# Patient Record
Sex: FEMALE | Race: WHITE | Employment: OTHER | URBAN - METROPOLITAN AREA
[De-identification: names, ages, dates, MRNs, and addresses within clinical notes are randomized per-mention and may not be internally consistent; named-entity substitution may affect disease eponyms.]

---

## 2017-01-05 ENCOUNTER — TELEPHONE (OUTPATIENT)
Dept: ORTHOPEDIC SURGERY | Age: 67
End: 2017-01-05

## 2017-01-06 ENCOUNTER — OFFICE VISIT (OUTPATIENT)
Dept: ORTHOPEDIC SURGERY | Age: 67
End: 2017-01-06

## 2017-01-06 VITALS — RESPIRATION RATE: 16 BRPM | WEIGHT: 171 LBS | HEIGHT: 62 IN | BODY MASS INDEX: 31.47 KG/M2

## 2017-01-06 DIAGNOSIS — M65.341 TRIGGER RING FINGER OF RIGHT HAND: ICD-10-CM

## 2017-01-06 DIAGNOSIS — M79.642 PAIN OF LEFT HAND: Primary | ICD-10-CM

## 2017-01-06 DIAGNOSIS — G56.03 BILATERAL CARPAL TUNNEL SYNDROME: ICD-10-CM

## 2017-01-06 PROCEDURE — 73130 X-RAY EXAM OF HAND: CPT | Performed by: ORTHOPAEDIC SURGERY

## 2017-01-06 PROCEDURE — 20550 NJX 1 TENDON SHEATH/LIGAMENT: CPT | Performed by: ORTHOPAEDIC SURGERY

## 2017-01-06 PROCEDURE — 99213 OFFICE O/P EST LOW 20 MIN: CPT | Performed by: ORTHOPAEDIC SURGERY

## 2017-01-13 ENCOUNTER — OFFICE VISIT (OUTPATIENT)
Dept: ORTHOPEDIC SURGERY | Age: 67
End: 2017-01-13

## 2017-01-13 VITALS — RESPIRATION RATE: 16 BRPM | WEIGHT: 171 LBS | HEIGHT: 62 IN | BODY MASS INDEX: 31.47 KG/M2

## 2017-01-13 DIAGNOSIS — G56.03 BILATERAL CARPAL TUNNEL SYNDROME: Primary | ICD-10-CM

## 2017-01-13 PROCEDURE — 99024 POSTOP FOLLOW-UP VISIT: CPT | Performed by: ORTHOPAEDIC SURGERY

## 2017-02-01 ENCOUNTER — TELEPHONE (OUTPATIENT)
Dept: ORTHOPEDIC SURGERY | Age: 67
End: 2017-02-01

## 2017-02-20 RX ORDER — ROSUVASTATIN CALCIUM 40 MG/1
TABLET, COATED ORAL
Qty: 30 TABLET | Refills: 0 | Status: SHIPPED | OUTPATIENT
Start: 2017-02-20 | End: 2017-08-07

## 2017-03-01 RX ORDER — METOPROLOL SUCCINATE 50 MG/1
50 TABLET, EXTENDED RELEASE ORAL DAILY
Qty: 180 TABLET | Refills: 0 | Status: SHIPPED | OUTPATIENT
Start: 2017-03-01 | End: 2017-06-30 | Stop reason: SDUPTHER

## 2017-03-27 ENCOUNTER — OFFICE VISIT (OUTPATIENT)
Dept: INTERNAL MEDICINE | Age: 67
End: 2017-03-27

## 2017-03-27 VITALS
WEIGHT: 174 LBS | HEART RATE: 68 BPM | HEIGHT: 63 IN | RESPIRATION RATE: 16 BRPM | BODY MASS INDEX: 30.83 KG/M2 | SYSTOLIC BLOOD PRESSURE: 108 MMHG | TEMPERATURE: 97.7 F | DIASTOLIC BLOOD PRESSURE: 72 MMHG

## 2017-03-27 DIAGNOSIS — G56.03 BILATERAL CARPAL TUNNEL SYNDROME: ICD-10-CM

## 2017-03-27 DIAGNOSIS — J44.1 CHRONIC OBSTRUCTIVE PULMONARY DISEASE WITH ACUTE EXACERBATION (HCC): ICD-10-CM

## 2017-03-27 DIAGNOSIS — H53.9 VISUAL CHANGES: ICD-10-CM

## 2017-03-27 DIAGNOSIS — E03.9 HYPOTHYROIDISM, UNSPECIFIED TYPE: ICD-10-CM

## 2017-03-27 DIAGNOSIS — I25.10 CORONARY ARTERY DISEASE INVOLVING NATIVE CORONARY ARTERY OF NATIVE HEART WITHOUT ANGINA PECTORIS: ICD-10-CM

## 2017-03-27 DIAGNOSIS — Z01.818 PRE-OP EXAM: Primary | ICD-10-CM

## 2017-03-27 DIAGNOSIS — E78.00 PURE HYPERCHOLESTEROLEMIA: ICD-10-CM

## 2017-03-27 DIAGNOSIS — I10 ESSENTIAL HYPERTENSION: ICD-10-CM

## 2017-03-27 LAB
A/G RATIO: 1.6 (ref 1.1–2.2)
ALBUMIN SERPL-MCNC: 4.1 G/DL (ref 3.4–5)
ALP BLD-CCNC: 90 U/L (ref 40–129)
ALT SERPL-CCNC: 23 U/L (ref 10–40)
ANION GAP SERPL CALCULATED.3IONS-SCNC: 18 MMOL/L (ref 3–16)
AST SERPL-CCNC: 20 U/L (ref 15–37)
BILIRUB SERPL-MCNC: <0.2 MG/DL (ref 0–1)
BUN BLDV-MCNC: 21 MG/DL (ref 7–20)
CALCIUM SERPL-MCNC: 9.1 MG/DL (ref 8.3–10.6)
CHLORIDE BLD-SCNC: 107 MMOL/L (ref 99–110)
CO2: 23 MMOL/L (ref 21–32)
CREAT SERPL-MCNC: 0.5 MG/DL (ref 0.6–1.2)
GFR AFRICAN AMERICAN: >60
GFR NON-AFRICAN AMERICAN: >60
GLOBULIN: 2.5 G/DL
GLUCOSE BLD-MCNC: 130 MG/DL (ref 70–99)
HCT VFR BLD CALC: 38.2 % (ref 36–48)
HEMOGLOBIN: 12.5 G/DL (ref 12–16)
MCH RBC QN AUTO: 29.9 PG (ref 26–34)
MCHC RBC AUTO-ENTMCNC: 32.6 G/DL (ref 31–36)
MCV RBC AUTO: 91.8 FL (ref 80–100)
PDW BLD-RTO: 14.9 % (ref 12.4–15.4)
PLATELET # BLD: 239 K/UL (ref 135–450)
PMV BLD AUTO: 8.4 FL (ref 5–10.5)
POTASSIUM SERPL-SCNC: 3.6 MMOL/L (ref 3.5–5.1)
RBC # BLD: 4.17 M/UL (ref 4–5.2)
SEDIMENTATION RATE, ERYTHROCYTE: 14 MM/HR (ref 0–30)
SODIUM BLD-SCNC: 148 MMOL/L (ref 136–145)
TOTAL PROTEIN: 6.6 G/DL (ref 6.4–8.2)
WBC # BLD: 6.6 K/UL (ref 4–11)

## 2017-03-27 PROCEDURE — G8484 FLU IMMUNIZE NO ADMIN: HCPCS | Performed by: INTERNAL MEDICINE

## 2017-03-27 PROCEDURE — 99215 OFFICE O/P EST HI 40 MIN: CPT | Performed by: INTERNAL MEDICINE

## 2017-03-27 PROCEDURE — G8926 SPIRO NO PERF OR DOC: HCPCS | Performed by: INTERNAL MEDICINE

## 2017-03-27 PROCEDURE — G8417 CALC BMI ABV UP PARAM F/U: HCPCS | Performed by: INTERNAL MEDICINE

## 2017-03-27 PROCEDURE — 81002 URINALYSIS NONAUTO W/O SCOPE: CPT | Performed by: INTERNAL MEDICINE

## 2017-03-27 PROCEDURE — G8427 DOCREV CUR MEDS BY ELIG CLIN: HCPCS | Performed by: INTERNAL MEDICINE

## 2017-03-27 PROCEDURE — 3017F COLORECTAL CA SCREEN DOC REV: CPT | Performed by: INTERNAL MEDICINE

## 2017-03-27 PROCEDURE — G8399 PT W/DXA RESULTS DOCUMENT: HCPCS | Performed by: INTERNAL MEDICINE

## 2017-03-27 PROCEDURE — 1090F PRES/ABSN URINE INCON ASSESS: CPT | Performed by: INTERNAL MEDICINE

## 2017-03-27 PROCEDURE — G8598 ASA/ANTIPLAT THER USED: HCPCS | Performed by: INTERNAL MEDICINE

## 2017-03-27 PROCEDURE — 36415 COLL VENOUS BLD VENIPUNCTURE: CPT | Performed by: INTERNAL MEDICINE

## 2017-03-27 PROCEDURE — 3023F SPIROM DOC REV: CPT | Performed by: INTERNAL MEDICINE

## 2017-03-27 PROCEDURE — 4040F PNEUMOC VAC/ADMIN/RCVD: CPT | Performed by: INTERNAL MEDICINE

## 2017-03-27 PROCEDURE — 1036F TOBACCO NON-USER: CPT | Performed by: INTERNAL MEDICINE

## 2017-03-27 PROCEDURE — 3014F SCREEN MAMMO DOC REV: CPT | Performed by: INTERNAL MEDICINE

## 2017-03-27 PROCEDURE — 1123F ACP DISCUSS/DSCN MKR DOCD: CPT | Performed by: INTERNAL MEDICINE

## 2017-03-27 ASSESSMENT — ENCOUNTER SYMPTOMS: GASTROINTESTINAL NEGATIVE: 1

## 2017-03-28 LAB
ANA INTERPRETATION: NORMAL
ANTI-NUCLEAR ANTIBODY (ANA): NEGATIVE

## 2017-04-03 ASSESSMENT — ENCOUNTER SYMPTOMS
ALLERGIC/IMMUNOLOGIC NEGATIVE: 1
RESPIRATORY NEGATIVE: 1

## 2017-04-04 ENCOUNTER — SURG/PROC ORDERS (OUTPATIENT)
Dept: ANESTHESIOLOGY | Age: 67
End: 2017-04-04

## 2017-04-04 RX ORDER — SODIUM CHLORIDE 0.9 % (FLUSH) 0.9 %
10 SYRINGE (ML) INJECTION PRN
Status: CANCELLED | OUTPATIENT
Start: 2017-04-04

## 2017-04-04 RX ORDER — SODIUM CHLORIDE 9 MG/ML
INJECTION, SOLUTION INTRAVENOUS CONTINUOUS
Status: CANCELLED | OUTPATIENT
Start: 2017-04-04

## 2017-04-04 RX ORDER — SODIUM CHLORIDE 0.9 % (FLUSH) 0.9 %
10 SYRINGE (ML) INJECTION EVERY 12 HOURS SCHEDULED
Status: CANCELLED | OUTPATIENT
Start: 2017-04-04

## 2017-04-06 ENCOUNTER — HOSPITAL ENCOUNTER (OUTPATIENT)
Dept: SURGERY | Age: 67
Discharge: HOME | End: 2017-04-06
Attending: ORTHOPAEDIC SURGERY | Admitting: ORTHOPAEDIC SURGERY

## 2017-04-06 VITALS
HEART RATE: 61 BPM | WEIGHT: 172.29 LBS | HEIGHT: 62 IN | SYSTOLIC BLOOD PRESSURE: 143 MMHG | BODY MASS INDEX: 31.71 KG/M2 | TEMPERATURE: 97.8 F | DIASTOLIC BLOOD PRESSURE: 63 MMHG | OXYGEN SATURATION: 98 % | RESPIRATION RATE: 12 BRPM

## 2017-04-06 PROCEDURE — 93010 ELECTROCARDIOGRAM REPORT: CPT | Performed by: INTERNAL MEDICINE

## 2017-04-06 RX ORDER — ONDANSETRON 2 MG/ML
4 INJECTION INTRAMUSCULAR; INTRAVENOUS
Status: ACTIVE | OUTPATIENT
Start: 2017-04-06 | End: 2017-04-06

## 2017-04-06 RX ORDER — MORPHINE SULFATE 2 MG/ML
1 INJECTION, SOLUTION INTRAMUSCULAR; INTRAVENOUS EVERY 5 MIN PRN
Status: DISCONTINUED | OUTPATIENT
Start: 2017-04-06 | End: 2017-04-07 | Stop reason: HOSPADM

## 2017-04-06 RX ORDER — MEPERIDINE HYDROCHLORIDE 25 MG/ML
12.5 INJECTION INTRAMUSCULAR; INTRAVENOUS; SUBCUTANEOUS EVERY 5 MIN PRN
Status: DISCONTINUED | OUTPATIENT
Start: 2017-04-06 | End: 2017-04-07 | Stop reason: HOSPADM

## 2017-04-06 RX ORDER — SODIUM CHLORIDE 0.9 % (FLUSH) 0.9 %
10 SYRINGE (ML) INJECTION PRN
Status: DISCONTINUED | OUTPATIENT
Start: 2017-04-06 | End: 2017-04-07 | Stop reason: HOSPADM

## 2017-04-06 RX ORDER — OXYCODONE HYDROCHLORIDE AND ACETAMINOPHEN 5; 325 MG/1; MG/1
1 TABLET ORAL PRN
Status: ACTIVE | OUTPATIENT
Start: 2017-04-06 | End: 2017-04-06

## 2017-04-06 RX ORDER — FENTANYL CITRATE 50 UG/ML
50 INJECTION, SOLUTION INTRAMUSCULAR; INTRAVENOUS EVERY 5 MIN PRN
Status: DISCONTINUED | OUTPATIENT
Start: 2017-04-06 | End: 2017-04-07 | Stop reason: HOSPADM

## 2017-04-06 RX ORDER — SODIUM CHLORIDE 0.9 % (FLUSH) 0.9 %
10 SYRINGE (ML) INJECTION EVERY 12 HOURS SCHEDULED
Status: DISCONTINUED | OUTPATIENT
Start: 2017-04-06 | End: 2017-04-07 | Stop reason: HOSPADM

## 2017-04-06 RX ORDER — MORPHINE SULFATE 2 MG/ML
2 INJECTION, SOLUTION INTRAMUSCULAR; INTRAVENOUS EVERY 5 MIN PRN
Status: DISCONTINUED | OUTPATIENT
Start: 2017-04-06 | End: 2017-04-07 | Stop reason: HOSPADM

## 2017-04-06 RX ORDER — SODIUM CHLORIDE 9 MG/ML
INJECTION, SOLUTION INTRAVENOUS CONTINUOUS
Status: DISCONTINUED | OUTPATIENT
Start: 2017-04-06 | End: 2017-04-07 | Stop reason: HOSPADM

## 2017-04-06 RX ORDER — FENTANYL CITRATE 50 UG/ML
25 INJECTION, SOLUTION INTRAMUSCULAR; INTRAVENOUS EVERY 5 MIN PRN
Status: DISCONTINUED | OUTPATIENT
Start: 2017-04-06 | End: 2017-04-07 | Stop reason: HOSPADM

## 2017-04-06 RX ORDER — OXYCODONE HYDROCHLORIDE AND ACETAMINOPHEN 5; 325 MG/1; MG/1
2 TABLET ORAL PRN
Status: ACTIVE | OUTPATIENT
Start: 2017-04-06 | End: 2017-04-06

## 2017-04-06 RX ADMIN — SODIUM CHLORIDE: 9 INJECTION, SOLUTION INTRAVENOUS at 06:52

## 2017-04-06 ASSESSMENT — ENCOUNTER SYMPTOMS: SHORTNESS OF BREATH: 1

## 2017-04-06 ASSESSMENT — PAIN SCALES - GENERAL: PAINLEVEL_OUTOF10: 3

## 2017-04-06 ASSESSMENT — PAIN - FUNCTIONAL ASSESSMENT: PAIN_FUNCTIONAL_ASSESSMENT: 0-10

## 2017-04-07 LAB
EKG ATRIAL RATE: 61 BPM
EKG DIAGNOSIS: NORMAL
EKG P AXIS: 28 DEGREES
EKG P-R INTERVAL: 238 MS
EKG Q-T INTERVAL: 412 MS
EKG QRS DURATION: 88 MS
EKG QTC CALCULATION (BAZETT): 414 MS
EKG R AXIS: -20 DEGREES
EKG T AXIS: -4 DEGREES
EKG VENTRICULAR RATE: 61 BPM

## 2017-04-11 RX ORDER — LEVOTHYROXINE SODIUM 137 UG/1
TABLET ORAL
Qty: 90 TABLET | Refills: 3 | Status: SHIPPED | OUTPATIENT
Start: 2017-04-11 | End: 2017-09-08 | Stop reason: SDUPTHER

## 2017-04-12 ENCOUNTER — OFFICE VISIT (OUTPATIENT)
Dept: ORTHOPEDIC SURGERY | Age: 67
End: 2017-04-12

## 2017-04-12 VITALS — BODY MASS INDEX: 30.48 KG/M2 | WEIGHT: 172 LBS | RESPIRATION RATE: 16 BRPM | HEIGHT: 63 IN

## 2017-04-12 DIAGNOSIS — G56.03 BILATERAL CARPAL TUNNEL SYNDROME: Primary | ICD-10-CM

## 2017-04-12 PROCEDURE — 99024 POSTOP FOLLOW-UP VISIT: CPT | Performed by: PHYSICIAN ASSISTANT

## 2017-04-19 ENCOUNTER — OFFICE VISIT (OUTPATIENT)
Dept: ORTHOPEDIC SURGERY | Age: 67
End: 2017-04-19

## 2017-04-19 VITALS — BODY MASS INDEX: 30.12 KG/M2 | HEIGHT: 63 IN | WEIGHT: 170 LBS | RESPIRATION RATE: 16 BRPM

## 2017-04-19 DIAGNOSIS — G56.03 BILATERAL CARPAL TUNNEL SYNDROME: Primary | ICD-10-CM

## 2017-04-19 PROCEDURE — 99024 POSTOP FOLLOW-UP VISIT: CPT | Performed by: ORTHOPAEDIC SURGERY

## 2017-05-16 ENCOUNTER — TELEPHONE (OUTPATIENT)
Dept: ORTHOPEDIC SURGERY | Age: 67
End: 2017-05-16

## 2017-05-19 ENCOUNTER — OFFICE VISIT (OUTPATIENT)
Dept: ORTHOPEDIC SURGERY | Age: 67
End: 2017-05-19

## 2017-05-19 VITALS — RESPIRATION RATE: 16 BRPM | HEIGHT: 63 IN | BODY MASS INDEX: 30.12 KG/M2 | WEIGHT: 170 LBS

## 2017-05-19 DIAGNOSIS — G56.03 BILATERAL CARPAL TUNNEL SYNDROME: Primary | ICD-10-CM

## 2017-05-19 PROCEDURE — 99024 POSTOP FOLLOW-UP VISIT: CPT | Performed by: ORTHOPAEDIC SURGERY

## 2017-06-08 ENCOUNTER — OFFICE VISIT (OUTPATIENT)
Dept: INTERNAL MEDICINE | Age: 67
End: 2017-06-08

## 2017-06-08 VITALS
WEIGHT: 177 LBS | RESPIRATION RATE: 16 BRPM | SYSTOLIC BLOOD PRESSURE: 118 MMHG | DIASTOLIC BLOOD PRESSURE: 78 MMHG | BODY MASS INDEX: 31.86 KG/M2 | HEART RATE: 64 BPM

## 2017-06-08 DIAGNOSIS — J44.1 CHRONIC OBSTRUCTIVE PULMONARY DISEASE WITH ACUTE EXACERBATION (HCC): ICD-10-CM

## 2017-06-08 DIAGNOSIS — Z23 NEED FOR TDAP VACCINATION: ICD-10-CM

## 2017-06-08 DIAGNOSIS — E78.00 PURE HYPERCHOLESTEROLEMIA: ICD-10-CM

## 2017-06-08 DIAGNOSIS — E03.9 HYPOTHYROIDISM, UNSPECIFIED TYPE: ICD-10-CM

## 2017-06-08 DIAGNOSIS — I10 ESSENTIAL HYPERTENSION: Primary | ICD-10-CM

## 2017-06-08 PROCEDURE — G8926 SPIRO NO PERF OR DOC: HCPCS | Performed by: INTERNAL MEDICINE

## 2017-06-08 PROCEDURE — 1123F ACP DISCUSS/DSCN MKR DOCD: CPT | Performed by: INTERNAL MEDICINE

## 2017-06-08 PROCEDURE — G8598 ASA/ANTIPLAT THER USED: HCPCS | Performed by: INTERNAL MEDICINE

## 2017-06-08 PROCEDURE — 90715 TDAP VACCINE 7 YRS/> IM: CPT | Performed by: INTERNAL MEDICINE

## 2017-06-08 PROCEDURE — 3014F SCREEN MAMMO DOC REV: CPT | Performed by: INTERNAL MEDICINE

## 2017-06-08 PROCEDURE — 4040F PNEUMOC VAC/ADMIN/RCVD: CPT | Performed by: INTERNAL MEDICINE

## 2017-06-08 PROCEDURE — 90471 IMMUNIZATION ADMIN: CPT | Performed by: INTERNAL MEDICINE

## 2017-06-08 PROCEDURE — 3017F COLORECTAL CA SCREEN DOC REV: CPT | Performed by: INTERNAL MEDICINE

## 2017-06-08 PROCEDURE — G8417 CALC BMI ABV UP PARAM F/U: HCPCS | Performed by: INTERNAL MEDICINE

## 2017-06-08 PROCEDURE — G8399 PT W/DXA RESULTS DOCUMENT: HCPCS | Performed by: INTERNAL MEDICINE

## 2017-06-08 PROCEDURE — 1090F PRES/ABSN URINE INCON ASSESS: CPT | Performed by: INTERNAL MEDICINE

## 2017-06-08 PROCEDURE — 99214 OFFICE O/P EST MOD 30 MIN: CPT | Performed by: INTERNAL MEDICINE

## 2017-06-08 PROCEDURE — 1036F TOBACCO NON-USER: CPT | Performed by: INTERNAL MEDICINE

## 2017-06-08 PROCEDURE — G8427 DOCREV CUR MEDS BY ELIG CLIN: HCPCS | Performed by: INTERNAL MEDICINE

## 2017-06-08 PROCEDURE — 3023F SPIROM DOC REV: CPT | Performed by: INTERNAL MEDICINE

## 2017-06-08 RX ORDER — POTASSIUM CHLORIDE 750 MG/1
10 CAPSULE, EXTENDED RELEASE ORAL 2 TIMES DAILY
COMMUNITY
End: 2017-07-11 | Stop reason: SDUPTHER

## 2017-06-08 RX ORDER — IBUPROFEN 600 MG/1
600 TABLET ORAL EVERY 6 HOURS PRN
Status: ON HOLD | COMMUNITY
End: 2018-08-10 | Stop reason: HOSPADM

## 2017-06-08 RX ORDER — BENAZEPRIL/HYDROCHLOROTHIAZIDE 20 MG-25MG
1 TABLET ORAL DAILY
COMMUNITY
End: 2017-09-08 | Stop reason: SDUPTHER

## 2017-06-08 RX ORDER — ESOMEPRAZOLE MAGNESIUM 40 MG/1
40 FOR SUSPENSION ORAL DAILY
COMMUNITY

## 2017-06-08 ASSESSMENT — ENCOUNTER SYMPTOMS
ALLERGIC/IMMUNOLOGIC NEGATIVE: 1
GASTROINTESTINAL NEGATIVE: 1

## 2017-06-22 LAB
A/G RATIO: 1.5 (ref 1.1–2.2)
ALBUMIN SERPL-MCNC: 4.3 G/DL (ref 3.4–5)
ALP BLD-CCNC: 80 U/L (ref 40–129)
ALT SERPL-CCNC: 19 U/L (ref 10–40)
ANION GAP SERPL CALCULATED.3IONS-SCNC: 13 MMOL/L (ref 3–16)
AST SERPL-CCNC: 17 U/L (ref 15–37)
BILIRUB SERPL-MCNC: 0.3 MG/DL (ref 0–1)
BUN BLDV-MCNC: 27 MG/DL (ref 7–20)
CALCIUM SERPL-MCNC: 9.9 MG/DL (ref 8.3–10.6)
CHLORIDE BLD-SCNC: 101 MMOL/L (ref 99–110)
CHOLESTEROL, TOTAL: 146 MG/DL (ref 0–199)
CO2: 27 MMOL/L (ref 21–32)
CREAT SERPL-MCNC: 0.7 MG/DL (ref 0.6–1.2)
GFR AFRICAN AMERICAN: >60
GFR NON-AFRICAN AMERICAN: >60
GLOBULIN: 2.9 G/DL
GLUCOSE BLD-MCNC: 110 MG/DL (ref 70–99)
HDLC SERPL-MCNC: 42 MG/DL (ref 40–60)
LDL CHOLESTEROL CALCULATED: 64 MG/DL
POTASSIUM SERPL-SCNC: 4.2 MMOL/L (ref 3.5–5.1)
SODIUM BLD-SCNC: 141 MMOL/L (ref 136–145)
TOTAL PROTEIN: 7.2 G/DL (ref 6.4–8.2)
TRIGL SERPL-MCNC: 200 MG/DL (ref 0–150)
TSH SERPL DL<=0.05 MIU/L-ACNC: 0.53 UIU/ML (ref 0.27–4.2)
VLDLC SERPL CALC-MCNC: 40 MG/DL

## 2017-06-30 ENCOUNTER — OFFICE VISIT (OUTPATIENT)
Dept: INTERNAL MEDICINE | Age: 67
End: 2017-06-30

## 2017-06-30 VITALS
BODY MASS INDEX: 31.5 KG/M2 | SYSTOLIC BLOOD PRESSURE: 112 MMHG | HEART RATE: 68 BPM | DIASTOLIC BLOOD PRESSURE: 62 MMHG | RESPIRATION RATE: 16 BRPM | WEIGHT: 175 LBS

## 2017-06-30 DIAGNOSIS — R60.0 LOCALIZED EDEMA: Primary | ICD-10-CM

## 2017-06-30 PROCEDURE — 4040F PNEUMOC VAC/ADMIN/RCVD: CPT | Performed by: INTERNAL MEDICINE

## 2017-06-30 PROCEDURE — 3017F COLORECTAL CA SCREEN DOC REV: CPT | Performed by: INTERNAL MEDICINE

## 2017-06-30 PROCEDURE — 1123F ACP DISCUSS/DSCN MKR DOCD: CPT | Performed by: INTERNAL MEDICINE

## 2017-06-30 PROCEDURE — 1036F TOBACCO NON-USER: CPT | Performed by: INTERNAL MEDICINE

## 2017-06-30 PROCEDURE — G8399 PT W/DXA RESULTS DOCUMENT: HCPCS | Performed by: INTERNAL MEDICINE

## 2017-06-30 PROCEDURE — G8598 ASA/ANTIPLAT THER USED: HCPCS | Performed by: INTERNAL MEDICINE

## 2017-06-30 PROCEDURE — G8417 CALC BMI ABV UP PARAM F/U: HCPCS | Performed by: INTERNAL MEDICINE

## 2017-06-30 PROCEDURE — 1090F PRES/ABSN URINE INCON ASSESS: CPT | Performed by: INTERNAL MEDICINE

## 2017-06-30 PROCEDURE — 99213 OFFICE O/P EST LOW 20 MIN: CPT | Performed by: INTERNAL MEDICINE

## 2017-06-30 PROCEDURE — G8427 DOCREV CUR MEDS BY ELIG CLIN: HCPCS | Performed by: INTERNAL MEDICINE

## 2017-06-30 PROCEDURE — 3014F SCREEN MAMMO DOC REV: CPT | Performed by: INTERNAL MEDICINE

## 2017-06-30 RX ORDER — ALBUTEROL SULFATE 90 UG/1
2 AEROSOL, METERED RESPIRATORY (INHALATION) EVERY 6 HOURS PRN
COMMUNITY
End: 2017-09-08 | Stop reason: SDUPTHER

## 2017-06-30 RX ORDER — METOPROLOL SUCCINATE 50 MG/1
50 TABLET, EXTENDED RELEASE ORAL DAILY
COMMUNITY
End: 2017-11-09

## 2017-06-30 ASSESSMENT — ENCOUNTER SYMPTOMS
GASTROINTESTINAL NEGATIVE: 1
ALLERGIC/IMMUNOLOGIC NEGATIVE: 1

## 2017-07-11 RX ORDER — POTASSIUM CHLORIDE 750 MG/1
10 CAPSULE, EXTENDED RELEASE ORAL 2 TIMES DAILY
Qty: 60 CAPSULE | Refills: 3 | Status: SHIPPED | OUTPATIENT
Start: 2017-07-11 | End: 2017-09-08 | Stop reason: SDUPTHER

## 2017-08-07 RX ORDER — ROSUVASTATIN CALCIUM 40 MG/1
TABLET, COATED ORAL
Qty: 30 TABLET | Refills: 0 | Status: SHIPPED | OUTPATIENT
Start: 2017-08-07 | End: 2017-09-08 | Stop reason: SDUPTHER

## 2017-08-30 ENCOUNTER — OFFICE VISIT (OUTPATIENT)
Dept: ORTHOPEDIC SURGERY | Age: 67
End: 2017-08-30

## 2017-08-30 VITALS — HEIGHT: 63 IN | WEIGHT: 170 LBS | RESPIRATION RATE: 18 BRPM | BODY MASS INDEX: 30.12 KG/M2

## 2017-08-30 DIAGNOSIS — M65.331 TRIGGER MIDDLE FINGER OF RIGHT HAND: Primary | ICD-10-CM

## 2017-08-30 PROCEDURE — 4040F PNEUMOC VAC/ADMIN/RCVD: CPT | Performed by: ORTHOPAEDIC SURGERY

## 2017-08-30 PROCEDURE — 3014F SCREEN MAMMO DOC REV: CPT | Performed by: ORTHOPAEDIC SURGERY

## 2017-08-30 PROCEDURE — G8427 DOCREV CUR MEDS BY ELIG CLIN: HCPCS | Performed by: ORTHOPAEDIC SURGERY

## 2017-08-30 PROCEDURE — 20550 NJX 1 TENDON SHEATH/LIGAMENT: CPT | Performed by: ORTHOPAEDIC SURGERY

## 2017-08-30 PROCEDURE — 1123F ACP DISCUSS/DSCN MKR DOCD: CPT | Performed by: ORTHOPAEDIC SURGERY

## 2017-08-30 PROCEDURE — 1036F TOBACCO NON-USER: CPT | Performed by: ORTHOPAEDIC SURGERY

## 2017-08-30 PROCEDURE — 3017F COLORECTAL CA SCREEN DOC REV: CPT | Performed by: ORTHOPAEDIC SURGERY

## 2017-08-30 PROCEDURE — G8598 ASA/ANTIPLAT THER USED: HCPCS | Performed by: ORTHOPAEDIC SURGERY

## 2017-08-30 PROCEDURE — 99214 OFFICE O/P EST MOD 30 MIN: CPT | Performed by: ORTHOPAEDIC SURGERY

## 2017-08-30 PROCEDURE — G8399 PT W/DXA RESULTS DOCUMENT: HCPCS | Performed by: ORTHOPAEDIC SURGERY

## 2017-08-30 PROCEDURE — G8417 CALC BMI ABV UP PARAM F/U: HCPCS | Performed by: ORTHOPAEDIC SURGERY

## 2017-08-30 PROCEDURE — 1090F PRES/ABSN URINE INCON ASSESS: CPT | Performed by: ORTHOPAEDIC SURGERY

## 2017-11-09 ENCOUNTER — OFFICE VISIT (OUTPATIENT)
Dept: INTERNAL MEDICINE | Age: 67
End: 2017-11-09

## 2017-11-09 VITALS
SYSTOLIC BLOOD PRESSURE: 114 MMHG | HEART RATE: 72 BPM | DIASTOLIC BLOOD PRESSURE: 74 MMHG | OXYGEN SATURATION: 93 % | WEIGHT: 171 LBS | BODY MASS INDEX: 30.78 KG/M2

## 2017-11-09 DIAGNOSIS — E78.2 MIXED HYPERLIPIDEMIA: ICD-10-CM

## 2017-11-09 DIAGNOSIS — I10 ESSENTIAL HYPERTENSION: ICD-10-CM

## 2017-11-09 DIAGNOSIS — E03.9 HYPOTHYROIDISM, UNSPECIFIED TYPE: ICD-10-CM

## 2017-11-09 DIAGNOSIS — Z00.00 ROUTINE GENERAL MEDICAL EXAMINATION AT A HEALTH CARE FACILITY: Primary | ICD-10-CM

## 2017-11-09 DIAGNOSIS — Z78.0 ASYMPTOMATIC MENOPAUSAL STATE: ICD-10-CM

## 2017-11-09 DIAGNOSIS — Z12.11 SCREENING FOR COLORECTAL CANCER: ICD-10-CM

## 2017-11-09 DIAGNOSIS — Z13.6 SCREENING FOR CARDIOVASCULAR CONDITION: ICD-10-CM

## 2017-11-09 DIAGNOSIS — Z72.89 OTHER PROBLEMS RELATED TO LIFESTYLE: ICD-10-CM

## 2017-11-09 DIAGNOSIS — Z13.1 SCREENING FOR DIABETES MELLITUS (DM): ICD-10-CM

## 2017-11-09 DIAGNOSIS — I25.10 CORONARY ARTERY DISEASE INVOLVING NATIVE CORONARY ARTERY OF NATIVE HEART WITHOUT ANGINA PECTORIS: ICD-10-CM

## 2017-11-09 DIAGNOSIS — M48.061 SPINAL STENOSIS OF LUMBAR REGION, UNSPECIFIED WHETHER NEUROGENIC CLAUDICATION PRESENT: ICD-10-CM

## 2017-11-09 DIAGNOSIS — C34.10 MALIGNANT NEOPLASM OF UPPER LOBE OF LUNG, UNSPECIFIED LATERALITY (HCC): ICD-10-CM

## 2017-11-09 DIAGNOSIS — Z12.12 SCREENING FOR COLORECTAL CANCER: ICD-10-CM

## 2017-11-09 DIAGNOSIS — Z23 NEED FOR 23-POLYVALENT PNEUMOCOCCAL POLYSACCHARIDE VACCINE: ICD-10-CM

## 2017-11-09 DIAGNOSIS — I71.21 ASCENDING AORTIC ANEURYSM: ICD-10-CM

## 2017-11-09 DIAGNOSIS — J44.1 CHRONIC OBSTRUCTIVE PULMONARY DISEASE WITH ACUTE EXACERBATION (HCC): ICD-10-CM

## 2017-11-09 PROCEDURE — G0009 ADMIN PNEUMOCOCCAL VACCINE: HCPCS | Performed by: INTERNAL MEDICINE

## 2017-11-09 PROCEDURE — 93000 ELECTROCARDIOGRAM COMPLETE: CPT | Performed by: INTERNAL MEDICINE

## 2017-11-09 PROCEDURE — 90732 PPSV23 VACC 2 YRS+ SUBQ/IM: CPT | Performed by: INTERNAL MEDICINE

## 2017-11-09 PROCEDURE — G0439 PPPS, SUBSEQ VISIT: HCPCS | Performed by: INTERNAL MEDICINE

## 2017-11-09 PROCEDURE — G8598 ASA/ANTIPLAT THER USED: HCPCS | Performed by: INTERNAL MEDICINE

## 2017-11-09 RX ORDER — ROSUVASTATIN CALCIUM 40 MG/1
TABLET, COATED ORAL
Qty: 90 TABLET | Refills: 0 | Status: SHIPPED | OUTPATIENT
Start: 2017-11-09 | End: 2018-03-13 | Stop reason: SDUPTHER

## 2017-11-09 RX ORDER — METOPROLOL SUCCINATE 50 MG/1
TABLET, EXTENDED RELEASE ORAL
Qty: 90 TABLET | Refills: 0 | Status: SHIPPED | OUTPATIENT
Start: 2017-11-09 | End: 2018-03-13 | Stop reason: SDUPTHER

## 2017-11-09 RX ORDER — BENAZEPRIL/HYDROCHLOROTHIAZIDE 20 MG-25MG
1 TABLET ORAL DAILY
Qty: 90 TABLET | Refills: 0 | Status: SHIPPED | OUTPATIENT
Start: 2017-11-09 | End: 2018-06-14 | Stop reason: SDUPTHER

## 2017-11-09 RX ORDER — LEVOTHYROXINE SODIUM 137 UG/1
TABLET ORAL
Qty: 90 TABLET | Refills: 0 | Status: SHIPPED | OUTPATIENT
Start: 2017-11-09 | End: 2018-03-13 | Stop reason: SDUPTHER

## 2017-11-09 ASSESSMENT — ENCOUNTER SYMPTOMS
RHINORRHEA: 0
STRIDOR: 0
EYE DISCHARGE: 0
FACIAL SWELLING: 0
EYE ITCHING: 0
SHORTNESS OF BREATH: 0
ANAL BLEEDING: 0
EYE REDNESS: 0
CONSTIPATION: 0
ABDOMINAL PAIN: 0
ABDOMINAL DISTENTION: 0
VOICE CHANGE: 0
WHEEZING: 0
NAUSEA: 0
BACK PAIN: 0
TROUBLE SWALLOWING: 0
COLOR CHANGE: 0
APNEA: 0
RECTAL PAIN: 0
PHOTOPHOBIA: 0
COUGH: 0
SORE THROAT: 0
SINUS PRESSURE: 0
CHOKING: 0
DIARRHEA: 0
BLOOD IN STOOL: 0
VOMITING: 0
EYE PAIN: 0
CHEST TIGHTNESS: 0

## 2017-11-09 ASSESSMENT — PATIENT HEALTH QUESTIONNAIRE - PHQ9
2. FEELING DOWN, DEPRESSED OR HOPELESS: 0
SUM OF ALL RESPONSES TO PHQ QUESTIONS 1-9: 0
1. LITTLE INTEREST OR PLEASURE IN DOING THINGS: 0
SUM OF ALL RESPONSES TO PHQ9 QUESTIONS 1 & 2: 0

## 2017-11-09 NOTE — PATIENT INSTRUCTIONS
good cholesterol, this type of cholesterol actually carries cholesterol away from your arteries and may, therefore, help lower your risk of having a heart attack. You want this level to be high (ideally greater than 60). It is a risk to have a level less than 40. You can raise this good cholesterol by eating olive oil, canola oil, avocados, or nuts. Exercise raises this level, too. Fat    Fat is calorie dense and packs a lot of calories into a small amount of food. Even though fats should be limited due to their high calorie content, not all fats are bad. In fact, some fats are quite healthful. Fat can be broken down into four main types. The good-for-you fats are:   Monounsaturated fat  found in oils such as olive and canola, avocados, and nuts and natural nut butters; can decrease cholesterol levels, while keeping levels of HDL cholesterol high   Polyunsaturated fat  found in oils such as safflower, sunflower, soybean, corn, and sesame; can decrease total cholesterol and LDL cholesterol   Omega-3 fatty acids  particularly those found in fatty fish (such as salmon, trout, tuna, mackerel, herring, and sardines); can decrease risk of arrhythmias, decrease triglyceride levels, and slightly lower blood pressure   The fats that you want to limit are:   Saturated fat  found in animal products, many fast foods, and a few vegetables; increases total blood cholesterol, including LDL levels   Animal fats that are saturated include: butter, lard, whole-milk dairy products, meat fat, and poultry skin   Vegetable fats that are saturated include: hydrogenated shortening, palm oil, coconut oil, cocoa butter   Hydrogenated or trans fat  found in margarine and vegetable shortening, most shelf stable snack foods, and fried foods; increases LDL and decreases HDL     It is generally recommended that you limit your total fat for the day to less than 30% of your total calories.  If you follow an 1800-calorie heart healthy diet, for week) Tofu Nuts or seeds (unsalted, dry-roasted), low-sodium peanut butter Dried peas, beans, and lentils   Any smoked, cured, salted, or canned meat, fish, or poultry (including castellano, chipped beef, cold cuts, hot dogs, sausages, sardines, and anchovies) Poultry skins Breaded and/or fried fish or meats Canned peas, beans, and lentils Salted nuts   Fats and Oils   Olive oil and canola oil Low-sodium, low-fat salad dressings and mayonnaise   Butter, margarine, coconut and palm oils, castellano fat   Snacks, Sweets, and Condiments   Low-sodium or unsalted versions of broths, soups, soy sauce, and condiments Pepper, herbs, and spices; vinegar, lemon, or lime juice Low-fat frozen desserts (yogurt, sherbet, fruit bars) Sugar, cocoa powder, honey, syrup, jam, and preserves Low-fat, trans-fat free cookies, cakes, and pies Sharan and animal crackers, fig bars, hermelinda snaps   High-fat desserts Broth, soups, gravies, and sauces, made from instant mixes or other high-sodium ingredients Salted snack foods Canned olives Meat tenderizers, seasoning salt, and most flavored vinegars   Beverages   Low-sodium carbonated beverages Tea and coffee in moderation Soy milk   Commercially softened water   Suggestions   Make whole grains, fruits, and vegetables the base of your diet. Choose heart-healthy fats such as canola, olive, and flaxseed oil, and foods high in heart-healthy fats, such as nuts, seeds, soybeans, tofu, and fish. Eat fish at least twice per week; the fish highest in omega-3 fatty acids and lowest in mercury include salmon, herring, mackerel, sardines, and canned chunk light tuna. If you eat fish less than twice per week or have high triglycerides, talk to your doctor about taking fish oil supplements. Read food labels.    For products low in fat and cholesterol, look for fat free, low-fat, cholesterol free, saturated fat free, and trans fat freeAlso scan the Nutrition Facts Label, which lists saturated fat, trans fat, and cholesterol amounts. For products low in sodium, look for sodium free, very low sodium, low sodium, no added salt, and unsalted   Skip the salt when cooking or at the table; if food needs more flavor, get creative and try out different herbs and spices. Garlic and onion also add substantial flavor to foods. Trim any visible fat off meat and poultry before cooking, and drain the fat off after brown. Use cooking methods that require little or no added fat, such as grilling, boiling, baking, poaching, broiling, roasting, steaming, stir-frying, and sauting. Avoid fast food and convenience food. They tend to be high in saturated and trans fat and have a lot of added salt. Talk to a registered dietitian for individualized diet advice.       Last Reviewed: March 2011 Vimal Orellana MS, MPH, RD   Updated: 3/29/2011   ·

## 2017-11-09 NOTE — PROGRESS NOTES
Subjective:      Patient ID: Maegan Bal is a 74 y.o. female. HPI  Patient is here for AWV. Review of Systems   Constitutional: Negative for activity change, appetite change, chills, diaphoresis, fatigue, fever and unexpected weight change. HENT: Negative for congestion, dental problem, drooling, ear discharge, ear pain, facial swelling, hearing loss, mouth sores, nosebleeds, postnasal drip, rhinorrhea, sinus pressure, sneezing, sore throat, tinnitus, trouble swallowing and voice change. Eyes: Negative for photophobia, pain, discharge, redness, itching and visual disturbance. Respiratory: Negative for apnea, cough, choking, chest tightness, shortness of breath, wheezing and stridor. Cardiovascular: Negative for chest pain, palpitations and leg swelling. Gastrointestinal: Negative for abdominal distention, abdominal pain, anal bleeding, blood in stool, constipation, diarrhea, nausea, rectal pain and vomiting. Genitourinary: Negative for decreased urine volume, difficulty urinating, dyspareunia, dysuria, enuresis, flank pain, frequency, genital sores, hematuria, menstrual problem, pelvic pain, urgency, vaginal bleeding, vaginal discharge and vaginal pain. Musculoskeletal: Negative for arthralgias, back pain, gait problem, joint swelling, myalgias, neck pain and neck stiffness. Skin: Negative for color change, pallor, rash and wound. Neurological: Negative for dizziness, tremors, seizures, syncope, facial asymmetry, speech difficulty, weakness, light-headedness, numbness and headaches. Hematological: Negative for adenopathy. Does not bruise/bleed easily. Objective:   Physical Exam   Constitutional: She is oriented to person, place, and time. She appears well-developed and well-nourished. HENT:   Head: Normocephalic and atraumatic.    Right Ear: Tympanic membrane and ear canal normal.   Left Ear: Tympanic membrane and ear canal normal.   Nose: Nose normal.   Mouth/Throat: [place] [employer] Due: see orders.   Recommended screening schedule for the next 5-10 years is provided to the patient in written form: see Patient Instructions/AVS.

## 2017-11-29 DIAGNOSIS — Z00.00 ROUTINE GENERAL MEDICAL EXAMINATION AT A HEALTH CARE FACILITY: ICD-10-CM

## 2017-11-29 DIAGNOSIS — Z72.89 OTHER PROBLEMS RELATED TO LIFESTYLE: ICD-10-CM

## 2017-11-29 DIAGNOSIS — Z13.6 SCREENING FOR CARDIOVASCULAR CONDITION: ICD-10-CM

## 2017-11-29 LAB
A/G RATIO: 1.5 (ref 1.1–2.2)
ALBUMIN SERPL-MCNC: 4.4 G/DL (ref 3.4–5)
ALP BLD-CCNC: 89 U/L (ref 40–129)
ALT SERPL-CCNC: 25 U/L (ref 10–40)
ANION GAP SERPL CALCULATED.3IONS-SCNC: 18 MMOL/L (ref 3–16)
AST SERPL-CCNC: 19 U/L (ref 15–37)
BACTERIA: ABNORMAL /HPF
BASOPHILS ABSOLUTE: 0 K/UL (ref 0–0.2)
BASOPHILS RELATIVE PERCENT: 0.4 %
BILIRUB SERPL-MCNC: 0.3 MG/DL (ref 0–1)
BILIRUBIN URINE: NEGATIVE
BLOOD, URINE: NEGATIVE
BUN BLDV-MCNC: 17 MG/DL (ref 7–20)
CALCIUM SERPL-MCNC: 10 MG/DL (ref 8.3–10.6)
CHLORIDE BLD-SCNC: 102 MMOL/L (ref 99–110)
CHOLESTEROL, TOTAL: 157 MG/DL (ref 0–199)
CLARITY: CLEAR
CO2: 27 MMOL/L (ref 21–32)
COLOR: YELLOW
CREAT SERPL-MCNC: 0.6 MG/DL (ref 0.6–1.2)
CREATININE URINE: 159.4 MG/DL (ref 28–259)
EOSINOPHILS ABSOLUTE: 0.3 K/UL (ref 0–0.6)
EOSINOPHILS RELATIVE PERCENT: 3.3 %
EPITHELIAL CELLS, UA: 5 /HPF (ref 0–5)
GFR AFRICAN AMERICAN: >60
GFR NON-AFRICAN AMERICAN: >60
GLOBULIN: 2.9 G/DL
GLUCOSE BLD-MCNC: 100 MG/DL (ref 70–99)
GLUCOSE URINE: NEGATIVE MG/DL
HCT VFR BLD CALC: 41.5 % (ref 36–48)
HDLC SERPL-MCNC: 52 MG/DL (ref 40–60)
HEMOGLOBIN: 13.7 G/DL (ref 12–16)
HEPATITIS C ANTIBODY INTERPRETATION: NORMAL
HYALINE CASTS: 1 /LPF (ref 0–8)
KETONES, URINE: NEGATIVE MG/DL
LDL CHOLESTEROL CALCULATED: 69 MG/DL
LEUKOCYTE ESTERASE, URINE: ABNORMAL
LYMPHOCYTES ABSOLUTE: 2 K/UL (ref 1–5.1)
LYMPHOCYTES RELATIVE PERCENT: 24 %
MCH RBC QN AUTO: 30.8 PG (ref 26–34)
MCHC RBC AUTO-ENTMCNC: 33 G/DL (ref 31–36)
MCV RBC AUTO: 93.5 FL (ref 80–100)
MICROALBUMIN UR-MCNC: <1.2 MG/DL
MICROALBUMIN/CREAT UR-RTO: NORMAL MG/G (ref 0–30)
MICROSCOPIC EXAMINATION: YES
MONOCYTES ABSOLUTE: 0.7 K/UL (ref 0–1.3)
MONOCYTES RELATIVE PERCENT: 8.9 %
NEUTROPHILS ABSOLUTE: 5.3 K/UL (ref 1.7–7.7)
NEUTROPHILS RELATIVE PERCENT: 63.4 %
NITRITE, URINE: NEGATIVE
PDW BLD-RTO: 14.5 % (ref 12.4–15.4)
PH UA: 5.5
PLATELET # BLD: 271 K/UL (ref 135–450)
PMV BLD AUTO: 8.3 FL (ref 5–10.5)
POTASSIUM SERPL-SCNC: 4 MMOL/L (ref 3.5–5.1)
PROTEIN UA: NEGATIVE MG/DL
RBC # BLD: 4.43 M/UL (ref 4–5.2)
RBC UA: 1 /HPF (ref 0–4)
SODIUM BLD-SCNC: 147 MMOL/L (ref 136–145)
SPECIFIC GRAVITY UA: 1.02
TOTAL PROTEIN: 7.3 G/DL (ref 6.4–8.2)
TRIGL SERPL-MCNC: 182 MG/DL (ref 0–150)
TSH SERPL DL<=0.05 MIU/L-ACNC: 0.87 UIU/ML (ref 0.27–4.2)
UROBILINOGEN, URINE: 0.2 E.U./DL
VLDLC SERPL CALC-MCNC: 36 MG/DL
WBC # BLD: 8.4 K/UL (ref 4–11)
WBC UA: 4 /HPF (ref 0–5)

## 2017-11-30 LAB
ESTIMATED AVERAGE GLUCOSE: 137 MG/DL
HBA1C MFR BLD: 6.4 %

## 2018-03-12 ENCOUNTER — OFFICE VISIT (OUTPATIENT)
Dept: ORTHOPEDIC SURGERY | Age: 68
End: 2018-03-12

## 2018-03-12 VITALS — HEART RATE: 61 BPM | DIASTOLIC BLOOD PRESSURE: 82 MMHG | RESPIRATION RATE: 16 BRPM | SYSTOLIC BLOOD PRESSURE: 131 MMHG

## 2018-03-12 DIAGNOSIS — M65.332 TRIGGER MIDDLE FINGER OF LEFT HAND: ICD-10-CM

## 2018-03-12 PROCEDURE — G8598 ASA/ANTIPLAT THER USED: HCPCS | Performed by: ORTHOPAEDIC SURGERY

## 2018-03-12 PROCEDURE — G8482 FLU IMMUNIZE ORDER/ADMIN: HCPCS | Performed by: ORTHOPAEDIC SURGERY

## 2018-03-12 PROCEDURE — 4040F PNEUMOC VAC/ADMIN/RCVD: CPT | Performed by: ORTHOPAEDIC SURGERY

## 2018-03-12 PROCEDURE — 1036F TOBACCO NON-USER: CPT | Performed by: ORTHOPAEDIC SURGERY

## 2018-03-12 PROCEDURE — G8427 DOCREV CUR MEDS BY ELIG CLIN: HCPCS | Performed by: ORTHOPAEDIC SURGERY

## 2018-03-12 PROCEDURE — 99214 OFFICE O/P EST MOD 30 MIN: CPT | Performed by: ORTHOPAEDIC SURGERY

## 2018-03-12 PROCEDURE — G8399 PT W/DXA RESULTS DOCUMENT: HCPCS | Performed by: ORTHOPAEDIC SURGERY

## 2018-03-12 PROCEDURE — 3014F SCREEN MAMMO DOC REV: CPT | Performed by: ORTHOPAEDIC SURGERY

## 2018-03-12 PROCEDURE — G8417 CALC BMI ABV UP PARAM F/U: HCPCS | Performed by: ORTHOPAEDIC SURGERY

## 2018-03-12 PROCEDURE — 1090F PRES/ABSN URINE INCON ASSESS: CPT | Performed by: ORTHOPAEDIC SURGERY

## 2018-03-12 PROCEDURE — 3017F COLORECTAL CA SCREEN DOC REV: CPT | Performed by: ORTHOPAEDIC SURGERY

## 2018-03-12 PROCEDURE — 1123F ACP DISCUSS/DSCN MKR DOCD: CPT | Performed by: ORTHOPAEDIC SURGERY

## 2018-03-12 PROCEDURE — 20550 NJX 1 TENDON SHEATH/LIGAMENT: CPT | Performed by: ORTHOPAEDIC SURGERY

## 2018-03-12 NOTE — PROGRESS NOTES
Ms. Verla Severe returns today in follow-up of her previously treated  right Middle Finger and Ring Finger Trigger Finger. She was last seen in August, 2017 at which time she was treated with Steroid Injection. She experienced moderate relief of her initial symptoms. She  has not noticed symptom recurrence over the last several weeks. She returns today with newsymptoms of left Middle Finger Stenosing Tenosynovitis, requesting further treatment. The patient's , past medical history, medications, allergies,  family history, social history, and review of systems have been reviewed and are recorded in the chart. Physical Exam:  Vitals  BP: 131/82  Pulse: 61  Resp: 16  Ms. Verla Severe appears well, she is in no apparent distress, she demonstrates appropriate mood & affect. Skin: Skin color, texture, turgor normal. No rashes or lesions bilaterally  Digital range of motion is limited by pain on the Left, greater than Right. There is mild triggering at the A1 pulley of the symptomatic left Middle Finger. Wrist range of motion is full and equal bilateral bilaterally  There is no evidence of gross joint instability bilaterally. Sensation is subjectively normal bilaterally  Vascular examination reveals normal, good capillary refill and good color bilaterally  Swelling is moderate in the symptomatic digit(s) on the Left, normal on the Right  Muscular strength is clinically appropriate bilaterally. Examination for Stenosing Tenosynovitis demonstrates moderate tenderness, thickening & nodularity at the A-1 pulley(s) of the Left Middle Finger. There is a palpable Nota's Node. There is triggering on active flexion with pain. No other digits demonstrate evidence of Stenosing Tenosynovitis. Impression:  Ms. Verla Severe is showing evidence of initial Stenosing Tenosynovitis (Trigger Finger) after previous treatment. She requests additional treatment at this time.     Plan:    After discussion

## 2018-03-13 RX ORDER — METOPROLOL SUCCINATE 50 MG/1
TABLET, EXTENDED RELEASE ORAL
Qty: 90 TABLET | Refills: 0 | Status: SHIPPED | OUTPATIENT
Start: 2018-03-13 | End: 2018-06-14 | Stop reason: SDUPTHER

## 2018-03-13 RX ORDER — ROSUVASTATIN CALCIUM 40 MG/1
TABLET, COATED ORAL
Qty: 90 TABLET | Refills: 0 | Status: SHIPPED | OUTPATIENT
Start: 2018-03-13 | End: 2018-07-06 | Stop reason: SDUPTHER

## 2018-03-13 RX ORDER — LEVOTHYROXINE SODIUM 137 MCG
TABLET ORAL
Qty: 90 TABLET | Refills: 0 | Status: SHIPPED | OUTPATIENT
Start: 2018-03-13 | End: 2018-06-13 | Stop reason: SDUPTHER

## 2018-05-09 ENCOUNTER — OFFICE VISIT (OUTPATIENT)
Dept: INTERNAL MEDICINE | Age: 68
End: 2018-05-09

## 2018-05-09 VITALS
HEART RATE: 62 BPM | SYSTOLIC BLOOD PRESSURE: 112 MMHG | OXYGEN SATURATION: 95 % | HEIGHT: 62 IN | DIASTOLIC BLOOD PRESSURE: 62 MMHG | WEIGHT: 168 LBS | TEMPERATURE: 98.6 F | BODY MASS INDEX: 30.91 KG/M2

## 2018-05-09 DIAGNOSIS — I25.10 CORONARY ARTERY DISEASE INVOLVING NATIVE CORONARY ARTERY OF NATIVE HEART WITHOUT ANGINA PECTORIS: ICD-10-CM

## 2018-05-09 DIAGNOSIS — I10 ESSENTIAL HYPERTENSION: Primary | ICD-10-CM

## 2018-05-09 DIAGNOSIS — J44.1 CHRONIC OBSTRUCTIVE PULMONARY DISEASE WITH ACUTE EXACERBATION (HCC): ICD-10-CM

## 2018-05-09 DIAGNOSIS — E03.9 HYPOTHYROIDISM, UNSPECIFIED TYPE: ICD-10-CM

## 2018-05-09 DIAGNOSIS — E78.2 MIXED HYPERLIPIDEMIA: ICD-10-CM

## 2018-05-09 DIAGNOSIS — I71.21 ASCENDING AORTIC ANEURYSM: ICD-10-CM

## 2018-05-09 DIAGNOSIS — C34.10 MALIGNANT NEOPLASM OF UPPER LOBE OF LUNG, UNSPECIFIED LATERALITY (HCC): ICD-10-CM

## 2018-05-09 PROCEDURE — 1090F PRES/ABSN URINE INCON ASSESS: CPT | Performed by: NURSE PRACTITIONER

## 2018-05-09 PROCEDURE — 3017F COLORECTAL CA SCREEN DOC REV: CPT | Performed by: NURSE PRACTITIONER

## 2018-05-09 PROCEDURE — 1036F TOBACCO NON-USER: CPT | Performed by: NURSE PRACTITIONER

## 2018-05-09 PROCEDURE — 1123F ACP DISCUSS/DSCN MKR DOCD: CPT | Performed by: NURSE PRACTITIONER

## 2018-05-09 PROCEDURE — 99214 OFFICE O/P EST MOD 30 MIN: CPT | Performed by: NURSE PRACTITIONER

## 2018-05-09 PROCEDURE — G8926 SPIRO NO PERF OR DOC: HCPCS | Performed by: NURSE PRACTITIONER

## 2018-05-09 PROCEDURE — G8399 PT W/DXA RESULTS DOCUMENT: HCPCS | Performed by: NURSE PRACTITIONER

## 2018-05-09 PROCEDURE — G8427 DOCREV CUR MEDS BY ELIG CLIN: HCPCS | Performed by: NURSE PRACTITIONER

## 2018-05-09 PROCEDURE — G8598 ASA/ANTIPLAT THER USED: HCPCS | Performed by: NURSE PRACTITIONER

## 2018-05-09 PROCEDURE — G8417 CALC BMI ABV UP PARAM F/U: HCPCS | Performed by: NURSE PRACTITIONER

## 2018-05-09 PROCEDURE — 4040F PNEUMOC VAC/ADMIN/RCVD: CPT | Performed by: NURSE PRACTITIONER

## 2018-05-09 PROCEDURE — 3023F SPIROM DOC REV: CPT | Performed by: NURSE PRACTITIONER

## 2018-05-09 ASSESSMENT — ENCOUNTER SYMPTOMS
CONSTIPATION: 0
VOMITING: 0
ABDOMINAL PAIN: 0
NAUSEA: 0
WHEEZING: 0
COLOR CHANGE: 0
RHINORRHEA: 0
SORE THROAT: 0
EYE REDNESS: 0
SINUS PRESSURE: 0
CHEST TIGHTNESS: 0
BLOOD IN STOOL: 0
BACK PAIN: 0
COUGH: 0
DIARRHEA: 0
EYE ITCHING: 0
SHORTNESS OF BREATH: 0

## 2018-06-13 RX ORDER — LEVOTHYROXINE SODIUM 137 MCG
TABLET ORAL
Qty: 90 TABLET | Refills: 0 | Status: ON HOLD | OUTPATIENT
Start: 2018-06-13 | End: 2018-08-10 | Stop reason: HOSPADM

## 2018-06-14 ENCOUNTER — TELEPHONE (OUTPATIENT)
Dept: INTERNAL MEDICINE | Age: 68
End: 2018-06-14

## 2018-06-14 RX ORDER — METOPROLOL SUCCINATE 50 MG/1
TABLET, EXTENDED RELEASE ORAL
Qty: 90 TABLET | Refills: 0 | Status: ON HOLD | OUTPATIENT
Start: 2018-06-14 | End: 2018-08-01 | Stop reason: HOSPADM

## 2018-06-14 RX ORDER — BENAZEPRIL/HYDROCHLOROTHIAZIDE 20 MG-25MG
1 TABLET ORAL DAILY
Qty: 90 TABLET | Refills: 0 | Status: ON HOLD | OUTPATIENT
Start: 2018-06-14 | End: 2018-08-01 | Stop reason: HOSPADM

## 2018-06-22 PROBLEM — R04.2 HEMOPTYSIS: Status: ACTIVE | Noted: 2017-12-19

## 2018-06-22 PROBLEM — N64.4 PAINFUL LUMPY RIGHT BREAST: Status: ACTIVE | Noted: 2018-01-11

## 2018-06-22 PROBLEM — N63.10 PAINFUL LUMPY RIGHT BREAST: Status: ACTIVE | Noted: 2018-01-11

## 2018-07-06 RX ORDER — ROSUVASTATIN CALCIUM 40 MG/1
TABLET, COATED ORAL
Qty: 90 TABLET | Refills: 0 | Status: SHIPPED | OUTPATIENT
Start: 2018-07-06 | End: 2018-07-06 | Stop reason: SDUPTHER

## 2018-07-09 RX ORDER — ROSUVASTATIN CALCIUM 40 MG/1
TABLET, COATED ORAL
Qty: 90 TABLET | Refills: 0 | Status: ON HOLD | OUTPATIENT
Start: 2018-07-09 | End: 2018-08-10 | Stop reason: HOSPADM

## 2018-07-24 ENCOUNTER — APPOINTMENT (OUTPATIENT)
Dept: GENERAL RADIOLOGY | Age: 68
DRG: 231 | End: 2018-07-24
Payer: MEDICARE

## 2018-07-24 ENCOUNTER — APPOINTMENT (OUTPATIENT)
Dept: CT IMAGING | Age: 68
DRG: 231 | End: 2018-07-24
Payer: MEDICARE

## 2018-07-24 ENCOUNTER — HOSPITAL ENCOUNTER (INPATIENT)
Age: 68
LOS: 8 days | Discharge: NURSING, REHAB OR LONG-TERM CARE | DRG: 231 | End: 2018-08-01
Attending: EMERGENCY MEDICINE | Admitting: INTERNAL MEDICINE
Payer: MEDICARE

## 2018-07-24 ENCOUNTER — OFFICE VISIT (OUTPATIENT)
Dept: INTERNAL MEDICINE | Age: 68
End: 2018-07-24

## 2018-07-24 VITALS
BODY MASS INDEX: 30.73 KG/M2 | WEIGHT: 167 LBS | HEART RATE: 74 BPM | HEIGHT: 62 IN | SYSTOLIC BLOOD PRESSURE: 122 MMHG | DIASTOLIC BLOOD PRESSURE: 76 MMHG | OXYGEN SATURATION: 97 %

## 2018-07-24 DIAGNOSIS — R10.13 DYSPEPSIA: ICD-10-CM

## 2018-07-24 DIAGNOSIS — F41.9 ANXIETY: ICD-10-CM

## 2018-07-24 DIAGNOSIS — I25.10 CORONARY ARTERY DISEASE INVOLVING NATIVE CORONARY ARTERY OF NATIVE HEART WITHOUT ANGINA PECTORIS: ICD-10-CM

## 2018-07-24 DIAGNOSIS — R07.9 CHEST PAIN, UNSPECIFIED TYPE: Primary | ICD-10-CM

## 2018-07-24 DIAGNOSIS — I71.21 ASCENDING AORTIC ANEURYSM: ICD-10-CM

## 2018-07-24 DIAGNOSIS — I10 ESSENTIAL HYPERTENSION: Primary | ICD-10-CM

## 2018-07-24 LAB
A/G RATIO: 1.3 (ref 1.1–2.2)
ABO/RH: NORMAL
ALBUMIN SERPL-MCNC: 3.7 G/DL (ref 3.4–5)
ALP BLD-CCNC: 67 U/L (ref 40–129)
ALT SERPL-CCNC: 20 U/L (ref 10–40)
ANION GAP SERPL CALCULATED.3IONS-SCNC: 11 MMOL/L (ref 3–16)
ANION GAP SERPL CALCULATED.3IONS-SCNC: 14 MMOL/L (ref 3–16)
ANTIBODY SCREEN: NORMAL
APTT: 30.9 SEC (ref 26–36)
AST SERPL-CCNC: 23 U/L (ref 15–37)
BASOPHILS ABSOLUTE: 0.1 K/UL (ref 0–0.2)
BASOPHILS RELATIVE PERCENT: 1 %
BILIRUB SERPL-MCNC: 0.3 MG/DL (ref 0–1)
BILIRUBIN DIRECT: <0.2 MG/DL (ref 0–0.3)
BILIRUBIN URINE: NEGATIVE
BILIRUBIN, INDIRECT: NORMAL MG/DL (ref 0–1)
BLOOD, URINE: NEGATIVE
BUN BLDV-MCNC: 15 MG/DL (ref 7–20)
BUN BLDV-MCNC: 23 MG/DL (ref 7–20)
CALCIUM SERPL-MCNC: 10.1 MG/DL (ref 8.3–10.6)
CALCIUM SERPL-MCNC: 9.1 MG/DL (ref 8.3–10.6)
CHLORIDE BLD-SCNC: 102 MMOL/L (ref 99–110)
CHLORIDE BLD-SCNC: 104 MMOL/L (ref 99–110)
CLARITY: CLEAR
CO2: 24 MMOL/L (ref 21–32)
CO2: 27 MMOL/L (ref 21–32)
COLOR: YELLOW
CREAT SERPL-MCNC: 0.5 MG/DL (ref 0.6–1.2)
CREAT SERPL-MCNC: <0.5 MG/DL (ref 0.6–1.2)
EKG ATRIAL RATE: 59 BPM
EKG DIAGNOSIS: NORMAL
EKG P AXIS: 32 DEGREES
EKG P-R INTERVAL: 196 MS
EKG Q-T INTERVAL: 432 MS
EKG QRS DURATION: 90 MS
EKG QTC CALCULATION (BAZETT): 427 MS
EKG R AXIS: -12 DEGREES
EKG T AXIS: -53 DEGREES
EKG VENTRICULAR RATE: 59 BPM
EOSINOPHILS ABSOLUTE: 0.4 K/UL (ref 0–0.6)
EOSINOPHILS RELATIVE PERCENT: 5.5 %
EPITHELIAL CELLS, UA: NORMAL /HPF
FIBRINOGEN: 273 MG/DL (ref 200–397)
GFR AFRICAN AMERICAN: >60
GFR AFRICAN AMERICAN: >60
GFR NON-AFRICAN AMERICAN: >60
GFR NON-AFRICAN AMERICAN: >60
GLOBULIN: 2.8 G/DL
GLUCOSE BLD-MCNC: 107 MG/DL (ref 70–99)
GLUCOSE BLD-MCNC: 113 MG/DL (ref 70–99)
GLUCOSE URINE: NEGATIVE MG/DL
HCT VFR BLD CALC: 41.1 % (ref 36–48)
HCT VFR BLD CALC: 41.1 % (ref 36–48)
HEMOGLOBIN: 13.5 G/DL (ref 12–16)
HEMOGLOBIN: 13.7 G/DL (ref 12–16)
INR BLD: 0.91 (ref 0.86–1.14)
INR BLD: 1.4 (ref 0.8–1.4)
KETONES, URINE: NEGATIVE MG/DL
LEFT VENTRICULAR EJECTION FRACTION MODE: NORMAL
LEUKOCYTE ESTERASE, URINE: ABNORMAL
LV EF: 35 %
LYMPHOCYTES ABSOLUTE: 1.9 K/UL (ref 1–5.1)
LYMPHOCYTES RELATIVE PERCENT: 29.7 %
MAGNESIUM: 1.8 MG/DL (ref 1.8–2.4)
MCH RBC QN AUTO: 30.8 PG (ref 26–34)
MCH RBC QN AUTO: 31 PG (ref 26–34)
MCHC RBC AUTO-ENTMCNC: 33 G/DL (ref 31–36)
MCHC RBC AUTO-ENTMCNC: 33.2 G/DL (ref 31–36)
MCV RBC AUTO: 93.2 FL (ref 80–100)
MCV RBC AUTO: 93.3 FL (ref 80–100)
MICROSCOPIC EXAMINATION: YES
MONOCYTES ABSOLUTE: 0.6 K/UL (ref 0–1.3)
MONOCYTES RELATIVE PERCENT: 8.9 %
NEUTROPHILS ABSOLUTE: 3.5 K/UL (ref 1.7–7.7)
NEUTROPHILS RELATIVE PERCENT: 54.9 %
NITRITE, URINE: NEGATIVE
PDW BLD-RTO: 14.2 % (ref 12.4–15.4)
PDW BLD-RTO: 14.2 % (ref 12.4–15.4)
PH UA: 6.5
PLATELET # BLD: 236 K/UL (ref 135–450)
PLATELET # BLD: 243 K/UL (ref 135–450)
PMV BLD AUTO: 7.3 FL (ref 5–10.5)
PMV BLD AUTO: 8.1 FL (ref 5–10.5)
POC ACT LR: 126 SEC (ref 113–149)
POC ACT LR: 230 SEC (ref 113–149)
POC ACT LR: 271 SEC (ref 113–149)
POC ACT LR: 298 SEC (ref 113–149)
POC ACT LR: 300 SEC (ref 113–149)
POTASSIUM REFLEX MAGNESIUM: 3.5 MMOL/L (ref 3.5–5.1)
POTASSIUM REFLEX MAGNESIUM: 3.8 MMOL/L (ref 3.5–5.1)
PRO-BNP: 854 PG/ML (ref 0–124)
PROTEIN UA: NEGATIVE MG/DL
PROTHROMBIN TIME: 10.4 SEC (ref 9.8–13)
RBC # BLD: 4.4 M/UL (ref 4–5.2)
RBC # BLD: 4.41 M/UL (ref 4–5.2)
RBC UA: NORMAL /HPF (ref 0–2)
SODIUM BLD-SCNC: 140 MMOL/L (ref 136–145)
SODIUM BLD-SCNC: 142 MMOL/L (ref 136–145)
SPECIFIC GRAVITY UA: 1.01
TOTAL PROTEIN: 6.5 G/DL (ref 6.4–8.2)
TROPONIN: 0.16 NG/ML
TROPONIN: 0.17 NG/ML
URINE TYPE: ABNORMAL
UROBILINOGEN, URINE: 0.2 E.U./DL
WBC # BLD: 6.4 K/UL (ref 4–11)
WBC # BLD: 6.5 K/UL (ref 4–11)
WBC UA: NORMAL /HPF (ref 0–5)

## 2018-07-24 PROCEDURE — 86900 BLOOD TYPING SEROLOGIC ABO: CPT

## 2018-07-24 PROCEDURE — 36415 COLL VENOUS BLD VENIPUNCTURE: CPT

## 2018-07-24 PROCEDURE — 3017F COLORECTAL CA SCREEN DOC REV: CPT | Performed by: INTERNAL MEDICINE

## 2018-07-24 PROCEDURE — 99153 MOD SED SAME PHYS/QHP EA: CPT

## 2018-07-24 PROCEDURE — 93000 ELECTROCARDIOGRAM COMPLETE: CPT | Performed by: INTERNAL MEDICINE

## 2018-07-24 PROCEDURE — 92920 PRQ TRLUML C ANGIOP 1ART&/BR: CPT

## 2018-07-24 PROCEDURE — 6360000004 HC RX CONTRAST MEDICATION: Performed by: INTERNAL MEDICINE

## 2018-07-24 PROCEDURE — B2151ZZ FLUOROSCOPY OF LEFT HEART USING LOW OSMOLAR CONTRAST: ICD-10-PCS | Performed by: INTERNAL MEDICINE

## 2018-07-24 PROCEDURE — 85520 HEPARIN ASSAY: CPT

## 2018-07-24 PROCEDURE — 85384 FIBRINOGEN ACTIVITY: CPT

## 2018-07-24 PROCEDURE — G8427 DOCREV CUR MEDS BY ELIG CLIN: HCPCS | Performed by: INTERNAL MEDICINE

## 2018-07-24 PROCEDURE — 2500000003 HC RX 250 WO HCPCS

## 2018-07-24 PROCEDURE — C1894 INTRO/SHEATH, NON-LASER: HCPCS

## 2018-07-24 PROCEDURE — C1760 CLOSURE DEV, VASC: HCPCS

## 2018-07-24 PROCEDURE — 1036F TOBACCO NON-USER: CPT | Performed by: INTERNAL MEDICINE

## 2018-07-24 PROCEDURE — C1725 CATH, TRANSLUMIN NON-LASER: HCPCS

## 2018-07-24 PROCEDURE — 6370000000 HC RX 637 (ALT 250 FOR IP): Performed by: STUDENT IN AN ORGANIZED HEALTH CARE EDUCATION/TRAINING PROGRAM

## 2018-07-24 PROCEDURE — C1887 CATHETER, GUIDING: HCPCS

## 2018-07-24 PROCEDURE — 86850 RBC ANTIBODY SCREEN: CPT

## 2018-07-24 PROCEDURE — 1090F PRES/ABSN URINE INCON ASSESS: CPT | Performed by: INTERNAL MEDICINE

## 2018-07-24 PROCEDURE — 02703ZZ DILATION OF CORONARY ARTERY, ONE ARTERY, PERCUTANEOUS APPROACH: ICD-10-PCS | Performed by: INTERNAL MEDICINE

## 2018-07-24 PROCEDURE — 6360000002 HC RX W HCPCS: Performed by: EMERGENCY MEDICINE

## 2018-07-24 PROCEDURE — 71275 CT ANGIOGRAPHY CHEST: CPT

## 2018-07-24 PROCEDURE — 96374 THER/PROPH/DIAG INJ IV PUSH: CPT

## 2018-07-24 PROCEDURE — 2700000000 HC OXYGEN THERAPY PER DAY

## 2018-07-24 PROCEDURE — B2111ZZ FLUOROSCOPY OF MULTIPLE CORONARY ARTERIES USING LOW OSMOLAR CONTRAST: ICD-10-PCS | Performed by: INTERNAL MEDICINE

## 2018-07-24 PROCEDURE — 2500000003 HC RX 250 WO HCPCS: Performed by: INTERNAL MEDICINE

## 2018-07-24 PROCEDURE — 99285 EMERGENCY DEPT VISIT HI MDM: CPT

## 2018-07-24 PROCEDURE — 6370000000 HC RX 637 (ALT 250 FOR IP)

## 2018-07-24 PROCEDURE — 94761 N-INVAS EAR/PLS OXIMETRY MLT: CPT

## 2018-07-24 PROCEDURE — 71046 X-RAY EXAM CHEST 2 VIEWS: CPT

## 2018-07-24 PROCEDURE — 6360000002 HC RX W HCPCS

## 2018-07-24 PROCEDURE — 2000000000 HC ICU R&B

## 2018-07-24 PROCEDURE — 93458 L HRT ARTERY/VENTRICLE ANGIO: CPT | Performed by: INTERNAL MEDICINE

## 2018-07-24 PROCEDURE — 2709999900 HC NON-CHARGEABLE SUPPLY

## 2018-07-24 PROCEDURE — 6360000002 HC RX W HCPCS: Performed by: INTERNAL MEDICINE

## 2018-07-24 PROCEDURE — 80053 COMPREHEN METABOLIC PANEL: CPT

## 2018-07-24 PROCEDURE — 2580000003 HC RX 258: Performed by: INTERNAL MEDICINE

## 2018-07-24 PROCEDURE — 85610 PROTHROMBIN TIME: CPT

## 2018-07-24 PROCEDURE — 86901 BLOOD TYPING SEROLOGIC RH(D): CPT

## 2018-07-24 PROCEDURE — 83735 ASSAY OF MAGNESIUM: CPT

## 2018-07-24 PROCEDURE — 6370000000 HC RX 637 (ALT 250 FOR IP): Performed by: INTERNAL MEDICINE

## 2018-07-24 PROCEDURE — 1101F PT FALLS ASSESS-DOCD LE1/YR: CPT | Performed by: INTERNAL MEDICINE

## 2018-07-24 PROCEDURE — 99214 OFFICE O/P EST MOD 30 MIN: CPT | Performed by: INTERNAL MEDICINE

## 2018-07-24 PROCEDURE — 85730 THROMBOPLASTIN TIME PARTIAL: CPT

## 2018-07-24 PROCEDURE — G8399 PT W/DXA RESULTS DOCUMENT: HCPCS | Performed by: INTERNAL MEDICINE

## 2018-07-24 PROCEDURE — G8510 SCR DEP NEG, NO PLAN REQD: HCPCS | Performed by: INTERNAL MEDICINE

## 2018-07-24 PROCEDURE — 83036 HEMOGLOBIN GLYCOSYLATED A1C: CPT

## 2018-07-24 PROCEDURE — 85347 COAGULATION TIME ACTIVATED: CPT

## 2018-07-24 PROCEDURE — 99223 1ST HOSP IP/OBS HIGH 75: CPT | Performed by: INTERNAL MEDICINE

## 2018-07-24 PROCEDURE — 99152 MOD SED SAME PHYS/QHP 5/>YRS: CPT

## 2018-07-24 PROCEDURE — 1123F ACP DISCUSS/DSCN MKR DOCD: CPT | Performed by: INTERNAL MEDICINE

## 2018-07-24 PROCEDURE — 93005 ELECTROCARDIOGRAM TRACING: CPT | Performed by: EMERGENCY MEDICINE

## 2018-07-24 PROCEDURE — C1769 GUIDE WIRE: HCPCS

## 2018-07-24 PROCEDURE — 92920 PRQ TRLUML C ANGIOP 1ART&/BR: CPT | Performed by: INTERNAL MEDICINE

## 2018-07-24 PROCEDURE — 3288F FALL RISK ASSESSMENT DOCD: CPT | Performed by: INTERNAL MEDICINE

## 2018-07-24 PROCEDURE — 85025 COMPLETE CBC W/AUTO DIFF WBC: CPT

## 2018-07-24 PROCEDURE — 87086 URINE CULTURE/COLONY COUNT: CPT

## 2018-07-24 PROCEDURE — 87081 CULTURE SCREEN ONLY: CPT

## 2018-07-24 PROCEDURE — 94664 DEMO&/EVAL PT USE INHALER: CPT

## 2018-07-24 PROCEDURE — 84484 ASSAY OF TROPONIN QUANT: CPT

## 2018-07-24 PROCEDURE — 81001 URINALYSIS AUTO W/SCOPE: CPT

## 2018-07-24 PROCEDURE — 80061 LIPID PANEL: CPT

## 2018-07-24 PROCEDURE — 94010 BREATHING CAPACITY TEST: CPT

## 2018-07-24 PROCEDURE — 6360000004 HC RX CONTRAST MEDICATION: Performed by: EMERGENCY MEDICINE

## 2018-07-24 PROCEDURE — G8598 ASA/ANTIPLAT THER USED: HCPCS | Performed by: INTERNAL MEDICINE

## 2018-07-24 PROCEDURE — 85027 COMPLETE CBC AUTOMATED: CPT

## 2018-07-24 PROCEDURE — G8417 CALC BMI ABV UP PARAM F/U: HCPCS | Performed by: INTERNAL MEDICINE

## 2018-07-24 PROCEDURE — 4A023N7 MEASUREMENT OF CARDIAC SAMPLING AND PRESSURE, LEFT HEART, PERCUTANEOUS APPROACH: ICD-10-PCS | Performed by: INTERNAL MEDICINE

## 2018-07-24 PROCEDURE — 83880 ASSAY OF NATRIURETIC PEPTIDE: CPT

## 2018-07-24 PROCEDURE — 93458 L HRT ARTERY/VENTRICLE ANGIO: CPT

## 2018-07-24 PROCEDURE — 75635 CT ANGIO ABDOMINAL ARTERIES: CPT

## 2018-07-24 PROCEDURE — C1874 STENT, COATED/COV W/DEL SYS: HCPCS

## 2018-07-24 PROCEDURE — 4040F PNEUMOC VAC/ADMIN/RCVD: CPT | Performed by: INTERNAL MEDICINE

## 2018-07-24 RX ORDER — ASPIRIN 81 MG/1
81 TABLET, CHEWABLE ORAL DAILY
Status: DISCONTINUED | OUTPATIENT
Start: 2018-07-24 | End: 2018-07-26

## 2018-07-24 RX ORDER — CHLORHEXIDINE GLUCONATE 0.12 MG/ML
15 RINSE ORAL 2 TIMES DAILY
Status: DISCONTINUED | OUTPATIENT
Start: 2018-07-24 | End: 2018-07-26

## 2018-07-24 RX ORDER — SODIUM CHLORIDE 0.9 % (FLUSH) 0.9 %
10 SYRINGE (ML) INJECTION PRN
Status: DISCONTINUED | OUTPATIENT
Start: 2018-07-24 | End: 2018-07-24 | Stop reason: SDUPTHER

## 2018-07-24 RX ORDER — HEPARIN SODIUM 10000 [USP'U]/100ML
12 INJECTION, SOLUTION INTRAVENOUS CONTINUOUS
Status: DISCONTINUED | OUTPATIENT
Start: 2018-07-24 | End: 2018-07-24

## 2018-07-24 RX ORDER — ATROPINE SULFATE 0.4 MG/ML
0.5 AMPUL (ML) INJECTION
Status: ACTIVE | OUTPATIENT
Start: 2018-07-24 | End: 2018-07-24

## 2018-07-24 RX ORDER — ONDANSETRON 2 MG/ML
4 INJECTION INTRAMUSCULAR; INTRAVENOUS EVERY 6 HOURS PRN
Status: DISCONTINUED | OUTPATIENT
Start: 2018-07-24 | End: 2018-07-24 | Stop reason: SDUPTHER

## 2018-07-24 RX ORDER — HEPARIN SODIUM 5000 [USP'U]/ML
4000 INJECTION, SOLUTION INTRAVENOUS; SUBCUTANEOUS PRN
Status: DISCONTINUED | OUTPATIENT
Start: 2018-07-24 | End: 2018-07-26

## 2018-07-24 RX ORDER — ATORVASTATIN CALCIUM 40 MG/1
40 TABLET, FILM COATED ORAL NIGHTLY
Status: DISCONTINUED | OUTPATIENT
Start: 2018-07-24 | End: 2018-07-24 | Stop reason: SDUPTHER

## 2018-07-24 RX ORDER — ATORVASTATIN CALCIUM 80 MG/1
80 TABLET, FILM COATED ORAL NIGHTLY
Status: DISCONTINUED | OUTPATIENT
Start: 2018-07-24 | End: 2018-07-26

## 2018-07-24 RX ORDER — HEPARIN SODIUM 5000 [USP'U]/ML
2000 INJECTION, SOLUTION INTRAVENOUS; SUBCUTANEOUS PRN
Status: DISCONTINUED | OUTPATIENT
Start: 2018-07-24 | End: 2018-07-26

## 2018-07-24 RX ORDER — SODIUM CHLORIDE 0.9 % (FLUSH) 0.9 %
10 SYRINGE (ML) INJECTION EVERY 12 HOURS SCHEDULED
Status: DISCONTINUED | OUTPATIENT
Start: 2018-07-24 | End: 2018-07-26

## 2018-07-24 RX ORDER — BENAZEPRIL/HYDROCHLOROTHIAZIDE 20 MG-25MG
1 TABLET ORAL DAILY
Status: DISCONTINUED | OUTPATIENT
Start: 2018-07-24 | End: 2018-07-24 | Stop reason: RX

## 2018-07-24 RX ORDER — FAMOTIDINE 20 MG/1
20 TABLET, FILM COATED ORAL 2 TIMES DAILY
Status: DISCONTINUED | OUTPATIENT
Start: 2018-07-24 | End: 2018-07-26

## 2018-07-24 RX ORDER — ONDANSETRON 2 MG/ML
4 INJECTION INTRAMUSCULAR; INTRAVENOUS EVERY 6 HOURS PRN
Status: DISCONTINUED | OUTPATIENT
Start: 2018-07-24 | End: 2018-07-25 | Stop reason: SDUPTHER

## 2018-07-24 RX ORDER — MORPHINE SULFATE 2 MG/ML
2 INJECTION, SOLUTION INTRAMUSCULAR; INTRAVENOUS
Status: ACTIVE | OUTPATIENT
Start: 2018-07-24 | End: 2018-07-24

## 2018-07-24 RX ORDER — HYDRALAZINE HYDROCHLORIDE 20 MG/ML
10 INJECTION INTRAMUSCULAR; INTRAVENOUS EVERY 4 HOURS PRN
Status: DISCONTINUED | OUTPATIENT
Start: 2018-07-24 | End: 2018-07-26

## 2018-07-24 RX ORDER — LISINOPRIL AND HYDROCHLOROTHIAZIDE 25; 20 MG/1; MG/1
1 TABLET ORAL DAILY
Status: DISCONTINUED | OUTPATIENT
Start: 2018-07-24 | End: 2018-07-26

## 2018-07-24 RX ORDER — CALCIUM CARBONATE 500(1250)
1 TABLET ORAL 2 TIMES DAILY
Status: DISCONTINUED | OUTPATIENT
Start: 2018-07-24 | End: 2018-07-26

## 2018-07-24 RX ORDER — NITROGLYCERIN 20 MG/100ML
40 INJECTION INTRAVENOUS CONTINUOUS
Status: DISCONTINUED | OUTPATIENT
Start: 2018-07-24 | End: 2018-07-26

## 2018-07-24 RX ORDER — LEVOTHYROXINE SODIUM 137 UG/1
137 TABLET ORAL DAILY
Status: DISCONTINUED | OUTPATIENT
Start: 2018-07-25 | End: 2018-07-26

## 2018-07-24 RX ORDER — SODIUM CHLORIDE 0.9 % (FLUSH) 0.9 %
10 SYRINGE (ML) INJECTION PRN
Status: DISCONTINUED | OUTPATIENT
Start: 2018-07-24 | End: 2018-07-26

## 2018-07-24 RX ORDER — ALBUTEROL SULFATE 90 UG/1
2 AEROSOL, METERED RESPIRATORY (INHALATION) EVERY 6 HOURS PRN
Status: DISCONTINUED | OUTPATIENT
Start: 2018-07-24 | End: 2018-07-26

## 2018-07-24 RX ORDER — ACETAMINOPHEN 325 MG/1
650 TABLET ORAL EVERY 4 HOURS PRN
Status: DISCONTINUED | OUTPATIENT
Start: 2018-07-24 | End: 2018-07-26

## 2018-07-24 RX ORDER — PANTOPRAZOLE SODIUM 40 MG/1
40 GRANULE, DELAYED RELEASE ORAL DAILY
Status: DISCONTINUED | OUTPATIENT
Start: 2018-07-24 | End: 2018-07-26

## 2018-07-24 RX ORDER — SODIUM CHLORIDE 0.9 % (FLUSH) 0.9 %
10 SYRINGE (ML) INJECTION EVERY 12 HOURS SCHEDULED
Status: DISCONTINUED | OUTPATIENT
Start: 2018-07-24 | End: 2018-07-24 | Stop reason: SDUPTHER

## 2018-07-24 RX ORDER — SODIUM CHLORIDE 9 MG/ML
INJECTION, SOLUTION INTRAVENOUS CONTINUOUS
Status: DISPENSED | OUTPATIENT
Start: 2018-07-24 | End: 2018-07-25

## 2018-07-24 RX ORDER — EPTIFIBATIDE 0.75 MG/ML
2 INJECTION, SOLUTION INTRAVENOUS CONTINUOUS
Status: DISPENSED | OUTPATIENT
Start: 2018-07-24 | End: 2018-07-25

## 2018-07-24 RX ORDER — HEPARIN SODIUM 10000 [USP'U]/100ML
10 INJECTION, SOLUTION INTRAVENOUS CONTINUOUS
Status: DISCONTINUED | OUTPATIENT
Start: 2018-07-24 | End: 2018-07-26

## 2018-07-24 RX ORDER — METOPROLOL SUCCINATE 50 MG/1
50 TABLET, EXTENDED RELEASE ORAL DAILY
Status: DISCONTINUED | OUTPATIENT
Start: 2018-07-24 | End: 2018-07-26

## 2018-07-24 RX ORDER — HEPARIN SODIUM 5000 [USP'U]/ML
4000 INJECTION, SOLUTION INTRAVENOUS; SUBCUTANEOUS ONCE
Status: COMPLETED | OUTPATIENT
Start: 2018-07-24 | End: 2018-07-24

## 2018-07-24 RX ADMIN — NITROGLYCERIN 40 MCG/MIN: 200 INJECTION, SOLUTION INTRAVENOUS at 18:00

## 2018-07-24 RX ADMIN — ATORVASTATIN CALCIUM 80 MG: 80 TABLET, FILM COATED ORAL at 21:50

## 2018-07-24 RX ADMIN — FAMOTIDINE 20 MG: 20 TABLET ORAL at 21:53

## 2018-07-24 RX ADMIN — Medication 2 PUFF: at 22:06

## 2018-07-24 RX ADMIN — Medication 15 ML: at 23:29

## 2018-07-24 RX ADMIN — CALCIUM 500 MG: 500 TABLET ORAL at 21:50

## 2018-07-24 RX ADMIN — IOPAMIDOL 250 ML: 755 INJECTION, SOLUTION INTRAVENOUS at 18:13

## 2018-07-24 RX ADMIN — SODIUM CHLORIDE: 0.9 INJECTION, SOLUTION INTRAVENOUS at 23:52

## 2018-07-24 RX ADMIN — MUPIROCIN: 20 OINTMENT TOPICAL at 23:29

## 2018-07-24 RX ADMIN — IOPAMIDOL 100 ML: 755 INJECTION, SOLUTION INTRAVENOUS at 11:44

## 2018-07-24 RX ADMIN — HEPARIN SODIUM 4000 UNITS: 5000 INJECTION, SOLUTION INTRAVENOUS; SUBCUTANEOUS at 14:20

## 2018-07-24 RX ADMIN — HEPARIN SODIUM 1000 UNITS/HR: 10000 INJECTION, SOLUTION INTRAVENOUS at 17:58

## 2018-07-24 RX ADMIN — PANTOPRAZOLE SODIUM 40 MG: 40 GRANULE, DELAYED RELEASE ORAL at 21:53

## 2018-07-24 RX ADMIN — ACETAMINOPHEN 650 MG: 325 TABLET, FILM COATED ORAL at 19:04

## 2018-07-24 RX ADMIN — EPTIFIBATIDE 2 MCG/KG/MIN: 0.75 INJECTION, SOLUTION INTRAVENOUS at 20:15

## 2018-07-24 RX ADMIN — SODIUM CHLORIDE: 0.9 INJECTION, SOLUTION INTRAVENOUS at 20:15

## 2018-07-24 RX ADMIN — IOPAMIDOL 50 ML: 612 INJECTION, SOLUTION INTRATHECAL at 18:13

## 2018-07-24 RX ADMIN — ACETAMINOPHEN 650 MG: 325 TABLET, FILM COATED ORAL at 23:13

## 2018-07-24 RX ADMIN — ASPIRIN 325 MG: 325 TABLET, DELAYED RELEASE ORAL at 15:17

## 2018-07-24 RX ADMIN — CHOLECALCIFEROL CAP 125 MCG (5000 UNIT) 5000 UNITS: 125 CAP at 21:50

## 2018-07-24 RX ADMIN — EPTIFIBATIDE 2 MCG/KG/MIN: 0.75 INJECTION, SOLUTION INTRAVENOUS at 23:26

## 2018-07-24 ASSESSMENT — PAIN DESCRIPTION - PROGRESSION
CLINICAL_PROGRESSION: GRADUALLY WORSENING
CLINICAL_PROGRESSION: GRADUALLY WORSENING

## 2018-07-24 ASSESSMENT — PAIN DESCRIPTION - PAIN TYPE
TYPE: ACUTE PAIN

## 2018-07-24 ASSESSMENT — PAIN DESCRIPTION - DESCRIPTORS
DESCRIPTORS: ACHING;HEADACHE
DESCRIPTORS: BURNING
DESCRIPTORS: ACHING;DULL

## 2018-07-24 ASSESSMENT — ENCOUNTER SYMPTOMS
COUGH: 0
DIARRHEA: 0
NAUSEA: 0
BLOOD IN STOOL: 0
CHEST TIGHTNESS: 0
CONSTIPATION: 0
EYE DISCHARGE: 0
ABDOMINAL PAIN: 0
RHINORRHEA: 0
EYE REDNESS: 0
SORE THROAT: 0
SINUS PRESSURE: 0
SHORTNESS OF BREATH: 0
VOMITING: 0
WHEEZING: 0

## 2018-07-24 ASSESSMENT — PAIN DESCRIPTION - ONSET
ONSET: ON-GOING
ONSET: ON-GOING

## 2018-07-24 ASSESSMENT — PAIN DESCRIPTION - LOCATION
LOCATION: HEAD
LOCATION: HEAD
LOCATION: CHEST

## 2018-07-24 ASSESSMENT — PAIN SCALES - GENERAL
PAINLEVEL_OUTOF10: 3
PAINLEVEL_OUTOF10: 8
PAINLEVEL_OUTOF10: 0
PAINLEVEL_OUTOF10: 7

## 2018-07-24 ASSESSMENT — PATIENT HEALTH QUESTIONNAIRE - PHQ9
SUM OF ALL RESPONSES TO PHQ QUESTIONS 1-9: 0
SUM OF ALL RESPONSES TO PHQ9 QUESTIONS 1 & 2: 0
2. FEELING DOWN, DEPRESSED OR HOPELESS: 0
1. LITTLE INTEREST OR PLEASURE IN DOING THINGS: 0

## 2018-07-24 ASSESSMENT — PAIN DESCRIPTION - ORIENTATION
ORIENTATION: RIGHT
ORIENTATION: MID

## 2018-07-24 ASSESSMENT — PAIN DESCRIPTION - FREQUENCY
FREQUENCY: INTERMITTENT
FREQUENCY: CONTINUOUS
FREQUENCY: INTERMITTENT

## 2018-07-24 ASSESSMENT — HEART SCORE: ECG: 1

## 2018-07-24 NOTE — ED TRIAGE NOTES
Patient states she has been having chest burning in the middle of her chest since June. She states she was at her PCP Dr. Ma Signs office today and was sent to the ER for EKG changes.

## 2018-07-24 NOTE — ED PROVIDER NOTES
ENDOSCOPY  6/2010    URETHRAL STRICTURE DILATATION      VASCULAR SURGERY Right 2003    leg     Her family history includes Dementia in her mother; Heart Failure in her father and mother; High Blood Pressure in her sister; High Cholesterol in her brother and sister; Other in her sister; Stroke in her mother. She reports that she quit smoking about 18 years ago. She has a 30.00 pack-year smoking history. She has never used smokeless tobacco. She reports that she drinks about 4.2 oz of alcohol per week . She reports that she does not use drugs. Medications     Previous Medications    ALBUTEROL SULFATE  (90 BASE) MCG/ACT INHALER    Inhale 2 puffs into the lungs every 6 hours as needed for Wheezing    ASPIRIN 81 MG TABLET    Take 81 mg by mouth daily    BENAZEPRIL-HYDROCHLORTHIAZIDE (LOTENSIN HCT) 20-25 MG PER TABLET    Take 1 tablet by mouth daily    CALCIUM CARBONATE (CALCIUM 600) 600 MG TABS TABLET    Take 1 tablet by mouth 2 times daily. Calcium for bones. CHOLECALCIFEROL (Speek VIT D 5000 HIGH-POTENCY) 5000 UNITS CAPS CAPSULE    Take 1 capsule by mouth daily. Vitamin D for bones. CYANOCOBALAMIN ([employer] VITAMIN B12) 1000 MCG TABLET    Everyone should take OTC B12 1000 mcg twice a day for neurological health. High B12 levels help prevent dementia. ESOMEPRAZOLE MAGNESIUM (NEXIUM) 40 MG PACK    Take 40 mg by mouth daily    FLUTICASONE-SALMETEROL (ADVAIR) 250-50 MCG/DOSE AEPB    Inhale 1 puff into the lungs every 12 hours    FUROSEMIDE (LASIX) 20 MG TABLET    Take 1 tablet by mouth daily As needed swelling.     IBUPROFEN (ADVIL;MOTRIN) 600 MG TABLET    Take 600 mg by mouth every 6 hours as needed for Pain    METOPROLOL SUCCINATE (TOPROL XL) 50 MG EXTENDED RELEASE TABLET    TAKE 1 TABLET BY MOUTH DAILY    POTASSIUM CHLORIDE (MICRO-K) 10 MEQ EXTENDED RELEASE CAPSULE    Take 1 capsule by mouth 2 times daily    ROSUVASTATIN (CRESTOR) 40 MG TABLET    TAKE 1 TABLET BY MOUTH EVERY DAY IN THE EVENING    SYNTHROID 137 MCG TABLET    TAKE 1 TABLET BY MOUTH DAILY       Allergies     She is allergic to codeine; demerol; erythromycin; mobic [meloxicam]; morphine and related; and sulfa antibiotics. Physical Exam     INITIAL VITALS: BP (!) 152/82   Pulse 72   Temp 98 °F (36.7 °C) (Oral)   Resp 13   Ht 5' 2\" (1.575 m)   Wt 170 lb (77.1 kg)   SpO2 97%   Breastfeeding? No   BMI 31.09 kg/m²    Physical Exam   Constitutional: She is oriented to person, place, and time. She appears well-developed and well-nourished. No distress. HENT:   Head: Normocephalic and atraumatic. Eyes: Conjunctivae are normal. No scleral icterus. Cardiovascular: Normal rate, regular rhythm, normal heart sounds and intact distal pulses. Exam reveals no gallop and no friction rub. No murmur heard. Pulmonary/Chest: Effort normal and breath sounds normal. No respiratory distress. She has no wheezes. She has no rales. Abdominal: Soft. Bowel sounds are normal. She exhibits no distension and no mass. There is no tenderness. There is no rigidity, no rebound, no guarding and no CVA tenderness. Musculoskeletal: She exhibits no edema or tenderness. Neurological: She is alert and oriented to person, place, and time. Skin: Skin is warm. No rash noted. Diagnostic Results     EKG   Interpreted in conjunction with emergency department physician Nikko Client*  Rhythm: normal sinus   Rate: 50-60  Axis: normal  Ectopy: none  Conduction: normal  ST Segments: normal  T Waves: New T-wave inversions in lead 2 and aVF as well as V4 V5 and V6 as compared to EKG dated 11/9/17  Q Waves: none  Clinical Impression: non-specific EKG  Comparison:  11/9/17    RADIOLOGY:  CTA ABDOMINAL AORTA W BILAT RUNOFF W CONTRAST   Final Result      Stable 4.1 cm ascending thoracic aortic aneurysm. No acute aortic pathology.       Relatively small complex loculated right basilar pleural fluid collection noting changes of prior surgery in the right lower [id number]   Phone [phone]   TROPONIN - Abnormal; Notable for the following:     Troponin 0.17 (*)     All other components within normal limits    Narrative:     Performed at: [facility]  [address] [facility] [address] [facility], [address]   Phone [phone]   CBC WITH AUTO DIFFERENTIAL    Narrative:     Performed at: [facility]  [address] [facility],  [place], [address]   Phone [phone]   PROTIME-INR    Narrative:     Performed at: [facility]  [address],  [place], [address]   Phone [phone]   MICROSCOPIC URINALYSIS    Narrative:     Performed at: [facility]  [address] [facility], [address]   Phone [phone]       ED BEDSIDE ULTRASOUND:    This procedure was performed in the presence of the Emergency Medicine Attending. RECENT VITALS:  BP: (!) 152/82, Temp: 98 °F (36.7 °C), Pulse: 72, Resp: 13     Procedures         ED Course     The patient was given the following medications:  Orders Placed This Encounter   Medications    iopamidol (ISOVUE-370) 76 % injection 75 mL            CONSULTS:  None    MEDICAL DECISION MAKING     Brooks Muse is a 69 y.o. female with history of hypertension, COPD, hypothyroidism, CAD, ascending aortic aneurysm last measuring 4.5 cm, lung cancer currently in remission who presents today with 1 week of intermittent chest pain. On arrival to the emergency department patient is a symptomatic with no chest pain at this time. History of aortic aneurysm as well as sharp chest pain radiating to her back neck and shoulders is concerning for possible aortic pathology therefore CT of the aorta was obtained and demonstrated 4.1 cm thoracic aortic aneurysm.   Patient was found to have new T-wave inversions as compared to prior EKG dated 11/9/17 however she states she has had a full cardiac

## 2018-07-24 NOTE — PROGRESS NOTES
Admitted from cath lab in stable condition. PIV x 2 intact. NS infusing at 150 ml/hr, Integrillin at 12 ml/hr, Heparin at 1000 units/hr, NTG at 40 mcg/min. Right groin site intact with slight ooze. Lower ext pulses palpable. Denies CP, SOB. Family at bedside.

## 2018-07-24 NOTE — H&P
Hospital Medicine History & Physical      PCP: Karin Allen MD    Date of Admission: 7/24/2018    Date of Service: Pt seen/examined on 7/24/2018 10:20 AM and   Admitted to Inpatient with expected LOS greater than two midnights due to medical therapy. Chief Complaint:  Chest pain    History Of Present Illness:    Rufino Alvarado is a 69 y.o. patient with PMHx: HTN, AAA (4.5cm), Cerebral aneurysm, COPD, HLP, hypothyroidism, lung cancer s/p pneumonectomy of RML and RLL (2004, 2010) who presented to the hospital after PCP visit for abnormal EKG. Patient has been having chest burning sensations with radiation across her chest, to her ears, to her back and down her arms. Patient reports it usually starts when she lays flat and does not improve with position. She stated the pain lasts for 45 min to 1 hour. She has had multiple episodes in the last 2 days  No particular relieving factors  The discomfort is occasionally associated with nausea and vomiting. Currently pain-free    Past Medical History:          Diagnosis Date    Brain aneurysm     on right very small    CAD (coronary artery disease)     [facility]chronic obstructive pulmonary disease) ([facility] [address]     Hyperlipidemia     Hypertension     Hypothyroidism     Lumbar spondylosis     per MRI done 5/23/2013 - Dr. Noelle Sin [facility]) 7/28/2013    Resected.  Renal cyst 10/27/2015    Spinal stenosis of lumbar region     per MRI done 5/23/2013    Thoracic aortic aneurysm (H[facility])     4 cm stable       Past Surgical History:          Procedure Laterality Date    BACK SURGERY  1990    L5    BACK SURGERY  11/2013    5th vert sciatic nerve release    BREAST LUMPECTOMY Bilateral 2000    CARPAL TUNNEL RELEASE Left 12/22/2016    and left index and ring trigger finger release    CARPAL TUNNEL RELEASE Right 04/06/2017    w/R ring finger trigger finger release    COLONOSCOPY  2010    COLPOPEXY  11/19/2014    Dr. Maureen Guillory. Gagan Doyle RALES/WHEEZES/RHONCHI. Cardiovascular:  Regular rate and rhythm with normal S1/S2 without MURMUR, rubs or gallops. Abdomen: Soft, non-tender, non-distended with normal bowel sounds. Musculoskeletal:  No clubbing, cyanosis or EDEMA bilaterally. Full range of motion without deformity. Skin: Skin color, texture, turgor normal.  No rashes or lesions. Neurologic:  Neurovascularly intact without any focal sensory/motor deficits. Cranial nerves: II-XII intact, grossly non-focal.  Psychiatric:  Alert and oriented, thought content appropriate, normal insight  Capillary Refill: Brisk,< 3 seconds   Peripheral Pulses: +2 palpable, equal bilaterally       Labs:     Recent Labs      07/24/18   1041  07/24/18   1419   WBC  6.4  6.5   HGB  13.7  13.5   HCT  41.1  41.1   PLT  243  236     Recent Labs      07/24/18   1041   NA  142   K  3.8   CL  104   CO2  27   BUN  23*   CREATININE  0.5*   CALCIUM  10.1     No results for input(s): AST, ALT, BILIDIR, BILITOT, ALKPHOS in the last 72 hours. Recent Labs      07/24/18   1041   INR  0.91     Recent Labs      07/24/18   1041  07/24/18   1220   TROPONINI  0.16*  0.17*       Urinalysis:      Lab Results   Component Value Date    NITRU Negative 07/24/2018    WBCUA 0-2 07/24/2018    BACTERIA 1+ 11/29/2017    RBCUA None seen 07/24/2018    BLOODU Negative 07/24/2018    SPECGRAV 1.010 07/24/2018    GLUCOSEU Negative 07/24/2018       Radiology:     CXR: I have reviewed the CXR with the following interpretation: nad  EKG:  I have reviewed the EKG with the following interpretation: T inversion in the inferior and lateral leads    CTA ABDOMINAL AORTA W BILAT RUNOFF W CONTRAST   Final Result      Stable 4.1 cm ascending thoracic aortic aneurysm. No acute aortic pathology. Relatively small complex loculated right basilar pleural fluid collection noting changes of prior surgery in the right lower lung for history of lung cancer.  Correlation with any interval outside studies is recommended, as the effusion is new since            CTA CHEST W CONTRAST   Final Result      Stable 4.1 cm ascending thoracic aortic aneurysm. No acute aortic pathology. Relatively small complex loculated right basilar pleural fluid collection noting changes of prior surgery in the right lower lung for history of lung cancer. Correlation with any interval outside studies is recommended, as the effusion is new since            XR CHEST STANDARD (2 VW)   Final Result      Elevated right hemidiaphragm with pleural scarring similar to previous. Left lung is clear. Borderline cardiomegaly. Tortuous descending thoracic aorta. ASSESSMENT/PLAN:    Active Hospital Problems    Diagnosis Date Noted    Chest pain [R07.9] 07/24/2018       Acute Medical Issues Being Addressed:    58-year-old woman admitted with chest burning    Chest pain suspected non-ST elevation MI  Elevation of troponin with some T inversion in inferior lateral leads  Currently pain-free  Aspirin, atorvastatin, beta blocker  Start IV heparin  Cardiology consult  Plan for cardiac catheterization this afternoon  CT chest no evidence of PE stable ascending aortic aneurysm of 4.1 cm    Hypothyroidism  Continue levothyroxin    COPD  Continue inhalers  Currently no evidence of exacerbation    DVT Prophylaxis: On IV heparin  Diet: Diet NPO Effective Now  Code Status: No Order    PT/OT Eval Status: will have eval if appropriate given patient's condition    Dispo - once acute medical process is resolved       Gerardo Lr MD    Thank you Saurav Peacock MD for the opportunity to be involved in this patient's care.  If you have any questions or concerns please feel free to contact me at [phone] (pager)

## 2018-07-24 NOTE — CONSULTS
12/22/2016); Carpal tunnel release (Right, 04/06/2017); and Urethra dilation. Social History:   reports that she quit smoking about 18 years ago. She has a 30.00 pack-year smoking history. She has never used smokeless tobacco. She reports that she drinks about 4.2 oz of alcohol per week . She reports that she does not use drugs. Family History:  No evidence for sudden cardiac death or premature CAD    Home Medications:  Were reviewed and are listed in nursing record. and/or listed below  Prior to Admission medications    Medication Sig Start Date End Date Taking? Authorizing Provider   rosuvastatin (CRESTOR) 40 MG tablet TAKE 1 TABLET BY MOUTH EVERY DAY IN THE EVENING 7/9/18  Yes Brendan Pardo MD   benazepril-hydrochlorthiazide (LOTENSIN HCT) 20-25 MG per tablet Take 1 tablet by mouth daily 6/14/18  Yes Brendan Pardo MD   metoprolol succinate (TOPROL XL) 50 MG extended release tablet TAKE 1 TABLET BY MOUTH DAILY 6/14/18  Yes Brendan Pardo MD   SYNTHROID 137 MCG tablet TAKE 1 TABLET BY MOUTH DAILY 6/13/18  Yes Brendan Pardo MD   potassium chloride (MICRO-K) 10 MEQ extended release capsule Take 1 capsule by mouth 2 times daily 9/8/17  Yes Severo Stack, MD   albuterol sulfate  (90 Base) MCG/ACT inhaler Inhale 2 puffs into the lungs every 6 hours as needed for Wheezing 9/8/17  Yes Severo Stack, MD   fluticasone-salmeterol (ADVAIR) 250-50 MCG/DOSE AEPB Inhale 1 puff into the lungs every 12 hours 9/8/17  Yes Severo Stack, MD   furosemide (LASIX) 20 MG tablet Take 1 tablet by mouth daily As needed swelling. 9/8/17  Yes Severo Stack, MD   aspirin 81 MG tablet Take 81 mg by mouth daily   Yes Historical Provider, MD   esomeprazole Magnesium (NEXIUM) 40 MG PACK Take 40 mg by mouth daily   Yes Historical Provider, MD   cyanocobalamin ([employer] VITAMIN B12) 1000 MCG tablet Everyone should take OTC B12 1000 mcg twice a day for neurological health. High B12 levels help prevent dementia. elevated troponin, and EKG changes comprising of T-wave inversions in the inferior leads, and ASCVD risk score of 11.6%,  patient would benefit from further cardiac evaluation via angiogram to assess for coronary artery disease. The 10-year ASCVD risk score (Hermes Burrows, et al., 2013) is: 11.6%    Values used to calculate the score:      Age: 69 years      Sex: Female      Is Non- : No      Diabetic: No      Tobacco smoker: No      Systolic Blood Pressure: 199 mmHg      Is BP treated: Yes      HDL Cholesterol: 52 mg/dL      Total Cholesterol: 157 mg/dL      Recommendations:    NSTEMI   Plan for angiogram today with possible stent placement. All possible options, including possibility of CABG were discussed with patient and daughter who is present in the room. Patient understands the risks and benefits of completing angiogram.     Discussed with attending physician Dr. Cisco Olivares MD  Internal Medicine, PGY-2  Pager: [phone]       Staff Note      Patient seen and evaluated with the medical resident. I was physically present during the critical portions of the service when performed by the resident including the assessment and management of the patient. NSTEMI. Inferior and anterolateral TWI. Will proceed with coronary angio to assess for CAD and may need further intervention. Will likely need admission to ICU. I agree with the findings and plans as described.

## 2018-07-24 NOTE — PROGRESS NOTES
ICU HISTORY AND PHYSICAL       Hospital Day:   ICU Day:                                                          Code:Full Code  Admit Date: 7/24/2018                                             PCP: Chalino Rmoero MD                                  CC: burning chest sensation     HISTORY OF PRESENT ILLNESS:   Interim history:  Patient admitted form cath lab to ICU floor, in stable condition. Will medically manage. Keep patient NPO for CT surgery either tonight or tomorrow AM.     PAST HISTORY:     Past Medical History:   Diagnosis Date    Brain aneurysm     on right very small    CAD (coronary artery disease)     COPD (chronic obstructive pulmonary disease) ([address]     Hyperlipidemia     Hypertension     Hypothyroidism     Lumbar spondylosis     per MRI done 5/23/2013 - Dr. Ryan [facility]) 7/28/2013    Resected.  Renal cyst 10/27/2015    Spinal stenosis of lumbar region     per MRI done 5/23/2013    Thoracic aortic aneurysm (HCC)     4 cm stable       Past Surgical History:   Procedure Laterality Date    BACK SURGERY  1990    L5    BACK SURGERY  11/2013 5th vert sciatic nerve release    BREAST LUMPECTOMY Bilateral 2000    CARPAL TUNNEL RELEASE Left 12/22/2016    and left index and ring trigger finger release    CARPAL TUNNEL RELEASE Right 04/06/2017    w/R ring finger trigger finger release    COLONOSCOPY  2010    COLPOPEXY  11/19/2014    Dr. Vazquez Mack[facility] COLPORRHAPHY  11/19/2014    A&P- Dr. Vazquez [facility] HIP SURGERY Right 05/2014    Dr. Anderson Spine, VAGINAL  11/19/2014    Dr. Vazquez Mack[facility] INGUINAL HERNIA REPAIR Bilateral 1980s    LUNG REMOVAL, PARTIAL Right 4/2010    mid in 4/2010, & lower in 5/2004    SALPINGO-OOPHORECTOMY Bilateral 11/19/2014    Dr. [facility]quez [facility] UPPER GASTROINTESTINAL ENDOSCOPY  6/2010    URETHRAL STRICTURE DILATATION      VASCULAR SURGERY Right 2003    leg       Social History:   The patient lives at    Alcohol: Illicit drugs: no use  Tobacco:      Family History:  Family History   Problem Relation Age of Onset    Heart Failure Father     Heart Failure Mother     Dementia Mother     Stroke Mother     Other Sister         brain aneurysm    High Cholesterol Brother     High Blood Pressure Sister     High Cholesterol Sister        MEDICATIONS:     No current facility-administered medications on file prior to encounter. Current Outpatient Prescriptions on File Prior to Encounter   Medication Sig Dispense Refill    rosuvastatin (CRESTOR) 40 MG tablet TAKE 1 TABLET BY MOUTH EVERY DAY IN THE EVENING 90 tablet 0    benazepril-hydrochlorthiazide (LOTENSIN HCT) 20-25 MG per tablet Take 1 tablet by mouth daily 90 tablet 0    metoprolol succinate (TOPROL XL) 50 MG extended release tablet TAKE 1 TABLET BY MOUTH DAILY 90 tablet 0    SYNTHROID 137 MCG tablet TAKE 1 TABLET BY MOUTH DAILY 90 tablet 0    potassium chloride (MICRO-K) 10 MEQ extended release capsule Take 1 capsule by mouth 2 times daily 180 capsule 0    albuterol sulfate  (90 Base) MCG/ACT inhaler Inhale 2 puffs into the lungs every 6 hours as needed for Wheezing 3 Inhaler 0    fluticasone-salmeterol (ADVAIR) 250-50 MCG/DOSE AEPB Inhale 1 puff into the lungs every 12 hours 1 Inhaler 0    furosemide (LASIX) 20 MG tablet Take 1 tablet by mouth daily As needed swelling. 90 tablet 0    aspirin 81 MG tablet Take 81 mg by mouth daily      esomeprazole Magnesium (NEXIUM) 40 MG PACK Take 40 mg by mouth daily      cyanocobalamin ([employer] VITAMIN B12) 1000 MCG tablet Everyone should take OTC B12 1000 mcg twice a day for neurological health. High B12 levels help prevent dementia. 180 tablet 3    Cholecalciferol ([employer]S VIT D 5000 HIGH-POTENCY) 5000 UNITS CAPS capsule Take 1 capsule by mouth daily. Vitamin D for bones. 90 capsule 12    calcium carbonate (CALCIUM 600) 600 MG TABS tablet Take 1 tablet by mouth 2 times daily. Calcium for bones.  180 tablet 3    Normal cephalic, atraumatic without obvious deformity. Pupils equal, round, and reactive to light. Extra ocular muscles intact. Conjunctivae/corneas clear. Neck: Supple, with full range of motion. No jugular venous distention. Trachea midline. Respiratory:  Normal respiratory effort. Clear to auscultation, bilaterally without RALES/WHEEZES/RHONCHI. Cardiovascular:  Regular rate and rhythm with normal S1/S2 without MURMUR, rubs or gallops. Abdomen: Soft, non-tender, non-distended with normal bowel sounds. Musculoskeletal:  No clubbing, cyanosis or EDEMA bilaterally. Full range of motion without deformity. Skin: Skin color, texture, turgor normal.  No rashes or lesions. Neurologic:  Neurovascularly intact without any focal sensory/motor deficits. Cranial nerves: II-XII intact, grossly non-focal.  Psychiatric:  Alert and oriented, thought content appropriate, normal insight  Capillary Refill: Brisk,< 3 seconds   Peripheral Pulses: +2 palpable, equal bilaterally      DATA:       Labs:  CBC:   Recent Labs      07/24/18   1041  07/24/18   1419   WBC  6.4  6.5   HGB  13.7  13.5   HCT  41.1  41.1   PLT  243  236       BMP:   Recent Labs      07/24/18   1041  07/24/18   1746   NA  142  140   K  3.8  3.5   CL  104  102   CO2  27  24   BUN  23*  15   CREATININE  0.5*  <0.5*   GLUCOSE  113*  107*     LFT's:   Recent Labs      07/24/18   1746   AST  23   ALT  20   BILITOT  0.3   ALKPHOS  67     Troponin:   Recent Labs      07/24/18   1041  07/24/18   1220   TROPONINI  0.16*  0.17*     BNP: No results for input(s): BNP in the last 72 hours. ABGs: No results for input(s): PHART, ARW8LFO, PO2ART in the last 72 hours.   INR:   Recent Labs      07/24/18   1041  07/24/18   1621   INR  0.91  1.4       U/A:  Recent Labs      07/24/18   1101   COLORU  Yellow   PHUR  6.5   WBCUA  0-2   RBCUA  None seen   CLARITYU  Clear   SPECGRAV  1.010   LEUKOCYTESUR  TRACE*   UROBILINOGEN  0.2   BILIRUBINUR  Negative   BLOODU  Negative GLUCOSEU  Negative       CTA ABDOMINAL AORTA W BILAT RUNOFF W CONTRAST   Final Result      Stable 4.1 cm ascending thoracic aortic aneurysm. No acute aortic pathology. Relatively small complex loculated right basilar pleural fluid collection noting changes of prior surgery in the right lower lung for history of lung cancer. Correlation with any interval outside studies is recommended, as the effusion is new since            CTA CHEST W CONTRAST   Final Result      Stable 4.1 cm ascending thoracic aortic aneurysm. No acute aortic pathology. Relatively small complex loculated right basilar pleural fluid collection noting changes of prior surgery in the right lower lung for history of lung cancer. Correlation with any interval outside studies is recommended, as the effusion is new since            XR CHEST STANDARD (2 VW)   Final Result      Elevated right hemidiaphragm with pleural scarring similar to previous. Left lung is clear. Borderline cardiomegaly. Tortuous descending thoracic aorta. ASSESSMENT AND PLAN:   Moni Maier is a 69 y.o. female, patient with PMHx: HTN, AAA (4.5cm), Cerebral aneurysm, COPD, HLP, hypothyroidism, lung cancer s/p pneumonectomy of RML and RLL (2004, 2010) admitted to the ICU for observation post angioplasty with some increase in blood flow in RCA. Unable to place stent, CT surgery consulted for CABG as patient has multi vessel disease.       #NSTEMI- Plan for CT surgery tonight or tomorrow AM  - EKG changes comprising of T-wave inversions in the inferior leads  - Trop up trending, q6H  -Start ASA 81mg  -Atorvastatin 80 mg  -Calcium carbonate 600 mg BID   - Toprol XL 50mg daily  -Nitroglycerin 50mg   - eptifibatide 0.75 drip, disc after 15 h.  -Hydralazine 10mg q4H PRN for BP>160  -LFT  -Lipid panel    #Hyperthyroidism  -Levothyroxine 137 mg daily    #COPD  - Dulera         Code Status:Full Code  FEN: NPO  PPX:  IV Hep, Pepsid,

## 2018-07-24 NOTE — ED PROVIDER NOTES
ED Attending Attestation Note     Date of evaluation: 7/24/2018    This patient was seen by the resident. I have seen and examined the patient, agree with the workup, evaluation, management and diagnosis. The care plan has been discussed. I have reviewed the ECG and concur with the resident's interpretation. My assessment reveals A nontender chest and abdomen.      Vreonica Quezada MD  07/24/18 1931

## 2018-07-24 NOTE — ASSESSMENT & PLAN NOTE
Patient complains of two weeks severe neck/chest pain radiating through to the back, up her neck and down her arms  It lasts minutes to hours and repeats itself    Last CT reviewed    Referral to ER for CTA  Report called

## 2018-07-25 ENCOUNTER — APPOINTMENT (OUTPATIENT)
Dept: CT IMAGING | Age: 68
DRG: 231 | End: 2018-07-25
Payer: MEDICARE

## 2018-07-25 ENCOUNTER — APPOINTMENT (OUTPATIENT)
Dept: ULTRASOUND IMAGING | Age: 68
DRG: 231 | End: 2018-07-25
Payer: MEDICARE

## 2018-07-25 LAB
A/G RATIO: 1.3 (ref 1.1–2.2)
ALBUMIN SERPL-MCNC: 3.3 G/DL (ref 3.4–5)
ALBUMIN SERPL-MCNC: 3.4 G/DL (ref 3.4–5)
ALP BLD-CCNC: 61 U/L (ref 40–129)
ALP BLD-CCNC: 66 U/L (ref 40–129)
ALT SERPL-CCNC: 16 U/L (ref 10–40)
ALT SERPL-CCNC: 18 U/L (ref 10–40)
ANION GAP SERPL CALCULATED.3IONS-SCNC: 9 MMOL/L (ref 3–16)
ANTI-XA UNFRAC HEPARIN: 0.42 IU/ML (ref 0.3–0.7)
ANTI-XA UNFRAC HEPARIN: 0.49 IU/ML (ref 0.3–0.7)
ANTI-XA UNFRAC HEPARIN: 0.94 IU/ML (ref 0.3–0.7)
AST SERPL-CCNC: 17 U/L (ref 15–37)
AST SERPL-CCNC: 22 U/L (ref 15–37)
BASOPHILS ABSOLUTE: 0 K/UL (ref 0–0.2)
BASOPHILS RELATIVE PERCENT: 0.7 %
BILIRUB SERPL-MCNC: <0.2 MG/DL (ref 0–1)
BILIRUB SERPL-MCNC: <0.2 MG/DL (ref 0–1)
BILIRUBIN DIRECT: <0.2 MG/DL (ref 0–0.3)
BILIRUBIN DIRECT: <0.2 MG/DL (ref 0–0.3)
BILIRUBIN, INDIRECT: ABNORMAL MG/DL (ref 0–1)
BILIRUBIN, INDIRECT: NORMAL MG/DL (ref 0–1)
BUN BLDV-MCNC: 19 MG/DL (ref 7–20)
CALCIUM SERPL-MCNC: 8.9 MG/DL (ref 8.3–10.6)
CHLORIDE BLD-SCNC: 110 MMOL/L (ref 99–110)
CHOLESTEROL, TOTAL: 129 MG/DL (ref 0–199)
CO2: 23 MMOL/L (ref 21–32)
CREAT SERPL-MCNC: <0.5 MG/DL (ref 0.6–1.2)
EOSINOPHILS ABSOLUTE: 0.3 K/UL (ref 0–0.6)
EOSINOPHILS RELATIVE PERCENT: 4.2 %
ESTIMATED AVERAGE GLUCOSE: 128.4 MG/DL
FIBRINOGEN: 269 MG/DL (ref 200–397)
GFR AFRICAN AMERICAN: >60
GFR NON-AFRICAN AMERICAN: >60
GLOBULIN: 2.5 G/DL
GLUCOSE BLD-MCNC: 154 MG/DL (ref 70–99)
HBA1C MFR BLD: 6.1 %
HCT VFR BLD CALC: 30.6 % (ref 36–48)
HCT VFR BLD CALC: 31.7 % (ref 36–48)
HCT VFR BLD CALC: 32.9 % (ref 36–48)
HDLC SERPL-MCNC: 44 MG/DL (ref 40–60)
HEMOGLOBIN: 10.4 G/DL (ref 12–16)
HEMOGLOBIN: 10.5 G/DL (ref 12–16)
HEMOGLOBIN: 10.9 G/DL (ref 12–16)
INR BLD: 0.99 (ref 0.86–1.14)
LDL CHOLESTEROL CALCULATED: 67 MG/DL
LV EF: 50 %
LVEF MODALITY: NORMAL
LYMPHOCYTES ABSOLUTE: 2.4 K/UL (ref 1–5.1)
LYMPHOCYTES RELATIVE PERCENT: 34 %
MAGNESIUM: 1.9 MG/DL (ref 1.8–2.4)
MAGNESIUM: 2 MG/DL (ref 1.8–2.4)
MCH RBC QN AUTO: 31.1 PG (ref 26–34)
MCHC RBC AUTO-ENTMCNC: 33.2 G/DL (ref 31–36)
MCV RBC AUTO: 93.6 FL (ref 80–100)
MONOCYTES ABSOLUTE: 0.6 K/UL (ref 0–1.3)
MONOCYTES RELATIVE PERCENT: 8.2 %
NEUTROPHILS ABSOLUTE: 3.7 K/UL (ref 1.7–7.7)
NEUTROPHILS RELATIVE PERCENT: 52.9 %
PDW BLD-RTO: 14.3 % (ref 12.4–15.4)
PLATELET # BLD: 203 K/UL (ref 135–450)
PMV BLD AUTO: 7.9 FL (ref 5–10.5)
POTASSIUM SERPL-SCNC: 3.9 MMOL/L (ref 3.5–5.1)
PROTHROMBIN TIME: 11.3 SEC (ref 9.8–13)
RBC # BLD: 3.52 M/UL (ref 4–5.2)
SODIUM BLD-SCNC: 142 MMOL/L (ref 136–145)
TOTAL PROTEIN: 5.8 G/DL (ref 6.4–8.2)
TOTAL PROTEIN: 6 G/DL (ref 6.4–8.2)
TRIGL SERPL-MCNC: 89 MG/DL (ref 0–150)
TROPONIN: 0.26 NG/ML
TROPONIN: 0.28 NG/ML
TROPONIN: 0.31 NG/ML
VLDLC SERPL CALC-MCNC: 18 MG/DL
WBC # BLD: 7 K/UL (ref 4–11)

## 2018-07-25 PROCEDURE — 36415 COLL VENOUS BLD VENIPUNCTURE: CPT

## 2018-07-25 PROCEDURE — 85018 HEMOGLOBIN: CPT

## 2018-07-25 PROCEDURE — 6370000000 HC RX 637 (ALT 250 FOR IP): Performed by: INTERNAL MEDICINE

## 2018-07-25 PROCEDURE — 94761 N-INVAS EAR/PLS OXIMETRY MLT: CPT

## 2018-07-25 PROCEDURE — 85014 HEMATOCRIT: CPT

## 2018-07-25 PROCEDURE — 74176 CT ABD & PELVIS W/O CONTRAST: CPT

## 2018-07-25 PROCEDURE — 6360000002 HC RX W HCPCS: Performed by: INTERNAL MEDICINE

## 2018-07-25 PROCEDURE — 6360000002 HC RX W HCPCS: Performed by: STUDENT IN AN ORGANIZED HEALTH CARE EDUCATION/TRAINING PROGRAM

## 2018-07-25 PROCEDURE — 93306 TTE W/DOPPLER COMPLETE: CPT

## 2018-07-25 PROCEDURE — 83036 HEMOGLOBIN GLYCOSYLATED A1C: CPT

## 2018-07-25 PROCEDURE — 6370000000 HC RX 637 (ALT 250 FOR IP): Performed by: STUDENT IN AN ORGANIZED HEALTH CARE EDUCATION/TRAINING PROGRAM

## 2018-07-25 PROCEDURE — 76705 ECHO EXAM OF ABDOMEN: CPT

## 2018-07-25 PROCEDURE — 2000000000 HC ICU R&B

## 2018-07-25 PROCEDURE — 80053 COMPREHEN METABOLIC PANEL: CPT

## 2018-07-25 PROCEDURE — 85384 FIBRINOGEN ACTIVITY: CPT

## 2018-07-25 PROCEDURE — 83735 ASSAY OF MAGNESIUM: CPT

## 2018-07-25 PROCEDURE — 80061 LIPID PANEL: CPT

## 2018-07-25 PROCEDURE — 82248 BILIRUBIN DIRECT: CPT

## 2018-07-25 PROCEDURE — 93005 ELECTROCARDIOGRAM TRACING: CPT | Performed by: INTERNAL MEDICINE

## 2018-07-25 PROCEDURE — 99233 SBSQ HOSP IP/OBS HIGH 50: CPT | Performed by: INTERNAL MEDICINE

## 2018-07-25 PROCEDURE — 84484 ASSAY OF TROPONIN QUANT: CPT

## 2018-07-25 PROCEDURE — 85610 PROTHROMBIN TIME: CPT

## 2018-07-25 PROCEDURE — 2700000000 HC OXYGEN THERAPY PER DAY

## 2018-07-25 PROCEDURE — 85025 COMPLETE CBC W/AUTO DIFF WBC: CPT

## 2018-07-25 PROCEDURE — 6360000002 HC RX W HCPCS

## 2018-07-25 PROCEDURE — 99291 CRITICAL CARE FIRST HOUR: CPT | Performed by: INTERNAL MEDICINE

## 2018-07-25 PROCEDURE — 2580000003 HC RX 258: Performed by: INTERNAL MEDICINE

## 2018-07-25 PROCEDURE — 85520 HEPARIN ASSAY: CPT

## 2018-07-25 RX ORDER — 0.9 % SODIUM CHLORIDE 0.9 %
250 INTRAVENOUS SOLUTION INTRAVENOUS ONCE
Status: COMPLETED | OUTPATIENT
Start: 2018-07-25 | End: 2018-07-25

## 2018-07-25 RX ORDER — ATROPINE SULFATE 1 MG/ML
0.5 INJECTION, SOLUTION INTRAMUSCULAR; INTRAVENOUS; SUBCUTANEOUS ONCE
Status: COMPLETED | OUTPATIENT
Start: 2018-07-25 | End: 2018-07-25

## 2018-07-25 RX ORDER — ONDANSETRON 2 MG/ML
4 INJECTION INTRAMUSCULAR; INTRAVENOUS EVERY 6 HOURS PRN
Status: DISCONTINUED | OUTPATIENT
Start: 2018-07-25 | End: 2018-07-26

## 2018-07-25 RX ORDER — DIPHENHYDRAMINE HYDROCHLORIDE 50 MG/ML
5 INJECTION INTRAMUSCULAR; INTRAVENOUS ONCE
Status: COMPLETED | OUTPATIENT
Start: 2018-07-25 | End: 2018-07-25

## 2018-07-25 RX ORDER — TRAMADOL HYDROCHLORIDE 50 MG/1
50 TABLET ORAL EVERY 6 HOURS PRN
Status: COMPLETED | OUTPATIENT
Start: 2018-07-25 | End: 2018-07-25

## 2018-07-25 RX ADMIN — TRAMADOL HYDROCHLORIDE 50 MG: 50 TABLET, FILM COATED ORAL at 23:46

## 2018-07-25 RX ADMIN — ATROPINE SULFATE 0.5 MG: 1 INJECTION, SOLUTION INTRAMUSCULAR; INTRAVENOUS; SUBCUTANEOUS at 09:30

## 2018-07-25 RX ADMIN — Medication 0.25 MG: at 19:06

## 2018-07-25 RX ADMIN — SODIUM CHLORIDE 250 ML: 9 INJECTION, SOLUTION INTRAVENOUS at 10:49

## 2018-07-25 RX ADMIN — FAMOTIDINE 20 MG: 20 TABLET ORAL at 10:38

## 2018-07-25 RX ADMIN — MUPIROCIN: 20 OINTMENT TOPICAL at 23:09

## 2018-07-25 RX ADMIN — ACETAMINOPHEN 650 MG: 325 TABLET, FILM COATED ORAL at 15:51

## 2018-07-25 RX ADMIN — PROCHLORPERAZINE EDISYLATE 10 MG: 5 INJECTION INTRAMUSCULAR; INTRAVENOUS at 23:05

## 2018-07-25 RX ADMIN — Medication 15 ML: at 10:39

## 2018-07-25 RX ADMIN — Medication 0.25 MG: at 09:40

## 2018-07-25 RX ADMIN — Medication 10 ML: at 23:10

## 2018-07-25 RX ADMIN — DIPHENHYDRAMINE HYDROCHLORIDE 5 MG: 50 INJECTION, SOLUTION INTRAMUSCULAR; INTRAVENOUS at 23:05

## 2018-07-25 RX ADMIN — HEPARIN SODIUM 10 UNITS/KG/HR: 10000 INJECTION, SOLUTION INTRAVENOUS at 23:50

## 2018-07-25 RX ADMIN — ACETAMINOPHEN 650 MG: 325 TABLET, FILM COATED ORAL at 04:33

## 2018-07-25 RX ADMIN — CHOLECALCIFEROL CAP 125 MCG (5000 UNIT) 5000 UNITS: 125 CAP at 10:32

## 2018-07-25 RX ADMIN — HYDROMORPHONE HYDROCHLORIDE 0.25 MG: 1 INJECTION, SOLUTION INTRAMUSCULAR; INTRAVENOUS; SUBCUTANEOUS at 09:40

## 2018-07-25 RX ADMIN — CALCIUM 500 MG: 500 TABLET ORAL at 10:38

## 2018-07-25 RX ADMIN — Medication 10 ML: at 10:39

## 2018-07-25 RX ADMIN — MUPIROCIN: 20 OINTMENT TOPICAL at 10:45

## 2018-07-25 RX ADMIN — ONDANSETRON 4 MG: 2 INJECTION INTRAMUSCULAR; INTRAVENOUS at 09:35

## 2018-07-25 RX ADMIN — ONDANSETRON 4 MG: 2 INJECTION INTRAMUSCULAR; INTRAVENOUS at 19:04

## 2018-07-25 RX ADMIN — EPTIFIBATIDE 2 MCG/KG/MIN: 0.75 INJECTION, SOLUTION INTRAVENOUS at 07:34

## 2018-07-25 RX ADMIN — PANTOPRAZOLE SODIUM 40 MG: 40 GRANULE, DELAYED RELEASE ORAL at 10:38

## 2018-07-25 RX ADMIN — ACETAMINOPHEN 650 MG: 325 TABLET, FILM COATED ORAL at 11:34

## 2018-07-25 RX ADMIN — HYDROMORPHONE HYDROCHLORIDE 0.25 MG: 1 INJECTION, SOLUTION INTRAMUSCULAR; INTRAVENOUS; SUBCUTANEOUS at 19:06

## 2018-07-25 RX ADMIN — LEVOTHYROXINE SODIUM 137 MCG: 137 TABLET ORAL at 10:38

## 2018-07-25 ASSESSMENT — PAIN DESCRIPTION - ONSET
ONSET: ON-GOING
ONSET_2: GRADUAL
ONSET: ON-GOING
ONSET: ON-GOING

## 2018-07-25 ASSESSMENT — PAIN SCALES - GENERAL
PAINLEVEL_OUTOF10: 10
PAINLEVEL_OUTOF10: 4
PAINLEVEL_OUTOF10: 7
PAINLEVEL_OUTOF10: 9
PAINLEVEL_OUTOF10: 8

## 2018-07-25 ASSESSMENT — PAIN DESCRIPTION - PAIN TYPE
TYPE: ACUTE PAIN
TYPE: ACUTE PAIN
TYPE_2: ACUTE PAIN
TYPE: ACUTE PAIN
TYPE: ACUTE PAIN

## 2018-07-25 ASSESSMENT — PAIN DESCRIPTION - PROGRESSION
CLINICAL_PROGRESSION: GRADUALLY WORSENING
CLINICAL_PROGRESSION_2: GRADUALLY WORSENING

## 2018-07-25 ASSESSMENT — PAIN DESCRIPTION - DESCRIPTORS
DESCRIPTORS: ACHING;HEADACHE
DESCRIPTORS_2: ACHING
DESCRIPTORS: HEADACHE;ACHING
DESCRIPTORS: ACHING;HEADACHE

## 2018-07-25 ASSESSMENT — PAIN DESCRIPTION - LOCATION
LOCATION: HEAD
LOCATION: OTHER (COMMENT)
LOCATION: HEAD
LOCATION: HEAD
LOCATION_2: BACK

## 2018-07-25 ASSESSMENT — PAIN DESCRIPTION - FREQUENCY
FREQUENCY: CONTINUOUS
FREQUENCY: CONTINUOUS
FREQUENCY: INTERMITTENT

## 2018-07-25 ASSESSMENT — PAIN DESCRIPTION - ORIENTATION
ORIENTATION: RIGHT;LEFT;ANTERIOR;POSTERIOR
ORIENTATION: OTHER (COMMENT)
ORIENTATION: OTHER (COMMENT)
ORIENTATION: RIGHT;LEFT
ORIENTATION_2: LOWER;MID

## 2018-07-25 ASSESSMENT — PAIN DESCRIPTION - INTENSITY: RATING_2: 8

## 2018-07-25 ASSESSMENT — PAIN DESCRIPTION - DURATION: DURATION_2: INTERMITTENT

## 2018-07-25 NOTE — PROCEDURES
Beta blockers will be initiated and Integrilin  will continue for 15 hours pending the evaluation from CT surgery. She is  critically ill and will be maintained in the ICU with hydration to  continue. Opal Santillan MD    D: 07/24/2018 18:53:52       T: 07/24/2018 18:58:22     [email or web address]  Job#: [id number]     Doc#: [id number]    CC:  Edel Mensah.  Tram Sampson MD

## 2018-07-25 NOTE — PROGRESS NOTES
- - 65 15 95 % - -   07/25/18 0630 90/62 - - 63 15 96 % 5' 2\" (1.575 m) 177 lb 0.5 oz (80.3 kg)   07/25/18 0600 102/62 - - 62 15 96 % - -       Intake/Output Summary (Last 24 hours) at 07/25/18 1339  Last data filed at 07/25/18 1038   Gross per 24 hour   Intake          2705.96 ml   Output              875 ml   Net          1830.96 ml       Physical Exam   Constitutional: She appears distressed. HENT:   Head: Normocephalic and atraumatic. Eyes: Conjunctivae and EOM are normal.   Neck: Normal range of motion. Cardiovascular: Normal rate, regular rhythm and normal heart sounds. Pulmonary/Chest: Effort normal and breath sounds normal. No respiratory distress. She has no wheezes. Abdominal: Soft. Bowel sounds are normal. She exhibits distension. There is no tenderness. There is no guarding. LABS:    CBC: Recent Labs      07/24/18   1041  07/24/18   1419  07/25/18   0505  07/25/18   0953   WBC  6.4  6.5  7.0   --    HGB  13.7  13.5  10.9*  10.5*   HCT  41.1  41.1  32.9*  31.7*   PLT  243  236  203   --    MCV  93.2  93.3  93.6   --      Renal:  Recent Labs      07/24/18   1041  07/24/18   1746  07/25/18   0505  07/25/18   0953   NA  142  140   --   142   K  3.8  3.5   --   3.9   CL  104  102   --   110   CO2  27  24   --   23   BUN  23*  15   --   19   CREATININE  0.5*  <0.5*   --   <0.5*   GLUCOSE  113*  107*   --   154*   CALCIUM  10.1  9.1   --   8.9   MG   --   1.80  1.90  2.00   ANIONGAP  11  14   --   9     Hepatic: Recent Labs      07/24/18   1746  07/25/18   0505  07/25/18   0953   AST  23  22  17   ALT  20  18  16   BILITOT  0.3  <0.2  <0.2   BILIDIR  <0.2  <0.2  <0.2   PROT  6.5  6.0*  5.8*   LABALBU  3.7  3.4  3.3*   ALKPHOS  67  66  61     Troponin:   Recent Labs      07/24/18   1220  07/25/18   0505  07/25/18   0953   TROPONINI  0.17*  0.31*  0.28*     BNP: No results for input(s): BNP in the last 72 hours.   Lipids: Recent Labs      07/24/18   1746   CHOL  129   HDL  44     ABGs:  No angioplasty with some increase in blood flow in RCA. Unable to place stent, CT surgery consulted for CABG as patient has multi vessel disease.       #NSTEMI- Plan for CT surgery-tomorrow AM  - EKG changes comprising of T-wave inversions in the inferior leads  - Trop up trending, q6H  - Start ASA 81mg  - Atorvastatin 80 mg  - Calcium carbonate 600 mg BID   - Toprol XL 50mg daily  - Nitroglycerin 50mg   - eptifibatide 0.75 drip, disc after 15h- has been discontinued   - Hydralazine 10mg q4H PRN for BP>160     #Hyperthyroidism  -Levothyroxine 137 mg daily     #COPD  - Dulera      Code Status:Full Code  FEN: NPO  PPX:  IV Hep, Pepsid, Ondansetron   DISPO: ICU    [facility] June, PGY-1  07/25/18  1:39 PM    This patient will be staffed and discussed with Kate Calix MD.       Staff Note      Patient seen and evaluated with the medical resident. I was physically present during the critical portions of the service when performed by the resident including the assessment and management of the patient. Continue heparin for NÉSTOR 2 to NÉSTOR 3 flow after marginally effective PTCA on calcified Mid-RCA. Hematoma on CT is noted but patient is HD stable presently. CT surgery will be alerted as to this development. Integrelin was stopped. Vascular surgery was consulted. I agree with the findings and plans as described.

## 2018-07-25 NOTE — PROGRESS NOTES
Post procedure site check completed. Surgical site is within normal limits and appears to be free of complications. All concerns were reviewed and questions answered. Patient verbalized understanding.

## 2018-07-25 NOTE — CONSULTS
ago. She has a 30.00 pack-year smoking history. She has never used smokeless tobacco.  ETOH:   reports that she drinks about 4.2 oz of alcohol per week . DRUGS:   reports that she does not use drugs. ROS: A 10 point review of systems was conducted, significant findings as noted in HPI. All other systems negative. Physical exam:   Vitals:    07/25/18 0941 07/25/18 1028 07/25/18 1129 07/25/18 1230   BP:  98/75 98/75 126/78   Pulse:  78 78 75   Resp: 14   26   Temp:    98.1 °F (36.7 °C)   TempSrc:    Oral   SpO2: 94%   99%   Weight:       Height:           General appearance: alert, no acute distress, grooming appropriate  Eyes: PERRLA, no scleral icterus  Neck: trachea midline, no JVD, no lymphadenopathy  Chest/Lungs: CTAB, no crackles/rales, wheezes/rhonchi, normal effort  Cardiovascular: RRR, no murmurs/gallops/rubs  Abdomen: soft, non-tender, non-distended, +BS, no guarding/rigidity  Skin: warm and dry, no rashes  Groin: R groin with tenderness to palpation, no palpable mass. Some ecchymosis around the puncture site, dressed with gauze and tegaderm.    Extremities: no edema, no cyanosis, PT and DT pulses palpable  Neuro: A&Ox3, no focal deficits, sensation intact    Pulses    PT DP   R + +   L + +    +, -/+ ,-/-       Labs:    CBC: Recent Labs      07/24/18   1041  07/24/18   1419  07/25/18   0505  07/25/18   0953  07/25/18   1542   WBC  6.4  6.5  7.0   --    --    HGB  13.7  13.5  10.9*  10.5*  10.4*   HCT  41.1  41.1  32.9*  31.7*  30.6*   MCV  93.2  93.3  93.6   --    --    PLT  243  236  203   --    --      BMP: Recent Labs      07/24/18   1041  07/24/18   1746  07/25/18   0953   NA  142  140  142   K  3.8  3.5  3.9   CL  104  102  110   CO2  27  24  23   BUN  23*  15  19   CREATININE  0.5*  <0.5*  <0.5*     PT/INR:   Recent Labs      07/24/18   1041  07/24/18   1621  07/25/18   0505   PROTIME  10.4   --   11.3   INR  0.91  1.4  0.99     APTT:   Recent Labs      07/24/18   1419   APTT  30.9     Liver

## 2018-07-25 NOTE — CONSULTS
TUNNEL RELEASE Left 2016    and left index and ring trigger finger release    CARPAL TUNNEL RELEASE Right 2017    w/R ring finger trigger finger release    COLONOSCOPY  2010    COLPOPEXY  2014    Dr. Archana Cruz. [facility] COLPORRHAPHY  2014    A&P- Dr. Archana Cruz. [facility] HIP SURGERY Right 2014    Dr. Socorro Ocasio, VAGINAL  2014    Dr. Archana Cruz. [facility] INGUINAL HERNIA REPAIR Bilateral 1980s    LUNG REMOVAL, PARTIAL Right 2010    RML lobectomy in 2010, & RLL lobectomy in 2004    SALPINGO-OOPHORECTOMY Bilateral 2014    Dr. Archana Cruz. [facility] UPPER GASTROINTESTINAL ENDOSCOPY  2010    URETHRAL STRICTURE DILATATION      VASCULAR SURGERY Right     leg       Social History:    TOBACCO:   reports that she quit smoking about 18 years ago. She has a 30.00 pack-year smoking history. She has never used smokeless tobacco.  ETOH:   reports that she drinks about 4.2 oz of alcohol per week . Family History:   Family History   Problem Relation Age of Onset    Heart Failure Father     Heart Failure Mother     Dementia Mother     Stroke Mother     Other Sister         brain aneurysm    High Cholesterol Brother     High Blood Pressure Sister     High Cholesterol Sister        REVIEW OF SYSTEMS:    Per HPI    Objective:   PHYSICAL EXAM:      VITALS:  /78   Pulse 75   Temp 98.1 °F (36.7 °C) (Oral)   Resp 26   Ht 5' 2\" (1.575 m)   Wt 177 lb 0.5 oz (80.3 kg)   SpO2 99%   Breastfeeding? No   BMI 32.38 kg/m²       24HR INTAKE/OUTPUT:    Intake/Output Summary (Last 24 hours) at 18 1651  Last data filed at 18 1038   Gross per 24 hour   Intake          2705.96 ml   Output              875 ml   Net          1830.96 ml     CURRENT PULSE OXIMETRY:  SpO2: 99 %  24HR PULSE OXIMETRY RANGE:  SpO2  Av.7 %  Min: 86 %  Max: 100 %    No results for input(s): PHART, SBU4ZWF, PO2ART in the last 72 hours.     CONSTITUTIONAL: alert, no distress 07/25/2018    EOSABS 0.3 07/25/2018    BASOSABS 0.0 07/25/2018     BMP:  Lab Results   Component Value Date     07/25/2018    K 3.9 07/25/2018    K 3.5 07/24/2018     07/25/2018    CO2 23 07/25/2018    BUN 19 07/25/2018    CREATININE <0.5 07/25/2018    CALCIUM 8.9 07/25/2018    GFRAA >60 07/25/2018    GFRAA 100 02/07/2012    LABGLOM >60 07/25/2018    GLUCOSE 154 07/25/2018    GLUCOSE 97 02/07/2012     Hepatic Function Panel:  Lab Results   Component Value Date    ALKPHOS 61 07/25/2018    ALT 16 07/25/2018    AST 17 07/25/2018    PROT 5.8 07/25/2018    PROT 6.7 10/30/2012    BILITOT <0.2 07/25/2018    BILIDIR <0.2 07/25/2018    IBILI see below 07/25/2018     ABG:  No results found for: VTL6LZT, BEART, U1YHKSVO, PHART, THGBART, MLP4IKF, PO2ART, XWZ3RSK    Cultures:   Blood Culture:    Sputum Culture:        Radiology Review:  All pertinent images / reports were reviewed as a part of this visit. imaging reveals the following:   CT ABDOMEN PELVIS WO CONTRAST Additional Contrast? None   Final Result      1. Large hematoma emanating from the right groin femoral arterial puncture site throughout the adjacent extraperitoneal soft tissues, right pelvic sidewall and inferior right retroperitoneum. 2.  Slight increase in small volume of right pleural fluid. US ABDOMEN LIMITED   Final Result   1. Small amount of ascites noted right lower quadrant near area of patient's pain uncertain significance and etiology repeat abdominal pelvic CT could be performed if clinically indicated                  CTA ABDOMINAL AORTA W BILAT RUNOFF W CONTRAST   Final Result      Stable 4.1 cm ascending thoracic aortic aneurysm. No acute aortic pathology. Relatively small complex loculated right basilar pleural fluid collection noting changes of prior surgery in the right lower lung for history of lung cancer.  Correlation with any interval outside studies is recommended, as the effusion is new since            CTA

## 2018-07-25 NOTE — PROGRESS NOTES
5814: Upon entering room patient complaining of 10/10 R side groin pain at sheath insertion site - small/soft hematoma is now palpable, patient states pain did not start until daughter raised HOB to 30 degrees. Patient became pale/diaphoretic, HR talia on monitor to 30-40s. Staff assist called. 1/2 amp atropine, Zofran 4 mg, and Dilaudid 0.25 administered per Dr. Trenton Smith order. Patient recovered well. VSS - see doc flowsheet. Will continue to monitor.

## 2018-07-25 NOTE — PROGRESS NOTES
lisinopril-hydrochlorothiazide  1 tablet Oral Daily    mometasone-formoterol        mupirocin   Nasal BID    chlorhexidine  15 mL Mouth/Throat BID      heparin (porcine) 10 Units/kg/hr (07/25/18 0610)    nitroGLYCERIN Stopped (07/25/18 0147)    eptifibatide 2 mcg/kg/min (07/25/18 0734)     ondansetron, heparin (porcine), heparin (porcine), albuterol sulfate HFA, sodium chloride flush, acetaminophen, magnesium hydroxide, hydrALAZINE    Lab Data:  CBC: Recent Labs      07/24/18   1041  07/24/18   1419  07/25/18   0505   WBC  6.4  6.5  7.0   HGB  13.7  13.5  10.9*   HCT  41.1  41.1  32.9*   MCV  93.2  93.3  93.6   PLT  243  236  203     BMP: Recent Labs      07/24/18   1041  07/24/18   1746   NA  142  140   K  3.8  3.5   CL  104  102   CO2  27  24   BUN  23*  15   CREATININE  0.5*  <0.5*     LIVER PROFILE:   Recent Labs      07/24/18   1746  07/25/18   0505   AST  23  22   ALT  20  18   BILIDIR  <0.2  <0.2   BILITOT  0.3  <0.2   ALKPHOS  67  66     PT/INR:   Recent Labs      07/24/18   1041  07/24/18   1621  07/25/18   0505   PROTIME  10.4   --   11.3   INR  0.91  1.4  0.99     APTT:   Recent Labs      07/24/18   1419   APTT  30.9       Assessment:  Patient is chest pain free. She underwent angiogram which revealed multi-vessel disease. Patient had low blood (NÉSTOR flow 2) which improved after angioplasty to NÉSTOR 3. No stents were deployed. Case was discussed with Cardiothoracic Surgeons to have CABG scheduled.      Plan:    Atypical Chest pain 2/2 NSTEMI, multi-vessel disease   - no need to continue trending troponin, as it will be elevated from angiogram with attempt to place stents   - Consulted CT Surgery   - follow up on Echo  - continue integrillin and heparin, until CABG is scheduled by Cardiothoracic Surgeons   - continue aspirin 325mg   - continue atorvastatin 80mg qhs   - continue metoprolol succinate 50mg qd      Discussed with attending physician Dr. Lonzell Siemens, MD 7/25/2018 9:45 AM  Internal Medicine, PGY-2  Pager: [phone]       Staff Note      Patient seen and evaluated with the medical resident. I was physically present during the critical portions of the service when performed by the resident including the assessment and management of the patient. Will alert CT surgery about intraperitoneal hematoma. Vascular surgery consulted and stopped integrelin. Patient is HD stable now. I agree with the findings and plans as described.

## 2018-07-25 NOTE — PROGRESS NOTES
Hospitalist Progress Note      PCP: Melvi Abad MD    Date of Admission: 7/24/2018    Chief Complaint: Chest pain    Hospital Course:  Feels well  No further chest pain  No nausea vomiting  No headache       Medications:  Reviewed      Exam:    BP 98/75   Pulse 78   Temp 97.9 °F (36.6 °C) (Oral)   Resp 14   Ht 5' 2\" (1.575 m)   Wt 177 lb 0.5 oz (80.3 kg)   SpO2 94%   Breastfeeding? No   BMI 32.38 kg/m²     General appearance: No apparent distress, appears stated age and cooperative. HEENT: Pupils equal, round, and reactive to light. Conjunctivae/corneas clear. Neck: Supple, with full range of motion. No jugular venous distention. Trachea midline. Respiratory:  Normal respiratory effort. Clear to auscultation, bilaterally without RALES/WHEEZES/Rhonchi. Cardiovascular: Regular rate and rhythm with normal S1/S2 without MURMURS, rubs or gallops. Abdomen: Soft, non-tender, non-distended with normal bowel sounds. Musculoskeletal: No clubbing, cyanosis or EDEMA bilaterally. Full range of motion without deformity. Skin: Skin color, texture, turgor normal.  No rashes or lesions. Neurologic:  Neurovascularly intact without any focal sensory/motor deficits.  Cranial nerves: II-XII intact, grossly non-focal.  Psychiatric: Alert and oriented, thought content appropriate, normal insight  Capillary Refill: Brisk,< 3 seconds   Peripheral Pulses: +2 palpable, equal bilaterally       Labs:   Recent Labs      07/24/18   1041  07/24/18   1419  07/25/18   0505  07/25/18   0953   WBC  6.4  6.5  7.0   --    HGB  13.7  13.5  10.9*  10.5*   HCT  41.1  41.1  32.9*  31.7*   PLT  243  236  203   --      Recent Labs      07/24/18   1041  07/24/18   1746  07/25/18   0953   NA  142  140  142   K  3.8  3.5  3.9   CL  104  102  110   CO2  27  24  23   BUN  23*  15  19   CREATININE  0.5*  <0.5*  <0.5*   CALCIUM  10.1  9.1  8.9     Recent Labs      07/24/18   1746  07/25/18   0505  07/25/18   0953   AST  23  22  17   ALT

## 2018-07-25 NOTE — PROGRESS NOTES
Bilateral 2000    CARPAL TUNNEL RELEASE Left 12/22/2016    and left index and ring trigger finger release    CARPAL TUNNEL RELEASE Right 04/06/2017    w/R ring finger trigger finger release    COLONOSCOPY  2010    COLPOPEXY  11/19/2014    Dr. Archana Cruz. [facility] COLPORRHAPHY  11/19/2014    A&P- Dr. Archana Cruz. [facility] HIP SURGERY Right 05/2014    Dr. Scoorro Ocasio, VAGINAL  11/19/2014    Dr. Archana Cruz. [facility] INGUINAL HERNIA REPAIR Bilateral 1980s    LUNG REMOVAL, PARTIAL Right 4/2010    mid in 4/2010, & lower in 5/2004    SALPINGO-OOPHORECTOMY Bilateral 11/19/2014    Dr. Archana Cruz. [facility] UPPER GASTROINTESTINAL ENDOSCOPY  6/2010    URETHRAL STRICTURE DILATATION      VASCULAR SURGERY Right 2003    leg       Level of Consciousness: Alert, Oriented, and Cooperative = 0    Level of Activity: Walking unassisted = 0    Respiratory Pattern: Regular Pattern; RR 8-20 = 0    Breath Sounds: Clear = 0    Sputum   ,  ,    Cough: Strong, spontaneous, non-productive = 0    Vital Signs   BP 91/72   Pulse 75   Temp 97.8 °F (36.6 °C) (Axillary)   Resp 15   Ht 5' 2\" (1.575 m)   Wt 170 lb (77.1 kg)   SpO2 95%   Breastfeeding? No   BMI 31.09 kg/m²   SPO2 (COPD values may differ): 90-91% on room air or greater than 92% on FiO2 24- 28% = 1    Peak Flow (asthma only): not applicable = 0    RSI: 0-4 = See once and convert to home regimen or discontinue        Plan       Goals: medication delivery and improve oxygenation    Patient/caregiver was educated on the proper method of use for Respiratory Care Devices:  Yes      Level of patient/caregiver understanding able to:   [] Verbalize understanding   [] Demonstrate understanding       [] Teach back        [] Needs reinforcement       []  No available caregiver               []  Other:     Response to education:  Good     Is patient being placed on Home Treatment Regimen? Yes     Does the patient have everything they need prior to discharge?   No     Comments: pt admitted with chest pain unspecified    Plan of Care: home regimen    Electronically signed by Mouna Devries RCP on 7/24/2018 at 10:12 PM    Respiratory Protocol Guidelines     1. Assessment and treatment by Respiratory Therapy will be initiated for medication and therapeutic interventions upon initiation of aerosolized medication. 2. Physician will be contacted for respiratory rate (RR) greater than 35 breaths per minute. Therapy will be held for heart rate (HR) greater than 140 beats per minute, pending direction from physician. 3. Bronchodilators will be administered via Metered Dose Inhaler (MDI) with spacer when the following criteria are met:  a. Alert and cooperative     b. HR < 140 bpm  c. RR < 30 bpm                d. Can demonstrate a 23 second inspiratory hold  4. Bronchodilators will be administered via Hand Held Nebulizer [facility]BASTIAN [facility]) to patients when ANY of the following criteria are met  a. Incognizant or uncooperative          b. Patients treated with HHN at Home        c. Unable to demonstrate proper use of MDI with spacer     d. RR > 30 bpm   5. Bronchodilators will be delivered via Metered Dose Inhaler (MDI), HHN, Aerogen to intubated patients on mechanical ventilation. 6. Inhalation medication orders will be delivered and/or substituted as outlined below. Aerosolized Medications Ordering and Administration Guidelines:    1. All Medications will be ordered by a physician, and their frequency and/or modality will be adjusted as defined by the patients Respiratory Severity Index (RSI) score. 2. If the patient does not have documented COPD, consider discontinuing anticholinergics when RSI is less than 9.  3. If the bronchospasm worsens (increased RSI), then the bronchodilator frequency can be increased to a maximum of every 4 hours. If greater than every 4 hours is required, the physician will be contacted.   4. If the bronchospasm improves, the frequency of the bronchodilator can be

## 2018-07-25 NOTE — CONSULTS
Consultation H&P    Date of Admission:  7/24/2018 10:20 AM  Date of Consultation:  7/25/2018    PCP:  Bertha Kaye MD    Cardiologist: Kenisha Teague    Chief Complaint: CP    History of Present Illness: We are asked to see this patient in consultation by Dr. Kenisha Teague  Regarding cardiac revascularization  Hansel Rayo is a 69 y.o. female who presented with NSTEMI and CP. Taken to cath lab where an intervention was performed on the RCA, felt to be the culprit vessel. Unfortunately, after the balloon inflation, the stent would not pass. A focal dissection was present but she had NÉSTOR 3 flow. She has been CP free since that intervention. Symptom Y/N Episodes Duration Severity Modifying Assoc Sx Other   CP                SOB           Dizzy           Syncope                Palpitations                Other                         Past Medical History:  Past Medical History:   Diagnosis Date    Brain aneurysm     on right very small    CAD (coronary artery disease)     COPD (chronic obstructive pulmonary disease) (HCC)     Hyperlipidemia     Hypertension     Hypothyroidism     Lumbar spondylosis     per MRI done 5/23/2013 - Dr. Jose Burgess [facility]) 7/28/2013    Resected.  Renal cyst 10/27/2015    Spinal stenosis of lumbar region     per MRI done 5/23/2013    Thoracic aortic aneurysm (HCC)     4 cm stable       Past Surgical History:  Past Surgical History:   Procedure Laterality Date    BACK SURGERY  1990    L5    BACK SURGERY  11/2013    5th vert sciatic nerve release    BREAST LUMPECTOMY Bilateral 2000    CARPAL TUNNEL RELEASE Left 12/22/2016    and left index and ring trigger finger release    CARPAL TUNNEL RELEASE Right 04/06/2017    w/R ring finger trigger finger release    COLONOSCOPY  2010    COLPOPEXY  11/19/2014    Dr. Gail Calhoun. [facility] COLPORRHAPHY  11/19/2014    A&P- Dr. Gail Calhoun. [facility] HIP SURGERY Right 05/2014    Dr. Aracelis Gutierrez,

## 2018-07-26 ENCOUNTER — ANESTHESIA (OUTPATIENT)
Dept: OPERATING ROOM | Age: 68
DRG: 231 | End: 2018-07-26
Payer: MEDICARE

## 2018-07-26 ENCOUNTER — APPOINTMENT (OUTPATIENT)
Dept: GENERAL RADIOLOGY | Age: 68
DRG: 231 | End: 2018-07-26
Payer: MEDICARE

## 2018-07-26 ENCOUNTER — ANESTHESIA EVENT (OUTPATIENT)
Dept: OPERATING ROOM | Age: 68
DRG: 231 | End: 2018-07-26
Payer: MEDICARE

## 2018-07-26 VITALS
RESPIRATION RATE: 12 BRPM | DIASTOLIC BLOOD PRESSURE: 50 MMHG | TEMPERATURE: 98.6 F | OXYGEN SATURATION: 99 % | SYSTOLIC BLOOD PRESSURE: 91 MMHG

## 2018-07-26 LAB
ACTIVATED CLOTTING TIME: 107 SEC (ref 81–125)
ACTIVATED CLOTTING TIME: 116 SEC (ref 81–125)
ACTIVATED CLOTTING TIME: 422 SEC (ref 81–125)
ACTIVATED CLOTTING TIME: 432 SEC (ref 81–125)
ACTIVATED CLOTTING TIME: 456 SEC (ref 81–125)
ACTIVATED CLOTTING TIME: 490 SEC (ref 81–125)
ACTIVATED CLOTTING TIME: 514 SEC (ref 81–125)
ACTIVATED CLOTTING TIME: 87 SEC (ref 81–125)
ALBUMIN SERPL-MCNC: 3.7 G/DL (ref 3.4–5)
ANION GAP SERPL CALCULATED.3IONS-SCNC: 8 MMOL/L (ref 3–16)
ANION GAP SERPL CALCULATED.3IONS-SCNC: 9 MMOL/L (ref 3–16)
ANTI-XA UNFRAC HEPARIN: 0.3 IU/ML (ref 0.3–0.7)
APTT: 31 SEC (ref 26–36)
BASE EXCESS ARTERIAL: -1 (ref -3–3)
BASE EXCESS ARTERIAL: -2 (ref -3–3)
BASE EXCESS ARTERIAL: -3 (ref -3–3)
BASE EXCESS ARTERIAL: 0 (ref -3–3)
BASE EXCESS ARTERIAL: 1 (ref -3–3)
BASE EXCESS ARTERIAL: 2 (ref -3–3)
BLOOD BANK DISPENSE STATUS: NORMAL
BLOOD BANK PRODUCT CODE: NORMAL
BPU ID: NORMAL
BUN BLDV-MCNC: 13 MG/DL (ref 7–20)
BUN BLDV-MCNC: 18 MG/DL (ref 7–20)
CALCIUM IONIZED: 1 MMOL/L (ref 1.12–1.32)
CALCIUM IONIZED: 1.03 MMOL/L (ref 1.12–1.32)
CALCIUM IONIZED: 1.04 MMOL/L (ref 1.12–1.32)
CALCIUM IONIZED: 1.22 MMOL/L (ref 1.12–1.32)
CALCIUM IONIZED: 1.3 MMOL/L (ref 1.12–1.32)
CALCIUM IONIZED: 1.31 MMOL/L (ref 1.12–1.32)
CALCIUM IONIZED: 1.32 MMOL/L (ref 1.12–1.32)
CALCIUM IONIZED: 1.33 MMOL/L (ref 1.12–1.32)
CALCIUM IONIZED: 1.34 MMOL/L (ref 1.12–1.32)
CALCIUM IONIZED: 1.42 MMOL/L (ref 1.12–1.32)
CALCIUM IONIZED: 1.49 MMOL/L (ref 1.12–1.32)
CALCIUM SERPL-MCNC: 9.3 MG/DL (ref 8.3–10.6)
CALCIUM SERPL-MCNC: 9.7 MG/DL (ref 8.3–10.6)
CHLORIDE BLD-SCNC: 108 MMOL/L (ref 99–110)
CHLORIDE BLD-SCNC: 113 MMOL/L (ref 99–110)
CHOLESTEROL, TOTAL: 106 MG/DL (ref 0–199)
CO2: 26 MMOL/L (ref 21–32)
CO2: 27 MMOL/L (ref 21–32)
CREAT SERPL-MCNC: <0.5 MG/DL (ref 0.6–1.2)
CREAT SERPL-MCNC: <0.5 MG/DL (ref 0.6–1.2)
DESCRIPTION BLOOD BANK: NORMAL
ESTIMATED AVERAGE GLUCOSE: 137 MG/DL
GFR AFRICAN AMERICAN: >60
GFR AFRICAN AMERICAN: >60
GFR NON-AFRICAN AMERICAN: >60
GFR NON-AFRICAN AMERICAN: >60
GLUCOSE BLD-MCNC: 114 MG/DL (ref 70–99)
GLUCOSE BLD-MCNC: 115 MG/DL (ref 70–99)
GLUCOSE BLD-MCNC: 116 MG/DL (ref 70–99)
GLUCOSE BLD-MCNC: 120 MG/DL (ref 70–99)
GLUCOSE BLD-MCNC: 122 MG/DL (ref 70–99)
GLUCOSE BLD-MCNC: 124 MG/DL (ref 70–99)
GLUCOSE BLD-MCNC: 129 MG/DL (ref 70–99)
GLUCOSE BLD-MCNC: 131 MG/DL (ref 70–99)
GLUCOSE BLD-MCNC: 134 MG/DL (ref 70–99)
GLUCOSE BLD-MCNC: 151 MG/DL (ref 70–99)
GLUCOSE BLD-MCNC: 155 MG/DL (ref 70–99)
GLUCOSE BLD-MCNC: 165 MG/DL (ref 70–99)
GLUCOSE BLD-MCNC: 174 MG/DL (ref 70–99)
GLUCOSE BLD-MCNC: 179 MG/DL (ref 70–99)
GLUCOSE BLD-MCNC: 183 MG/DL (ref 70–99)
GLUCOSE BLD-MCNC: 202 MG/DL (ref 70–99)
GLUCOSE BLD-MCNC: 214 MG/DL (ref 70–99)
GLUCOSE BLD-MCNC: 93 MG/DL (ref 70–99)
GLUCOSE BLD-MCNC: 93 MG/DL (ref 70–99)
HBA1C MFR BLD: 6.4 %
HCO3 ARTERIAL: 22.2 MMOL/L (ref 21–29)
HCO3 ARTERIAL: 22.3 MMOL/L (ref 21–29)
HCO3 ARTERIAL: 22.6 MMOL/L (ref 21–29)
HCO3 ARTERIAL: 23.3 MMOL/L (ref 21–29)
HCO3 ARTERIAL: 24 MMOL/L (ref 21–29)
HCO3 ARTERIAL: 25 MMOL/L (ref 21–29)
HCO3 ARTERIAL: 25.1 MMOL/L (ref 21–29)
HCO3 ARTERIAL: 25.1 MMOL/L (ref 21–29)
HCO3 ARTERIAL: 25.4 MMOL/L (ref 21–29)
HCO3 ARTERIAL: 25.4 MMOL/L (ref 21–29)
HCO3 ARTERIAL: 25.7 MMOL/L (ref 21–29)
HCO3 ARTERIAL: 25.8 MMOL/L (ref 21–29)
HCO3 ARTERIAL: 25.8 MMOL/L (ref 21–29)
HCO3 ARTERIAL: 26 MMOL/L (ref 21–29)
HCO3 ARTERIAL: 27.2 MMOL/L (ref 21–29)
HCO3 ARTERIAL: 28.1 MMOL/L (ref 21–29)
HCO3 ARTERIAL: 28.2 MMOL/L (ref 21–29)
HCT VFR BLD CALC: 26.3 % (ref 36–48)
HCT VFR BLD CALC: 27.2 % (ref 36–48)
HCT VFR BLD CALC: 28.8 % (ref 36–48)
HCT VFR BLD CALC: 32.5 % (ref 36–48)
HDLC SERPL-MCNC: 35 MG/DL (ref 40–60)
HEMOGLOBIN: 11 G/DL (ref 12–16)
HEMOGLOBIN: 9 G/DL (ref 12–16)
HEMOGLOBIN: 9.1 G/DL (ref 12–16)
HEMOGLOBIN: 9.6 G/DL (ref 12–16)
INR BLD: 1.05 (ref 0.86–1.14)
INR BLD: 1.32 (ref 0.86–1.14)
LACTATE: 1.65 MMOL/L (ref 0.4–2)
LACTATE: 1.68 MMOL/L (ref 0.4–2)
LACTATE: 1.8 MMOL/L (ref 0.4–2)
LACTATE: 1.85 MMOL/L (ref 0.4–2)
LACTATE: 2.05 MMOL/L (ref 0.4–2)
LACTATE: 2.16 MMOL/L (ref 0.4–2)
LACTATE: 2.44 MMOL/L (ref 0.4–2)
LDL CHOLESTEROL CALCULATED: 39 MG/DL
MAGNESIUM: 1.9 MG/DL (ref 1.8–2.4)
MAGNESIUM: 2.3 MG/DL (ref 1.8–2.4)
MCH RBC QN AUTO: 30.5 PG (ref 26–34)
MCH RBC QN AUTO: 30.9 PG (ref 26–34)
MCH RBC QN AUTO: 31.3 PG (ref 26–34)
MCHC RBC AUTO-ENTMCNC: 33.3 G/DL (ref 31–36)
MCHC RBC AUTO-ENTMCNC: 33.8 G/DL (ref 31–36)
MCHC RBC AUTO-ENTMCNC: 34.4 G/DL (ref 31–36)
MCV RBC AUTO: 90 FL (ref 80–100)
MCV RBC AUTO: 90.3 FL (ref 80–100)
MCV RBC AUTO: 93.9 FL (ref 80–100)
O2 SAT, ARTERIAL: 100 % (ref 93–100)
O2 SAT, ARTERIAL: 93 % (ref 93–100)
O2 SAT, ARTERIAL: 95 % (ref 93–100)
O2 SAT, ARTERIAL: 96 % (ref 93–100)
O2 SAT, ARTERIAL: 97 % (ref 93–100)
O2 SAT, ARTERIAL: 98 % (ref 93–100)
PCO2 ARTERIAL: 31.6 MM HG (ref 35–45)
PCO2 ARTERIAL: 35.2 MM HG (ref 35–45)
PCO2 ARTERIAL: 35.4 MM HG (ref 35–45)
PCO2 ARTERIAL: 35.7 MM HG (ref 35–45)
PCO2 ARTERIAL: 38 MM HG (ref 35–45)
PCO2 ARTERIAL: 38 MM HG (ref 35–45)
PCO2 ARTERIAL: 38.4 MM HG (ref 35–45)
PCO2 ARTERIAL: 38.9 MM HG (ref 35–45)
PCO2 ARTERIAL: 39 MM HG (ref 35–45)
PCO2 ARTERIAL: 40.3 MM HG (ref 35–45)
PCO2 ARTERIAL: 42.1 MM HG (ref 35–45)
PCO2 ARTERIAL: 42.8 MM HG (ref 35–45)
PCO2 ARTERIAL: 44.1 MM HG (ref 35–45)
PCO2 ARTERIAL: 45.1 MM HG (ref 35–45)
PCO2 ARTERIAL: 47.6 MM HG (ref 35–45)
PCO2 ARTERIAL: 63.9 MM HG (ref 35–45)
PCO2 ARTERIAL: 65.3 MM HG (ref 35–45)
PDW BLD-RTO: 14.5 % (ref 12.4–15.4)
PDW BLD-RTO: 14.6 % (ref 12.4–15.4)
PDW BLD-RTO: 14.6 % (ref 12.4–15.4)
PERFORMED ON: ABNORMAL
PH ARTERIAL: 7.24 (ref 7.35–7.45)
PH ARTERIAL: 7.25 (ref 7.35–7.45)
PH ARTERIAL: 7.36 (ref 7.35–7.45)
PH ARTERIAL: 7.37 (ref 7.35–7.45)
PH ARTERIAL: 7.38 (ref 7.35–7.45)
PH ARTERIAL: 7.38 (ref 7.35–7.45)
PH ARTERIAL: 7.39 (ref 7.35–7.45)
PH ARTERIAL: 7.4 (ref 7.35–7.45)
PH ARTERIAL: 7.41 (ref 7.35–7.45)
PH ARTERIAL: 7.43 (ref 7.35–7.45)
PH ARTERIAL: 7.43 (ref 7.35–7.45)
PH ARTERIAL: 7.44 (ref 7.35–7.45)
PH ARTERIAL: 7.46 (ref 7.35–7.45)
PHOSPHORUS: 4.1 MG/DL (ref 2.5–4.9)
PLATELET # BLD: 141 K/UL (ref 135–450)
PLATELET # BLD: 165 K/UL (ref 135–450)
PLATELET # BLD: 198 K/UL (ref 135–450)
PMV BLD AUTO: 7.4 FL (ref 5–10.5)
PMV BLD AUTO: 7.4 FL (ref 5–10.5)
PMV BLD AUTO: 7.8 FL (ref 5–10.5)
PO2 ARTERIAL: 106.6 MM HG (ref 75–108)
PO2 ARTERIAL: 107.9 MM HG (ref 75–108)
PO2 ARTERIAL: 168.7 MM HG (ref 75–108)
PO2 ARTERIAL: 217.5 MM HG (ref 75–108)
PO2 ARTERIAL: 413 MM HG (ref 75–108)
PO2 ARTERIAL: 452.1 MM HG (ref 75–108)
PO2 ARTERIAL: 479 MM HG (ref 75–108)
PO2 ARTERIAL: 508.3 MM HG (ref 75–108)
PO2 ARTERIAL: 75.6 MM HG (ref 75–108)
PO2 ARTERIAL: 76.1 MM HG (ref 75–108)
PO2 ARTERIAL: 77.9 MM HG (ref 75–108)
PO2 ARTERIAL: 78.1 MM HG (ref 75–108)
PO2 ARTERIAL: 79.7 MM HG (ref 75–108)
PO2 ARTERIAL: 80.4 MM HG (ref 75–108)
PO2 ARTERIAL: 80.9 MM HG (ref 75–108)
PO2 ARTERIAL: 86.8 MM HG (ref 75–108)
PO2 ARTERIAL: 91.5 MM HG (ref 75–108)
POC POTASSIUM: 3.4 MMOL/L (ref 3.5–5.1)
POC POTASSIUM: 3.8 MMOL/L (ref 3.5–5.1)
POC POTASSIUM: 3.9 MMOL/L (ref 3.5–5.1)
POC POTASSIUM: 3.9 MMOL/L (ref 3.5–5.1)
POC POTASSIUM: 4 MMOL/L (ref 3.5–5.1)
POC POTASSIUM: 4.1 MMOL/L (ref 3.5–5.1)
POC POTASSIUM: 4.2 MMOL/L (ref 3.5–5.1)
POC POTASSIUM: 4.3 MMOL/L (ref 3.5–5.1)
POC POTASSIUM: 4.4 MMOL/L (ref 3.5–5.1)
POC POTASSIUM: 4.6 MMOL/L (ref 3.5–5.1)
POC POTASSIUM: 4.8 MMOL/L (ref 3.5–5.1)
POC SAMPLE TYPE: ABNORMAL
POC SODIUM: 142 MMOL/L (ref 136–145)
POC SODIUM: 143 MMOL/L (ref 136–145)
POC SODIUM: 143 MMOL/L (ref 136–145)
POC SODIUM: 144 MMOL/L (ref 136–145)
POC SODIUM: 145 MMOL/L (ref 136–145)
POC SODIUM: 146 MMOL/L (ref 136–145)
POC SODIUM: 147 MMOL/L (ref 136–145)
POTASSIUM SERPL-SCNC: 3.5 MMOL/L (ref 3.5–5.1)
POTASSIUM SERPL-SCNC: 3.7 MMOL/L (ref 3.5–5.1)
PROTHROMBIN TIME: 12 SEC (ref 9.8–13)
PROTHROMBIN TIME: 15 SEC (ref 9.8–13)
RBC # BLD: 2.9 M/UL (ref 4–5.2)
RBC # BLD: 2.92 M/UL (ref 4–5.2)
RBC # BLD: 3.6 M/UL (ref 4–5.2)
SODIUM BLD-SCNC: 143 MMOL/L (ref 136–145)
SODIUM BLD-SCNC: 148 MMOL/L (ref 136–145)
TCO2 ARTERIAL: 23 MMOL/L
TCO2 ARTERIAL: 23 MMOL/L
TCO2 ARTERIAL: 24 MMOL/L
TCO2 ARTERIAL: 25 MMOL/L
TCO2 ARTERIAL: 25 MMOL/L
TCO2 ARTERIAL: 26 MMOL/L
TCO2 ARTERIAL: 26 MMOL/L
TCO2 ARTERIAL: 27 MMOL/L
TCO2 ARTERIAL: 29 MMOL/L
TCO2 ARTERIAL: 30 MMOL/L
TCO2 ARTERIAL: 30 MMOL/L
TRIGL SERPL-MCNC: 159 MG/DL (ref 0–150)
VLDLC SERPL CALC-MCNC: 32 MG/DL
WBC # BLD: 11.3 K/UL (ref 4–11)
WBC # BLD: 8.7 K/UL (ref 4–11)
WBC # BLD: 9.6 K/UL (ref 4–11)

## 2018-07-26 PROCEDURE — 94750 HC PULMONARY COMPLIANCE STUDY: CPT

## 2018-07-26 PROCEDURE — 5A1221Z PERFORMANCE OF CARDIAC OUTPUT, CONTINUOUS: ICD-10-PCS | Performed by: THORACIC SURGERY (CARDIOTHORACIC VASCULAR SURGERY)

## 2018-07-26 PROCEDURE — 5A1945Z RESPIRATORY VENTILATION, 24-96 CONSECUTIVE HOURS: ICD-10-PCS | Performed by: INTERNAL MEDICINE

## 2018-07-26 PROCEDURE — 6360000002 HC RX W HCPCS: Performed by: THORACIC SURGERY (CARDIOTHORACIC VASCULAR SURGERY)

## 2018-07-26 PROCEDURE — 2580000003 HC RX 258: Performed by: THORACIC SURGERY (CARDIOTHORACIC VASCULAR SURGERY)

## 2018-07-26 PROCEDURE — S0028 INJECTION, FAMOTIDINE, 20 MG: HCPCS | Performed by: THORACIC SURGERY (CARDIOTHORACIC VASCULAR SURGERY)

## 2018-07-26 PROCEDURE — 06BP4ZZ EXCISION OF RIGHT SAPHENOUS VEIN, PERCUTANEOUS ENDOSCOPIC APPROACH: ICD-10-PCS | Performed by: THORACIC SURGERY (CARDIOTHORACIC VASCULAR SURGERY)

## 2018-07-26 PROCEDURE — 3600000018 HC SURGERY OHS ADDTL 15MIN: Performed by: THORACIC SURGERY (CARDIOTHORACIC VASCULAR SURGERY)

## 2018-07-26 PROCEDURE — 94770 HC ETCO2 MONITOR DAILY: CPT

## 2018-07-26 PROCEDURE — 7100000011 HC PHASE II RECOVERY - ADDTL 15 MIN

## 2018-07-26 PROCEDURE — 6370000000 HC RX 637 (ALT 250 FOR IP): Performed by: STUDENT IN AN ORGANIZED HEALTH CARE EDUCATION/TRAINING PROGRAM

## 2018-07-26 PROCEDURE — 85520 HEPARIN ASSAY: CPT

## 2018-07-26 PROCEDURE — 2720000010 HC SURG SUPPLY STERILE: Performed by: THORACIC SURGERY (CARDIOTHORACIC VASCULAR SURGERY)

## 2018-07-26 PROCEDURE — B24BZZ4 ULTRASONOGRAPHY OF HEART WITH AORTA, TRANSESOPHAGEAL: ICD-10-PCS | Performed by: THORACIC SURGERY (CARDIOTHORACIC VASCULAR SURGERY)

## 2018-07-26 PROCEDURE — P9016 RBC LEUKOCYTES REDUCED: HCPCS

## 2018-07-26 PROCEDURE — 36415 COLL VENOUS BLD VENIPUNCTURE: CPT

## 2018-07-26 PROCEDURE — 84295 ASSAY OF SERUM SODIUM: CPT

## 2018-07-26 PROCEDURE — 2709999900 HC NON-CHARGEABLE SUPPLY: Performed by: THORACIC SURGERY (CARDIOTHORACIC VASCULAR SURGERY)

## 2018-07-26 PROCEDURE — 85730 THROMBOPLASTIN TIME PARTIAL: CPT

## 2018-07-26 PROCEDURE — C1751 CATH, INF, PER/CENT/MIDLINE: HCPCS | Performed by: THORACIC SURGERY (CARDIOTHORACIC VASCULAR SURGERY)

## 2018-07-26 PROCEDURE — 6370000000 HC RX 637 (ALT 250 FOR IP): Performed by: THORACIC SURGERY (CARDIOTHORACIC VASCULAR SURGERY)

## 2018-07-26 PROCEDURE — 33533 CABG ARTERIAL SINGLE: CPT | Performed by: THORACIC SURGERY (CARDIOTHORACIC VASCULAR SURGERY)

## 2018-07-26 PROCEDURE — 2500000003 HC RX 250 WO HCPCS: Performed by: THORACIC SURGERY (CARDIOTHORACIC VASCULAR SURGERY)

## 2018-07-26 PROCEDURE — 84132 ASSAY OF SERUM POTASSIUM: CPT

## 2018-07-26 PROCEDURE — 80069 RENAL FUNCTION PANEL: CPT

## 2018-07-26 PROCEDURE — 85610 PROTHROMBIN TIME: CPT

## 2018-07-26 PROCEDURE — 85027 COMPLETE CBC AUTOMATED: CPT

## 2018-07-26 PROCEDURE — 3600000008 HC SURGERY OHS BASE: Performed by: THORACIC SURGERY (CARDIOTHORACIC VASCULAR SURGERY)

## 2018-07-26 PROCEDURE — 02100Z9 BYPASS CORONARY ARTERY, ONE ARTERY FROM LEFT INTERNAL MAMMARY, OPEN APPROACH: ICD-10-PCS | Performed by: THORACIC SURGERY (CARDIOTHORACIC VASCULAR SURGERY)

## 2018-07-26 PROCEDURE — 33508 ENDOSCOPIC VEIN HARVEST: CPT | Performed by: THORACIC SURGERY (CARDIOTHORACIC VASCULAR SURGERY)

## 2018-07-26 PROCEDURE — 82330 ASSAY OF CALCIUM: CPT

## 2018-07-26 PROCEDURE — C1729 CATH, DRAINAGE: HCPCS | Performed by: THORACIC SURGERY (CARDIOTHORACIC VASCULAR SURGERY)

## 2018-07-26 PROCEDURE — 3700000000 HC ANESTHESIA ATTENDED CARE: Performed by: THORACIC SURGERY (CARDIOTHORACIC VASCULAR SURGERY)

## 2018-07-26 PROCEDURE — 3700000001 HC ADD 15 MINUTES (ANESTHESIA): Performed by: THORACIC SURGERY (CARDIOTHORACIC VASCULAR SURGERY)

## 2018-07-26 PROCEDURE — 94002 VENT MGMT INPAT INIT DAY: CPT

## 2018-07-26 PROCEDURE — 83605 ASSAY OF LACTIC ACID: CPT

## 2018-07-26 PROCEDURE — 33518 CABG ARTERY-VEIN TWO: CPT | Performed by: THORACIC SURGERY (CARDIOTHORACIC VASCULAR SURGERY)

## 2018-07-26 PROCEDURE — 021109W BYPASS CORONARY ARTERY, TWO ARTERIES FROM AORTA WITH AUTOLOGOUS VENOUS TISSUE, OPEN APPROACH: ICD-10-PCS | Performed by: THORACIC SURGERY (CARDIOTHORACIC VASCULAR SURGERY)

## 2018-07-26 PROCEDURE — 82803 BLOOD GASES ANY COMBINATION: CPT

## 2018-07-26 PROCEDURE — 2500000003 HC RX 250 WO HCPCS: Performed by: ANESTHESIOLOGY

## 2018-07-26 PROCEDURE — 82947 ASSAY GLUCOSE BLOOD QUANT: CPT

## 2018-07-26 PROCEDURE — P9017 PLASMA 1 DONOR FRZ W/IN 8 HR: HCPCS

## 2018-07-26 PROCEDURE — 6370000000 HC RX 637 (ALT 250 FOR IP): Performed by: ANESTHESIOLOGY

## 2018-07-26 PROCEDURE — 85018 HEMOGLOBIN: CPT

## 2018-07-26 PROCEDURE — P9035 PLATELET PHERES LEUKOREDUCED: HCPCS

## 2018-07-26 PROCEDURE — 71045 X-RAY EXAM CHEST 1 VIEW: CPT

## 2018-07-26 PROCEDURE — 2000000000 HC ICU R&B

## 2018-07-26 PROCEDURE — 83735 ASSAY OF MAGNESIUM: CPT

## 2018-07-26 PROCEDURE — 85347 COAGULATION TIME ACTIVATED: CPT

## 2018-07-26 PROCEDURE — 2700000000 HC OXYGEN THERAPY PER DAY

## 2018-07-26 PROCEDURE — 94664 DEMO&/EVAL PT USE INHALER: CPT

## 2018-07-26 PROCEDURE — 6360000002 HC RX W HCPCS: Performed by: ANESTHESIOLOGY

## 2018-07-26 PROCEDURE — 86923 COMPATIBILITY TEST ELECTRIC: CPT

## 2018-07-26 PROCEDURE — 6360000002 HC RX W HCPCS

## 2018-07-26 PROCEDURE — 2580000003 HC RX 258: Performed by: ANESTHESIOLOGY

## 2018-07-26 RX ORDER — DOCUSATE SODIUM 100 MG/1
100 CAPSULE, LIQUID FILLED ORAL 2 TIMES DAILY
Status: DISCONTINUED | OUTPATIENT
Start: 2018-07-26 | End: 2018-08-01 | Stop reason: HOSPADM

## 2018-07-26 RX ORDER — METOCLOPRAMIDE HYDROCHLORIDE 5 MG/ML
10 INJECTION INTRAMUSCULAR; INTRAVENOUS EVERY 6 HOURS PRN
Status: DISCONTINUED | OUTPATIENT
Start: 2018-07-26 | End: 2018-08-01 | Stop reason: HOSPADM

## 2018-07-26 RX ORDER — CALCIUM CHLORIDE 100 MG/ML
INJECTION INTRAVENOUS; INTRAVENTRICULAR PRN
Status: DISCONTINUED | OUTPATIENT
Start: 2018-07-26 | End: 2018-07-26 | Stop reason: SDUPTHER

## 2018-07-26 RX ORDER — HYDRALAZINE HYDROCHLORIDE 20 MG/ML
5 INJECTION INTRAMUSCULAR; INTRAVENOUS
Status: DISCONTINUED | OUTPATIENT
Start: 2018-07-26 | End: 2018-07-30

## 2018-07-26 RX ORDER — CEFAZOLIN SODIUM 1 G/3ML
INJECTION, POWDER, FOR SOLUTION INTRAMUSCULAR; INTRAVENOUS PRN
Status: DISCONTINUED | OUTPATIENT
Start: 2018-07-26 | End: 2018-07-26 | Stop reason: SDUPTHER

## 2018-07-26 RX ORDER — NITROGLYCERIN 20 MG/100ML
INJECTION INTRAVENOUS CONTINUOUS PRN
Status: DISCONTINUED | OUTPATIENT
Start: 2018-07-26 | End: 2018-07-26 | Stop reason: SDUPTHER

## 2018-07-26 RX ORDER — LACTULOSE 10 G/15ML
10 SOLUTION ORAL 2 TIMES DAILY PRN
Status: DISCONTINUED | OUTPATIENT
Start: 2018-07-26 | End: 2018-08-01 | Stop reason: HOSPADM

## 2018-07-26 RX ORDER — DEXTROSE MONOHYDRATE 25 G/50ML
12.5 INJECTION, SOLUTION INTRAVENOUS PRN
Status: DISCONTINUED | OUTPATIENT
Start: 2018-07-26 | End: 2018-07-30

## 2018-07-26 RX ORDER — FENTANYL CITRATE 50 UG/ML
25 INJECTION, SOLUTION INTRAMUSCULAR; INTRAVENOUS
Status: DISCONTINUED | OUTPATIENT
Start: 2018-07-26 | End: 2018-08-01

## 2018-07-26 RX ORDER — ROCURONIUM BROMIDE 10 MG/ML
INJECTION, SOLUTION INTRAVENOUS PRN
Status: DISCONTINUED | OUTPATIENT
Start: 2018-07-26 | End: 2018-07-26 | Stop reason: SDUPTHER

## 2018-07-26 RX ORDER — SODIUM CHLORIDE 9 MG/ML
INJECTION, SOLUTION INTRAVENOUS CONTINUOUS
Status: DISCONTINUED | OUTPATIENT
Start: 2018-07-26 | End: 2018-07-30

## 2018-07-26 RX ORDER — OXYCODONE HYDROCHLORIDE 5 MG/1
5 TABLET ORAL EVERY 4 HOURS PRN
Status: DISCONTINUED | OUTPATIENT
Start: 2018-07-26 | End: 2018-07-30

## 2018-07-26 RX ORDER — DEXTROSE MONOHYDRATE 50 MG/ML
100 INJECTION, SOLUTION INTRAVENOUS PRN
Status: DISCONTINUED | OUTPATIENT
Start: 2018-07-26 | End: 2018-07-30

## 2018-07-26 RX ORDER — MIDAZOLAM HYDROCHLORIDE 1 MG/ML
1 INJECTION INTRAMUSCULAR; INTRAVENOUS
Status: ACTIVE | OUTPATIENT
Start: 2018-07-26 | End: 2018-07-27

## 2018-07-26 RX ORDER — NITROGLYCERIN 40 MG/1
1 PATCH TRANSDERMAL DAILY
Status: DISCONTINUED | OUTPATIENT
Start: 2018-07-27 | End: 2018-07-30

## 2018-07-26 RX ORDER — ATORVASTATIN CALCIUM 40 MG/1
40 TABLET, FILM COATED ORAL NIGHTLY
Status: DISCONTINUED | OUTPATIENT
Start: 2018-07-27 | End: 2018-08-01 | Stop reason: HOSPADM

## 2018-07-26 RX ORDER — FAMOTIDINE 20 MG/1
20 TABLET, FILM COATED ORAL 2 TIMES DAILY
Status: DISCONTINUED | OUTPATIENT
Start: 2018-07-27 | End: 2018-08-01 | Stop reason: HOSPADM

## 2018-07-26 RX ORDER — 0.9 % SODIUM CHLORIDE 0.9 %
500 INTRAVENOUS SOLUTION INTRAVENOUS CONTINUOUS PRN
Status: DISCONTINUED | OUTPATIENT
Start: 2018-07-26 | End: 2018-07-30

## 2018-07-26 RX ORDER — MIDAZOLAM HYDROCHLORIDE 1 MG/ML
INJECTION INTRAMUSCULAR; INTRAVENOUS PRN
Status: DISCONTINUED | OUTPATIENT
Start: 2018-07-26 | End: 2018-07-26 | Stop reason: SDUPTHER

## 2018-07-26 RX ORDER — SODIUM CHLORIDE 0.9 % (FLUSH) 0.9 %
10 SYRINGE (ML) INJECTION EVERY 12 HOURS SCHEDULED
Status: DISCONTINUED | OUTPATIENT
Start: 2018-07-26 | End: 2018-08-01 | Stop reason: HOSPADM

## 2018-07-26 RX ORDER — MAGNESIUM SULFATE IN WATER 40 MG/ML
2 INJECTION, SOLUTION INTRAVENOUS PRN
Status: DISCONTINUED | OUTPATIENT
Start: 2018-07-26 | End: 2018-08-01 | Stop reason: HOSPADM

## 2018-07-26 RX ORDER — MEPERIDINE HYDROCHLORIDE 25 MG/ML
25 INJECTION INTRAMUSCULAR; INTRAVENOUS; SUBCUTANEOUS
Status: ACTIVE | OUTPATIENT
Start: 2018-07-26 | End: 2018-07-26

## 2018-07-26 RX ORDER — PROTAMINE SULFATE 10 MG/ML
INJECTION, SOLUTION INTRAVENOUS PRN
Status: DISCONTINUED | OUTPATIENT
Start: 2018-07-26 | End: 2018-07-26 | Stop reason: SDUPTHER

## 2018-07-26 RX ORDER — ALBUMIN, HUMAN INJ 5% 5 %
25 SOLUTION INTRAVENOUS PRN
Status: DISCONTINUED | OUTPATIENT
Start: 2018-07-26 | End: 2018-07-30

## 2018-07-26 RX ORDER — ACETAMINOPHEN 325 MG/1
650 TABLET ORAL EVERY 4 HOURS PRN
Status: DISCONTINUED | OUTPATIENT
Start: 2018-07-26 | End: 2018-08-01 | Stop reason: HOSPADM

## 2018-07-26 RX ORDER — PROTAMINE SULFATE 10 MG/ML
50 INJECTION, SOLUTION INTRAVENOUS
Status: ACTIVE | OUTPATIENT
Start: 2018-07-26 | End: 2018-07-26

## 2018-07-26 RX ORDER — LISINOPRIL 2.5 MG/1
2.5 TABLET ORAL
Status: DISCONTINUED | OUTPATIENT
Start: 2018-07-27 | End: 2018-07-30

## 2018-07-26 RX ORDER — SODIUM CHLORIDE 9 MG/ML
INJECTION, SOLUTION INTRAVENOUS CONTINUOUS PRN
Status: DISCONTINUED | OUTPATIENT
Start: 2018-07-26 | End: 2018-07-26 | Stop reason: SDUPTHER

## 2018-07-26 RX ORDER — DIPHENHYDRAMINE HCL 25 MG
25 TABLET ORAL NIGHTLY PRN
Status: DISCONTINUED | OUTPATIENT
Start: 2018-07-27 | End: 2018-07-31

## 2018-07-26 RX ORDER — OXYCODONE HYDROCHLORIDE 5 MG/1
10 TABLET ORAL EVERY 4 HOURS PRN
Status: DISCONTINUED | OUTPATIENT
Start: 2018-07-26 | End: 2018-07-30

## 2018-07-26 RX ORDER — CHLORHEXIDINE GLUCONATE 0.12 MG/ML
15 RINSE ORAL 2 TIMES DAILY
Status: DISCONTINUED | OUTPATIENT
Start: 2018-07-26 | End: 2018-08-01 | Stop reason: HOSPADM

## 2018-07-26 RX ORDER — NITROGLYCERIN 20 MG/100ML
10 INJECTION INTRAVENOUS CONTINUOUS PRN
Status: DISCONTINUED | OUTPATIENT
Start: 2018-07-26 | End: 2018-07-30

## 2018-07-26 RX ORDER — AMINOCAPROIC ACID 250 MG/ML
INJECTION, SOLUTION INTRAVENOUS PRN
Status: DISCONTINUED | OUTPATIENT
Start: 2018-07-26 | End: 2018-07-26 | Stop reason: SDUPTHER

## 2018-07-26 RX ORDER — FUROSEMIDE 10 MG/ML
40 INJECTION INTRAMUSCULAR; INTRAVENOUS 2 TIMES DAILY
Status: DISCONTINUED | OUTPATIENT
Start: 2018-07-27 | End: 2018-07-30

## 2018-07-26 RX ORDER — ETOMIDATE 2 MG/ML
INJECTION INTRAVENOUS PRN
Status: DISCONTINUED | OUTPATIENT
Start: 2018-07-26 | End: 2018-07-26 | Stop reason: SDUPTHER

## 2018-07-26 RX ORDER — NICOTINE POLACRILEX 4 MG
15 LOZENGE BUCCAL PRN
Status: DISCONTINUED | OUTPATIENT
Start: 2018-07-26 | End: 2018-07-30

## 2018-07-26 RX ORDER — SUCCINYLCHOLINE/SOD CL,ISO/PF 100 MG/5ML
SYRINGE (ML) INTRAVENOUS PRN
Status: DISCONTINUED | OUTPATIENT
Start: 2018-07-26 | End: 2018-07-26 | Stop reason: SDUPTHER

## 2018-07-26 RX ORDER — ONDANSETRON 2 MG/ML
4 INJECTION INTRAMUSCULAR; INTRAVENOUS EVERY 8 HOURS PRN
Status: DISCONTINUED | OUTPATIENT
Start: 2018-07-26 | End: 2018-08-01 | Stop reason: HOSPADM

## 2018-07-26 RX ORDER — OXYCODONE HYDROCHLORIDE AND ACETAMINOPHEN 5; 325 MG/1; MG/1
1 TABLET ORAL EVERY 4 HOURS PRN
Status: DISCONTINUED | OUTPATIENT
Start: 2018-07-26 | End: 2018-07-26

## 2018-07-26 RX ORDER — FENTANYL CITRATE 0.05 MG/ML
INJECTION, SOLUTION INTRAMUSCULAR; INTRAVENOUS PRN
Status: DISCONTINUED | OUTPATIENT
Start: 2018-07-26 | End: 2018-07-26 | Stop reason: SDUPTHER

## 2018-07-26 RX ORDER — DOPAMINE HYDROCHLORIDE 160 MG/100ML
2 INJECTION, SOLUTION INTRAVENOUS CONTINUOUS PRN
Status: DISCONTINUED | OUTPATIENT
Start: 2018-07-26 | End: 2018-07-30

## 2018-07-26 RX ORDER — METOPROLOL TARTRATE 5 MG/5ML
INJECTION INTRAVENOUS PRN
Status: DISCONTINUED | OUTPATIENT
Start: 2018-07-26 | End: 2018-07-26 | Stop reason: SDUPTHER

## 2018-07-26 RX ORDER — POTASSIUM CHLORIDE 750 MG/1
10 TABLET, EXTENDED RELEASE ORAL
Status: DISCONTINUED | OUTPATIENT
Start: 2018-07-28 | End: 2018-07-30

## 2018-07-26 RX ORDER — FONDAPARINUX SODIUM 2.5 MG/.5ML
2.5 INJECTION SUBCUTANEOUS DAILY
Status: DISCONTINUED | OUTPATIENT
Start: 2018-07-27 | End: 2018-08-01 | Stop reason: HOSPADM

## 2018-07-26 RX ORDER — SODIUM CHLORIDE 0.9 % (FLUSH) 0.9 %
10 SYRINGE (ML) INJECTION PRN
Status: DISCONTINUED | OUTPATIENT
Start: 2018-07-26 | End: 2018-08-01 | Stop reason: HOSPADM

## 2018-07-26 RX ORDER — HEPARIN SODIUM 10000 [USP'U]/ML
INJECTION, SOLUTION INTRAVENOUS; SUBCUTANEOUS PRN
Status: DISCONTINUED | OUTPATIENT
Start: 2018-07-26 | End: 2018-07-26 | Stop reason: SDUPTHER

## 2018-07-26 RX ORDER — FUROSEMIDE 10 MG/ML
40 INJECTION INTRAMUSCULAR; INTRAVENOUS PRN
Status: DISCONTINUED | OUTPATIENT
Start: 2018-07-26 | End: 2018-07-30

## 2018-07-26 RX ORDER — POTASSIUM CHLORIDE 29.8 MG/ML
20 INJECTION INTRAVENOUS PRN
Status: DISCONTINUED | OUTPATIENT
Start: 2018-07-26 | End: 2018-07-30

## 2018-07-26 RX ADMIN — SODIUM CHLORIDE: 9 INJECTION, SOLUTION INTRAVENOUS at 13:16

## 2018-07-26 RX ADMIN — FENTANYL CITRATE 250 MCG: 50 INJECTION, SOLUTION INTRAMUSCULAR; INTRAVENOUS at 07:29

## 2018-07-26 RX ADMIN — POTASSIUM CHLORIDE 20 MEQ: 400 INJECTION, SOLUTION INTRAVENOUS at 21:48

## 2018-07-26 RX ADMIN — ROCURONIUM BROMIDE 50 MG: 10 INJECTION, SOLUTION INTRAVENOUS at 09:08

## 2018-07-26 RX ADMIN — FENTANYL CITRATE 25 MCG: 50 INJECTION INTRAMUSCULAR; INTRAVENOUS at 15:30

## 2018-07-26 RX ADMIN — ROCURONIUM BROMIDE 50 MG: 10 INJECTION, SOLUTION INTRAVENOUS at 07:50

## 2018-07-26 RX ADMIN — Medication 10 ML: at 21:49

## 2018-07-26 RX ADMIN — FENTANYL CITRATE 25 MCG: 50 INJECTION INTRAMUSCULAR; INTRAVENOUS at 20:33

## 2018-07-26 RX ADMIN — Medication 15 ML: at 21:48

## 2018-07-26 RX ADMIN — METOPROLOL TARTRATE 2.5 MG: 5 INJECTION, SOLUTION INTRAVENOUS at 06:45

## 2018-07-26 RX ADMIN — MIDAZOLAM HYDROCHLORIDE 2.5 MG: 1 INJECTION, SOLUTION INTRAMUSCULAR; INTRAVENOUS at 09:05

## 2018-07-26 RX ADMIN — METOCLOPRAMIDE 10 MG: 5 INJECTION, SOLUTION INTRAMUSCULAR; INTRAVENOUS at 21:21

## 2018-07-26 RX ADMIN — FENTANYL CITRATE 250 MCG: 50 INJECTION, SOLUTION INTRAMUSCULAR; INTRAVENOUS at 10:17

## 2018-07-26 RX ADMIN — Medication 15 ML: at 05:26

## 2018-07-26 RX ADMIN — MIDAZOLAM HYDROCHLORIDE 2.5 MG: 1 INJECTION, SOLUTION INTRAMUSCULAR; INTRAVENOUS at 10:15

## 2018-07-26 RX ADMIN — AMINOCAPROIC ACID 5000 MG: 250 INJECTION, SOLUTION INTRAVENOUS at 07:38

## 2018-07-26 RX ADMIN — Medication 100 MG: at 06:47

## 2018-07-26 RX ADMIN — FENTANYL CITRATE 250 MCG: 50 INJECTION, SOLUTION INTRAMUSCULAR; INTRAVENOUS at 09:08

## 2018-07-26 RX ADMIN — POTASSIUM CHLORIDE 20 MEQ: 400 INJECTION, SOLUTION INTRAVENOUS at 13:16

## 2018-07-26 RX ADMIN — MUPIROCIN: 20 OINTMENT TOPICAL at 05:26

## 2018-07-26 RX ADMIN — POTASSIUM CHLORIDE 20 MEQ: 400 INJECTION, SOLUTION INTRAVENOUS at 14:09

## 2018-07-26 RX ADMIN — SODIUM CHLORIDE: 900 INJECTION, SOLUTION INTRAVENOUS at 06:40

## 2018-07-26 RX ADMIN — SODIUM CHLORIDE 3 UNITS/HR: 900 INJECTION, SOLUTION INTRAVENOUS at 09:31

## 2018-07-26 RX ADMIN — VANCOMYCIN HYDROCHLORIDE 1000 MG: 10 INJECTION, POWDER, LYOPHILIZED, FOR SOLUTION INTRAVENOUS at 19:15

## 2018-07-26 RX ADMIN — ETOMIDATE 10 MG: 2 INJECTION, SOLUTION INTRAVENOUS at 06:45

## 2018-07-26 RX ADMIN — ONDANSETRON 4 MG: 2 INJECTION INTRAMUSCULAR; INTRAVENOUS at 18:50

## 2018-07-26 RX ADMIN — POTASSIUM CHLORIDE 20 MEQ: 400 INJECTION, SOLUTION INTRAVENOUS at 15:16

## 2018-07-26 RX ADMIN — NICARDIPINE HYDROCHLORIDE 5 MG/HR: 0.1 INJECTION, SOLUTION INTRAVENOUS at 13:14

## 2018-07-26 RX ADMIN — PROTAMINE SULFATE 350 MG: 10 INJECTION, SOLUTION INTRAVENOUS at 11:18

## 2018-07-26 RX ADMIN — MUPIROCIN: 20 OINTMENT TOPICAL at 13:36

## 2018-07-26 RX ADMIN — FAMOTIDINE 20 MG: 10 INJECTION, SOLUTION INTRAVENOUS at 21:21

## 2018-07-26 RX ADMIN — ROCURONIUM BROMIDE 50 MG: 10 INJECTION, SOLUTION INTRAVENOUS at 06:50

## 2018-07-26 RX ADMIN — DOPAMINE HYDROCHLORIDE IN DEXTROSE 2 MCG/KG/MIN: 1.6 INJECTION, SOLUTION INTRAVENOUS at 16:16

## 2018-07-26 RX ADMIN — SODIUM CHLORIDE 1.82 UNITS/HR: 900 INJECTION, SOLUTION INTRAVENOUS at 13:20

## 2018-07-26 RX ADMIN — ROCURONIUM BROMIDE 50 MG: 10 INJECTION, SOLUTION INTRAVENOUS at 10:15

## 2018-07-26 RX ADMIN — VANCOMYCIN HYDROCHLORIDE 1000 MG: 1 INJECTION, POWDER, LYOPHILIZED, FOR SOLUTION INTRAVENOUS at 07:00

## 2018-07-26 RX ADMIN — PROTAMINE SULFATE 30 MG: 10 INJECTION, SOLUTION INTRAVENOUS at 11:39

## 2018-07-26 RX ADMIN — CEFAZOLIN SODIUM 1000 MG: 1 POWDER, FOR SOLUTION INTRAMUSCULAR; INTRAVENOUS at 11:37

## 2018-07-26 RX ADMIN — POTASSIUM CHLORIDE 20 MEQ: 400 INJECTION, SOLUTION INTRAVENOUS at 18:03

## 2018-07-26 RX ADMIN — CEFAZOLIN SODIUM 2000 MG: 1 POWDER, FOR SOLUTION INTRAMUSCULAR; INTRAVENOUS at 07:30

## 2018-07-26 RX ADMIN — FENTANYL CITRATE 25 MCG: 50 INJECTION INTRAMUSCULAR; INTRAVENOUS at 21:57

## 2018-07-26 RX ADMIN — FENTANYL CITRATE 250 MCG: 50 INJECTION, SOLUTION INTRAMUSCULAR; INTRAVENOUS at 06:45

## 2018-07-26 RX ADMIN — AMINOCAPROIC ACID 1 G/HR: 250 INJECTION, SOLUTION INTRAVENOUS at 07:40

## 2018-07-26 RX ADMIN — CALCIUM CHLORIDE 1 G: 100 INJECTION, SOLUTION INTRAVENOUS at 11:39

## 2018-07-26 RX ADMIN — Medication 15 ML: at 13:36

## 2018-07-26 RX ADMIN — CEFAZOLIN SODIUM 2 G: 10 INJECTION, POWDER, FOR SOLUTION INTRAVENOUS at 17:18

## 2018-07-26 RX ADMIN — FENTANYL CITRATE 25 MCG: 50 INJECTION INTRAMUSCULAR; INTRAVENOUS at 17:33

## 2018-07-26 RX ADMIN — HEPARIN SODIUM 32000 UNITS: 10000 INJECTION, SOLUTION INTRAVENOUS; SUBCUTANEOUS at 08:22

## 2018-07-26 RX ADMIN — NITROGLYCERIN 100 MCG/MIN: 200 INJECTION, SOLUTION INTRAVENOUS at 11:15

## 2018-07-26 RX ADMIN — NITROGLYCERIN 300 MCG/MIN: 20 INJECTION INTRAVENOUS at 13:15

## 2018-07-26 RX ADMIN — FENTANYL CITRATE 25 MCG: 50 INJECTION INTRAMUSCULAR; INTRAVENOUS at 16:30

## 2018-07-26 RX ADMIN — CALCIUM CHLORIDE 1 G: 100 INJECTION, SOLUTION INTRAVENOUS at 11:18

## 2018-07-26 RX ADMIN — FAMOTIDINE 20 MG: 10 INJECTION, SOLUTION INTRAVENOUS at 13:34

## 2018-07-26 RX ADMIN — MUPIROCIN: 20 OINTMENT TOPICAL at 21:49

## 2018-07-26 ASSESSMENT — PULMONARY FUNCTION TESTS
PIF_VALUE: 17
PIF_VALUE: 1
PIF_VALUE: 19
PIF_VALUE: 17
PIF_VALUE: 1
PIF_VALUE: 1
PIF_VALUE: 22
PIF_VALUE: 1
PIF_VALUE: 1
PIF_VALUE: 18
PIF_VALUE: 19
PIF_VALUE: 1
PIF_VALUE: 19
PIF_VALUE: 18
PIF_VALUE: 19
PIF_VALUE: 3
PIF_VALUE: 19
PIF_VALUE: 23
PIF_VALUE: 18
PIF_VALUE: 19
PIF_VALUE: 1
PIF_VALUE: 16
PIF_VALUE: 3
PIF_VALUE: 1
PIF_VALUE: 18
PIF_VALUE: 22
PIF_VALUE: 17
PIF_VALUE: 1
PIF_VALUE: 19
PIF_VALUE: 19
PIF_VALUE: 17
PIF_VALUE: 17
PIF_VALUE: 22
PIF_VALUE: 16
PIF_VALUE: 17
PIF_VALUE: 1
PIF_VALUE: 18
PIF_VALUE: 22
PIF_VALUE: 1
PIF_VALUE: 18
PIF_VALUE: 18
PIF_VALUE: 17
PIF_VALUE: 18
PIF_VALUE: 1
PIF_VALUE: 1
PIF_VALUE: 0
PIF_VALUE: 18
PIF_VALUE: 17
PIF_VALUE: 1
PIF_VALUE: 18
PIF_VALUE: 1
PIF_VALUE: 18
PIF_VALUE: 1
PIF_VALUE: 18
PIF_VALUE: 19
PIF_VALUE: 17
PIF_VALUE: 18
PIF_VALUE: 18
PIF_VALUE: 17
PIF_VALUE: 19
PIF_VALUE: 1
PIF_VALUE: 18
PIF_VALUE: 17
PIF_VALUE: 19
PIF_VALUE: 1
PIF_VALUE: 1
PIF_VALUE: 23
PIF_VALUE: 18
PIF_VALUE: 19
PIF_VALUE: 34
PIF_VALUE: 18
PIF_VALUE: 1
PIF_VALUE: 1
PIF_VALUE: 17
PIF_VALUE: 19
PIF_VALUE: 1
PIF_VALUE: 18
PIF_VALUE: 20
PIF_VALUE: 22
PIF_VALUE: 18
PIF_VALUE: 22
PIF_VALUE: 18
PIF_VALUE: 22
PIF_VALUE: 1
PIF_VALUE: 18
PIF_VALUE: 1
PIF_VALUE: 1
PIF_VALUE: 18
PIF_VALUE: 20
PIF_VALUE: 18
PIF_VALUE: 1
PIF_VALUE: 19
PIF_VALUE: 1
PIF_VALUE: 22
PIF_VALUE: 1
PIF_VALUE: 17
PIF_VALUE: 17
PIF_VALUE: 1
PIF_VALUE: 1
PIF_VALUE: 17
PIF_VALUE: 17
PIF_VALUE: 0
PIF_VALUE: 18
PIF_VALUE: 19
PIF_VALUE: 20
PIF_VALUE: 18
PIF_VALUE: 18
PIF_VALUE: 19
PIF_VALUE: 1
PIF_VALUE: 1
PIF_VALUE: 19
PIF_VALUE: 1
PIF_VALUE: 17
PIF_VALUE: 1
PIF_VALUE: 1
PIF_VALUE: 17
PIF_VALUE: 17
PIF_VALUE: 1
PIF_VALUE: 18
PIF_VALUE: 1
PIF_VALUE: 1
PIF_VALUE: 0
PIF_VALUE: 2
PIF_VALUE: 1
PIF_VALUE: 17
PIF_VALUE: 19
PIF_VALUE: 17
PIF_VALUE: 1
PIF_VALUE: 18
PIF_VALUE: 18
PIF_VALUE: 1
PIF_VALUE: 1
PIF_VALUE: 21
PIF_VALUE: 17
PIF_VALUE: 19
PIF_VALUE: 1
PIF_VALUE: 1
PIF_VALUE: 23
PIF_VALUE: 30
PIF_VALUE: 23
PIF_VALUE: 1
PIF_VALUE: 22
PIF_VALUE: 18
PIF_VALUE: 1
PIF_VALUE: 18
PIF_VALUE: 19
PIF_VALUE: 20
PIF_VALUE: 19
PIF_VALUE: 1
PIF_VALUE: 1
PIF_VALUE: 23
PIF_VALUE: 23
PIF_VALUE: 22
PIF_VALUE: 18
PIF_VALUE: 1
PIF_VALUE: 22
PIF_VALUE: 20
PIF_VALUE: 17
PIF_VALUE: 23
PIF_VALUE: 19
PIF_VALUE: 22
PIF_VALUE: 22
PIF_VALUE: 1
PIF_VALUE: 22
PIF_VALUE: 1
PIF_VALUE: 17
PIF_VALUE: 1
PIF_VALUE: 1
PIF_VALUE: 17
PIF_VALUE: 16
PIF_VALUE: 0
PIF_VALUE: 21
PIF_VALUE: 18
PIF_VALUE: 1
PIF_VALUE: 16
PIF_VALUE: 22
PIF_VALUE: 18
PIF_VALUE: 18
PIF_VALUE: 19
PIF_VALUE: 17
PIF_VALUE: 1
PIF_VALUE: 18
PIF_VALUE: 17
PIF_VALUE: 18
PIF_VALUE: 1
PIF_VALUE: 19
PIF_VALUE: 17
PIF_VALUE: 19
PIF_VALUE: 19
PIF_VALUE: 22
PIF_VALUE: 18
PIF_VALUE: 1
PIF_VALUE: 19
PIF_VALUE: 17
PIF_VALUE: 28
PIF_VALUE: 1
PIF_VALUE: 21
PIF_VALUE: 18
PIF_VALUE: 1
PIF_VALUE: 18
PIF_VALUE: 19
PIF_VALUE: 18
PIF_VALUE: 1
PIF_VALUE: 18
PIF_VALUE: 1
PIF_VALUE: 19
PIF_VALUE: 1
PIF_VALUE: 17
PIF_VALUE: 1
PIF_VALUE: 15
PIF_VALUE: 1
PIF_VALUE: 18
PIF_VALUE: 1
PIF_VALUE: 17
PIF_VALUE: 19
PIF_VALUE: 17
PIF_VALUE: 1
PIF_VALUE: 18
PIF_VALUE: 1
PIF_VALUE: 1
PIF_VALUE: 19
PIF_VALUE: 19
PIF_VALUE: 1
PIF_VALUE: 1
PIF_VALUE: 19
PIF_VALUE: 16
PIF_VALUE: 18
PIF_VALUE: 1
PIF_VALUE: 16
PIF_VALUE: 17
PIF_VALUE: 21
PIF_VALUE: 1
PIF_VALUE: 20
PIF_VALUE: 1
PIF_VALUE: 17
PIF_VALUE: 18
PIF_VALUE: 17
PIF_VALUE: 18
PIF_VALUE: 1
PIF_VALUE: 18
PIF_VALUE: 18
PIF_VALUE: 19
PIF_VALUE: 19
PIF_VALUE: 1
PIF_VALUE: 4
PIF_VALUE: 1
PIF_VALUE: 18
PIF_VALUE: 18
PIF_VALUE: 19
PIF_VALUE: 22
PIF_VALUE: 18
PIF_VALUE: 19
PIF_VALUE: 1
PIF_VALUE: 1
PIF_VALUE: 20
PIF_VALUE: 19
PIF_VALUE: 18
PIF_VALUE: 1
PIF_VALUE: 1
PIF_VALUE: 19
PIF_VALUE: 1
PIF_VALUE: 17
PIF_VALUE: 19
PIF_VALUE: 1
PIF_VALUE: 19
PIF_VALUE: 0
PIF_VALUE: 1
PIF_VALUE: 22
PIF_VALUE: 1
PIF_VALUE: 19
PIF_VALUE: 20
PIF_VALUE: 17
PIF_VALUE: 19
PIF_VALUE: 19
PIF_VALUE: 18
PIF_VALUE: 17
PIF_VALUE: 19
PIF_VALUE: 18
PIF_VALUE: 17
PIF_VALUE: 19
PIF_VALUE: 1
PIF_VALUE: 18
PIF_VALUE: 18
PIF_VALUE: 1
PIF_VALUE: 19
PIF_VALUE: 17
PIF_VALUE: 17
PIF_VALUE: 22
PIF_VALUE: 1
PIF_VALUE: 19
PIF_VALUE: 1
PIF_VALUE: 1
PIF_VALUE: 18
PIF_VALUE: 1
PIF_VALUE: 1
PIF_VALUE: 18
PIF_VALUE: 18
PIF_VALUE: 1
PIF_VALUE: 17
PIF_VALUE: 1
PIF_VALUE: 16
PIF_VALUE: 1
PIF_VALUE: 0
PIF_VALUE: 1
PIF_VALUE: 1
PIF_VALUE: 19
PIF_VALUE: 17
PIF_VALUE: 18
PIF_VALUE: 17
PIF_VALUE: 16
PIF_VALUE: 18
PIF_VALUE: 1
PIF_VALUE: 1
PIF_VALUE: 0
PIF_VALUE: 22
PIF_VALUE: 19
PIF_VALUE: 17
PIF_VALUE: 18
PIF_VALUE: 19

## 2018-07-26 ASSESSMENT — PAIN DESCRIPTION - DESCRIPTORS
DESCRIPTORS: ACHING
DESCRIPTORS_2: ACHING
DESCRIPTORS: ACHING
DESCRIPTORS: ACHING;HEADACHE
DESCRIPTORS: ACHING

## 2018-07-26 ASSESSMENT — PAIN DESCRIPTION - PAIN TYPE
TYPE_2: ACUTE PAIN
TYPE: ACUTE PAIN
TYPE: ACUTE PAIN;SURGICAL PAIN

## 2018-07-26 ASSESSMENT — PAIN DESCRIPTION - ORIENTATION
ORIENTATION: RIGHT;LEFT;ANTERIOR;POSTERIOR
ORIENTATION: MID
ORIENTATION_2: LOWER;MID
ORIENTATION: MID

## 2018-07-26 ASSESSMENT — PAIN SCALES - GENERAL
PAINLEVEL_OUTOF10: 8
PAINLEVEL_OUTOF10: 2
PAINLEVEL_OUTOF10: 6
PAINLEVEL_OUTOF10: 0
PAINLEVEL_OUTOF10: 8
PAINLEVEL_OUTOF10: 4
PAINLEVEL_OUTOF10: 8
PAINLEVEL_OUTOF10: 0
PAINLEVEL_OUTOF10: 2
PAINLEVEL_OUTOF10: 2
PAINLEVEL_OUTOF10: 8
PAINLEVEL_OUTOF10: 2

## 2018-07-26 ASSESSMENT — PAIN DESCRIPTION - FREQUENCY
FREQUENCY: CONTINUOUS

## 2018-07-26 ASSESSMENT — PAIN DESCRIPTION - LOCATION
LOCATION: CHEST
LOCATION_2: BACK
LOCATION: CHEST
LOCATION: HEAD

## 2018-07-26 ASSESSMENT — PAIN DESCRIPTION - PROGRESSION
CLINICAL_PROGRESSION_2: NOT CHANGED
CLINICAL_PROGRESSION: NOT CHANGED

## 2018-07-26 ASSESSMENT — PAIN DESCRIPTION - DURATION: DURATION_2: CONTINUOUS

## 2018-07-26 ASSESSMENT — PAIN DESCRIPTION - ONSET
ONSET: ON-GOING
ONSET_2: ON-GOING

## 2018-07-26 ASSESSMENT — PAIN DESCRIPTION - INTENSITY: RATING_2: 8

## 2018-07-26 NOTE — BRIEF OP NOTE
Date: 7/26/2018  Patient:  Rhina Barahona    Brief Op Note    Pre-op Diagnosis:  cad    Post-op Diagnosis:  same    Procedure:    1. ELVIRA  2. EVH  3. cabgx3 (LIMA-LAD, SVG-OM, SVG-RCA)    Surgeon: Clint Garcia    Assistant(s):  Michele Banegas    Anesthesia:  General    EBL: n/a    XC:  96       CPB:  119    Specimens:  none    Findings:  Well preserved LV function    Drains:  Left pleural, mediastinal    Drips:  ntg    Complications:  none    Disposition: To CVU in stable condition    Post-Op Note:  Dictated.      Marifer Davis MD  7/26/2018  12:05 PM

## 2018-07-26 NOTE — PROGRESS NOTES
1800- skin     Patient turned and repositioned , skin inspected and heels elevated off bed, sacral heart in place

## 2018-07-26 NOTE — PROGRESS NOTES
1235- admission to ICU        Patient admitted to icu following cabg x 3 , report received from anesthesia,  Patient admitted on nitro at 200 mcg/min , and insulin infusion , nicardipine quickly turned on to keep b/p within goal range , family back to see patient all question answered, fall precautions in place

## 2018-07-26 NOTE — ADDENDUM NOTE
Addendum  created 07/26/18 1312 by Karlie Dickey MD    Anesthesia Intra Blocks edited, Sign clinical note

## 2018-07-26 NOTE — H&P
CAD -  Previous H+P on chart 7/25/18  Retroperitoneal hematoma noted. Serial hgb results noted. Vascular surgery aware of pt. No other changes    Past Medical History:  Past Medical History:   Diagnosis Date    Brain aneurysm     on right very small    CAD (coronary artery disease) 07/24/2018    NSTEMI    COPD (chronic obstructive pulmonary disease) (HCC)     Hyperlipidemia     Hypertension     Hypothyroidism     Lumbar spondylosis     per MRI done 5/23/2013 - Dr. Larson [facility])     RLL 3.8cm adenoCa 2004. RML 2.6cm adenoCa 2010.  Renal cyst 10/27/2015    Spinal stenosis of lumbar region     per MRI done 5/23/2013    Thoracic aortic aneurysm (HCC)     4 cm stable       Past Surgical History:  Past Surgical History:   Procedure Laterality Date    BACK SURGERY  1990    L5    BACK SURGERY  11/2013 5th vert sciatic nerve release    BREAST LUMPECTOMY Bilateral 2000    CARPAL TUNNEL RELEASE Left 12/22/2016    and left index and ring trigger finger release    CARPAL TUNNEL RELEASE Right 04/06/2017    w/R ring finger trigger finger release    COLONOSCOPY  2010    COLPOPEXY  11/19/2014    Dr. Amy Hansen. [facility] COLPORRHAPHY  11/19/2014    A&P- Dr. Amy Hansen. [facility] HIP SURGERY Right 05/2014    Dr. Abel Garner, VAGINAL  11/19/2014    Dr. Amy Hansen. [facility] INGUINAL HERNIA REPAIR Bilateral 1980s    LUNG REMOVAL, PARTIAL Right 04/2010    RML lobectomy in 4/2010, & RLL lobectomy in 5/2004    SALPINGO-OOPHORECTOMY Bilateral 11/19/2014    Dr. Amy Hansen. [facility] UPPER GASTROINTESTINAL ENDOSCOPY  6/2010    URETHRAL STRICTURE DILATATION      VASCULAR SURGERY Right 2003    leg       Home Medications:   Prior to Admission medications    Medication Sig Start Date End Date Taking?  Authorizing Provider   rosuvastatin (CRESTOR) 40 MG tablet TAKE 1 TABLET BY MOUTH EVERY DAY IN THE EVENING 7/9/18  Yes Josiah Morris MD   benazepril-hydrochlorthiazide (LOTENSIN HCT) 20-25 MG levothyroxine  137 mcg Oral Daily    sodium chloride flush  10 mL Intravenous 2 times per day    famotidine  20 mg Oral BID    lisinopril-hydrochlorothiazide  1 tablet Oral Daily    mupirocin   Nasal BID    chlorhexidine  15 mL Mouth/Throat BID       Allergies: Allergies   Allergen Reactions    Codeine Nausea And Vomiting    Demerol Nausea And Vomiting    Erythromycin      GI upset    Mobic [Meloxicam] Swelling    Morphine And Related Nausea And Vomiting    Sulfa Antibiotics Nausea And Vomiting        Pt examined. Vital Signs:                                                 /74   Pulse 83   Temp 99.1 °F (37.3 °C) (Core)   Resp 19   Ht 5' 2\" (1.575 m)   Wt 173 lb 15.1 oz (78.9 kg)   SpO2 95%   Breastfeeding?  No   BMI 31.81 kg/m²  O2 Flow Rate (L/min): 2 L/min     Admission Weight: Weight: 170 lb (77.1 kg)      CV: reg  Pulm: decreased at bases  Abd: soft  Ext: warm    CBC:   Lab Results   Component Value Date    WBC 8.7 07/26/2018    HGB 9.1 07/26/2018    HCT 27.2 07/26/2018    MCV 93.9 07/26/2018     07/26/2018     BMP:   Lab Results   Component Value Date     07/26/2018    K 3.7 07/26/2018    K 3.5 07/24/2018     07/26/2018    CO2 26 07/26/2018    PHOS 4.1 07/26/2018    BUN 18 07/26/2018    CREATININE <0.5 07/26/2018    CALCIUM 9.3 07/26/2018    MG 1.90 07/26/2018     Cardiac Enzymes:   Lab Results   Component Value Date    CKTOTAL 140 07/01/2016    TROPONINI 0.26 07/25/2018     PT/INR:   Lab Results   Component Value Date    PROTIME 12.0 07/26/2018    INR 1.05 07/26/2018     APTT:   Lab Results   Component Value Date    APTT 30.9 07/24/2018     Liver Profile:  Lab Results   Component Value Date    AST 17 07/25/2018    ALT 16 07/25/2018    BILIDIR <0.2 07/25/2018    BILITOT <0.2 07/25/2018    ALKPHOS 61 07/25/2018    LABALBU 3.7 07/26/2018     Lab Results   Component Value Date    CHOL 129 07/24/2018    HDL 44 07/24/2018    TRIG 89 07/24/2018     HgbA1c:  Lab Results Component Value Date    LABA1C 6.1 07/24/2018     UA:   Lab Results   Component Value Date    NITRITE neg 12/07/2016    COLORU Yellow 07/24/2018    PHUR 6.5 07/24/2018    WBCUA 0-2 07/24/2018    RBCUA None seen 07/24/2018    BACTERIA 1+ 11/29/2017    CLARITYU Clear 07/24/2018    SPECGRAV 1.010 07/24/2018    LEUKOCYTESUR TRACE 07/24/2018    UROBILINOGEN 0.2 07/24/2018    BILIRUBINUR Negative 07/24/2018    BILIRUBINUR neg 12/07/2016    BLOODU Negative 07/24/2018    GLUCOSEU Negative 07/24/2018       Reviewed above, reason for surgery, diagnosis and treatment plan.   Will proceed with Kiana Cueot MD  7/26/2018  6:45 AM

## 2018-07-26 NOTE — PROGRESS NOTES
Nursing: Continued HA and Lower mid back pain    D: Patient slept for about an hour and a half after receiving Compazine and Ultram earlier; Now awake with c/o HA rated \"8/10\" and lower mid back pain rated \"8/10\"; Patient does however report that her nausea is better at this time. A: Medical ICU residents made aware of continued c/o pain; Limited options for pain medication due to patients hx of nausea w/multiple pain medications (listed in allergies); Discussed w/patient the option of trying another dose of Dilaudid IV or trying one percocet. R: Patient declined percocet immediately; Wishes to try to relax in calm environment while her nausea has subsided instead of trying pain medications again that may cause her more nausea; Medical ICU residents updated w/patient's decision; Call light within reach; Daughters remain at bedside; Will continue to monitor.     Evelyn Whyte RN

## 2018-07-26 NOTE — PROGRESS NOTES
Nursing: Pain/Nausea and recheck of H/H    D: Patient received compazine 10mg IV and benadryl 5mg IV at 2305; Patient reported relief of nausea, but continued c/o HA rated \"7/10\" as well as increased mid lower back pain rated \"8/10\"; R groin angio site remains unchanged - soft, mild bruising, tenderness medially towards bladder; Patient denies numbness/tingling; Pedal pulses remain palpable. A: Medicated patient w/50 mg Ultram at 2346; Juan scheduled repeat H/H - down to 9.6/28.8 from 10.4/30.6; Medical ICU residents updated w/current H/H and increased lower mid back pain. R: Patient is currently resting in bed comfortably w/eyes closed; No new orders at this time; SBP remains in the 110's; Remains SR w/HR in the 70's; Will continue to monitor.     Lino Cornejo RN

## 2018-07-26 NOTE — PROGRESS NOTES
ICU Progress Note    Admit Date: 7/24/2018  IV Access:Peripheral  IV Fluids:None  Vasopressors:None                Antibiotics: None  Diet: DIET CARDIAC; Daily Fluid Restriction: 2000 ml    Interval history: Patient received CABG this morning. Patient in OR. History not obtained.        Medications:     Scheduled Meds:   sodium chloride flush  10 mL Intravenous 2 times per day    ceFAZolin (ANCEF) IVPB  2 g Intravenous Q8H    vancomycin (VANCOCIN) IV  1,000 mg Intravenous Q12H    docusate sodium  100 mg Oral BID    [START ON 7/27/2018] famotidine  20 mg Oral BID    famotidine (PEPCID) injection  20 mg Intravenous BID    [START ON 7/27/2018] metoprolol tartrate  12.5 mg Oral BID    [START ON 7/27/2018] lisinopril  2.5 mg Oral Lunch    chlorhexidine  15 mL Mouth/Throat BID    [START ON 7/27/2018] furosemide  40 mg Intravenous BID    [START ON 7/27/2018] magnesium oxide  400 mg Oral BID    mupirocin   Nasal BID    [START ON 7/27/2018] nitroGLYCERIN  1 patch Transdermal Daily    [START ON 7/28/2018] potassium chloride  10 mEq Oral TID WC    [START ON 7/27/2018] atorvastatin  40 mg Oral Nightly    [START ON 7/27/2018] fondaparinux  2.5 mg Subcutaneous Daily    [START ON 7/27/2018] aspirin  325 mg Oral Daily    [START ON 7/27/2018] insulin glargine  0.15 Units/kg Subcutaneous Nightly    insulin lispro  0-12 Units Subcutaneous TID WC    insulin lispro  0-6 Units Subcutaneous Nightly     Continuous Infusions:   sodium chloride 50 mL/hr at 07/26/18 1316    sodium chloride      DOPamine      nitroGLYCERIN Stopped (07/26/18 1529)    niCARdipine Stopped (07/26/18 1413)    insulin (HUMAN R) non-weight based infusion 4.55 Units/hr (07/26/18 1500)    dextrose      nitroprusside (NIPRIDE) 50 mg in D5W infusion       PRN Meds:protamine, sodium chloride flush, potassium chloride, magnesium sulfate, calcium chloride IVPB, calcium chloride IVPB, acetaminophen, oxyCODONE **OR** oxyCODONE, fentanNYL, midazolam, [START ON 7/27/2018] diphenhydrAMINE, lactulose, ondansetron, metoclopramide, hydrALAZINE, sodium bicarbonate, meperidine, albumin human, sodium chloride, DOPamine, furosemide, nitroGLYCERIN, niCARdipine, glucose, dextrose, glucagon (rDNA), dextrose    Objective:   Vitals:   T-max:  Patient Vitals for the past 8 hrs:   BP Temp Temp src Pulse Resp SpO2   07/26/18 1530 84/66 100.4 °F (38 °C) CORE 89 18 (!) 89 %   07/26/18 1515 85/67 - - 88 16 94 %   07/26/18 1500 89/65 100.3 °F (37.9 °C) CORE 86 18 98 %   07/26/18 1430 89/62 99.3 °F (37.4 °C) CORE 89 18 94 %   07/26/18 1415 91/63 99.5 °F (37.5 °C) CORE 89 21 94 %   07/26/18 1400 109/66 99.2 °F (37.3 °C) CORE 96 (!) 31 92 %   07/26/18 1345 95/68 99.2 °F (37.3 °C) CORE 97 23 92 %   07/26/18 1330 103/68 99 °F (37.2 °C) CORE 109 24 (!) 86 %   07/26/18 1315 113/78 99.4 °F (37.4 °C) CORE 108 25 90 %   07/26/18 1300 116/80 99.5 °F (37.5 °C) CORE 97 23 93 %   07/26/18 1250 - 98.3 °F (36.8 °C) CORE - - -   07/26/18 1245 109/76 98.3 °F (36.8 °C) CORE 90 (!) 0 96 %   07/26/18 1240 - 98.3 °F (36.8 °C) CORE 88 (!) 0 96 %   07/26/18 1235 107/69 98.3 °F (36.8 °C) CORE 86 18 97 %   07/26/18 1231 - - - 86 12 96 %       Intake/Output Summary (Last 24 hours) at 07/26/18 1539  Last data filed at 07/26/18 1500   Gross per 24 hour   Intake          9738.75 ml   Output             2585 ml   Net          7153.75 ml       Physical Exam  Not obtained Patient in OR    LABS:    CBC: Recent Labs      07/25/18   0505   07/25/18   2358  07/26/18   0458  07/26/18   1241   WBC  7.0   --    --   8.7  9.6   HGB  10.9*   < >  9.6*  9.1*  9.0*   HCT  32.9*   < >  28.8*  27.2*  26.3*   PLT  203   --    --   198  141   MCV  93.6   --    --   93.9  90.0    < > = values in this interval not displayed.      Renal:  Recent Labs      07/25/18   0953  07/26/18   0459  07/26/18   1242   NA  142  143  148*   K  3.9  3.7  3.5   CL  110  108  113*   CO2  23  26  27   BUN  19  18  13   CREATININE  <0.5*  <0.5*  <0.5* GLUCOSE  154*  114*  120*   CALCIUM  8.9  9.3  9.7   MG  2.00  1.90  2.30   PHOS   --   4.1   --    ANIONGAP  9  9  8     Hepatic: Recent Labs      07/24/18   1746  07/25/18   0505  07/25/18   0953  07/26/18   0459   AST  23  22  17   --    ALT  20  18  16   --    BILITOT  0.3  <0.2  <0.2   --    BILIDIR  <0.2  <0.2  <0.2   --    PROT  6.5  6.0*  5.8*   --    LABALBU  3.7  3.4  3.3*  3.7   ALKPHOS  67  66  61   --      Troponin:   Recent Labs      07/25/18   0505  07/25/18   0953  07/25/18   1542   TROPONINI  0.31*  0.28*  0.26*     BNP: No results for input(s): BNP in the last 72 hours. Lipids: Recent Labs      07/25/18   0953   CHOL  106   HDL  35*     ABGs:  Recent Labs      07/26/18   1243  07/26/18   1406  07/26/18   1505   PHART  7.365  7.251*  7.364   [id number]  47.6*  63.9*  44.1   PO2ART  107.9  80.9  80.4   [id number]  27.2  28.1  25.1   BEART  2  1  0   [id number]  98  93  95   [id number]  29  30  27       INR:   Recent Labs      07/25/18   0505  07/26/18   0458  07/26/18   1241   INR  0.99  1.05  1.32*     Lactate:   Recent Labs      07/26/18   1243  07/26/18   1406  07/26/18   1505   LACTATE  1.68  2.05*  2.16*     Cultures:  -----------------------------------------------------------------  RAD:   XR CHEST PORTABLE   Final Result      Postoperative chest      Bilateral pleural-parenchymal opacities      CT ABDOMEN PELVIS WO CONTRAST Additional Contrast? None   Final Result      1. Large hematoma emanating from the right groin femoral arterial puncture site throughout the adjacent extraperitoneal soft tissues, right pelvic sidewall and inferior right retroperitoneum. 2.  Slight increase in small volume of right pleural fluid. US ABDOMEN LIMITED   Final Result   1.  Small amount of ascites noted right lower quadrant near area of patient's pain uncertain significance and etiology repeat abdominal pelvic CT could be performed if clinically indicated                  CTA ABDOMINAL AORTA W BILAT RUNOFF W

## 2018-07-26 NOTE — ANESTHESIA PRE PROCEDURE
by mouth daily. Vitamin D for bones. 1/14/14  Yes Arnetha Krabbe, MD   calcium carbonate (CALCIUM 600) 600 MG TABS tablet Take 1 tablet by mouth 2 times daily. Calcium for bones.  1/14/14  Yes Arnetha Krabbe, MD   ibuprofen (ADVIL;MOTRIN) 600 MG tablet Take 600 mg by mouth every 6 hours as needed for Pain    Historical Provider, MD       Current medications:    Current Facility-Administered Medications   Medication Dose Route Frequency Provider Last Rate Last Dose    HYDROmorphone (DILAUDID) injection 1 mg  1 mg Intravenous Q4H PRN Glen Fitzgerald DO        oxyCODONE-acetaminophen (PERCOCET) 5-325 MG per tablet 1 tablet  1 tablet Oral Q4H PRN Glen Fitzgerald,         ondansetron [facility]) injection 4 mg  4 mg Intravenous Q6H PRN Harley Costa MD   4 mg at 07/25/18 1904    heparin (porcine) injection 4,000 Units  4,000 Units Intravenous PRN Basilio Peguero MD        heparin (porcine) injection 2,000 Units  2,000 Units Intravenous PRN Basilio Peguero MD        albuterol sulfate  (90 Base) MCG/ACT inhaler 2 puff  2 puff Inhalation Q6H PRN Guillermina Sky MD        aspirin chewable tablet 81 mg  81 mg Oral Daily Guillermina Sky MD   Stopped at 07/25/18 1028    calcium elemental (OSCAL) tablet 500 mg  1 tablet Oral BID Guillermina Sky MD   500 mg at 07/25/18 1038    vitamin D (CHOLECALCIFEROL) capsule 5,000 Units  5,000 Units Oral Daily Guillermina Sky MD   5,000 Units at 07/25/18 1032    pantoprazole sodium (PROTONIX) packet 40 mg  40 mg Oral Daily Guillermina Sky MD   40 mg at 07/25/18 1038    mometasone-formoterol (DULERA) 200-5 MCG/ACT inhaler 2 puff  2 puff Inhalation BID Guillermina Sky MD   2 puff at 07/24/18 2206    metoprolol succinate (TOPROL XL) extended release tablet 50 mg  50 mg Oral Daily Guillermina Sky MD   Stopped at 07/25/18 1028    atorvastatin (LIPITOR) tablet 80 mg  80 mg Oral Nightly Guillermina Sky MD   80 mg at 07/24/18 2150    levothyroxine (SYNTHROID) tablet 137 mcg  137 mcg Oral Daily Arlyn Han MD   137 mcg at 07/25/18 1038    heparin 25,000 units in dextrose 5% 250 mL infusion  10 Units/kg/hr Intravenous Continuous Lesly Muse MD 7.7 mL/hr at 07/26/18 0003 10 Units/kg/hr at 07/26/18 0003    nitroGLYCERIN 50 mg in dextrose 5% 250 mL infusion  40 mcg/min Intravenous Continuous Lesly Muse MD   Stopped at 07/25/18 0147    sodium chloride flush 0.9 % injection 10 mL  10 mL Intravenous 2 times per day Lesly Muse MD   10 mL at 07/25/18 2310    sodium chloride flush 0.9 % injection 10 mL  10 mL Intravenous PRN Lesly Muse MD        acetaminophen (TYLENOL) tablet 650 mg  650 mg Oral Q4H PRN Lesly Muse MD   650 mg at 07/25/18 1551    magnesium hydroxide (MILK OF MAGNESIA) 400 MG/5ML suspension 30 mL  30 mL Oral Daily PRN Lesly Muse MD        famotidine (PEPCID) tablet 20 mg  20 mg Oral BID Lesly Muse MD   20 mg at 07/25/18 1038    hydrALAZINE (APRESOLINE) injection 10 mg  10 mg Intravenous Q4H PRN Lesly Muse MD        lisinopril-hydrochlorothiazide (PRINZIDE;ZESTORETIC) 20-25 MG per tablet 1 tablet  1 tablet Oral Daily Arlyn Han MD   Stopped at 07/25/18 1028    mupirocin (BACTROBAN) 2 % ointment   Nasal BID Joanne Madison DO        chlorhexidine (PERIDEX) 0.12 % solution 15 mL  15 mL Mouth/Throat BID Joanne Madison DO   15 mL at 07/25/18 1039       Allergies:     Allergies   Allergen Reactions    Codeine Nausea And Vomiting    Demerol Nausea And Vomiting    Erythromycin      GI upset    Mobic [Meloxicam] Swelling    Morphine And Related Nausea And Vomiting    Sulfa Antibiotics Nausea And Vomiting       Problem List:    Patient Active Problem List   Diagnosis Code    Hyperlipidemia E78.5    HTN (hypertension) I10    COPD (chronic obstructive pulmonary disease) J44.9    Hypothyroid E03.9    CAD (coronary artery disease) I25.10    Ascending aortic aneurysm I71.2    Lung cancer C34.90    Lumbar spinal stenosis M48.061    Lumbar spondylosis M47.816    DDD (degenerative disc disease), lumbosacral M51.37    Trigger middle finger of left hand M65.332    Painful lumpy right breast N64.4, N63.10    Hemoptysis R04.2    Dyspepsia R10.13    Chest pain R07.9    NSTEMI (non-ST elevated myocardial infarction) ([facility]) I21.4       Past Medical History:        Diagnosis Date    Brain aneurysm     on right very small    CAD (coronary artery disease) 07/24/2018    NSTEMI    COPD (chronic obstructive pulmonary disease) ([facility])     Hyperlipidemia     Hypertension     Hypothyroidism     Lumbar spondylosis     [facility]     RLL 3.8cm adenoCa 2004. 2.6cm adenoCa 2010.  Renal cyst 10/27/2015    Spinal stenosis of lumbar region     per MRI done 5/23/2013    Thoracic aortic aneurysm ([facility])     4 cm stable       Past Surgical History:        Procedure Laterality Date    BACK SURGERY  1990    L5    BACK SURGERY  11/2013 5th vert sciatic nerve release    BREAST LUMPECTOMY Bilateral 2000    CARPAL TUNNEL RELEASE Left 12/22/2016    and left index and ring trigger finger release    CARPAL TUNNEL RELEASE Right 04/06/2017    w/R ring finger trigger finger release    COLONOSCOPY  2010    COLPOPEXY  11/19/2014    Dr. Isidoro Donald. [place] COLPORRHAPHY  11/19/2014    A&P- Dr. Isidoro Donald. [place] HIP SURGERY Right 05/2014    Dr. Joey Plata, VAGINAL  11/19/2014    Dr. Isidoro Donald. [place] INGUINAL HERNIA REPAIR Bilateral 1980s    LUNG REMOVAL, PARTIAL Right 04/2010    RML lobectomy in 4/2010, & RLL lobectomy in 5/2004    SALPINGO-OOPHORECTOMY Bilateral 11/19/2014    Dr. Isidoro Donald. [place] UPPER GASTROINTESTINAL ENDOSCOPY  6/2010    URETHRAL STRICTURE DILATATION      VASCULAR SURGERY Right 2003    leg       Social History:    Social History   Substance Use Topics    Smoking status: Former Smoker     Packs/day: 1.00     Years: 30.00     Quit date: 1/1/2000    Smokeless tobacco: Never Used    Alcohol use 4.2 oz/week     7 Glasses of wine per week      Comment: one glass daily                                Counseling given: Not Answered      Vital Signs (Current):   Vitals:    07/26/18 0400 07/26/18 0500 07/26/18 0522 07/26/18 0524   BP: 117/64 111/73 128/74    Pulse: 77 81 83    Resp: 16 18 19    Temp:  99.1 °F (37.3 °C)     TempSrc:  Core     SpO2: 100% 97% 99% 95%   Weight:   173 lb 15.1 oz (78.9 kg)    Height:                                                  BP Readings from Last 3 Encounters:   07/26/18 128/74   07/24/18 122/76   05/09/18 112/62       NPO Status: Time of last liquid consumption: 0530                        Time of last solid consumption: 2300                        Date of last liquid consumption: 07/26/18 (ice chips)                        Date of last solid food consumption: 07/24/18    BMI:   Wt Readings from Last 3 Encounters:   07/26/18 173 lb 15.1 oz (78.9 kg)   07/24/18 167 lb (75.8 kg)   05/09/18 168 lb (76.2 kg)     Body mass index is 31.81 kg/m². CBC:   Lab Results   Component Value Date    WBC 8.7 07/26/2018    RBC 2.90 07/26/2018    HGB 9.1 07/26/2018    HCT 27.2 07/26/2018    MCV 93.9 07/26/2018    RDW 14.5 07/26/2018     07/26/2018       CMP:   Lab Results   Component Value Date     07/26/2018    K 3.7 07/26/2018    K 3.5 07/24/2018     07/26/2018    CO2 26 07/26/2018    BUN 18 07/26/2018    CREATININE <0.5 07/26/2018    GFRAA >60 07/26/2018    GFRAA 100 02/07/2012    AGRATIO 1.3 07/25/2018    LABGLOM >60 07/26/2018    GLUCOSE 114 07/26/2018    GLUCOSE 97 02/07/2012    PROT 5.8 07/25/2018    PROT 6.7 10/30/2012    CALCIUM 9.3 07/26/2018    BILITOT <0.2 07/25/2018    ALKPHOS 61 07/25/2018    AST 17 07/25/2018    ALT 16 07/25/2018       POC Tests: No results for input(s): POCGLU, POCNA, POCK, POCCL, POCBUN, POCHEMO, POCHCT in the last 72 hours.     Coags:   Lab Results   Component Value Date    PROTIME 12.0 07/26/2018    INR

## 2018-07-27 LAB
ANION GAP SERPL CALCULATED.3IONS-SCNC: 7 MMOL/L (ref 3–16)
ANION GAP SERPL CALCULATED.3IONS-SCNC: 8 MMOL/L (ref 3–16)
BASE EXCESS ARTERIAL: 0 (ref -3–3)
BASE EXCESS ARTERIAL: 0 (ref -3–3)
BASE EXCESS ARTERIAL: 1 (ref -3–3)
BASE EXCESS ARTERIAL: 2 (ref -3–3)
BASE EXCESS ARTERIAL: 3 (ref -3–3)
BASE EXCESS ARTERIAL: 6 (ref -3–3)
BUN BLDV-MCNC: 14 MG/DL (ref 7–20)
BUN BLDV-MCNC: 15 MG/DL (ref 7–20)
CALCIUM IONIZED: 1.17 MMOL/L (ref 1.12–1.32)
CALCIUM IONIZED: 1.25 MMOL/L (ref 1.12–1.32)
CALCIUM IONIZED: 1.25 MMOL/L (ref 1.12–1.32)
CALCIUM IONIZED: 1.26 MMOL/L (ref 1.12–1.32)
CALCIUM IONIZED: 1.27 MMOL/L (ref 1.12–1.32)
CALCIUM IONIZED: 1.28 MMOL/L (ref 1.12–1.32)
CALCIUM IONIZED: 1.3 MMOL/L (ref 1.12–1.32)
CALCIUM SERPL-MCNC: 8.9 MG/DL (ref 8.3–10.6)
CALCIUM SERPL-MCNC: 9.1 MG/DL (ref 8.3–10.6)
CHLORIDE BLD-SCNC: 109 MMOL/L (ref 99–110)
CHLORIDE BLD-SCNC: 111 MMOL/L (ref 99–110)
CO2: 25 MMOL/L (ref 21–32)
CO2: 26 MMOL/L (ref 21–32)
CREAT SERPL-MCNC: 0.5 MG/DL (ref 0.6–1.2)
CREAT SERPL-MCNC: <0.5 MG/DL (ref 0.6–1.2)
EKG ATRIAL RATE: 103 BPM
EKG ATRIAL RATE: 61 BPM
EKG DIAGNOSIS: NORMAL
EKG DIAGNOSIS: NORMAL
EKG P AXIS: 30 DEGREES
EKG P AXIS: 48 DEGREES
EKG P-R INTERVAL: 178 MS
EKG P-R INTERVAL: 228 MS
EKG Q-T INTERVAL: 358 MS
EKG Q-T INTERVAL: 422 MS
EKG QRS DURATION: 82 MS
EKG QRS DURATION: 88 MS
EKG QTC CALCULATION (BAZETT): 424 MS
EKG QTC CALCULATION (BAZETT): 468 MS
EKG R AXIS: -8 DEGREES
EKG R AXIS: -9 DEGREES
EKG T AXIS: -61 DEGREES
EKG T AXIS: -63 DEGREES
EKG VENTRICULAR RATE: 103 BPM
EKG VENTRICULAR RATE: 61 BPM
GFR AFRICAN AMERICAN: >60
GFR AFRICAN AMERICAN: >60
GFR NON-AFRICAN AMERICAN: >60
GFR NON-AFRICAN AMERICAN: >60
GLUCOSE BLD-MCNC: 100 MG/DL (ref 70–99)
GLUCOSE BLD-MCNC: 101 MG/DL (ref 70–99)
GLUCOSE BLD-MCNC: 103 MG/DL (ref 70–99)
GLUCOSE BLD-MCNC: 104 MG/DL (ref 70–99)
GLUCOSE BLD-MCNC: 106 MG/DL (ref 70–99)
GLUCOSE BLD-MCNC: 107 MG/DL (ref 70–99)
GLUCOSE BLD-MCNC: 118 MG/DL (ref 70–99)
GLUCOSE BLD-MCNC: 124 MG/DL (ref 70–99)
GLUCOSE BLD-MCNC: 127 MG/DL (ref 70–99)
GLUCOSE BLD-MCNC: 137 MG/DL (ref 70–99)
GLUCOSE BLD-MCNC: 147 MG/DL (ref 70–99)
GLUCOSE BLD-MCNC: 154 MG/DL (ref 70–99)
GLUCOSE BLD-MCNC: 88 MG/DL (ref 70–99)
GLUCOSE BLD-MCNC: 98 MG/DL (ref 70–99)
GLUCOSE BLD-MCNC: 99 MG/DL (ref 70–99)
HCO3 ARTERIAL: 23.5 MMOL/L (ref 21–29)
HCO3 ARTERIAL: 23.6 MMOL/L (ref 21–29)
HCO3 ARTERIAL: 24.5 MMOL/L (ref 21–29)
HCO3 ARTERIAL: 24.6 MMOL/L (ref 21–29)
HCO3 ARTERIAL: 24.9 MMOL/L (ref 21–29)
HCO3 ARTERIAL: 25.8 MMOL/L (ref 21–29)
HCO3 ARTERIAL: 27.1 MMOL/L (ref 21–29)
HCO3 ARTERIAL: 30.2 MMOL/L (ref 21–29)
HCT VFR BLD CALC: 30.8 % (ref 36–48)
HCT VFR BLD CALC: 32.4 % (ref 36–48)
HEMOGLOBIN: 10.5 G/DL (ref 12–16)
HEMOGLOBIN: 10.9 G/DL (ref 12–16)
HEMOGLOBIN: 6.5 G/DL (ref 12–16)
HEMOGLOBIN: 6.6 G/DL (ref 12–16)
HEMOGLOBIN: 6.9 G/DL (ref 12–16)
HEMOGLOBIN: 7.2 G/DL (ref 12–16)
HEMOGLOBIN: 7.5 G/DL (ref 12–16)
HEMOGLOBIN: 7.6 G/DL (ref 12–16)
HEMOGLOBIN: 8 G/DL (ref 12–16)
HEMOGLOBIN: 8.3 G/DL (ref 12–16)
HEMOGLOBIN: 9.4 G/DL (ref 12–16)
HEMOGLOBIN: 9.4 G/DL (ref 12–16)
INR BLD: 1.18 (ref 0.86–1.14)
LACTATE: 0.77 MMOL/L (ref 0.4–2)
LACTATE: 1.32 MMOL/L (ref 0.4–2)
MAGNESIUM: 1.9 MG/DL (ref 1.8–2.4)
MAGNESIUM: 2 MG/DL (ref 1.8–2.4)
MCH RBC QN AUTO: 30.5 PG (ref 26–34)
MCH RBC QN AUTO: 30.7 PG (ref 26–34)
MCHC RBC AUTO-ENTMCNC: 33.7 G/DL (ref 31–36)
MCHC RBC AUTO-ENTMCNC: 34 G/DL (ref 31–36)
MCV RBC AUTO: 90.3 FL (ref 80–100)
MCV RBC AUTO: 90.7 FL (ref 80–100)
MRSA CULTURE ONLY: NORMAL
O2 SAT, ARTERIAL: 93 % (ref 93–100)
O2 SAT, ARTERIAL: 94 % (ref 93–100)
O2 SAT, ARTERIAL: 94 % (ref 93–100)
O2 SAT, ARTERIAL: 95 % (ref 93–100)
O2 SAT, ARTERIAL: 96 % (ref 93–100)
O2 SAT, ARTERIAL: 96 % (ref 93–100)
PCO2 ARTERIAL: 32.6 MM HG (ref 35–45)
PCO2 ARTERIAL: 33.5 MM HG (ref 35–45)
PCO2 ARTERIAL: 34.4 MM HG (ref 35–45)
PCO2 ARTERIAL: 35.5 MM HG (ref 35–45)
PCO2 ARTERIAL: 36.5 MM HG (ref 35–45)
PCO2 ARTERIAL: 38.1 MM HG (ref 35–45)
PCO2 ARTERIAL: 39.7 MM HG (ref 35–45)
PCO2 ARTERIAL: 44.6 MM HG (ref 35–45)
PDW BLD-RTO: 14.8 % (ref 12.4–15.4)
PDW BLD-RTO: 15.1 % (ref 12.4–15.4)
PERFORMED ON: ABNORMAL
PERFORMED ON: NORMAL
PERFORMED ON: NORMAL
PH ARTERIAL: 7.44 (ref 7.35–7.45)
PH ARTERIAL: 7.45 (ref 7.35–7.45)
PH ARTERIAL: 7.46 (ref 7.35–7.45)
PH ARTERIAL: 7.46 (ref 7.35–7.45)
PH ARTERIAL: 7.47 (ref 7.35–7.45)
PLATELET # BLD: 142 K/UL (ref 135–450)
PLATELET # BLD: 159 K/UL (ref 135–450)
PMV BLD AUTO: 8.4 FL (ref 5–10.5)
PMV BLD AUTO: 8.6 FL (ref 5–10.5)
PO2 ARTERIAL: 64.4 MM HG (ref 75–108)
PO2 ARTERIAL: 66 MM HG (ref 75–108)
PO2 ARTERIAL: 66.3 MM HG (ref 75–108)
PO2 ARTERIAL: 71.5 MM HG (ref 75–108)
PO2 ARTERIAL: 73.5 MM HG (ref 75–108)
PO2 ARTERIAL: 75.1 MM HG (ref 75–108)
PO2 ARTERIAL: 75.3 MM HG (ref 75–108)
PO2 ARTERIAL: 75.9 MM HG (ref 75–108)
POC POTASSIUM: 3.7 MMOL/L (ref 3.5–5.1)
POC POTASSIUM: 3.8 MMOL/L (ref 3.5–5.1)
POC POTASSIUM: 4 MMOL/L (ref 3.5–5.1)
POC POTASSIUM: 4.2 MMOL/L (ref 3.5–5.1)
POC POTASSIUM: 4.2 MMOL/L (ref 3.5–5.1)
POC POTASSIUM: 4.3 MMOL/L (ref 3.5–5.1)
POC POTASSIUM: 4.4 MMOL/L (ref 3.5–5.1)
POC POTASSIUM: 4.7 MMOL/L (ref 3.5–5.1)
POC SAMPLE TYPE: ABNORMAL
POC SODIUM: 142 MMOL/L (ref 136–145)
POC SODIUM: 144 MMOL/L (ref 136–145)
POTASSIUM SERPL-SCNC: 3.8 MMOL/L (ref 3.5–5.1)
POTASSIUM SERPL-SCNC: 4.6 MMOL/L (ref 3.5–5.1)
POTASSIUM SERPL-SCNC: 4.8 MMOL/L (ref 3.5–5.1)
PROTHROMBIN TIME: 13.4 SEC (ref 9.8–13)
RBC # BLD: 3.41 M/UL (ref 4–5.2)
RBC # BLD: 3.57 M/UL (ref 4–5.2)
SODIUM BLD-SCNC: 143 MMOL/L (ref 136–145)
SODIUM BLD-SCNC: 143 MMOL/L (ref 136–145)
TCO2 ARTERIAL: 25 MMOL/L
TCO2 ARTERIAL: 25 MMOL/L
TCO2 ARTERIAL: 26 MMOL/L
TCO2 ARTERIAL: 27 MMOL/L
TCO2 ARTERIAL: 28 MMOL/L
TCO2 ARTERIAL: 32 MMOL/L
URINE CULTURE, ROUTINE: NORMAL
WBC # BLD: 11.1 K/UL (ref 4–11)
WBC # BLD: 11.9 K/UL (ref 4–11)

## 2018-07-27 PROCEDURE — 2000000000 HC ICU R&B

## 2018-07-27 PROCEDURE — 82947 ASSAY GLUCOSE BLOOD QUANT: CPT

## 2018-07-27 PROCEDURE — 99233 SBSQ HOSP IP/OBS HIGH 50: CPT | Performed by: INTERNAL MEDICINE

## 2018-07-27 PROCEDURE — 94770 HC ETCO2 MONITOR DAILY: CPT

## 2018-07-27 PROCEDURE — 94003 VENT MGMT INPAT SUBQ DAY: CPT

## 2018-07-27 PROCEDURE — 85610 PROTHROMBIN TIME: CPT

## 2018-07-27 PROCEDURE — 6360000002 HC RX W HCPCS: Performed by: THORACIC SURGERY (CARDIOTHORACIC VASCULAR SURGERY)

## 2018-07-27 PROCEDURE — 84132 ASSAY OF SERUM POTASSIUM: CPT

## 2018-07-27 PROCEDURE — 94750 HC PULMONARY COMPLIANCE STUDY: CPT

## 2018-07-27 PROCEDURE — 82330 ASSAY OF CALCIUM: CPT

## 2018-07-27 PROCEDURE — 80048 BASIC METABOLIC PNL TOTAL CA: CPT

## 2018-07-27 PROCEDURE — 99291 CRITICAL CARE FIRST HOUR: CPT | Performed by: INTERNAL MEDICINE

## 2018-07-27 PROCEDURE — 2580000003 HC RX 258: Performed by: THORACIC SURGERY (CARDIOTHORACIC VASCULAR SURGERY)

## 2018-07-27 PROCEDURE — 83735 ASSAY OF MAGNESIUM: CPT

## 2018-07-27 PROCEDURE — 94664 DEMO&/EVAL PT USE INHALER: CPT

## 2018-07-27 PROCEDURE — 94799 UNLISTED PULMONARY SVC/PX: CPT

## 2018-07-27 PROCEDURE — 94761 N-INVAS EAR/PLS OXIMETRY MLT: CPT

## 2018-07-27 PROCEDURE — 85027 COMPLETE CBC AUTOMATED: CPT

## 2018-07-27 PROCEDURE — 2580000003 HC RX 258: Performed by: INTERNAL MEDICINE

## 2018-07-27 PROCEDURE — 2500000003 HC RX 250 WO HCPCS: Performed by: CLINICAL NURSE SPECIALIST

## 2018-07-27 PROCEDURE — 2500000003 HC RX 250 WO HCPCS: Performed by: INTERNAL MEDICINE

## 2018-07-27 PROCEDURE — 82803 BLOOD GASES ANY COMBINATION: CPT

## 2018-07-27 PROCEDURE — S0028 INJECTION, FAMOTIDINE, 20 MG: HCPCS | Performed by: THORACIC SURGERY (CARDIOTHORACIC VASCULAR SURGERY)

## 2018-07-27 PROCEDURE — 2500000003 HC RX 250 WO HCPCS: Performed by: THORACIC SURGERY (CARDIOTHORACIC VASCULAR SURGERY)

## 2018-07-27 PROCEDURE — 6370000000 HC RX 637 (ALT 250 FOR IP): Performed by: THORACIC SURGERY (CARDIOTHORACIC VASCULAR SURGERY)

## 2018-07-27 PROCEDURE — 83605 ASSAY OF LACTIC ACID: CPT

## 2018-07-27 PROCEDURE — 84295 ASSAY OF SERUM SODIUM: CPT

## 2018-07-27 PROCEDURE — 93010 ELECTROCARDIOGRAM REPORT: CPT | Performed by: INTERNAL MEDICINE

## 2018-07-27 RX ORDER — METOPROLOL TARTRATE 5 MG/5ML
INJECTION INTRAVENOUS
Status: DISPENSED
Start: 2018-07-27 | End: 2018-07-27

## 2018-07-27 RX ORDER — METOPROLOL TARTRATE 5 MG/5ML
5 INJECTION INTRAVENOUS EVERY 5 MIN PRN
Status: DISCONTINUED | OUTPATIENT
Start: 2018-07-27 | End: 2018-07-30

## 2018-07-27 RX ORDER — METOPROLOL TARTRATE 5 MG/5ML
5 INJECTION INTRAVENOUS ONCE
Status: COMPLETED | OUTPATIENT
Start: 2018-07-27 | End: 2018-07-27

## 2018-07-27 RX ADMIN — POTASSIUM CHLORIDE 20 MEQ: 400 INJECTION, SOLUTION INTRAVENOUS at 02:16

## 2018-07-27 RX ADMIN — Medication 10 ML: at 08:14

## 2018-07-27 RX ADMIN — POTASSIUM CHLORIDE 20 MEQ: 400 INJECTION, SOLUTION INTRAVENOUS at 15:57

## 2018-07-27 RX ADMIN — FENTANYL CITRATE 25 MCG: 50 INJECTION INTRAMUSCULAR; INTRAVENOUS at 10:09

## 2018-07-27 RX ADMIN — CEFAZOLIN SODIUM 2 G: 10 INJECTION, POWDER, FOR SOLUTION INTRAVENOUS at 17:10

## 2018-07-27 RX ADMIN — Medication 10 ML: at 21:00

## 2018-07-27 RX ADMIN — POTASSIUM CHLORIDE 20 MEQ: 400 INJECTION, SOLUTION INTRAVENOUS at 20:48

## 2018-07-27 RX ADMIN — FENTANYL CITRATE 25 MCG: 50 INJECTION INTRAMUSCULAR; INTRAVENOUS at 14:27

## 2018-07-27 RX ADMIN — DOCUSATE SODIUM 100 MG: 100 CAPSULE, LIQUID FILLED ORAL at 23:45

## 2018-07-27 RX ADMIN — MUPIROCIN: 20 OINTMENT TOPICAL at 08:01

## 2018-07-27 RX ADMIN — ONDANSETRON 4 MG: 2 INJECTION INTRAMUSCULAR; INTRAVENOUS at 20:30

## 2018-07-27 RX ADMIN — OXYCODONE HYDROCHLORIDE 10 MG: 5 TABLET ORAL at 23:23

## 2018-07-27 RX ADMIN — DEXMEDETOMIDINE HYDROCHLORIDE 0.2 MCG/KG/HR: 100 INJECTION, SOLUTION INTRAVENOUS at 15:57

## 2018-07-27 RX ADMIN — INSULIN LISPRO 1 UNITS: 100 INJECTION, SOLUTION INTRAVENOUS; SUBCUTANEOUS at 21:30

## 2018-07-27 RX ADMIN — Medication 15 ML: at 20:48

## 2018-07-27 RX ADMIN — FAMOTIDINE 20 MG: 10 INJECTION, SOLUTION INTRAVENOUS at 20:48

## 2018-07-27 RX ADMIN — VANCOMYCIN HYDROCHLORIDE 1000 MG: 10 INJECTION, POWDER, LYOPHILIZED, FOR SOLUTION INTRAVENOUS at 06:33

## 2018-07-27 RX ADMIN — FENTANYL CITRATE 25 MCG: 50 INJECTION INTRAMUSCULAR; INTRAVENOUS at 02:51

## 2018-07-27 RX ADMIN — FENTANYL CITRATE 25 MCG: 50 INJECTION INTRAMUSCULAR; INTRAVENOUS at 08:00

## 2018-07-27 RX ADMIN — FENTANYL CITRATE 25 MCG: 50 INJECTION INTRAMUSCULAR; INTRAVENOUS at 15:19

## 2018-07-27 RX ADMIN — MAGNESIUM OXIDE TAB 400 MG (241.3 MG ELEMENTAL MG) 400 MG: 400 (241.3 MG) TAB at 23:45

## 2018-07-27 RX ADMIN — CEFAZOLIN SODIUM 2 G: 10 INJECTION, POWDER, FOR SOLUTION INTRAVENOUS at 08:11

## 2018-07-27 RX ADMIN — FENTANYL CITRATE 25 MCG: 50 INJECTION INTRAMUSCULAR; INTRAVENOUS at 00:25

## 2018-07-27 RX ADMIN — METOPROLOL TARTRATE 5 MG: 5 INJECTION, SOLUTION INTRAVENOUS at 09:15

## 2018-07-27 RX ADMIN — ATORVASTATIN CALCIUM 40 MG: 40 TABLET, FILM COATED ORAL at 23:45

## 2018-07-27 RX ADMIN — ONDANSETRON 4 MG: 2 INJECTION INTRAMUSCULAR; INTRAVENOUS at 20:48

## 2018-07-27 RX ADMIN — SODIUM CHLORIDE 6.9 UNITS/HR: 900 INJECTION, SOLUTION INTRAVENOUS at 08:04

## 2018-07-27 RX ADMIN — METOPROLOL TARTRATE 5 MG: 5 INJECTION, SOLUTION INTRAVENOUS at 17:46

## 2018-07-27 RX ADMIN — METOCLOPRAMIDE 10 MG: 5 INJECTION, SOLUTION INTRAMUSCULAR; INTRAVENOUS at 23:23

## 2018-07-27 RX ADMIN — FUROSEMIDE 40 MG: 10 INJECTION, SOLUTION INTRAMUSCULAR; INTRAVENOUS at 16:03

## 2018-07-27 RX ADMIN — FENTANYL CITRATE 25 MCG: 50 INJECTION INTRAMUSCULAR; INTRAVENOUS at 04:17

## 2018-07-27 RX ADMIN — MUPIROCIN: 20 OINTMENT TOPICAL at 21:00

## 2018-07-27 RX ADMIN — VANCOMYCIN HYDROCHLORIDE 1000 MG: 10 INJECTION, POWDER, LYOPHILIZED, FOR SOLUTION INTRAVENOUS at 19:14

## 2018-07-27 RX ADMIN — Medication 12.5 MG: at 23:45

## 2018-07-27 RX ADMIN — FENTANYL CITRATE 25 MCG: 50 INJECTION INTRAMUSCULAR; INTRAVENOUS at 01:22

## 2018-07-27 RX ADMIN — POTASSIUM CHLORIDE 20 MEQ: 400 INJECTION, SOLUTION INTRAVENOUS at 17:13

## 2018-07-27 RX ADMIN — CEFAZOLIN SODIUM 2 G: 10 INJECTION, POWDER, FOR SOLUTION INTRAVENOUS at 00:31

## 2018-07-27 RX ADMIN — FENTANYL CITRATE 25 MCG: 50 INJECTION INTRAMUSCULAR; INTRAVENOUS at 05:17

## 2018-07-27 RX ADMIN — FAMOTIDINE 20 MG: 10 INJECTION, SOLUTION INTRAVENOUS at 07:57

## 2018-07-27 RX ADMIN — FUROSEMIDE 40 MG: 10 INJECTION, SOLUTION INTRAMUSCULAR; INTRAVENOUS at 09:13

## 2018-07-27 RX ADMIN — POTASSIUM CHLORIDE 20 MEQ: 400 INJECTION, SOLUTION INTRAVENOUS at 22:08

## 2018-07-27 RX ADMIN — FONDAPARINUX SODIUM 2.5 MG: 2.5 INJECTION, SOLUTION SUBCUTANEOUS at 09:13

## 2018-07-27 RX ADMIN — FENTANYL CITRATE 25 MCG: 50 INJECTION INTRAMUSCULAR; INTRAVENOUS at 21:50

## 2018-07-27 RX ADMIN — FENTANYL CITRATE 25 MCG: 50 INJECTION INTRAMUSCULAR; INTRAVENOUS at 12:59

## 2018-07-27 RX ADMIN — Medication 15 ML: at 08:13

## 2018-07-27 ASSESSMENT — PAIN SCALES - GENERAL
PAINLEVEL_OUTOF10: 6
PAINLEVEL_OUTOF10: 0
PAINLEVEL_OUTOF10: 6
PAINLEVEL_OUTOF10: 0
PAINLEVEL_OUTOF10: 0
PAINLEVEL_OUTOF10: 8
PAINLEVEL_OUTOF10: 0
PAINLEVEL_OUTOF10: 7
PAINLEVEL_OUTOF10: 8
PAINLEVEL_OUTOF10: 6
PAINLEVEL_OUTOF10: 0
PAINLEVEL_OUTOF10: 8
PAINLEVEL_OUTOF10: 0
PAINLEVEL_OUTOF10: 0

## 2018-07-27 ASSESSMENT — PAIN DESCRIPTION - LOCATION
LOCATION: CHEST
LOCATION: CHEST

## 2018-07-27 ASSESSMENT — PAIN DESCRIPTION - ONSET
ONSET: AWAKENED FROM SLEEP
ONSET: GRADUAL

## 2018-07-27 ASSESSMENT — PAIN DESCRIPTION - FREQUENCY
FREQUENCY: CONTINUOUS
FREQUENCY: CONTINUOUS

## 2018-07-27 ASSESSMENT — PAIN DESCRIPTION - PROGRESSION
CLINICAL_PROGRESSION: GRADUALLY WORSENING
CLINICAL_PROGRESSION: GRADUALLY WORSENING

## 2018-07-27 ASSESSMENT — PAIN DESCRIPTION - DESCRIPTORS
DESCRIPTORS: ACHING
DESCRIPTORS: ACHING

## 2018-07-27 ASSESSMENT — PULMONARY FUNCTION TESTS
PIF_VALUE: 24
PIF_VALUE: 22
PIF_VALUE: 21
PIF_VALUE: 22
PIF_VALUE: 22
PIF_VALUE: 21
PIF_VALUE: 22

## 2018-07-27 ASSESSMENT — PAIN DESCRIPTION - ORIENTATION
ORIENTATION: MID
ORIENTATION: MID

## 2018-07-27 ASSESSMENT — PAIN DESCRIPTION - PAIN TYPE
TYPE: ACUTE PAIN
TYPE: ACUTE PAIN

## 2018-07-27 ASSESSMENT — ACTIVITIES OF DAILY LIVING (ADL)
EFFECT OF PAIN ON DAILY ACTIVITIES: SLEEPING
EFFECT OF PAIN ON DAILY ACTIVITIES: RESTING

## 2018-07-27 NOTE — PROGRESS NOTES
liters)   ETCO2: 29 cmH2O   SPO2: 93 %   If on sedation, amount and type     [x]   RR is less than 35, RSBI is less than 100, patient's vitals signs are stable, and patient is in no apparent distress; therefore, patient is being left on SBT for up to 1 hour. []   RR is greater than 35, RSBI is greater than 100, VS's are unstable, or patient is in distress; therefore, patient is being placed back on previous settings. SBT  Concluded at  1810. Weaning Parameters/VS's at conclusion of SBT:   VE: 10.7 L   RR: 31 b/m   VT: 345 mL (average VT = VE/RR)   RSBI: 89 (RR/VT in liters)   ETCO2: 29 cmH2O   SPO2: 100 %    If on sedation, amount and type none    [x]   Patient tolerated SBT for full 60 minutes with acceptable weaning parameters and vital signs and showed no signs or distress. []   Patient tolerated SBT for full 60 minutes, but had unacceptable weaning parameters or vital signs, and/or signs of distress. []   Patient was unable to tolerate SBT for 60 minutes and was placed back on previous settings.             COMMENTS:  ABG drawn

## 2018-07-27 NOTE — OP NOTE
[address] [facility], [address]                                 OPERATIVE REPORT    PATIENT NAME: Lois Barriga                  :        1950  MED REC NO:   [id number]                          ROOM:       2178  ACCOUNT NO:   [de-identified]                           ADMIT DATE: 2018  PROVIDER:     Lizzie Borrego MD    DATE OF PROCEDURE:  2018    ATTENDING SURGEON:  Lizzie Borrego MD    ASSISTANTS:  ALVIN Nixon; Lanette Lennox Mummert, PA    PREOPERATIVE DIAGNOSIS:  Coronary artery disease. POSTOPERATIVE DIAGNOSIS:  Coronary artery disease. PROCEDURES:  1.  ELVIRA. 2.  Endoscopic vein harvesting. 3.  Urgent coronary artery bypass grafting x3 (LIMA to LAD, SVG to D1, SVG  to RCA). ANESTHESIA:  General.    INDICATION:  The patient is a 38-year-old female who presented with anginal  symptoms and ruled in for a non-ST elevation myocardial infarction. She  underwent cardiac catheterization demonstrating three-vessel coronary  artery disease and pecutaneous intervention to the right coronary culprit  vessel. There was difficulty deploying the stent and localized dissection  occurred. The patient remained on heparin and Integrilin drips. She also  developed a retroperitoneal hematoma at the catheterization site. She  presents today for coronary artery bypass grafting. She is aware of the  risks and possible complications of the procedure and wishes to proceed. OPERATIVE FINDINGS:  The saphenous vein conduit was taken from the right  lower extremity and was of good caliber and quality. The left internal  mammary artery was a good-sized vessel with excellent flow. The ascending  aorta was free of any palpable calcification. The preprocedural ELVIRA  confirmed the presence of well-preserved left ventricular function with  mild mitral regurgitation.   On the inferior wall, the distal right temperature cooled to 33 degrees. An ascending aortic cardioplegia  root vent was inserted. An aortic cross clamp was applied along with  topical iced saline. 500 mL of cold blood antegrade cardioplegia was given  with prompt arrest.  This was followed by an equivalent amount of  retrograde cardioplegia. Attention was turned to the lateral wall where  the obtuse marginal branch was dissected out and opened longitudinally. The saphenous vein conduit was reversed and anastomosed to it in an  end-to-side fashion. The proximal anastomosis was performed to the left  side of the ascending aorta and an additional dose of retrograde  cardioplegia given. The mid right coronary artery was then dissected out  and opened longitudinally. The remaining short piece of saphenous vein was  deficient for this purpose and no suitable conduit had been found in the  left leg. The distal anastomosis was performed in an end-to-side fashion  and the proximal anastomosis to the right side of the ascending aorta. An  additional dose of retrograde cardioplegia was given. The left internal  mammary artery was brought through the lateral pericardium and trimmed and  spatulated appropriately. It was anastomosed to left anterior descending  artery in an end-to-side fashion. The patient was systemically warmed. A  hot shot of cardioplegia was given, first retrograde and then antegrade. The aortic cross-clamp was then removed and there was spontaneous return of  sinus rhythm. It was noted that there was a weak spot in the vein at the  proximal anastomosis for the right coronary graft. An attempt to place  patching stitches was inadequate. Therefore the aortic cross clamp was  replaced and 700 mL of  antegrade cardioplegia given. The anastomosis was  taken down. The vein was trimmed to exclude the weak portion and the  anastomosis was redone with good results.   A small hot shot of cardioplegia  was given and the aortic cross

## 2018-07-27 NOTE — PROGRESS NOTES
Attempted to do a weaning trial off the vent, on 50% O2, patient started getting restless, with increased work of breathing, RSBI increased to 112 owing to RR high in the 30's. SBP also increased in the 150's to 160's. Nitroglycerin titrated up to 30 mcg/min. Placed back to SIMV settings on the vent.

## 2018-07-27 NOTE — OP NOTE
[address] [facility], [address]                                 OPERATIVE REPORT    PATIENT NAME: Shakira Fall                  :        1950  MED REC NO:   [id number]                          ROOM:       8565  ACCOUNT NO:   [de-identified]                           ADMIT DATE: 2018  PROVIDER:     Joanne Sigala MD    DATE OF PROCEDURE:  2018    ATTENDING SURGEON:  Joanne Sigala M.D.    ASSISTANTS:  ALVIN Bell; [place], [place]    PREOPERATIVE DIAGNOSIS:  Coronary artery disease. POSTOPERATIVE DIAGNOSIS:  Coronary artery disease. PROCEDURES:  1.  ELVIRA. 2.  Endoscopic vein harvesting. .  3.  Coronary artery bypass grafting x3 (LIMA to LAD, SVG to D1, SVG to  RCA). ANESTHESIA:  General.    INDICATION:  The patient is a 64-year-old female who presented with anginal  symptoms and ruled in for a non-ST elevation myocardial infarction. Cardiac catheterization demonstrated three-vessel coronary artery disease,  an intervention was performed on the RCA, felt to be the culprit vessel. There was difficulty passing the stent and a focal dissection was  sustained. She remained on heparin and Integrilin drips. She also formed  a significant retroperitoneal hematoma at the catheterization site. She  presents today for urgent coronary artery bypass grafting. She is aware of  the risks and possible complications of the procedure and wishes to  proceed. OPERATIVE FINDINGS:  The saphenous vein conduit was taken from the right  leg and was of good caliber and quality. The left internal mammary artery  was a good-sized vessel with excellent flow. The ascending aorta was free  of any palpable calcification.         Burke Shelton MD    D: 2018 12:59:16       T: 2018 14:38:16     [email or web address]  Job#: [id number]     Doc#: [id number]    CC:

## 2018-07-27 NOTE — PROGRESS NOTES
Physical Therapy  HOLD  Referral received, pt currently intubated and unable to participate in PT. Will continue to follow. Recommend OT referral as well when appropriate.   Eduardo Jenkins, [address]

## 2018-07-27 NOTE — PROGRESS NOTES
Results for Mouna Garza (MRN [id number]) as of 7/27/2018 00:42   Ref. Range 7/27/2018 00:12   Hemoglobin Quant Latest Ref Range: 12.0 - 16.0 g/dL 10.9 (L)   Hematocrit Latest Ref Range: 36.0 - 48.0 % 32.4 (L)   No symptoms of bleeding. Chest tube output WNL.

## 2018-07-27 NOTE — PROGRESS NOTES
Progress Note  Hospital Day: 4  POD#  1  S/P Coronary artery bypass x 3 (2018)    Chief Complaint: Postop follow up    Ms. Rohit Coleman is awake and alert but intubated. Daughters at bedside. On IV NTG/Insulin infusions    24 hour Interval History:   0 - uncomplicated operation. Admitted to ICU at 1235 on IV NTG, Insulin and Nicardipine. Failed SBT due to increased WOB, RSBI 112. Hgb remains stable    VITAL SIGNS   Temperature:  Current - Temp: 99.8 °F (37.7 °C);  Max - Temp  Av.3 °F (36.3 °C)  Min: 94.5 °F (34.7 °C)  Max: 101.9 °F (38.8 °C)    Respiratory Rate : Resp  Av.9  Min: 0  Max: 31    Pulse Range: Pulse  Av.9  Min: 84  Max: 109    Blood Pressure Range:  Systolic (45LPS), NTF:268 , Min:82 , OSCAR:543   ; Diastolic (35ZGV), CRP:97, Min:53, Max:87    Pulse ox Range: SpO2  Av.1 %  Min: 86 %  Max: 100 %  O2 Flow Rate (L/min): 2 L/min    CVP (Mean): 8 mmHg  PAP:   PAP (Mean): 23 mmHg  SVR (Using ABP Mean): 1184.62 dyne*sec/cm5  CCI: 2.8 L/min  SVO2 (%): 67 %    Admission Weight: Weight: 170 lb (77.1 kg)    Current Weight: Patient Vitals for the past 96 hrs (Last 3 readings):   Weight   18 0522 173 lb 15.1 oz (78.9 kg)   18 0630 177 lb 0.5 oz (80.3 kg)   18 1022 170 lb (77.1 kg)       VENT SETTINGS   Vent Mode: SIMV/PRVC Rate Set: 12 bmp/Vt Ordered: 550 mL/ /FiO2 : 50 %    I/O:   Intake/Output Summary (Last 24 hours) at 18 0735  Last data filed at 18 0600   Gross per 24 hour   Intake             9747 ml   Output             3970 ml   Net             5777 ml     Urine (rodriguez): 8991-822-122 ml/shift  Chest tube:  NR-150-50 ml/shift  serosanguinous, on suction, no leak    LINES/ACCESS:   Central Access: RIJ Day #: 1   Peripheral Access: Rt antecubital Day #: 3   Peripheral Access: Lt hand Day #: 3   Arterial Line Access: Rt brachial Day #: 1                                 Rodriguez Day #: 1           OGT Day #: 1

## 2018-07-27 NOTE — PROGRESS NOTES
Patient has been successfully weaned from Mechanical Ventilation. RSBI before extubation was 89 with EtCO2 of 29 and SpO2 of 100 on 40% FiO2. Patient extubated and placed on 5 liters/min via nasal cannula. Post extubation SpO2 is 93% with HR  97 bpm and RR 30 breaths/min. Patient had mild cough that was non-productive. Extubation Well tolerated by patient. Moshe Sloan

## 2018-07-27 NOTE — PROGRESS NOTES
Vascular Surgery Daily Progress Note  Patient: Brandon Fabian    CC: R groin hematoma    Subjective :  No acute events overnight. H/H has been stable. R groin hematoma remains stable. Failed SBT overnight secondary to increased work of breathing. Sedation resumed this AM; therefore, subjective interview not possible. ROS: Unable to obtain secondary to intubation and sedation. Objective : Infusions:   sodium chloride 50 mL/hr at 07/26/18 1316    sodium chloride      DOPamine Stopped (07/27/18 0405)    nitroGLYCERIN 30 mcg/min (07/27/18 0508)    niCARdipine Stopped (07/26/18 1413)    insulin (HUMAN R) non-weight based infusion 6.93 Units/hr (07/27/18 0712)    dextrose      nitroprusside (NIPRIDE) 50 mg in D5W infusion          I/O:I/O last 3 completed shifts: In: 5784 [I.V.:5908; Blood:1839; Other:2000]  Out: 3970 [Urine:3770; Chest Tube:200]           Wt Readings from Last 1 Encounters:   07/26/18 173 lb 15.1 oz (78.9 kg)       Exam:/70   Pulse 90   Temp 99.8 °F (37.7 °C) (Core)   Resp 18   Ht 5' 2\" (1.575 m)   Wt 173 lb 15.1 oz (78.9 kg)   SpO2 93%   Breastfeeding?  No   BMI 31.81 kg/m²     General appearance: alert, in no apparent distress   Lungs: Intubated and mechanically ventilated  Heart: sternotomy incision site clean/dry/intact; RRR  Abdomen: soft, non-tender, non-distended; no guarding, no rigidity  Extremities: Incision clean/dry/intact, no drainage; soft; no increase/expansion of hematoma vs. Yesterday       LABS:   Recent Labs      07/27/18   0012  07/27/18   0509   WBC  11.9*  11.1*   HGB  10.9*  10.5*   HCT  32.4*  30.8*   MCV  90.7  90.3   PLT  159  142        Recent Labs      07/26/18   0459  07/26/18   1242  07/27/18   0011  07/27/18   0509   NA  143  148*   --   143   K  3.7  3.5  4.8  4.6   CL  108  113*   --   111*   CO2  26  27   --   25   PHOS  4.1   --    --    --    BUN  18  13   --   14   CREATININE  <0.5*  <0.5*   --   0.5*        Recent Labs      07/25/18 0505  07/25/18   0953   AST  22  17   ALT  18  16   BILIDIR  <0.2  <0.2   BILITOT  <0.2  <0.2   ALKPHOS  66  61      No results for input(s): LIPASE, AMYLASE in the last 72 hours. Recent Labs      07/24/18   1419   07/25/18   0505  07/25/18   0953   07/26/18   1241  07/27/18   0509   PROT   --    < >  6.0*  5.8*   --    --    --    INR   --    < >  0.99   --    < >  1.32*  1.18*   APTT  30.9   --    --    --    --   31.0   --     < > = values in this interval not displayed.         Recent Labs      07/25/18   0953  07/25/18   1542   TROPONINI  0.28*  0.26*       ASSESSMENT/PLAN: Pt. is a 69 y.o. female with a resolving R groin hematoma s/p cardiac cath during current admission.       - No vascular surgical intervention required for groin hematoma.  - H/H per CT surgery  - Medical management per primary      Jai Allison MD PGY1  General Surgery  07/27/18  7:18 AM  [phone]

## 2018-07-27 NOTE — PROGRESS NOTES
could occur if left untreated.      A total critical care time 60 minutes was used. This includes but not limited to examining patient, collaborating with other physicians, monitoring vital signs, telemetry, continuous pulse oximetry, and clinical response to IV medications, documentation time, review and interpretation of laboratory and radiological data, review of nursing notes and old record review.  This time excludes any time that may have been spent performing procedures for life threatening organ failure.     Ana Luisa Gross MD

## 2018-07-27 NOTE — PROGRESS NOTES
Subcutaneous TID WC    insulin lispro  0-6 Units Subcutaneous Nightly      sodium chloride 50 mL/hr at 07/26/18 1316    sodium chloride      DOPamine Stopped (07/27/18 0405)    nitroGLYCERIN 30 mcg/min (07/27/18 0508)    niCARdipine Stopped (07/26/18 1413)    insulin (HUMAN R) non-weight based infusion Stopped (07/27/18 1036)    dextrose      nitroprusside (NIPRIDE) 50 mg in D5W infusion       sodium chloride flush, potassium chloride, magnesium sulfate, calcium chloride IVPB, calcium chloride IVPB, acetaminophen, oxyCODONE **OR** oxyCODONE, fentanNYL, diphenhydrAMINE, lactulose, ondansetron, metoclopramide, hydrALAZINE, albumin human, sodium chloride, DOPamine, furosemide, nitroGLYCERIN, niCARdipine, glucose, dextrose, glucagon (rDNA), dextrose    Lab Data:  CBC: Recent Labs      07/26/18 1745 07/27/18   0012  07/27/18   0509   WBC  11.3*  11.9*  11.1*   HGB  11.0*  10.9*  10.5*   HCT  32.5*  32.4*  30.8*   MCV  90.3  90.7  90.3   PLT  165  159  142     BMP: Recent Labs      07/26/18 0459 07/26/18   1242  07/27/18   0011  07/27/18   0509   NA  143  148*   --   143   K  3.7  3.5  4.8  4.6   CL  108  113*   --   111*   CO2  26  27   --   25   PHOS  4.1   --    --    --    BUN  18  13   --   14   CREATININE  <0.5*  <0.5*   --   0.5*     LIVER PROFILE:   Recent Labs      07/24/18   1746 07/25/18   0505  07/25/18   0953   AST  23  22  17   ALT  20  18  16   BILIDIR  <0.2  <0.2  <0.2   BILITOT  0.3  <0.2  <0.2   ALKPHOS  67  66  61     PT/INR:   Recent Labs      07/26/18 0458  07/26/18   1241  07/27/18   0509   PROTIME  12.0  15.0*  13.4*   INR  1.05  1.32*  1.18*     APTT:   Recent Labs      07/26/18   1241   APTT  31.0       Assessment:  Patient Active Problem List    Diagnosis Date Noted    Lung cancer 07/28/2013     Priority: High    Hyperlipidemia 02/07/2012     Priority: High    HTN (hypertension) 02/07/2012     Priority: High    COPD (chronic obstructive pulmonary disease) 02/07/2012

## 2018-07-27 NOTE — PROGRESS NOTES
MECHANICAL VENTILATION WEANING PROTOCOL    PRE-TRIAL PATIENT ASSESSMENT - COMPLETED AT 1455    Ventilatory Assessment:    PARAMETER CRITERIA FOR WEANING   Spontaneous Cough:  Yes    Sputum Characteristics:  · Sputum Amount: Small  · Tenacity: Thick  · Sputum Color: White SPONTANEOUS COUGH With small to moderate  Amount of secretions   FiO2 : 50 % FIO2 less than or equal to 50%     PEEP less than or equal to 8   Progressive Mobility Protocol  No     ABG:  Lab Results   Component Value Date    PHART 7.439 07/27/2018    AAR2HTQ 38.1 07/27/2018    PO2ART 75.3 07/27/2018    L0CIIQOM 96 07/27/2018    WND0DBJ 25.8 07/27/2018    BEART 2 07/27/2018     HGB/WBC:  Lab Results   Component Value Date    HGB 10.5 07/27/2018    WBC 11.1 07/27/2018        Vital Signs:    PARAMETER CRITERIA FOR WEANING Meets Criteria   Pulse: 95 Within patient's normal limits / stable Yes   Resp: 14 Less than or equal to 30 Yes   BP: 102/71 Within patient's normal limits / minimal pressors (Hemodynamically Stable) Yes   SpO2: 97 % Greater than or equal to 90% Yes   End Tidal CO2: 32 (%) Within patient's normal limits Yes   Temp: 100 °F (37.8 °C) Less than 38. 5oC / 101. 3oF Yes     [x]    Based on this assessment and the [facility] Ventilator Weaning Protocol, this patient  IS being placed on a Spontaneous Breathing Trial (SBT) at this time.  []    Based on this assessment and the [facility] Ventilator Weaning Protocol, this patient  IS NOT being placed on a Spontaneous Breathing Trial (SBT) at this time. []    Patient  IS NOT being placed on a Spontaneous Breathing Trial (SBT) at this time because of factors not previously addressed.   Those factors include          SBT - Initiated at  1455    Ventilator Settings:  CPAP - 5 cmH2O, PS - 8 cmH2O(if using settings other than CPAP 5/PS 8, please explain reasons for settings here):       1 Minute SBT Respiratory Parameters:   VE: 8.2 L   RR: 26 b/m   VT: 315 mL (average VT = VE/RR)   RSBI: 82 (RR/VT in liters)   ETCO2: 30 cmH2O   SPO2: 94 %   If on sedation, amount and type none    [x]   RR is less than 35, RSBI is less than 100, patient's vitals signs are stable, and patient is in no apparent distress; therefore, patient is being left on SBT for up to 1 hour. []   RR is greater than 35, RSBI is greater than 100, VS's are unstable, or patient is in distress; therefore, patient is being placed back on previous settings. SBT  Concluded at  1459. Weaning Parameters/VS's at conclusion of SBT:   VE: 9.7 L   RR: 32 b/m   VT: 303 mL (average VT = VE/RR)   RSBI: 105 (RR/VT in liters)   ETCO2: 29 cmH2O   SPO2: 94 %    If on sedation, amount and type none    []   Patient tolerated SBT for full 60 minutes with acceptable weaning parameters and vital signs and showed no signs or distress. []   Patient tolerated SBT for full 60 minutes, but had unacceptable weaning parameters or vital signs, and/or signs of distress. [x]   Patient was unable to tolerate SBT for 60 minutes and was placed back on previous settings. COMMENTS:  Placed on SIMV/PRVC 12 550 1.0 +5 40% PS 8.

## 2018-07-28 LAB
ANION GAP SERPL CALCULATED.3IONS-SCNC: 8 MMOL/L (ref 3–16)
BLOOD BANK DISPENSE STATUS: NORMAL
BLOOD BANK PRODUCT CODE: NORMAL
BPU ID: NORMAL
BUN BLDV-MCNC: 21 MG/DL (ref 7–20)
CALCIUM SERPL-MCNC: 8.9 MG/DL (ref 8.3–10.6)
CHLORIDE BLD-SCNC: 105 MMOL/L (ref 99–110)
CO2: 28 MMOL/L (ref 21–32)
CREAT SERPL-MCNC: <0.5 MG/DL (ref 0.6–1.2)
DESCRIPTION BLOOD BANK: NORMAL
GFR AFRICAN AMERICAN: >60
GFR NON-AFRICAN AMERICAN: >60
GLUCOSE BLD-MCNC: 110 MG/DL (ref 70–99)
GLUCOSE BLD-MCNC: 112 MG/DL (ref 70–99)
GLUCOSE BLD-MCNC: 134 MG/DL (ref 70–99)
GLUCOSE BLD-MCNC: 136 MG/DL (ref 70–99)
GLUCOSE BLD-MCNC: 140 MG/DL (ref 70–99)
GLUCOSE BLD-MCNC: 150 MG/DL (ref 70–99)
HCT VFR BLD CALC: 27.9 % (ref 36–48)
HEMOGLOBIN: 9.3 G/DL (ref 12–16)
INR BLD: 1.2 (ref 0.86–1.14)
MAGNESIUM: 2.2 MG/DL (ref 1.8–2.4)
MCH RBC QN AUTO: 30.8 PG (ref 26–34)
MCHC RBC AUTO-ENTMCNC: 33.4 G/DL (ref 31–36)
MCV RBC AUTO: 92.4 FL (ref 80–100)
PDW BLD-RTO: 15.2 % (ref 12.4–15.4)
PERFORMED ON: ABNORMAL
PLATELET # BLD: 129 K/UL (ref 135–450)
PMV BLD AUTO: 8.4 FL (ref 5–10.5)
POTASSIUM SERPL-SCNC: 4.6 MMOL/L (ref 3.5–5.1)
PROTHROMBIN TIME: 13.7 SEC (ref 9.8–13)
RBC # BLD: 3.02 M/UL (ref 4–5.2)
SODIUM BLD-SCNC: 141 MMOL/L (ref 136–145)
WBC # BLD: 12.7 K/UL (ref 4–11)

## 2018-07-28 PROCEDURE — 2700000000 HC OXYGEN THERAPY PER DAY

## 2018-07-28 PROCEDURE — 99233 SBSQ HOSP IP/OBS HIGH 50: CPT | Performed by: INTERNAL MEDICINE

## 2018-07-28 PROCEDURE — 2580000003 HC RX 258: Performed by: THORACIC SURGERY (CARDIOTHORACIC VASCULAR SURGERY)

## 2018-07-28 PROCEDURE — 2500000003 HC RX 250 WO HCPCS: Performed by: THORACIC SURGERY (CARDIOTHORACIC VASCULAR SURGERY)

## 2018-07-28 PROCEDURE — 85610 PROTHROMBIN TIME: CPT

## 2018-07-28 PROCEDURE — 6360000002 HC RX W HCPCS: Performed by: THORACIC SURGERY (CARDIOTHORACIC VASCULAR SURGERY)

## 2018-07-28 PROCEDURE — 94640 AIRWAY INHALATION TREATMENT: CPT

## 2018-07-28 PROCEDURE — S0028 INJECTION, FAMOTIDINE, 20 MG: HCPCS | Performed by: THORACIC SURGERY (CARDIOTHORACIC VASCULAR SURGERY)

## 2018-07-28 PROCEDURE — 94664 DEMO&/EVAL PT USE INHALER: CPT

## 2018-07-28 PROCEDURE — 83735 ASSAY OF MAGNESIUM: CPT

## 2018-07-28 PROCEDURE — 6370000000 HC RX 637 (ALT 250 FOR IP): Performed by: THORACIC SURGERY (CARDIOTHORACIC VASCULAR SURGERY)

## 2018-07-28 PROCEDURE — 94761 N-INVAS EAR/PLS OXIMETRY MLT: CPT

## 2018-07-28 PROCEDURE — 2000000000 HC ICU R&B

## 2018-07-28 PROCEDURE — 80048 BASIC METABOLIC PNL TOTAL CA: CPT

## 2018-07-28 PROCEDURE — 94150 VITAL CAPACITY TEST: CPT

## 2018-07-28 PROCEDURE — 85027 COMPLETE CBC AUTOMATED: CPT

## 2018-07-28 PROCEDURE — 6370000000 HC RX 637 (ALT 250 FOR IP): Performed by: INTERNAL MEDICINE

## 2018-07-28 RX ORDER — ALBUTEROL SULFATE 90 UG/1
2 AEROSOL, METERED RESPIRATORY (INHALATION) EVERY 6 HOURS PRN
Status: DISCONTINUED | OUTPATIENT
Start: 2018-07-28 | End: 2018-07-28

## 2018-07-28 RX ADMIN — Medication 12.5 MG: at 09:19

## 2018-07-28 RX ADMIN — OXYCODONE HYDROCHLORIDE 10 MG: 5 TABLET ORAL at 03:46

## 2018-07-28 RX ADMIN — POTASSIUM CHLORIDE 10 MEQ: 10 TABLET, EXTENDED RELEASE ORAL at 10:14

## 2018-07-28 RX ADMIN — MAGNESIUM OXIDE TAB 400 MG (241.3 MG ELEMENTAL MG) 400 MG: 400 (241.3 MG) TAB at 12:42

## 2018-07-28 RX ADMIN — Medication 12.5 MG: at 21:30

## 2018-07-28 RX ADMIN — POTASSIUM CHLORIDE 10 MEQ: 10 TABLET, EXTENDED RELEASE ORAL at 21:30

## 2018-07-28 RX ADMIN — Medication 10 ML: at 21:31

## 2018-07-28 RX ADMIN — ATORVASTATIN CALCIUM 40 MG: 40 TABLET, FILM COATED ORAL at 21:40

## 2018-07-28 RX ADMIN — MAGNESIUM OXIDE TAB 400 MG (241.3 MG ELEMENTAL MG) 400 MG: 400 (241.3 MG) TAB at 21:30

## 2018-07-28 RX ADMIN — Medication 2 PUFF: at 09:22

## 2018-07-28 RX ADMIN — FAMOTIDINE 20 MG: 10 INJECTION, SOLUTION INTRAVENOUS at 21:40

## 2018-07-28 RX ADMIN — FUROSEMIDE 40 MG: 10 INJECTION, SOLUTION INTRAMUSCULAR; INTRAVENOUS at 21:30

## 2018-07-28 RX ADMIN — ACETAMINOPHEN 650 MG: 325 TABLET, FILM COATED ORAL at 20:15

## 2018-07-28 RX ADMIN — FAMOTIDINE 20 MG: 10 INJECTION, SOLUTION INTRAVENOUS at 09:18

## 2018-07-28 RX ADMIN — Medication 15 ML: at 12:41

## 2018-07-28 RX ADMIN — FUROSEMIDE 40 MG: 10 INJECTION, SOLUTION INTRAMUSCULAR; INTRAVENOUS at 09:18

## 2018-07-28 RX ADMIN — FONDAPARINUX SODIUM 2.5 MG: 2.5 INJECTION, SOLUTION SUBCUTANEOUS at 10:11

## 2018-07-28 RX ADMIN — FENTANYL CITRATE 25 MCG: 50 INJECTION INTRAMUSCULAR; INTRAVENOUS at 15:57

## 2018-07-28 RX ADMIN — ACETAMINOPHEN 650 MG: 325 TABLET, FILM COATED ORAL at 14:38

## 2018-07-28 RX ADMIN — CEFAZOLIN SODIUM 2 G: 10 INJECTION, POWDER, FOR SOLUTION INTRAVENOUS at 01:24

## 2018-07-28 RX ADMIN — MUPIROCIN: 20 OINTMENT TOPICAL at 09:40

## 2018-07-28 RX ADMIN — ASPIRIN 325 MG: 325 TABLET, DELAYED RELEASE ORAL at 12:42

## 2018-07-28 RX ADMIN — OXYCODONE HYDROCHLORIDE 5 MG: 5 TABLET ORAL at 18:10

## 2018-07-28 RX ADMIN — Medication 15 ML: at 21:30

## 2018-07-28 RX ADMIN — LISINOPRIL 2.5 MG: 2.5 TABLET ORAL at 15:57

## 2018-07-28 RX ADMIN — ONDANSETRON 4 MG: 2 INJECTION INTRAMUSCULAR; INTRAVENOUS at 06:40

## 2018-07-28 RX ADMIN — Medication 2 PUFF: at 20:06

## 2018-07-28 RX ADMIN — Medication 10 ML: at 09:45

## 2018-07-28 RX ADMIN — DOCUSATE SODIUM 100 MG: 100 CAPSULE, LIQUID FILLED ORAL at 10:14

## 2018-07-28 RX ADMIN — METOCLOPRAMIDE 10 MG: 5 INJECTION, SOLUTION INTRAMUSCULAR; INTRAVENOUS at 08:39

## 2018-07-28 RX ADMIN — MUPIROCIN: 20 OINTMENT TOPICAL at 21:00

## 2018-07-28 RX ADMIN — INSULIN LISPRO 2 UNITS: 100 INJECTION, SOLUTION INTRAVENOUS; SUBCUTANEOUS at 06:05

## 2018-07-28 RX ADMIN — METOCLOPRAMIDE 10 MG: 5 INJECTION, SOLUTION INTRAMUSCULAR; INTRAVENOUS at 20:15

## 2018-07-28 RX ADMIN — POTASSIUM CHLORIDE 10 MEQ: 10 TABLET, EXTENDED RELEASE ORAL at 15:57

## 2018-07-28 ASSESSMENT — PAIN DESCRIPTION - FREQUENCY
FREQUENCY: CONTINUOUS
FREQUENCY: CONTINUOUS
FREQUENCY: INTERMITTENT

## 2018-07-28 ASSESSMENT — PAIN DESCRIPTION - PROGRESSION
CLINICAL_PROGRESSION: GRADUALLY WORSENING

## 2018-07-28 ASSESSMENT — PAIN SCALES - GENERAL
PAINLEVEL_OUTOF10: 0
PAINLEVEL_OUTOF10: 0
PAINLEVEL_OUTOF10: 3
PAINLEVEL_OUTOF10: 1
PAINLEVEL_OUTOF10: 0
PAINLEVEL_OUTOF10: 6
PAINLEVEL_OUTOF10: 7
PAINLEVEL_OUTOF10: 0
PAINLEVEL_OUTOF10: 3
PAINLEVEL_OUTOF10: 0
PAINLEVEL_OUTOF10: 8
PAINLEVEL_OUTOF10: 0

## 2018-07-28 ASSESSMENT — PAIN DESCRIPTION - DIRECTION: RADIATING_TOWARDS: RIGHT CERVICAL

## 2018-07-28 ASSESSMENT — PAIN DESCRIPTION - ORIENTATION
ORIENTATION: RIGHT;LEFT

## 2018-07-28 ASSESSMENT — PAIN DESCRIPTION - LOCATION
LOCATION: HEAD

## 2018-07-28 ASSESSMENT — PAIN DESCRIPTION - PAIN TYPE
TYPE: ACUTE PAIN;SURGICAL PAIN
TYPE: ACUTE PAIN
TYPE: ACUTE PAIN

## 2018-07-28 ASSESSMENT — PAIN DESCRIPTION - ONSET
ONSET: GRADUAL
ONSET: ON-GOING
ONSET: AWAKENED FROM SLEEP

## 2018-07-28 ASSESSMENT — ACTIVITIES OF DAILY LIVING (ADL): EFFECT OF PAIN ON DAILY ACTIVITIES: RESTING

## 2018-07-28 ASSESSMENT — PAIN DESCRIPTION - DESCRIPTORS
DESCRIPTORS: HEADACHE
DESCRIPTORS: ACHING
DESCRIPTORS: ACHING

## 2018-07-28 NOTE — PROGRESS NOTES
Cardiology Daily Progress Note      Admit Date:  7/24/2018     Chief Complaint: Chest Pain     Subjective:  Ms. Keily Pulido is stable this AM. Extubated. Feeling better. Alert and awake. Objective:   BP (!) 146/83   Pulse 106   Temp 97.6 °F (36.4 °C) (Oral)   Resp 28   Ht 5' 2\" (1.575 m)   Wt 179 lb 14.3 oz (81.6 kg)   SpO2 93%   Breastfeeding?  No   BMI 32.90 kg/m²       Intake/Output Summary (Last 24 hours) at 07/28/18 0930  Last data filed at 07/28/18 0600   Gross per 24 hour   Intake          1590.92 ml   Output             2745 ml   Net         -1154.08 ml       TELEMETRY: Sinus     Physical Exam:  General:  Awake, alert, NAD  Skin:  Warm and dry  Neck:  No JVD  Chest:  Clear to auscultation, respiration easy, chest tube present and draining serosanguinous fluid   Cardiovascular:  RRR S1S2  Abdomen:  Soft, non-tender, non-distended  Extremities:  + trace edema Bilateral lower extremities are wrapped with clean ACE bandages, Mayorga catheter present and draining appropriately     Medications:    beclomethasone  2 puff Inhalation BID    sodium chloride flush  10 mL Intravenous 2 times per day    docusate sodium  100 mg Oral BID    famotidine  20 mg Oral BID    famotidine (PEPCID) injection  20 mg Intravenous BID    metoprolol tartrate  12.5 mg Oral BID    lisinopril  2.5 mg Oral Lunch    chlorhexidine  15 mL Mouth/Throat BID    furosemide  40 mg Intravenous BID    magnesium oxide  400 mg Oral BID    mupirocin   Nasal BID    nitroGLYCERIN  1 patch Transdermal Daily    potassium chloride  10 mEq Oral TID WC    atorvastatin  40 mg Oral Nightly    fondaparinux  2.5 mg Subcutaneous Daily    aspirin  325 mg Oral Daily    insulin glargine  0.15 Units/kg Subcutaneous Nightly    insulin lispro  0-12 Units Subcutaneous TID WC    insulin lispro  0-6 Units Subcutaneous Nightly      dexmedetomidine (PRECEDEX) IV infusion Stopped (07/27/18 2000)    sodium chloride 50 mL/hr at 07/26/18 1316    sodium chloride      DOPamine Stopped (07/27/18 0405)    nitroGLYCERIN 30 mcg/min (07/27/18 0508)    niCARdipine Stopped (07/26/18 1413)    insulin (HUMAN R) non-weight based infusion Stopped (07/27/18 1036)    dextrose      nitroprusside (NIPRIDE) 50 mg in D5W infusion       ipratropium, metoprolol, sodium chloride flush, potassium chloride, magnesium sulfate, calcium chloride IVPB, calcium chloride IVPB, acetaminophen, oxyCODONE **OR** oxyCODONE, fentanNYL, diphenhydrAMINE, lactulose, ondansetron, metoclopramide, hydrALAZINE, albumin human, sodium chloride, DOPamine, furosemide, nitroGLYCERIN, niCARdipine, glucose, dextrose, glucagon (rDNA), dextrose    Lab Data:  CBC:   Recent Labs      07/27/18   0012  07/27/18   0509  07/28/18   0523   WBC  11.9*  11.1*  12.7*   HGB  10.9*  10.5*  9.3*   HCT  32.4*  30.8*  27.9*   MCV  90.7  90.3  92.4   PLT  159  142  129*     BMP:   Recent Labs      07/26/18   0459   07/27/18   0509  07/27/18   1506  07/28/18   0523   NA  143   < >  143  143  141   K  3.7   < >  4.6  3.8  4.6   CL  108   < >  111*  109  105   CO2  26   < >  25  26  28   PHOS  4.1   --    --    --    --    BUN  18   < >  14  15  21*   CREATININE  <0.5*   < >  0.5*  <0.5*  <0.5*    < > = values in this interval not displayed.      LIVER PROFILE:   Recent Labs      07/25/18   0953   AST  17   ALT  16   BILIDIR  <0.2   BILITOT  <0.2   ALKPHOS  61     PT/INR:   Recent Labs      07/26/18   1241  07/27/18   0509  07/28/18   0523   PROTIME  15.0*  13.4*  13.7*   INR  1.32*  1.18*  1.20*     APTT:   Recent Labs      07/26/18   1241   APTT  31.0       Assessment:  Patient Active Problem List    Diagnosis Date Noted    Lung cancer 07/28/2013     Priority: High    Hyperlipidemia 02/07/2012     Priority: High    HTN (hypertension) 02/07/2012     Priority: High    COPD (chronic obstructive pulmonary disease) 02/07/2012     Priority: High    Hypothyroid 02/07/2012     Priority: High    CAD (coronary artery disease)

## 2018-07-28 NOTE — PROGRESS NOTES
D: pt back up to chair. Pt with nausea and headache ongoing t/o day. Pt with no appetite. PRN meds given without relief. Ice packs to head PRN. Will monitor.

## 2018-07-28 NOTE — PROGRESS NOTES
.               Progress Note    S/P CABG POD2  Extubated    Vital Signs:                                                 /81   Pulse 82   Temp 97.6 °F (36.4 °C) (Oral)   Resp 20   Ht 5' 2\" (1.575 m)   Wt 179 lb 14.3 oz (81.6 kg)   SpO2 95%   Breastfeeding? No   BMI 32.90 kg/m²  O2 Flow Rate (L/min): 6 L/min     CVP (Mean): 13 mmHg  PAP: 36/23  PAP (Mean): 29 mmHg  SVR (Using ABP Mean): 1183.33 dyne*sec/cm5  CCI: 2.7 L/min  SVO2 (%): 71 %  Admission Weight: Weight: 170 lb (77.1 kg)          I/O:    Intake/Output Summary (Last 24 hours) at 07/28/18 1132  Last data filed at 07/28/18 1000   Gross per 24 hour   Intake          1690.92 ml   Output             3220 ml   Net         -1529.08 ml     Chest Tube:     CV: reg, wound c/d/i  Pulm: decreased  Abd: soft  Ext: warm, 1+ edema    Data Review:  CBC: Recent Labs      07/27/18   0012  07/27/18   0509  07/28/18   0523   WBC  11.9*  11.1*  12.7*   HGB  10.9*  10.5*  9.3*   HCT  32.4*  30.8*  27.9*   MCV  90.7  90.3  92.4   PLT  159  142  129*     BMP: Recent Labs      07/26/18   0459   07/27/18   0018  07/27/18   0509  07/27/18   1506  07/28/18   0523   NA  143   < >   --   143  143  141   K  3.7   < >   --   4.6  3.8  4.6   CL  108   < >   --   111*  109  105   CO2  26   < >   --   25  26  28   PHOS  4.1   --    --    --    --    --    BUN  18   < >   --   14  15  21*   CREATININE  <0.5*   < >   --   0.5*  <0.5*  <0.5*   CALCIUM  9.3   < >   --   9.1  8.9  8.9   MG  1.90   < >  2.00  1.90   --   2.20    < > = values in this interval not displayed.      Cardiac Enzymes: Recent Labs      07/25/18   1542   TROPONINI  0.26*     PT/INR:   Recent Labs      07/26/18   1241  07/27/18   0509  07/28/18   0523   PROTIME  15.0*  13.4*  13.7*   INR  1.32*  1.18*  1.20*     APTT:   Recent Labs      07/26/18   1241   APTT  31.0       Assessment/Plan:  CV - HD stable  pulm - extubated uneventfully yesterday  Renal - Cr and UO Ok   - retain Mayorga for accurate I+O  Heme -

## 2018-07-28 NOTE — PROGRESS NOTES
Pulmonary & Critical Care Medicine ICU Progress Note    Admit Date: 2018  PCP: Ethan Obrien MD    CC:  Acute respiratory failure, COPD  Events of Last 24 hours:    Had an episode of cough, shortness of breath and wheezing this morning  She was able to ambulate with assistance  No fever or chills  No increase in sputum production    Vitals:  Tmax:  VITALS:  /77   Pulse 102   Temp 97.9 °F (36.6 °C) (Axillary)   Resp 30   Ht 5' 2\" (1.575 m)   Wt 179 lb 14.3 oz (81.6 kg)   SpO2 94%   Breastfeeding? No   BMI 32.90 kg/m²   24HR INTAKE/OUTPUT:      Intake/Output Summary (Last 24 hours) at 18 1515  Last data filed at 18 1454   Gross per 24 hour   Intake          1690.92 ml   Output             3800 ml   Net         -2109.08 ml     CURRENT PULSE OXIMETRY:  SpO2: 94 %  24HR PULSE OXIMETRY RANGE:  SpO2  Av.3 %  Min: 76 %  Max: 98 %    Vent Settings:  Vent Mode: SIMV/PRVC Rate Set: 12 bmp/Vt Ordered: 550 mL/ /FiO2 : 40 %  PEEP5  Recent Labs      18   1803  18   2038   PHART  7.443  7.438   [id number]  39.7  44.6   PO2ART  75.1  64.4*         EXAM:  General: No distress. Alert. Eyes: PERRL. No sclera icterus. No conjunctival injection. ENT: Moist mucous membranes  Neck: Trachea midline. Normal thyroid. Resp: No accessory muscle use. Diminished air entry bilaterally, scattered rhonchi  CV: Regular rate. Regular rhythm. No mumur or rub. Bilateral leg edema  GI: Non-tender. Non-distended. No masses. No organmegaly. Normal bowel sounds. Skin: Warm and dry. No nodule on exposed extremities. No rash on exposed extremities. M/S: No cyanosis. No joint deformity. No clubbing.    Neuro:Speech is a clear, no focal weakness  Psych: No anxiety or agitation       IV:   dexmedetomidine (PRECEDEX) IV infusion Stopped (18)    sodium chloride 50 mL/hr at 18 1316    sodium chloride      DOPamine Stopped (18 0405)    nitroGLYCERIN 30 mcg/min (18 0508)   Austin Hinojosa APTT:   Recent Labs      07/26/18   1241   APTT  31.0     UA:No results for input(s): NITRITE, COLORU, PHUR, LABCAST, WBCUA, RBCUA, MUCUS, TRICHOMONAS, YEAST, BACTERIA, CLARITYU, SPECGRAV, LEUKOCYTESUR, UROBILINOGEN, BILIRUBINUR, BLOODU, GLUCOSEU, AMORPHOUS in the last 72 hours. Invalid input(s): Gaetano Pena    Assessment/Plan:  The patient is a 69 y.o. female with     Acute respiratory failure, Extubated on 7/27/18  Non-STEMI with multivessel disease status post CABG ×3  Volume overload - better. Good urine output. Stable renal function  Echo on 7/25/18 with EF of 50%  History of lung cancer x2 status post lobectomy in 2004 in 2010  ID: No focus of infection. Mild leukocytosis.   No fever over the past 24 hours     Start Qvar and ipratropium breathing treatments  Avoid beta agonist for now as it may increase the risk of atrial fibrillation and tachyarrhythmia after CABG  Chest tube - per CT surgery  OOB and encourage activity as tolerated    Devora Ty MD

## 2018-07-29 LAB
ANION GAP SERPL CALCULATED.3IONS-SCNC: 9 MMOL/L (ref 3–16)
BUN BLDV-MCNC: 23 MG/DL (ref 7–20)
CALCIUM SERPL-MCNC: 8.9 MG/DL (ref 8.3–10.6)
CHLORIDE BLD-SCNC: 96 MMOL/L (ref 99–110)
CO2: 31 MMOL/L (ref 21–32)
CREAT SERPL-MCNC: <0.5 MG/DL (ref 0.6–1.2)
EKG ATRIAL RATE: 87 BPM
EKG DIAGNOSIS: NORMAL
EKG P AXIS: 38 DEGREES
EKG P-R INTERVAL: 168 MS
EKG Q-T INTERVAL: 388 MS
EKG QRS DURATION: 94 MS
EKG QTC CALCULATION (BAZETT): 466 MS
EKG R AXIS: -16 DEGREES
EKG T AXIS: 16 DEGREES
EKG VENTRICULAR RATE: 87 BPM
GFR AFRICAN AMERICAN: >60
GFR NON-AFRICAN AMERICAN: >60
GLUCOSE BLD-MCNC: 111 MG/DL (ref 70–99)
GLUCOSE BLD-MCNC: 113 MG/DL (ref 70–99)
GLUCOSE BLD-MCNC: 120 MG/DL (ref 70–99)
GLUCOSE BLD-MCNC: 124 MG/DL (ref 70–99)
HCT VFR BLD CALC: 25.8 % (ref 36–48)
HEMOGLOBIN: 8.7 G/DL (ref 12–16)
INR BLD: 1.19 (ref 0.86–1.14)
MAGNESIUM: 2.3 MG/DL (ref 1.8–2.4)
MCH RBC QN AUTO: 31.1 PG (ref 26–34)
MCHC RBC AUTO-ENTMCNC: 33.8 G/DL (ref 31–36)
MCV RBC AUTO: 91.9 FL (ref 80–100)
PDW BLD-RTO: 15.2 % (ref 12.4–15.4)
PERFORMED ON: ABNORMAL
PLATELET # BLD: 147 K/UL (ref 135–450)
PMV BLD AUTO: 8 FL (ref 5–10.5)
POTASSIUM SERPL-SCNC: 3.9 MMOL/L (ref 3.5–5.1)
PROTHROMBIN TIME: 13.6 SEC (ref 9.8–13)
RBC # BLD: 2.8 M/UL (ref 4–5.2)
SODIUM BLD-SCNC: 136 MMOL/L (ref 136–145)
WBC # BLD: 12 K/UL (ref 4–11)

## 2018-07-29 PROCEDURE — 2700000000 HC OXYGEN THERAPY PER DAY

## 2018-07-29 PROCEDURE — 6360000002 HC RX W HCPCS: Performed by: INTERNAL MEDICINE

## 2018-07-29 PROCEDURE — 2000000000 HC ICU R&B

## 2018-07-29 PROCEDURE — 85027 COMPLETE CBC AUTOMATED: CPT

## 2018-07-29 PROCEDURE — 2580000003 HC RX 258: Performed by: THORACIC SURGERY (CARDIOTHORACIC VASCULAR SURGERY)

## 2018-07-29 PROCEDURE — 6360000002 HC RX W HCPCS: Performed by: THORACIC SURGERY (CARDIOTHORACIC VASCULAR SURGERY)

## 2018-07-29 PROCEDURE — 99233 SBSQ HOSP IP/OBS HIGH 50: CPT | Performed by: INTERNAL MEDICINE

## 2018-07-29 PROCEDURE — 94761 N-INVAS EAR/PLS OXIMETRY MLT: CPT

## 2018-07-29 PROCEDURE — 94640 AIRWAY INHALATION TREATMENT: CPT

## 2018-07-29 PROCEDURE — S0028 INJECTION, FAMOTIDINE, 20 MG: HCPCS | Performed by: THORACIC SURGERY (CARDIOTHORACIC VASCULAR SURGERY)

## 2018-07-29 PROCEDURE — 80048 BASIC METABOLIC PNL TOTAL CA: CPT

## 2018-07-29 PROCEDURE — 85610 PROTHROMBIN TIME: CPT

## 2018-07-29 PROCEDURE — 83735 ASSAY OF MAGNESIUM: CPT

## 2018-07-29 PROCEDURE — 94150 VITAL CAPACITY TEST: CPT

## 2018-07-29 PROCEDURE — 6370000000 HC RX 637 (ALT 250 FOR IP): Performed by: THORACIC SURGERY (CARDIOTHORACIC VASCULAR SURGERY)

## 2018-07-29 PROCEDURE — 94664 DEMO&/EVAL PT USE INHALER: CPT

## 2018-07-29 PROCEDURE — 2500000003 HC RX 250 WO HCPCS: Performed by: THORACIC SURGERY (CARDIOTHORACIC VASCULAR SURGERY)

## 2018-07-29 RX ORDER — AMIODARONE HYDROCHLORIDE 200 MG/1
400 TABLET ORAL 2 TIMES DAILY
Status: DISCONTINUED | OUTPATIENT
Start: 2018-07-29 | End: 2018-08-01

## 2018-07-29 RX ORDER — AMIODARONE HYDROCHLORIDE 200 MG/1
200 TABLET ORAL DAILY
Status: DISCONTINUED | OUTPATIENT
Start: 2018-08-03 | End: 2018-08-01

## 2018-07-29 RX ADMIN — MAGNESIUM OXIDE TAB 400 MG (241.3 MG ELEMENTAL MG) 400 MG: 400 (241.3 MG) TAB at 20:21

## 2018-07-29 RX ADMIN — FAMOTIDINE 20 MG: 20 TABLET ORAL at 20:22

## 2018-07-29 RX ADMIN — AMIODARONE HYDROCHLORIDE 150 MG: 50 INJECTION, SOLUTION INTRAVENOUS at 00:40

## 2018-07-29 RX ADMIN — IPRATROPIUM BROMIDE 0.5 MG: 0.5 SOLUTION RESPIRATORY (INHALATION) at 15:47

## 2018-07-29 RX ADMIN — OXYCODONE HYDROCHLORIDE 5 MG: 5 TABLET ORAL at 03:16

## 2018-07-29 RX ADMIN — POTASSIUM CHLORIDE 10 MEQ: 10 TABLET, EXTENDED RELEASE ORAL at 16:58

## 2018-07-29 RX ADMIN — METOCLOPRAMIDE 10 MG: 5 INJECTION, SOLUTION INTRAMUSCULAR; INTRAVENOUS at 16:51

## 2018-07-29 RX ADMIN — POTASSIUM CHLORIDE 10 MEQ: 10 TABLET, EXTENDED RELEASE ORAL at 18:51

## 2018-07-29 RX ADMIN — MUPIROCIN: 20 OINTMENT TOPICAL at 10:06

## 2018-07-29 RX ADMIN — FONDAPARINUX SODIUM 2.5 MG: 2.5 INJECTION, SOLUTION SUBCUTANEOUS at 09:58

## 2018-07-29 RX ADMIN — DIPHENHYDRAMINE HCL 25 MG: 25 TABLET ORAL at 20:21

## 2018-07-29 RX ADMIN — Medication 10 ML: at 09:57

## 2018-07-29 RX ADMIN — AMIODARONE HYDROCHLORIDE 1 MG/MIN: 50 INJECTION, SOLUTION INTRAVENOUS at 01:08

## 2018-07-29 RX ADMIN — DOCUSATE SODIUM 100 MG: 100 CAPSULE, LIQUID FILLED ORAL at 20:21

## 2018-07-29 RX ADMIN — AMIODARONE HYDROCHLORIDE 400 MG: 200 TABLET ORAL at 20:21

## 2018-07-29 RX ADMIN — ACETAMINOPHEN 650 MG: 325 TABLET, FILM COATED ORAL at 03:16

## 2018-07-29 RX ADMIN — ONDANSETRON 4 MG: 2 INJECTION INTRAMUSCULAR; INTRAVENOUS at 20:19

## 2018-07-29 RX ADMIN — Medication 15 ML: at 20:21

## 2018-07-29 RX ADMIN — AMIODARONE HYDROCHLORIDE 400 MG: 200 TABLET ORAL at 10:27

## 2018-07-29 RX ADMIN — POTASSIUM CHLORIDE 10 MEQ: 10 TABLET, EXTENDED RELEASE ORAL at 10:07

## 2018-07-29 RX ADMIN — Medication 15 ML: at 10:07

## 2018-07-29 RX ADMIN — ACETAMINOPHEN 650 MG: 325 TABLET, FILM COATED ORAL at 16:51

## 2018-07-29 RX ADMIN — METOCLOPRAMIDE 10 MG: 5 INJECTION, SOLUTION INTRAMUSCULAR; INTRAVENOUS at 06:20

## 2018-07-29 RX ADMIN — IPRATROPIUM BROMIDE 0.5 MG: 0.5 SOLUTION RESPIRATORY (INHALATION) at 03:31

## 2018-07-29 RX ADMIN — Medication 12.5 MG: at 10:02

## 2018-07-29 RX ADMIN — IPRATROPIUM BROMIDE 0.5 MG: 0.5 SOLUTION RESPIRATORY (INHALATION) at 22:41

## 2018-07-29 RX ADMIN — FAMOTIDINE 20 MG: 10 INJECTION, SOLUTION INTRAVENOUS at 10:23

## 2018-07-29 RX ADMIN — DOCUSATE SODIUM 100 MG: 100 CAPSULE, LIQUID FILLED ORAL at 00:52

## 2018-07-29 RX ADMIN — OXYCODONE HYDROCHLORIDE 5 MG: 5 TABLET ORAL at 10:04

## 2018-07-29 RX ADMIN — ONDANSETRON 4 MG: 2 INJECTION INTRAMUSCULAR; INTRAVENOUS at 16:51

## 2018-07-29 RX ADMIN — METOPROLOL TARTRATE 25 MG: 25 TABLET ORAL at 11:39

## 2018-07-29 RX ADMIN — Medication 2 PUFF: at 22:44

## 2018-07-29 RX ADMIN — ASPIRIN 325 MG: 325 TABLET, DELAYED RELEASE ORAL at 10:05

## 2018-07-29 RX ADMIN — AMIODARONE HYDROCHLORIDE 0.5 MG/MIN: 50 INJECTION, SOLUTION INTRAVENOUS at 11:42

## 2018-07-29 RX ADMIN — DOCUSATE SODIUM 100 MG: 100 CAPSULE, LIQUID FILLED ORAL at 10:08

## 2018-07-29 RX ADMIN — METOPROLOL TARTRATE 25 MG: 25 TABLET ORAL at 20:21

## 2018-07-29 RX ADMIN — FUROSEMIDE 40 MG: 10 INJECTION, SOLUTION INTRAMUSCULAR; INTRAVENOUS at 17:29

## 2018-07-29 RX ADMIN — ATORVASTATIN CALCIUM 40 MG: 40 TABLET, FILM COATED ORAL at 20:21

## 2018-07-29 RX ADMIN — Medication 10 ML: at 20:23

## 2018-07-29 RX ADMIN — ACETAMINOPHEN 650 MG: 325 TABLET, FILM COATED ORAL at 23:22

## 2018-07-29 RX ADMIN — Medication 2 PUFF: at 08:53

## 2018-07-29 RX ADMIN — FUROSEMIDE 40 MG: 10 INJECTION, SOLUTION INTRAMUSCULAR; INTRAVENOUS at 09:58

## 2018-07-29 RX ADMIN — MUPIROCIN: 20 OINTMENT TOPICAL at 20:23

## 2018-07-29 RX ADMIN — ONDANSETRON 4 MG: 2 INJECTION INTRAMUSCULAR; INTRAVENOUS at 10:22

## 2018-07-29 RX ADMIN — MAGNESIUM OXIDE TAB 400 MG (241.3 MG ELEMENTAL MG) 400 MG: 400 (241.3 MG) TAB at 10:05

## 2018-07-29 RX ADMIN — ONDANSETRON 4 MG: 2 INJECTION INTRAMUSCULAR; INTRAVENOUS at 03:30

## 2018-07-29 ASSESSMENT — PAIN SCALES - GENERAL
PAINLEVEL_OUTOF10: 2
PAINLEVEL_OUTOF10: 6
PAINLEVEL_OUTOF10: 0
PAINLEVEL_OUTOF10: 6
PAINLEVEL_OUTOF10: 3
PAINLEVEL_OUTOF10: 2
PAINLEVEL_OUTOF10: 4
PAINLEVEL_OUTOF10: 0
PAINLEVEL_OUTOF10: 6
PAINLEVEL_OUTOF10: 0

## 2018-07-29 ASSESSMENT — PAIN DESCRIPTION - ORIENTATION
ORIENTATION: MID
ORIENTATION: RIGHT
ORIENTATION: MID

## 2018-07-29 ASSESSMENT — ACTIVITIES OF DAILY LIVING (ADL): EFFECT OF PAIN ON DAILY ACTIVITIES: TO BREATH

## 2018-07-29 ASSESSMENT — PAIN DESCRIPTION - DESCRIPTORS
DESCRIPTORS: ACHING
DESCRIPTORS: ACHING
DESCRIPTORS: HEADACHE
DESCRIPTORS: ACHING
DESCRIPTORS: TIGHTNESS

## 2018-07-29 ASSESSMENT — PAIN DESCRIPTION - FREQUENCY
FREQUENCY: INTERMITTENT
FREQUENCY: CONTINUOUS
FREQUENCY: CONTINUOUS

## 2018-07-29 ASSESSMENT — PAIN DESCRIPTION - LOCATION
LOCATION: STERNUM
LOCATION: CHEST
LOCATION: STERNUM
LOCATION: NECK

## 2018-07-29 ASSESSMENT — PAIN DESCRIPTION - PAIN TYPE
TYPE: SURGICAL PAIN
TYPE: SURGICAL PAIN
TYPE: ACUTE PAIN
TYPE: SURGICAL PAIN

## 2018-07-29 ASSESSMENT — PAIN DESCRIPTION - ONSET: ONSET: AWAKENED FROM SLEEP

## 2018-07-29 ASSESSMENT — PAIN DESCRIPTION - PROGRESSION: CLINICAL_PROGRESSION: GRADUALLY WORSENING

## 2018-07-29 NOTE — PROGRESS NOTES
artery disease) 02/07/2012     Priority: High    Lumbar spondylosis 10/01/2013     Priority: Medium    Lumbar spinal stenosis 08/08/2013     Priority: Medium    Ascending aortic aneurysm 07/28/2013     Priority: Medium    DDD (degenerative disc disease), lumbosacral 01/10/2013     Priority: Medium    S/P CABG x 3     Dyspepsia 07/24/2018    Chest pain 07/24/2018    NSTEMI (non-ST elevated myocardial infarction) ([address]     Trigger middle finger of left hand 03/12/2018    Painful lumpy right breast 01/11/2018    Hemoptysis 12/19/2017     Patient received CABG x 3 yesterday and continues to be on Precedex and Nitroprusside drip. Tolerated procedure well. Plan:  1. Received CABG x 3, tolerating well, ambulating.    2. Continue medical mmgt, VS stable    Alba Rolle MD 7/29/2018 7:38 AM

## 2018-07-30 PROBLEM — G89.18 ACUTE POSTOPERATIVE PAIN: Status: ACTIVE | Noted: 2018-07-30

## 2018-07-30 LAB
ANION GAP SERPL CALCULATED.3IONS-SCNC: 9 MMOL/L (ref 3–16)
BUN BLDV-MCNC: 25 MG/DL (ref 7–20)
CALCIUM SERPL-MCNC: 8.8 MG/DL (ref 8.3–10.6)
CHLORIDE BLD-SCNC: 96 MMOL/L (ref 99–110)
CO2: 32 MMOL/L (ref 21–32)
CREAT SERPL-MCNC: <0.5 MG/DL (ref 0.6–1.2)
GFR AFRICAN AMERICAN: >60
GFR NON-AFRICAN AMERICAN: >60
GLUCOSE BLD-MCNC: 111 MG/DL (ref 70–99)
GLUCOSE BLD-MCNC: 124 MG/DL (ref 70–99)
GLUCOSE BLD-MCNC: 90 MG/DL (ref 70–99)
HCT VFR BLD CALC: 25.2 % (ref 36–48)
HEMOGLOBIN: 8.6 G/DL (ref 12–16)
INR BLD: 1.15 (ref 0.86–1.14)
MAGNESIUM: 2.4 MG/DL (ref 1.8–2.4)
MCH RBC QN AUTO: 31.5 PG (ref 26–34)
MCHC RBC AUTO-ENTMCNC: 34.1 G/DL (ref 31–36)
MCV RBC AUTO: 92.4 FL (ref 80–100)
PDW BLD-RTO: 15.6 % (ref 12.4–15.4)
PERFORMED ON: ABNORMAL
PERFORMED ON: NORMAL
PLATELET # BLD: 174 K/UL (ref 135–450)
PMV BLD AUTO: 8.1 FL (ref 5–10.5)
POTASSIUM SERPL-SCNC: 3.5 MMOL/L (ref 3.5–5.1)
PROTHROMBIN TIME: 13.1 SEC (ref 9.8–13)
RBC # BLD: 2.73 M/UL (ref 4–5.2)
SODIUM BLD-SCNC: 137 MMOL/L (ref 136–145)
WBC # BLD: 9.7 K/UL (ref 4–11)

## 2018-07-30 PROCEDURE — 6370000000 HC RX 637 (ALT 250 FOR IP): Performed by: THORACIC SURGERY (CARDIOTHORACIC VASCULAR SURGERY)

## 2018-07-30 PROCEDURE — 2580000003 HC RX 258: Performed by: THORACIC SURGERY (CARDIOTHORACIC VASCULAR SURGERY)

## 2018-07-30 PROCEDURE — 97530 THERAPEUTIC ACTIVITIES: CPT

## 2018-07-30 PROCEDURE — 2700000000 HC OXYGEN THERAPY PER DAY

## 2018-07-30 PROCEDURE — 6360000002 HC RX W HCPCS: Performed by: THORACIC SURGERY (CARDIOTHORACIC VASCULAR SURGERY)

## 2018-07-30 PROCEDURE — 85027 COMPLETE CBC AUTOMATED: CPT

## 2018-07-30 PROCEDURE — 2000000000 HC ICU R&B

## 2018-07-30 PROCEDURE — 99232 SBSQ HOSP IP/OBS MODERATE 35: CPT | Performed by: INTERNAL MEDICINE

## 2018-07-30 PROCEDURE — G8979 MOBILITY GOAL STATUS: HCPCS

## 2018-07-30 PROCEDURE — 6360000002 HC RX W HCPCS: Performed by: INTERNAL MEDICINE

## 2018-07-30 PROCEDURE — 85610 PROTHROMBIN TIME: CPT

## 2018-07-30 PROCEDURE — 6370000000 HC RX 637 (ALT 250 FOR IP): Performed by: CLINICAL NURSE SPECIALIST

## 2018-07-30 PROCEDURE — 94150 VITAL CAPACITY TEST: CPT

## 2018-07-30 PROCEDURE — 83735 ASSAY OF MAGNESIUM: CPT

## 2018-07-30 PROCEDURE — 97116 GAIT TRAINING THERAPY: CPT

## 2018-07-30 PROCEDURE — 97162 PT EVAL MOD COMPLEX 30 MIN: CPT

## 2018-07-30 PROCEDURE — 94761 N-INVAS EAR/PLS OXIMETRY MLT: CPT

## 2018-07-30 PROCEDURE — 80048 BASIC METABOLIC PNL TOTAL CA: CPT

## 2018-07-30 PROCEDURE — G8978 MOBILITY CURRENT STATUS: HCPCS

## 2018-07-30 PROCEDURE — 94640 AIRWAY INHALATION TREATMENT: CPT

## 2018-07-30 RX ORDER — OXYCODONE HYDROCHLORIDE 5 MG/1
5 TABLET ORAL EVERY 6 HOURS PRN
Status: DISCONTINUED | OUTPATIENT
Start: 2018-07-30 | End: 2018-07-30

## 2018-07-30 RX ORDER — TRAMADOL HYDROCHLORIDE 50 MG/1
50 TABLET ORAL EVERY 6 HOURS PRN
Status: DISCONTINUED | OUTPATIENT
Start: 2018-07-30 | End: 2018-08-01 | Stop reason: HOSPADM

## 2018-07-30 RX ORDER — FUROSEMIDE 40 MG/1
40 TABLET ORAL DAILY
Status: DISCONTINUED | OUTPATIENT
Start: 2018-07-30 | End: 2018-08-01

## 2018-07-30 RX ORDER — POTASSIUM CHLORIDE 20 MEQ/1
40 TABLET, EXTENDED RELEASE ORAL
Status: DISCONTINUED | OUTPATIENT
Start: 2018-07-30 | End: 2018-07-31

## 2018-07-30 RX ORDER — ALPRAZOLAM 0.25 MG/1
0.25 TABLET ORAL 2 TIMES DAILY PRN
Status: DISCONTINUED | OUTPATIENT
Start: 2018-07-30 | End: 2018-07-31

## 2018-07-30 RX ORDER — LACTULOSE 10 G/15ML
20 SOLUTION ORAL ONCE
Status: COMPLETED | OUTPATIENT
Start: 2018-07-30 | End: 2018-07-30

## 2018-07-30 RX ORDER — ALPRAZOLAM 0.25 MG/1
0.12 TABLET ORAL 2 TIMES DAILY PRN
Status: DISCONTINUED | OUTPATIENT
Start: 2018-07-30 | End: 2018-07-30

## 2018-07-30 RX ORDER — ALPRAZOLAM 0.25 MG/1
0.12 TABLET ORAL ONCE
Status: COMPLETED | OUTPATIENT
Start: 2018-07-30 | End: 2018-07-30

## 2018-07-30 RX ADMIN — DOCUSATE SODIUM 100 MG: 100 CAPSULE, LIQUID FILLED ORAL at 09:09

## 2018-07-30 RX ADMIN — ACETAMINOPHEN 650 MG: 325 TABLET, FILM COATED ORAL at 09:30

## 2018-07-30 RX ADMIN — ALPRAZOLAM 0.12 MG: 0.25 TABLET ORAL at 16:38

## 2018-07-30 RX ADMIN — Medication 15 ML: at 20:43

## 2018-07-30 RX ADMIN — Medication 2 PUFF: at 20:11

## 2018-07-30 RX ADMIN — FONDAPARINUX SODIUM 2.5 MG: 2.5 INJECTION, SOLUTION SUBCUTANEOUS at 09:18

## 2018-07-30 RX ADMIN — FAMOTIDINE 20 MG: 20 TABLET ORAL at 20:43

## 2018-07-30 RX ADMIN — MUPIROCIN: 20 OINTMENT TOPICAL at 09:19

## 2018-07-30 RX ADMIN — IPRATROPIUM BROMIDE 0.5 MG: 0.5 SOLUTION RESPIRATORY (INHALATION) at 11:10

## 2018-07-30 RX ADMIN — AMIODARONE HYDROCHLORIDE 400 MG: 200 TABLET ORAL at 20:43

## 2018-07-30 RX ADMIN — FAMOTIDINE 20 MG: 20 TABLET ORAL at 09:10

## 2018-07-30 RX ADMIN — MUPIROCIN: 20 OINTMENT TOPICAL at 20:43

## 2018-07-30 RX ADMIN — IPRATROPIUM BROMIDE 0.5 MG: 0.5 SOLUTION RESPIRATORY (INHALATION) at 20:07

## 2018-07-30 RX ADMIN — ATORVASTATIN CALCIUM 40 MG: 40 TABLET, FILM COATED ORAL at 20:43

## 2018-07-30 RX ADMIN — MAGNESIUM OXIDE TAB 400 MG (241.3 MG ELEMENTAL MG) 400 MG: 400 (241.3 MG) TAB at 09:10

## 2018-07-30 RX ADMIN — ACETAMINOPHEN 650 MG: 325 TABLET, FILM COATED ORAL at 19:05

## 2018-07-30 RX ADMIN — LACTULOSE 20 G: 20 SOLUTION ORAL at 09:09

## 2018-07-30 RX ADMIN — ACETAMINOPHEN 650 MG: 325 TABLET, FILM COATED ORAL at 23:52

## 2018-07-30 RX ADMIN — FUROSEMIDE 40 MG: 40 TABLET ORAL at 09:10

## 2018-07-30 RX ADMIN — DIPHENHYDRAMINE HCL 25 MG: 25 TABLET ORAL at 20:43

## 2018-07-30 RX ADMIN — ALPRAZOLAM 0.12 MG: 0.25 TABLET ORAL at 14:12

## 2018-07-30 RX ADMIN — Medication 15 ML: at 09:08

## 2018-07-30 RX ADMIN — POTASSIUM CHLORIDE 40 MEQ: 20 TABLET, EXTENDED RELEASE ORAL at 16:16

## 2018-07-30 RX ADMIN — IPRATROPIUM BROMIDE 0.5 MG: 0.5 SOLUTION RESPIRATORY (INHALATION) at 14:59

## 2018-07-30 RX ADMIN — Medication 2 PUFF: at 08:43

## 2018-07-30 RX ADMIN — ACETAMINOPHEN 650 MG: 325 TABLET, FILM COATED ORAL at 05:12

## 2018-07-30 RX ADMIN — IPRATROPIUM BROMIDE 0.5 MG: 0.5 SOLUTION RESPIRATORY (INHALATION) at 05:33

## 2018-07-30 RX ADMIN — ALPRAZOLAM 0.25 MG: 0.25 TABLET ORAL at 19:38

## 2018-07-30 RX ADMIN — AMIODARONE HYDROCHLORIDE 400 MG: 200 TABLET ORAL at 09:10

## 2018-07-30 RX ADMIN — DOCUSATE SODIUM 100 MG: 100 CAPSULE, LIQUID FILLED ORAL at 20:42

## 2018-07-30 RX ADMIN — METOPROLOL TARTRATE 25 MG: 25 TABLET ORAL at 20:43

## 2018-07-30 RX ADMIN — POTASSIUM CHLORIDE 40 MEQ: 20 TABLET, EXTENDED RELEASE ORAL at 12:08

## 2018-07-30 RX ADMIN — Medication 10 ML: at 09:18

## 2018-07-30 RX ADMIN — MAGNESIUM OXIDE TAB 400 MG (241.3 MG ELEMENTAL MG) 400 MG: 400 (241.3 MG) TAB at 20:43

## 2018-07-30 RX ADMIN — LEVOTHYROXINE SODIUM 137 MCG: 25 TABLET ORAL at 09:09

## 2018-07-30 RX ADMIN — POTASSIUM CHLORIDE 40 MEQ: 20 TABLET, EXTENDED RELEASE ORAL at 09:09

## 2018-07-30 RX ADMIN — METOPROLOL TARTRATE 25 MG: 25 TABLET ORAL at 09:10

## 2018-07-30 RX ADMIN — ASPIRIN 325 MG: 325 TABLET, DELAYED RELEASE ORAL at 09:10

## 2018-07-30 RX ADMIN — Medication 10 ML: at 20:43

## 2018-07-30 ASSESSMENT — PAIN SCALES - GENERAL
PAINLEVEL_OUTOF10: 2
PAINLEVEL_OUTOF10: 0
PAINLEVEL_OUTOF10: 2
PAINLEVEL_OUTOF10: 0
PAINLEVEL_OUTOF10: 6
PAINLEVEL_OUTOF10: 0
PAINLEVEL_OUTOF10: 2
PAINLEVEL_OUTOF10: 2
PAINLEVEL_OUTOF10: 6
PAINLEVEL_OUTOF10: 0

## 2018-07-30 ASSESSMENT — PAIN DESCRIPTION - PAIN TYPE
TYPE: ACUTE PAIN;SURGICAL PAIN
TYPE: ACUTE PAIN
TYPE: ACUTE PAIN;SURGICAL PAIN
TYPE: ACUTE PAIN

## 2018-07-30 ASSESSMENT — PAIN DESCRIPTION - FREQUENCY
FREQUENCY: CONTINUOUS

## 2018-07-30 ASSESSMENT — PAIN DESCRIPTION - LOCATION
LOCATION: CHEST

## 2018-07-30 ASSESSMENT — PAIN DESCRIPTION - DESCRIPTORS
DESCRIPTORS: ACHING

## 2018-07-30 ASSESSMENT — PAIN DESCRIPTION - ORIENTATION
ORIENTATION: MID

## 2018-07-30 NOTE — PROGRESS NOTES
Progress Note  Hospital Day: 7  POD#  4  S/P Coronary artery bypass    Chief Complaint: Postop follow up    Ms. Karyn Henson is sitting up in the chair. States she feels her heart beating. No complaints of pain. States she doesn't want any narcotics. Had a coughing fit overnight, given respiratory treatment and oxygen was increased. VITAL SIGNS   Temperature:  Current - Temp: 98.2 °F (36.8 °C);  Max - Temp  Av.1 °F (36.7 °C)  Min: 98 °F (36.7 °C)  Max: 98.3 °F (36.8 °C)    Respiratory Rate : Resp  Av.4  Min: 14  Max: 31    Pulse Range: Pulse  Av.9  Min: 66  Max: 135    Blood Pressure Range:  Systolic (75SDK), FKL:569 , Min:80 , UAH:770   ; Diastolic (13MHE), WN, Min:56, Max:104    Pulse ox Range: SpO2  Av.8 %  Min: 90 %  Max: 100 %  O2 Flow Rate (L/min): 8 L/min    Admission Weight: Weight: 170 lb (77.1 kg)    Current Weight: Patient Vitals for the past 96 hrs (Last 3 readings):   Weight   18 0700 175 lb 0.7 oz (79.4 kg)   18 0622 167 lb 8.8 oz (76 kg)   18 0630 179 lb 14.3 oz (81.6 kg)     I/O:   Intake/Output Summary (Last 24 hours) at 18 0740  Last data filed at 18 0700   Gross per 24 hour   Intake              850 ml   Output             2655 ml   Net            -1805 ml     Urine: [phone] ml/shift    LINES/ACCESS:   Central Access: [facility] Day #: 4     Diet: DIET CARDIAC; Daily Fluid Restriction: 2000 ml    MEDICATIONS:      levothyroxine  137 mcg Oral Daily    potassium chloride  40 mEq Oral TID WC    amiodarone  400 mg Oral BID    Followed by   Henna Johnson ON 8/3/2018] amiodarone  200 mg Oral Daily    metoprolol tartrate  25 mg Oral BID    beclomethasone  2 puff Inhalation BID    sodium chloride flush  10 mL Intravenous 2 times per day    docusate sodium  100 mg Oral BID    famotidine  20 mg Oral BID    chlorhexidine  15 mL Mouth/Throat BID    furosemide  40 mg Intravenous BID    magnesium oxide  400 mg Oral BID    mupirocin   Nasal BID  atorvastatin  40 mg Oral Nightly    fondaparinux  2.5 mg Subcutaneous Daily    aspirin  325 mg Oral Daily    insulin lispro  0-12 Units Subcutaneous TID WC    insulin lispro  0-6 Units Subcutaneous Nightly     DATA REVIEW:   CBC: Recent Labs      07/28/18   0523  07/29/18   0519  07/30/18   0344   WBC  12.7*  12.0*  9.7   HGB  9.3*  8.7*  8.6*   HCT  27.9*  25.8*  25.2*   MCV  92.4  91.9  92.4   PLT  129*  147  174     BMP: Recent Labs      07/27/18   1506  07/28/18   0523  07/29/18   0519  07/30/18   0344   NA  143  141  136  137   K  3.8  4.6  3.9  3.5   CL  109  105  96*  96*   CO2  26  28  31  32   GLUCOSE  127*  150*  124*  111*   BUN  15  21*  23*  25*   CREATININE  <0.5*  <0.5*  <0.5*  <0.5*   CALCIUM  8.9  8.9  8.9  8.8   MG   --   2.20  2.30  2.40     Accucheck Glucoses:   Recent Labs      07/28/18   1812  07/28/18   2123  07/29/18   0953  07/29/18   1802  07/29/18   2018   POCGLU  110*  136*  111*  113*  120*     PT/INR:   Recent Labs      07/28/18   0523  07/29/18   0519  07/30/18   0344   PROTIME  13.7*  13.6*  13.1*   INR  1.20*  1.19*  1.15*     ABG:  Lab Results   Component Value Date    PHART 7.438 07/27/2018    PO2ART 64.4 07/27/2018    NAK7YXY 44.6 07/27/2018    J5NDUVKH 93 07/27/2018    QUV0ELY 32 07/27/2018    DTZ7GZB 30.2 07/27/2018    BEART 6 07/27/2018     ASSESSMENT:   Patient Active Problem List:     Hyperlipidemia     HTN (hypertension)     COPD (chronic obstructive pulmonary disease)     Hypothyroid     CAD (coronary artery disease)     Ascending aortic aneurysm     Lung cancer     Lumbar spinal stenosis     Lumbar spondylosis     DDD (degenerative disc disease), lumbosacral     Trigger middle finger of left hand     Painful lumpy right breast     Hemoptysis     Dyspepsia     Chest pain     NSTEMI (non-ST elevated myocardial infarction) (HCC)     S/P CABG x 3    Neuro: awake, alert and oriented x 3, thought content appropriate   CV:   Sinus  Pulm:  Diminished, using IS to 500 ml  Abd:  soft, non-tender; bowel sounds normal; passing flatus, no BM  Wounds: clean, dry and intact  Ext: warm, + edema     PLAN:   · CV - on BB, statin, ASA   - continue Amiodarone for periop AF   - dc ace inhibitor for marginal BP  · Pulm - wean O2 as tolerated, continue incentive spirometry, Cardiac Rehab, resp treatments, add Acapella, pulm following  · Renal - creatinine stable - change Lasix to PO daily  · Heme - Acute blood loss anemia as expected- continue to monitor  · FEN - cardiac diet with 2000 ml fluid restriction     - increase KCl supplement to 40 mEq tid     - bowel med  · VTE prophylaxis - SCD and ROCK hose, Arixtra  · Add Ultram prn for pain  · Restart preop Synthroid  · Disposition - Consult PT/OT for dc planning, home with home health at discharge.  May need home oxygen at discharge  Discussed patient with Dr Bailey Fleming, APRN  [phone] pager  7/30/2018  7:40 AM

## 2018-07-30 NOTE — DISCHARGE SUMMARY
presence of well-preserved left ventricular function with mild mitral regurgitation. On the inferior wall, the distal right coronary artery was selected for grafting beyond the area of the previous intervention which was palpable. The vessel was about 2.5 mm in diameter and was probed patent distally. On the anterior wall, the LAD was very small distally. It was therefore dissected out of the epicardial fat in the midportion. It was grafted just beyond a visible plaque. The vessel was about 2.5 mm in diameter. On the lateral wall, there was a single obtuse marginal branch about 2 mm in diameter. This was grafted again beyond some visible mural disease. The completion ELVIRA confirmed continued good ventricular function. Her operative course was uncomplicated and she transferred to ICU on IV nitroglycerin and Nicardipine and insulin infusions for ongoing care. She failed several spontaneous breathing trials due to increased work of breathing and tachypnea. A pulmonary consult was obtained to assist with her acute respiratory failure. She was aggressively diuresed and started on Precedex. She was able to be extubated thirty hours postoperatively. She transitioned to progressive care on the second postoperative day. She went into atrial fib with a rapid ventricular rate on the third postop day. She started on an Amiodarone protocol and converted back to sinus rhythm two hours later. PT/OT were consulted to assist with discharge. She was started on PRN Xanax and Ativan as she would get anxiety during the night and become tachypneic. She stated she has had this in the past and trying to do her exercise she learned in pulmonary rehab. She also has been having insomnia the past couple of nights and may need an medication for insomnia. She was discharged on the sixth postop day to Acute Rehab.     Consults:  IP CONSULT TO HOSPITALIST  IP CONSULT TO CARDIOLOGY  IP CONSULT TO CARDIOTHORACIC SURGERY  IP CONSULT TO VASCULAR Deya Greco, NICOLÁS  8/1/2018  12:24 PM     Ajay Lind MD  8/1/2018  1:04 PM

## 2018-07-30 NOTE — PROGRESS NOTES
Labs      07/28/18   0523  07/29/18   0519  07/30/18   0344   WBC  12.7*  12.0*  9.7   HGB  9.3*  8.7*  8.6*   HCT  27.9*  25.8*  25.2*   MCV  92.4  91.9  92.4   PLT  129*  147  174     BMP: Recent Labs      07/28/18   0523  07/29/18   0519  07/30/18   0344   NA  141  136  137   K  4.6  3.9  3.5   CL  105  96*  96*   CO2  28  31  32   BUN  21*  23*  25*   CREATININE  <0.5*  <0.5*  <0.5*     LIVER PROFILE: No results for input(s): AST, ALT, LIPASE, BILIDIR, BILITOT, ALKPHOS in the last 72 hours. Invalid input(s): AMYLASE,  ALB  PT/INR:   Recent Labs      07/28/18   0523  07/29/18   0519  07/30/18   0344   PROTIME  13.7*  13.6*  13.1*   INR  1.20*  1.19*  1.15*       Assessment:  Patient Active Problem List    Diagnosis Date Noted    Lung cancer 07/28/2013     Priority: High    Hyperlipidemia 02/07/2012     Priority: High    HTN (hypertension) 02/07/2012     Priority: High    COPD (chronic obstructive pulmonary disease) 02/07/2012     Priority: High    Hypothyroid 02/07/2012     Priority: High    CAD (coronary artery disease) 02/07/2012     Priority: High    Lumbar spondylosis 10/01/2013     Priority: Medium    Lumbar spinal stenosis 08/08/2013     Priority: Medium    Ascending aortic aneurysm 07/28/2013     Priority: Medium    DDD (degenerative disc disease), lumbosacral 01/10/2013     Priority: Medium    Acute postoperative pain 07/30/2018    S/P CABG x 3     Dyspepsia 07/24/2018    Chest pain 07/24/2018    NSTEMI (non-ST elevated myocardial infarction) ([address]     Trigger middle finger of left hand 03/12/2018    Painful lumpy right breast 01/11/2018    Hemoptysis 12/19/2017       Plan:  1. CAD: s/p CABG. HB seems stable. CTout. Diuresis continues. SR monitor.     Core Measures:  · Discharge instructions:   · LVEF documented:   · ACEI for LV dysfunction:   · Smoking Cessation:    Ajith Velasco MD 7/30/2018 5:21 PM

## 2018-07-30 NOTE — PROGRESS NOTES
1600- incisional care     Chest tube stab sites cleaned with sterile saline and dry sterile drsg applied , stephanie lower ext stab sites well approximated,mid sternal incision well approximated  No drainage noted , continue to monitor for drainage

## 2018-07-30 NOTE — PROGRESS NOTES
glasses at all times  Hearing: Within functional limits       Subjective  General  Chart Reviewed: Yes  Additional Pertinent Hx: Pt admitted on 7/24/18 with CAD. Pt underwent cardiac cath on 7/24 and CABG x 3 on 7/26. Pt extubated on 7/27. H/o brain anuerysm, CAD, COPD, lumbar spondylosis, lung cancer, renal cyst, spinal stenosis, thoracic aortic aneurysm, back surgery, B CTR, R hip surgery, R partial lung removal.  Family / Caregiver Present: No  Referring Practitioner: Dr. Dariana Escudero  Diagnosis: CAD  Subjective  Subjective: Pt sitting in chair and agreeable to therapy. Pt c/o fatigue. Pain Screening  Patient Currently in Pain: Denies     Orientation  Orientation  Overall Orientation Status: Within Normal Limits    Social/Functional History  Social/Functional History  Lives With: Spouse (Daughter, son in law, and 5 grandchildren)  Type of Home: House  Home Layout:  (Lives in basement)  Home Access: Level entry  Bathroom Shower/Tub: Walk-in shower  Bathroom Toilet: Handicap height  Bathroom Equipment: Grab bars in shower, Shower chair  Bathroom Accessibility: Wheelchair accessible  Home Equipment: Cane  ADL Assistance: Independent  Homemaking Assistance: Independent  Ambulation Assistance: Independent  Active : Yes  Type of occupation:  at [employer]     Objective  AROM RLE (degrees)  RLE AROM: WFL  AROM LLE (degrees)  LLE AROM : WFL     Strength BLE  Comment: >3/5 B LE     Bed mobility  Sit to Supine: Stand by assistance     Transfers  Sit to Stand: Contact guard assistance  Stand to sit: Contact guard assistance     Ambulation  Ambulation?: Yes  Ambulation 1  Surface: level tile  Device: Rollator  Other Apparatus: O2 (5 L)  Assistance: Contact guard assistance  Quality of Gait: Slow gait, 2 standing rest breaks, flexed trunk, c/o fatigue  Distance: 150 feet    Assessment   Assessment: Pt is 68 yo F with decreased overall mobility.   Pt cooperative with therapy, but c/o fatigue with all activity. Pt required 2 standing rest breaks. Rec continued skilled therapy at d/c. Will follow. Treatment Diagnosis: Decreased functional mobility related to CAD  Prognosis: Good  Decision Making: Medium Complexity  Patient Education: Role of PT- pt verbalized understanding  REQUIRES PT FOLLOW UP: Yes  Activity Tolerance  Activity Tolerance: Patient limited by fatigue;Patient limited by endurance       Plan   Plan  Times per week: 2-5  Current Treatment Recommendations: Strengthening, Functional Mobility Training, Gait Training, Endurance Training, Safety Education & Training  Safety Devices  Type of devices: Left in bed, Bed alarm in place, Nurse notified, Call light within reach    G-Code  PT G-Codes  Functional Limitation: Mobility: Walking and moving around  Mobility: Walking and Moving Around Current Status (): At least 40 percent but less than 60 percent impaired, limited or restricted  Mobility: Walking and Moving Around Goal Status (): At least 1 percent but less than 20 percent impaired, limited or restricted    AM-PAC Score  AM-PAC Inpatient Mobility Raw Score : 17  AM-PAC Inpatient T-Scale Score : 42.13  Mobility Inpatient CMS 0-100% Score: 50.57  Mobility Inpatient CMS G-Code Modifier : CK    Goals  Short term goals  Time Frame for Short term goals: by discharge  Short term goal 1: Bed mobility with SBA  Short term goal 2: Sit to stand with SBA  Short term goal 3: Pt will ambulate 300 feet with rollator and SBA  Patient Goals   Patient goals : Regain her strength     Therapy Time   Individual Concurrent Group Co-treatment   Time In 0850         Time Out 0930         Minutes 40           Timed Code Treatment Minutes:   25    Total Treatment Minutes:  40     If pt d/c'd prior to next treatment, this note serves as a discharge note.   [place], [address] [facility]

## 2018-07-30 NOTE — CARE COORDINATION
Case Management continuing to follow patient for discharge planning and needs, patient is POD #4 with CABG. Case Management will speak with patient and family today regarding discharge needs. Per earlier  note, patient to need [place] [address] services, will have [facility] follow and confirm with patient and Attending this am.  Please contact Case Management with any questions or concerns. Addendum:  Case Management met with patient and spouse this am at bedside. Patient agreeable to [facility] at discharge, Case Management forwarded referral to [facility] with [facility], beds are available, possible discharge tomorrow, per Attending. No precert needed.     Thank Prisca Lei, CLINT Case Manager  4th Floor Progressive Care Unit  [phone]

## 2018-07-30 NOTE — CONSULTS
Patient: Aziza [facility]  [id number]  Date: 7/30/2018      Chief Complaint: NSTEMI    History of Present Illness/Hospital Course:  Ms Kasia Gauthier is a 69year old female admitted with symptoms of angina, subsequently with enzyme elevations revealing NSTEMI. Cath revealed 3 vessel CAD s/p R coronary PCI with subsequent difficulty deploying the stent and localized dissection with retroperitoneal hematoma at the catheterization site. She then underwent 3 vessel CABG performed by Dr. Gibran Crews. She is now POD4, ambulating 150' CGA.      has a past medical history of Brain aneurysm; CAD (coronary artery disease); COPD (chronic obstructive pulmonary disease) ([facility] [address] Hyperlipidemia; Hypertension; Hypothyroidism; Lumbar spondylosis; Lung cancer ([facility] [address] Renal cyst; Spinal stenosis of lumbar region; and Thoracic aortic aneurysm ([facility] [address] has a past surgical history that includes Lung removal, partial (Right, 04/2010); Colonoscopy (2010); Upper gastrointestinal endoscopy (6/2010); Inguinal hernia repair (Bilateral, 1980s); vascular surgery (Right, 2003); back surgery (1990); Hysterectomy, vaginal (11/19/2014); back surgery (11/2013); Breast lumpectomy (Bilateral, 2000); hip surgery (Right, 05/2014); Salpingo-oophorectomy (Bilateral, 11/19/2014); Colporrhaphy (11/19/2014); Colpopexy (11/19/2014); Carpal tunnel release (Left, 12/22/2016); Carpal tunnel release (Right, 04/06/2017); Urethra dilation; Coronary artery bypass graft (07/26/2018); and pr cabg, vein, single (N/A, 7/26/2018). reports that she quit smoking about 18 years ago. She has a 30.00 pack-year smoking history. She has never used smokeless tobacco. She reports that she drinks about 4.2 oz of alcohol per week . She reports that she does not use drugs. family history includes Dementia in her mother; Heart Failure in her father and mother; High Blood Pressure in her sister; High Cholesterol in her brother and sister;  Other in her sister; Stroke in her mother. REVIEW OF SYSTEMS:   CONSTITUTIONAL: negative for fevers, chills, diaphoresis, activity change, appetite change, fatigue, night sweats and unexpected weight change. EYES: negative for blurred vision, eye discharge, visual disturbance and icterus  HEENT: negative for hearing loss, tinnitus, ear drainage, sinus pressure, nasal congestion, epistaxis and snoring  RESPIRATORY: Negative for hemoptysis, cough, sputum production  CARDIOVASCULAR: negative for chest pain, palpitations, exertional chest pressure/discomfort, edema, syncope  GASTROINTESTINAL: negative for nausea, vomiting, diarrhea, constipation, blood in stool and abdominal pain  GENITOURINARY: negative for frequency, dysuria, urinary incontinence, decreased urine volume, and hematuria  HEMATOLOGIC/LYMPHATIC: negative for easy bruising, bleeding and lymphadenopathy  ALLERGIC/IMMUNOLOGIC: negative for recurrent infections, angioedema, anaphylaxis and drug reactions  ENDOCRINE: negative for weight changes and diabetic symptoms including polyuria, polydipsia and polyphagia  MUSCULOSKELETAL: negative for pain, joint swelling, decreased range of motion and muscle weakness  NEUROLOGICAL: negative for headaches, slurred speech, unilateral weakness  PSYCHIATRIC/BEHAVIORAL: negative for hallucinations, behavioral problems, confusion and agitation.      Physical Examination:  Vitals: Patient Vitals for the past 24 hrs:   BP Temp Temp src Pulse Resp SpO2 Weight   07/30/18 1600 115/64 - - 78 25 - -   07/30/18 1503 - - - - 29 93 % -   07/30/18 1200 (!) 118/106 98.2 °F (36.8 °C) Oral 77 29 96 % -   07/30/18 1113 - - - - 18 98 % -   07/30/18 1100 120/86 - - 68 15 99 % -   07/30/18 1000 121/79 - - 83 21 94 % -   07/30/18 0850 - - - - 22 99 % -   07/30/18 0800 118/79 97.5 °F (36.4 °C) Axillary 81 19 99 % -   07/30/18 0700 - - - - - - 175 lb 0.7 oz (79.4 kg)   07/30/18 0533 - - - - 24 96 % -   07/30/18 0500 (!) 93/56 - - 66 (!) 31 94 % -   07/30/18 0400 129/77 - - 72 26 95 % -   07/30/18 0330 (!) 101/58 98.2 °F (36.8 °C) Oral 68 19 91 % -   07/30/18 0301 - - - - 19 92 % -   07/30/18 0300 - - - 67 22 90 % -   07/30/18 0200 - - - 68 19 96 % -   07/30/18 0100 111/63 - - 67 14 - -   07/30/18 0000 (!) 94/56 - - 67 22 100 % -   07/29/18 2330 - 98.2 °F (36.8 °C) Oral - - - -   07/29/18 2300 126/83 - - 69 25 96 % -   07/29/18 2241 - - - - 24 95 % -   07/29/18 2200 80/61 - - 67 24 100 % -   07/29/18 2100 131/77 - - 87 28 - -   07/29/18 2000 110/74 - - 81 28 94 % -   07/29/18 1930 - 98.3 °F (36.8 °C) Oral - - - -   07/29/18 1900 102/73 - - 87 17 93 % -   07/29/18 1800 123/76 - - 81 - 93 % -   07/29/18 1700 115/76 - - 81 - 93 % -     Mood: Stable  Const: No distress  ENT: Oral mucosa moist  Eyes: No discharge or injection  CV: Regular rate and rhythm, no murmur rub or gallop noted  Resp: Lungs clear to auscultation bilaterally, no rales wheezes or ronchi  GI: Soft, nontender, nondistended. Normal bowel sounds. Neuro: Alert, oriented, appropriate. No cranial nerve deficits appreciated. Skin: No lesions or rashes noted. MSK: No joint abnormalities noted. Lab Results   Component Value Date    WBC 9.7 07/30/2018    HGB 8.6 (L) 07/30/2018    HCT 25.2 (L) 07/30/2018    MCV 92.4 07/30/2018     07/30/2018     Lab Results   Component Value Date    INR 1.15 (H) 07/30/2018    INR 1.19 (H) 07/29/2018    INR 1.20 (H) 07/28/2018    PROTIME 13.1 (H) 07/30/2018    PROTIME 13.6 (H) 07/29/2018    PROTIME 13.7 (H) 07/28/2018     Lab Results   Component Value Date    CREATININE <0.5 (L) 07/30/2018    BUN 25 (H) 07/30/2018     07/30/2018    K 3.5 07/30/2018    CL 96 (L) 07/30/2018    CO2 32 07/30/2018     Lab Results   Component Value Date    ALT 16 07/25/2018    AST 17 07/25/2018    ALKPHOS 61 07/25/2018    BILITOT <0.2 07/25/2018       CT ABDOMEN PELVIS WO CONTRAST       Indication: Abdominal pain. Abdominal fluid on ultrasound.  Recent cardiac catheterization.       Comparison:

## 2018-07-31 LAB
ANION GAP SERPL CALCULATED.3IONS-SCNC: 7 MMOL/L (ref 3–16)
BUN BLDV-MCNC: 18 MG/DL (ref 7–20)
CALCIUM SERPL-MCNC: 9.4 MG/DL (ref 8.3–10.6)
CHLORIDE BLD-SCNC: 101 MMOL/L (ref 99–110)
CO2: 32 MMOL/L (ref 21–32)
CREAT SERPL-MCNC: <0.5 MG/DL (ref 0.6–1.2)
GFR AFRICAN AMERICAN: >60
GFR NON-AFRICAN AMERICAN: >60
GLUCOSE BLD-MCNC: 103 MG/DL (ref 70–99)
HCT VFR BLD CALC: 27.5 % (ref 36–48)
HEMOGLOBIN: 9.3 G/DL (ref 12–16)
MAGNESIUM: 2.6 MG/DL (ref 1.8–2.4)
MCH RBC QN AUTO: 31.6 PG (ref 26–34)
MCHC RBC AUTO-ENTMCNC: 33.9 G/DL (ref 31–36)
MCV RBC AUTO: 93.2 FL (ref 80–100)
PDW BLD-RTO: 15.9 % (ref 12.4–15.4)
PLATELET # BLD: 243 K/UL (ref 135–450)
PMV BLD AUTO: 7.9 FL (ref 5–10.5)
POTASSIUM SERPL-SCNC: 4.5 MMOL/L (ref 3.5–5.1)
RBC # BLD: 2.95 M/UL (ref 4–5.2)
SODIUM BLD-SCNC: 140 MMOL/L (ref 136–145)
WBC # BLD: 10.6 K/UL (ref 4–11)

## 2018-07-31 PROCEDURE — 94761 N-INVAS EAR/PLS OXIMETRY MLT: CPT

## 2018-07-31 PROCEDURE — 99024 POSTOP FOLLOW-UP VISIT: CPT | Performed by: THORACIC SURGERY (CARDIOTHORACIC VASCULAR SURGERY)

## 2018-07-31 PROCEDURE — 6370000000 HC RX 637 (ALT 250 FOR IP): Performed by: THORACIC SURGERY (CARDIOTHORACIC VASCULAR SURGERY)

## 2018-07-31 PROCEDURE — 6360000002 HC RX W HCPCS: Performed by: INTERNAL MEDICINE

## 2018-07-31 PROCEDURE — 6370000000 HC RX 637 (ALT 250 FOR IP): Performed by: CLINICAL NURSE SPECIALIST

## 2018-07-31 PROCEDURE — 2580000003 HC RX 258: Performed by: THORACIC SURGERY (CARDIOTHORACIC VASCULAR SURGERY)

## 2018-07-31 PROCEDURE — 94640 AIRWAY INHALATION TREATMENT: CPT

## 2018-07-31 PROCEDURE — 99233 SBSQ HOSP IP/OBS HIGH 50: CPT | Performed by: INTERNAL MEDICINE

## 2018-07-31 PROCEDURE — 6360000002 HC RX W HCPCS: Performed by: THORACIC SURGERY (CARDIOTHORACIC VASCULAR SURGERY)

## 2018-07-31 PROCEDURE — 85027 COMPLETE CBC AUTOMATED: CPT

## 2018-07-31 PROCEDURE — 2700000000 HC OXYGEN THERAPY PER DAY

## 2018-07-31 PROCEDURE — 94667 MNPJ CHEST WALL 1ST: CPT

## 2018-07-31 PROCEDURE — 94150 VITAL CAPACITY TEST: CPT

## 2018-07-31 PROCEDURE — 94664 DEMO&/EVAL PT USE INHALER: CPT

## 2018-07-31 PROCEDURE — 2000000000 HC ICU R&B

## 2018-07-31 PROCEDURE — 83735 ASSAY OF MAGNESIUM: CPT

## 2018-07-31 PROCEDURE — 80048 BASIC METABOLIC PNL TOTAL CA: CPT

## 2018-07-31 RX ORDER — LORAZEPAM 0.5 MG/1
0.5 TABLET ORAL EVERY 8 HOURS PRN
Status: DISCONTINUED | OUTPATIENT
Start: 2018-07-31 | End: 2018-08-01 | Stop reason: HOSPADM

## 2018-07-31 RX ORDER — POTASSIUM CHLORIDE 20 MEQ/1
20 TABLET, EXTENDED RELEASE ORAL DAILY
Status: DISCONTINUED | OUTPATIENT
Start: 2018-07-31 | End: 2018-08-01 | Stop reason: HOSPADM

## 2018-07-31 RX ADMIN — Medication 15 ML: at 08:19

## 2018-07-31 RX ADMIN — Medication 2 PUFF: at 11:17

## 2018-07-31 RX ADMIN — ONDANSETRON 4 MG: 2 INJECTION INTRAMUSCULAR; INTRAVENOUS at 16:40

## 2018-07-31 RX ADMIN — FAMOTIDINE 20 MG: 20 TABLET ORAL at 20:56

## 2018-07-31 RX ADMIN — Medication 10 ML: at 08:31

## 2018-07-31 RX ADMIN — FUROSEMIDE 40 MG: 40 TABLET ORAL at 08:19

## 2018-07-31 RX ADMIN — LEVOTHYROXINE SODIUM 137 MCG: 25 TABLET ORAL at 08:18

## 2018-07-31 RX ADMIN — AMIODARONE HYDROCHLORIDE 400 MG: 200 TABLET ORAL at 08:18

## 2018-07-31 RX ADMIN — ACETAMINOPHEN 650 MG: 325 TABLET, FILM COATED ORAL at 05:46

## 2018-07-31 RX ADMIN — AMIODARONE HYDROCHLORIDE 400 MG: 200 TABLET ORAL at 20:56

## 2018-07-31 RX ADMIN — IPRATROPIUM BROMIDE 0.5 MG: 0.5 SOLUTION RESPIRATORY (INHALATION) at 23:05

## 2018-07-31 RX ADMIN — POTASSIUM CHLORIDE 20 MEQ: 20 TABLET, EXTENDED RELEASE ORAL at 08:18

## 2018-07-31 RX ADMIN — FAMOTIDINE 20 MG: 20 TABLET ORAL at 08:19

## 2018-07-31 RX ADMIN — Medication 2 PUFF: at 20:36

## 2018-07-31 RX ADMIN — ACETAMINOPHEN 650 MG: 325 TABLET, FILM COATED ORAL at 20:59

## 2018-07-31 RX ADMIN — METOPROLOL TARTRATE 25 MG: 25 TABLET ORAL at 20:56

## 2018-07-31 RX ADMIN — IPRATROPIUM BROMIDE 0.5 MG: 0.5 SOLUTION RESPIRATORY (INHALATION) at 18:08

## 2018-07-31 RX ADMIN — IPRATROPIUM BROMIDE 0.5 MG: 0.5 SOLUTION RESPIRATORY (INHALATION) at 04:42

## 2018-07-31 RX ADMIN — DOCUSATE SODIUM 100 MG: 100 CAPSULE, LIQUID FILLED ORAL at 08:18

## 2018-07-31 RX ADMIN — Medication 10 ML: at 20:56

## 2018-07-31 RX ADMIN — Medication 15 ML: at 20:56

## 2018-07-31 RX ADMIN — DOCUSATE SODIUM 100 MG: 100 CAPSULE, LIQUID FILLED ORAL at 20:56

## 2018-07-31 RX ADMIN — Medication 400 MG: at 08:18

## 2018-07-31 RX ADMIN — LORAZEPAM 0.5 MG: 0.5 TABLET ORAL at 20:56

## 2018-07-31 RX ADMIN — IPRATROPIUM BROMIDE 0.5 MG: 0.5 SOLUTION RESPIRATORY (INHALATION) at 11:15

## 2018-07-31 RX ADMIN — IPRATROPIUM BROMIDE 0.5 MG: 0.5 SOLUTION RESPIRATORY (INHALATION) at 00:02

## 2018-07-31 RX ADMIN — FONDAPARINUX SODIUM 2.5 MG: 2.5 INJECTION, SOLUTION SUBCUTANEOUS at 08:31

## 2018-07-31 RX ADMIN — ASPIRIN 325 MG: 325 TABLET, DELAYED RELEASE ORAL at 08:19

## 2018-07-31 RX ADMIN — ATORVASTATIN CALCIUM 40 MG: 40 TABLET, FILM COATED ORAL at 20:56

## 2018-07-31 RX ADMIN — ALPRAZOLAM 0.25 MG: 0.25 TABLET ORAL at 11:48

## 2018-07-31 RX ADMIN — METOPROLOL TARTRATE 25 MG: 25 TABLET ORAL at 08:18

## 2018-07-31 ASSESSMENT — PAIN SCALES - GENERAL
PAINLEVEL_OUTOF10: 0
PAINLEVEL_OUTOF10: 4
PAINLEVEL_OUTOF10: 3
PAINLEVEL_OUTOF10: 0
PAINLEVEL_OUTOF10: 0

## 2018-07-31 NOTE — PROGRESS NOTES
Daria Sigala  7/31/2018  [id number]    Chief Complaint: NSTEMI (non-ST elevated myocardial infarction) [facility])    Subjective:   No new issues; still would like to do rehab in house. ROS: No n/v  Objective:  Patient Vitals for the past 24 hrs:   BP Temp Temp src Pulse Resp SpO2 Weight   07/31/18 1400 113/68 - - 82 20 96 % -   07/31/18 1300 116/70 - - 83 25 94 % -   07/31/18 1200 127/88 98.2 °F (36.8 °C) Oral 79 (!) 36 95 % -   07/31/18 1119 - - - - - 91 % -   07/31/18 0900 (!) 95/59 - - 77 (!) 34 97 % -   07/31/18 0800 124/76 97.6 °F (36.4 °C) Oral 80 28 98 % -   07/31/18 0700 - - - - - - 175 lb 7.8 oz (79.6 kg)   07/31/18 0600 111/70 - - 81 (!) 38 100 % -   07/31/18 0500 97/65 - - 75 (!) 31 96 % -   07/31/18 0442 - - - - 28 99 % -   07/31/18 0400 133/80 - - 69 26 99 % -   07/31/18 0330 - 98 °F (36.7 °C) Oral - - - -   07/31/18 0300 130/68 - - 70 28 100 % -   07/31/18 0200 101/62 - - 74 22 100 % -   07/31/18 0100 (!) 124/97 - - 71 21 99 % -   07/31/18 0002 - - - - 20 100 % -   07/31/18 0000 (!) 126/94 - - 71 (!) 33 100 % -   07/30/18 2330 - 98.1 °F (36.7 °C) Oral - - - -   07/30/18 2300 128/75 - - 72 26 100 % -   07/30/18 2200 95/63 - - 76 23 - -   07/30/18 2100 122/81 - - 85 26 100 % -   07/30/18 2007 - - - - 30 98 % -   07/30/18 2000 133/74 - - 94 25 97 % -   07/30/18 1930 - 98.3 °F (36.8 °C) Oral 91 27 (!) 85 % -   07/30/18 1900 128/81 - - 87 24 (!) 76 % -   07/30/18 1800 111/76 - - 85 24 95 % -   07/30/18 1700 (!) 140/76 - - 80 (!) 33 93 % -   07/30/18 1600 115/64 - - 78 25 - -     Gen: No distress, pleasant. HEENT: Normocephalic, atraumatic. CV: Regular rate and rhythm. Resp: No respiratory distress. Abd: Soft, nontender   Ext: No edema. Neuro: Alert, oriented, appropriately interactive.    Wt Readings from Last 3 Encounters:   07/31/18 175 lb 7.8 oz (79.6 kg)   07/24/18 167 lb (75.8 kg)   05/09/18 168 lb (76.2 kg)       Laboratory data:   Lab Results   Component Value Date    WBC 10.6 07/31/2018 HGB 9.3 (L) 07/31/2018    HCT 27.5 (L) 07/31/2018    MCV 93.2 07/31/2018     07/31/2018       Lab Results   Component Value Date     07/31/2018    K 4.5 07/31/2018    K 3.5 07/24/2018     07/31/2018    CO2 32 07/31/2018    BUN 18 07/31/2018    CREATININE <0.5 07/31/2018    GLUCOSE 103 07/31/2018    GLUCOSE 97 02/07/2012    CALCIUM 9.4 07/31/2018        Therapy progress:  PT  Position Activity Restriction  Other position/activity restrictions: Up as tolerated  Objective     Sit to Stand: Contact guard assistance  Stand to sit: Contact guard assistance  Device: Rollator  Other Apparatus: O2 (5 L)  Assistance: Contact guard assistance  Distance: 150 feet  OT  PT Equipment Recommendations  Equipment Needed: No     Assessment        SLP                Body mass index is 32.1 kg/m². Assessment:  1. CAD s/p CABG x 3   2. Groin hematoma   3. Hypothyroidism       Recommendations:  Plan for admission to [facility] [facility].  Lorena Bates MD 7/31/2018, 3:04 PM

## 2018-07-31 NOTE — PROGRESS NOTES
1340- airway      Attempting to wean oxygen presently at 3.5 l/min , with % sat maintaining 92- 94 % continue to monitor and wean as warranted

## 2018-07-31 NOTE — PROGRESS NOTES
Nutrition Assessment    Type and Reason for Visit: Initial (LOS x7 days)    Nutrition Recommendations:     1. Cardiac diet with 2000 ml fluid restriction per MD orders, encourage intake  2. Monitor nutritional adequacy and clinical progress  3. Monitor cardiac diet edu needs    Malnutrition Assessment:  · Malnutrition Status: Insufficient data  · Context: Acute illness or injury  · Findings of the 6 clinical characteristics of malnutrition (Minimum of 2 out of 6 clinical characteristics is required to make the diagnosis of moderate or severe Protein Calorie Malnutrition based on AND/ASPEN Guidelines):  1. Energy Intake-Less than or equal to 75%, greater than or equal to 5 days    2. Weight Loss-No significant weight loss,    3. Fat Loss-Unable to assess,    4. Muscle Loss-Unable to assess,    5. Fluid Accumulation-No significant fluid accumulation,    6.  Strength-Not measured    Nutrition Diagnosis:   · Problem: Inadequate oral intake  · Etiology: related to Acute injury/trauma     Signs and symptoms:  as evidenced by Intake 0-25%, Intake 25-50%    Nutrition Assessment:  · Subjective Assessment: Pt admitted with symptoms of angina, subsequently with enzyme elevations revealing NSTEMI. She underwent 3 vessel CABG. She is now POD5, ambulating 150' CGA. She is tolerating cardiac diet with 2000 ml fluid restriction, intake improving with 26-50% of each meal consumed. Pt napping this aftenoon. Will f/u to assess edu needs.   · Nutrition-Focused Physical Findings: +BM 7/30  · Wound Type: Surgical Wound  · Current Nutrition Therapies:  · Oral Diet Orders: Cardiac   · Oral Diet intake: 1-25%, 26-50%  · Oral Nutrition Supplement (ONS) Orders: None  · Anthropometric Measures:  · Ht: 5' 2\" (157.5 cm)   · Current Body Wt: 175 lb (79.4 kg)  · Admission Body Wt: 177 lb (80.3 kg)  · Usual Body Wt: 170 lb (77.1 kg)  · Ideal Body Wt: 110 lb (49.9 kg),     · BMI Classification: BMI 30.0 - 34.9 Obese Class I  · Comparative Standards (Estimated Nutrition Needs):  · Estimated Daily Total Kcal: 8845-5663  · Estimated Daily Protein (g): 65-75    Estimated Intake vs Estimated Needs: Intake Less Than Needs    Nutrition Risk Level: Moderate    Nutrition Interventions:   Continue current diet  Continued Inpatient Monitoring    Nutrition Evaluation:   · Evaluation:     · Goals: Patient will consume 50% or greater of all meals    · Monitoring: Meal Intake, Diet Tolerance, Pertinent Labs, Patient/Family Education    See Adult Nutrition Doc Flowsheet for more detail.      Electronically signed by Jordyn Quevedo RD, LD on 7/31/18 at 2:14 PM    Contact Number: [phone]

## 2018-07-31 NOTE — PROGRESS NOTES
Subcutaneous Daily    aspirin  325 mg Oral Daily     DATA REVIEW:   CBC:   Recent Labs      07/29/18   0519 07/30/18   0344  07/31/18   0425   WBC  12.0*  9.7  10.6   HGB  8.7*  8.6*  9.3*   HCT  25.8*  25.2*  27.5*   MCV  91.9  92.4  93.2   PLT  147  174  243     BMP:   Recent Labs      07/29/18   0519 07/30/18   0344  07/31/18   0426   NA  136  137  140   K  3.9  3.5  4.5   CL  96*  96*  101   CO2  31  32  32   GLUCOSE  124*  111*  103*   BUN  23*  25*  18   CREATININE  <0.5*  <0.5*  <0.5*   CALCIUM  8.9  8.8  9.4   MG  2.30  2.40  2.60*     Accucheck Glucoses:   Recent Labs      07/29/18   0953  07/29/18   1802  07/29/18 2018 07/30/18   1157  07/30/18   1615   POCGLU  111*  113*  120*  124*  90     PT/INR:   Recent Labs      07/29/18 0519 07/30/18   0344   PROTIME  13.6*  13.1*   INR  1.19*  1.15*     ABG:    Lab Results   Component Value Date    PHART 7.438 07/27/2018    PO2ART 64.4 07/27/2018    NGT7HBJ 44.6 07/27/2018    D0DAOYEQ 93 07/27/2018    RPX8WIK 32 07/27/2018    PXB3WUL 30.2 07/27/2018    BEART 6 07/27/2018     ASSESSMENT:   Patient Active Problem List:     Hyperlipidemia     HTN (hypertension)     COPD (chronic obstructive pulmonary disease)     Hypothyroid     CAD (coronary artery disease)     Ascending aortic aneurysm     Lung cancer     Lumbar spinal stenosis     Lumbar spondylosis     DDD (degenerative disc disease), lumbosacral     Trigger middle finger of left hand     Painful lumpy right breast     Hemoptysis     Dyspepsia     Chest pain     NSTEMI (non-ST elevated myocardial infarction) ([facility])     S/P CABG x 3    Neuro: awake, alert and oriented x 3  CV:   Sinus  Pulm:  Diminished, few exp wheezes using IS to 500 ml  Abd:  soft, non-tender; bowel sounds normal; passing flatus, +formed BM  Wounds: clean, dry and intact  Ext: warm    PLAN:   · CV - on BB, statin, ASA   - continue Amiodarone for periop AF   - dc ace inhibitor for marginal BP  · Pulm - wean O2 as tolerated, continue

## 2018-07-31 NOTE — PROGRESS NOTES
Occupational Therapy    OT order received, chart reviewed, attempted OT but pt declined. She stated she just got back to bed and needed to take a nap. Will follow up later today vs 8/1 as schedule allows. Above reported to Eva Peck, OTR/L [id number]

## 2018-07-31 NOTE — CARE COORDINATION
Case Management continuing to follow patient for discharge planning and needs. Per conversation with Arik Pa in admissions with [facility], patient was accepted, will not need Precert can transfer to ARU once medically stable, beds are available. Case Management notified CT Surgery as well. Please contact Case Management with any questions or concerns. Addendum: Per Arik Pa with [facility], bed available, but another patient is now to be placed in that bed. ARU bed will be available tomorrow, patient may discharge to ARU tomorrow, if improved patient will discharge home with [facility] services with [facility].     Thank Man Cheek RN Case Manager  4th Floor Progressive Care Unit  [phone]

## 2018-08-01 ENCOUNTER — HOSPITAL ENCOUNTER (INPATIENT)
Age: 68
LOS: 10 days | Discharge: HOME HEALTH | DRG: 949 | End: 2018-08-11
Attending: PHYSICAL MEDICINE & REHABILITATION | Admitting: PHYSICAL MEDICINE & REHABILITATION
Payer: MEDICARE

## 2018-08-01 VITALS
OXYGEN SATURATION: 93 % | DIASTOLIC BLOOD PRESSURE: 83 MMHG | WEIGHT: 173.72 LBS | RESPIRATION RATE: 26 BRPM | HEIGHT: 62 IN | TEMPERATURE: 98.1 F | SYSTOLIC BLOOD PRESSURE: 142 MMHG | HEART RATE: 90 BPM | BODY MASS INDEX: 31.97 KG/M2

## 2018-08-01 DIAGNOSIS — J44.9 COPD (CHRONIC OBSTRUCTIVE PULMONARY DISEASE) (HCC): ICD-10-CM

## 2018-08-01 DIAGNOSIS — E03.9 HYPOTHYROID: ICD-10-CM

## 2018-08-01 DIAGNOSIS — I25.10 CAD (CORONARY ARTERY DISEASE): ICD-10-CM

## 2018-08-01 DIAGNOSIS — E78.5 HYPERLIPIDEMIA: ICD-10-CM

## 2018-08-01 DIAGNOSIS — I10 HTN (HYPERTENSION): ICD-10-CM

## 2018-08-01 DIAGNOSIS — I71.21 ASCENDING AORTIC ANEURYSM: ICD-10-CM

## 2018-08-01 LAB
ANION GAP SERPL CALCULATED.3IONS-SCNC: 11 MMOL/L (ref 3–16)
BASE EXCESS ARTERIAL: 7 (ref -3–3)
BUN BLDV-MCNC: 16 MG/DL (ref 7–20)
CALCIUM SERPL-MCNC: 9.3 MG/DL (ref 8.3–10.6)
CHLORIDE BLD-SCNC: 98 MMOL/L (ref 99–110)
CO2: 30 MMOL/L (ref 21–32)
CREAT SERPL-MCNC: <0.5 MG/DL (ref 0.6–1.2)
GFR AFRICAN AMERICAN: >60
GFR NON-AFRICAN AMERICAN: >60
GLUCOSE BLD-MCNC: 113 MG/DL (ref 70–99)
HCO3 ARTERIAL: 31.3 MMOL/L (ref 21–29)
HCT VFR BLD CALC: 28 % (ref 36–48)
HEMOGLOBIN: 9.3 G/DL (ref 12–16)
MAGNESIUM: 2.3 MG/DL (ref 1.8–2.4)
MCH RBC QN AUTO: 31.2 PG (ref 26–34)
MCHC RBC AUTO-ENTMCNC: 33.2 G/DL (ref 31–36)
MCV RBC AUTO: 93.9 FL (ref 80–100)
O2 SAT, ARTERIAL: 95 % (ref 93–100)
PCO2 ARTERIAL: 46.7 MM HG (ref 35–45)
PDW BLD-RTO: 15.8 % (ref 12.4–15.4)
PERFORMED ON: ABNORMAL
PH ARTERIAL: 7.43 (ref 7.35–7.45)
PLATELET # BLD: 274 K/UL (ref 135–450)
PMV BLD AUTO: 7.7 FL (ref 5–10.5)
PO2 ARTERIAL: 75 MM HG (ref 75–108)
POC SAMPLE TYPE: ABNORMAL
POTASSIUM SERPL-SCNC: 3.8 MMOL/L (ref 3.5–5.1)
RBC # BLD: 2.98 M/UL (ref 4–5.2)
SODIUM BLD-SCNC: 139 MMOL/L (ref 136–145)
TCO2 ARTERIAL: 33 MMOL/L
WBC # BLD: 11.6 K/UL (ref 4–11)

## 2018-08-01 PROCEDURE — 6370000000 HC RX 637 (ALT 250 FOR IP): Performed by: CLINICAL NURSE SPECIALIST

## 2018-08-01 PROCEDURE — 6370000000 HC RX 637 (ALT 250 FOR IP): Performed by: PHYSICAL MEDICINE & REHABILITATION

## 2018-08-01 PROCEDURE — 36592 COLLECT BLOOD FROM PICC: CPT

## 2018-08-01 PROCEDURE — 97530 THERAPEUTIC ACTIVITIES: CPT

## 2018-08-01 PROCEDURE — 97166 OT EVAL MOD COMPLEX 45 MIN: CPT

## 2018-08-01 PROCEDURE — 97116 GAIT TRAINING THERAPY: CPT

## 2018-08-01 PROCEDURE — 97535 SELF CARE MNGMENT TRAINING: CPT

## 2018-08-01 PROCEDURE — 6370000000 HC RX 637 (ALT 250 FOR IP): Performed by: THORACIC SURGERY (CARDIOTHORACIC VASCULAR SURGERY)

## 2018-08-01 PROCEDURE — 94761 N-INVAS EAR/PLS OXIMETRY MLT: CPT

## 2018-08-01 PROCEDURE — 6360000002 HC RX W HCPCS: Performed by: THORACIC SURGERY (CARDIOTHORACIC VASCULAR SURGERY)

## 2018-08-01 PROCEDURE — 94150 VITAL CAPACITY TEST: CPT

## 2018-08-01 PROCEDURE — 80048 BASIC METABOLIC PNL TOTAL CA: CPT

## 2018-08-01 PROCEDURE — G8987 SELF CARE CURRENT STATUS: HCPCS

## 2018-08-01 PROCEDURE — 85027 COMPLETE CBC AUTOMATED: CPT

## 2018-08-01 PROCEDURE — 2700000000 HC OXYGEN THERAPY PER DAY

## 2018-08-01 PROCEDURE — 83735 ASSAY OF MAGNESIUM: CPT

## 2018-08-01 PROCEDURE — 94640 AIRWAY INHALATION TREATMENT: CPT

## 2018-08-01 PROCEDURE — G8988 SELF CARE GOAL STATUS: HCPCS

## 2018-08-01 PROCEDURE — 6370000000 HC RX 637 (ALT 250 FOR IP): Performed by: INTERNAL MEDICINE

## 2018-08-01 PROCEDURE — 82803 BLOOD GASES ANY COMBINATION: CPT

## 2018-08-01 PROCEDURE — 1280000000 HC REHAB R&B

## 2018-08-01 PROCEDURE — 94664 DEMO&/EVAL PT USE INHALER: CPT

## 2018-08-01 PROCEDURE — 6360000002 HC RX W HCPCS: Performed by: INTERNAL MEDICINE

## 2018-08-01 PROCEDURE — 36600 WITHDRAWAL OF ARTERIAL BLOOD: CPT

## 2018-08-01 RX ORDER — TRAZODONE HYDROCHLORIDE 50 MG/1
50 TABLET ORAL NIGHTLY PRN
Status: CANCELLED | OUTPATIENT
Start: 2018-08-01

## 2018-08-01 RX ORDER — CHLORHEXIDINE GLUCONATE 0.12 MG/ML
15 RINSE ORAL 2 TIMES DAILY
Status: CANCELLED | OUTPATIENT
Start: 2018-08-01

## 2018-08-01 RX ORDER — ALBUTEROL SULFATE 90 UG/1
2 AEROSOL, METERED RESPIRATORY (INHALATION) EVERY 6 HOURS PRN
Status: CANCELLED | OUTPATIENT
Start: 2018-08-01

## 2018-08-01 RX ORDER — POTASSIUM CHLORIDE 20 MEQ/1
20 TABLET, EXTENDED RELEASE ORAL DAILY
Status: CANCELLED | OUTPATIENT
Start: 2018-08-02

## 2018-08-01 RX ORDER — AMIODARONE HYDROCHLORIDE 200 MG/1
200 TABLET ORAL DAILY
Qty: 30 TABLET | Refills: 0 | Status: ON HOLD | DISCHARGE
Start: 2018-08-01 | End: 2018-08-10 | Stop reason: HOSPADM

## 2018-08-01 RX ORDER — ACETAMINOPHEN 325 MG/1
650 TABLET ORAL EVERY 4 HOURS PRN
Status: DISCONTINUED | OUTPATIENT
Start: 2018-08-01 | End: 2018-08-11 | Stop reason: HOSPADM

## 2018-08-01 RX ORDER — AMIODARONE HYDROCHLORIDE 200 MG/1
200 TABLET ORAL DAILY
Qty: 30 TABLET | Refills: 3 | Status: CANCELLED | DISCHARGE
Start: 2018-08-03

## 2018-08-01 RX ORDER — FUROSEMIDE 40 MG/1
40 TABLET ORAL DAILY
Status: DISCONTINUED | OUTPATIENT
Start: 2018-08-02 | End: 2018-08-11 | Stop reason: HOSPADM

## 2018-08-01 RX ORDER — MAGNESIUM SULFATE IN WATER 40 MG/ML
2 INJECTION, SOLUTION INTRAVENOUS PRN
Status: DISCONTINUED | OUTPATIENT
Start: 2018-08-01 | End: 2018-08-11 | Stop reason: HOSPADM

## 2018-08-01 RX ORDER — LORAZEPAM 0.5 MG/1
0.5 TABLET ORAL EVERY 8 HOURS PRN
Qty: 20 TABLET | Refills: 0 | Status: ON HOLD | OUTPATIENT
Start: 2018-08-01 | End: 2018-08-10 | Stop reason: HOSPADM

## 2018-08-01 RX ORDER — FUROSEMIDE 40 MG/1
40 TABLET ORAL DAILY
Status: DISCONTINUED | OUTPATIENT
Start: 2018-08-01 | End: 2018-08-01 | Stop reason: HOSPADM

## 2018-08-01 RX ORDER — AMIODARONE HYDROCHLORIDE 200 MG/1
200 TABLET ORAL DAILY
Status: CANCELLED | OUTPATIENT
Start: 2018-08-02

## 2018-08-01 RX ORDER — METOCLOPRAMIDE HYDROCHLORIDE 5 MG/ML
10 INJECTION INTRAMUSCULAR; INTRAVENOUS EVERY 6 HOURS PRN
Status: DISCONTINUED | OUTPATIENT
Start: 2018-08-01 | End: 2018-08-11 | Stop reason: HOSPADM

## 2018-08-01 RX ORDER — ALBUTEROL SULFATE 90 UG/1
2 AEROSOL, METERED RESPIRATORY (INHALATION) EVERY 6 HOURS PRN
Status: DISCONTINUED | OUTPATIENT
Start: 2018-08-01 | End: 2018-08-01 | Stop reason: HOSPADM

## 2018-08-01 RX ORDER — DOCUSATE SODIUM 100 MG/1
100 CAPSULE, LIQUID FILLED ORAL 2 TIMES DAILY
Status: CANCELLED | OUTPATIENT
Start: 2018-08-01

## 2018-08-01 RX ORDER — ALBUTEROL SULFATE 90 UG/1
2 AEROSOL, METERED RESPIRATORY (INHALATION) 4 TIMES DAILY
Status: DISCONTINUED | OUTPATIENT
Start: 2018-08-01 | End: 2018-08-01 | Stop reason: HOSPADM

## 2018-08-01 RX ORDER — ALBUTEROL SULFATE 90 UG/1
2 AEROSOL, METERED RESPIRATORY (INHALATION) EVERY 6 HOURS PRN
Status: DISCONTINUED | OUTPATIENT
Start: 2018-08-01 | End: 2018-08-02

## 2018-08-01 RX ORDER — CHLORHEXIDINE GLUCONATE 0.12 MG/ML
15 RINSE ORAL 2 TIMES DAILY
Status: DISCONTINUED | OUTPATIENT
Start: 2018-08-01 | End: 2018-08-11 | Stop reason: HOSPADM

## 2018-08-01 RX ORDER — DOCUSATE SODIUM 100 MG/1
100 CAPSULE, LIQUID FILLED ORAL 2 TIMES DAILY
Status: DISCONTINUED | OUTPATIENT
Start: 2018-08-01 | End: 2018-08-04

## 2018-08-01 RX ORDER — MAGNESIUM SULFATE IN WATER 40 MG/ML
2 INJECTION, SOLUTION INTRAVENOUS PRN
Status: CANCELLED | OUTPATIENT
Start: 2018-08-01

## 2018-08-01 RX ORDER — SODIUM CHLORIDE 0.9 % (FLUSH) 0.9 %
10 SYRINGE (ML) INJECTION EVERY 12 HOURS SCHEDULED
Status: DISCONTINUED | OUTPATIENT
Start: 2018-08-02 | End: 2018-08-04

## 2018-08-01 RX ORDER — AMIODARONE HYDROCHLORIDE 200 MG/1
200 TABLET ORAL DAILY
Status: DISCONTINUED | OUTPATIENT
Start: 2018-08-02 | End: 2018-08-11 | Stop reason: HOSPADM

## 2018-08-01 RX ORDER — SODIUM CHLORIDE 0.9 % (FLUSH) 0.9 %
10 SYRINGE (ML) INJECTION PRN
Status: CANCELLED | OUTPATIENT
Start: 2018-08-01

## 2018-08-01 RX ORDER — DOCUSATE SODIUM 100 MG/1
100 CAPSULE, LIQUID FILLED ORAL 2 TIMES DAILY
Status: DISCONTINUED | OUTPATIENT
Start: 2018-08-01 | End: 2018-08-01 | Stop reason: SDUPTHER

## 2018-08-01 RX ORDER — SODIUM CHLORIDE 0.9 % (FLUSH) 0.9 %
10 SYRINGE (ML) INJECTION PRN
Status: DISCONTINUED | OUTPATIENT
Start: 2018-08-01 | End: 2018-08-03

## 2018-08-01 RX ORDER — LACTULOSE 10 G/15ML
10 SOLUTION ORAL 2 TIMES DAILY PRN
Status: CANCELLED | OUTPATIENT
Start: 2018-08-01

## 2018-08-01 RX ORDER — TRAMADOL HYDROCHLORIDE 50 MG/1
50 TABLET ORAL EVERY 6 HOURS PRN
Status: DISCONTINUED | OUTPATIENT
Start: 2018-08-01 | End: 2018-08-03

## 2018-08-01 RX ORDER — FONDAPARINUX SODIUM 2.5 MG/.5ML
2.5 INJECTION SUBCUTANEOUS DAILY
Status: CANCELLED | OUTPATIENT
Start: 2018-08-02

## 2018-08-01 RX ORDER — LORAZEPAM 0.5 MG/1
0.5 TABLET ORAL EVERY 8 HOURS PRN
Status: CANCELLED | OUTPATIENT
Start: 2018-08-01

## 2018-08-01 RX ORDER — FAMOTIDINE 20 MG/1
20 TABLET, FILM COATED ORAL 2 TIMES DAILY
Status: CANCELLED | OUTPATIENT
Start: 2018-08-01

## 2018-08-01 RX ORDER — TRAMADOL HYDROCHLORIDE 50 MG/1
50 TABLET ORAL EVERY 6 HOURS PRN
Status: CANCELLED | OUTPATIENT
Start: 2018-08-01

## 2018-08-01 RX ORDER — BENAZEPRIL/HYDROCHLOROTHIAZIDE 20 MG-25MG
1 TABLET ORAL
Status: ON HOLD | COMMUNITY
End: 2018-08-10 | Stop reason: HOSPADM

## 2018-08-01 RX ORDER — LACTULOSE 10 G/15ML
10 SOLUTION ORAL 2 TIMES DAILY PRN
Status: DISCONTINUED | OUTPATIENT
Start: 2018-08-01 | End: 2018-08-11 | Stop reason: HOSPADM

## 2018-08-01 RX ORDER — AMIODARONE HYDROCHLORIDE 200 MG/1
200 TABLET ORAL DAILY
Status: DISCONTINUED | OUTPATIENT
Start: 2018-08-01 | End: 2018-08-01 | Stop reason: HOSPADM

## 2018-08-01 RX ORDER — LORAZEPAM 0.5 MG/1
0.5 TABLET ORAL EVERY 8 HOURS PRN
Status: DISCONTINUED | OUTPATIENT
Start: 2018-08-01 | End: 2018-08-11 | Stop reason: HOSPADM

## 2018-08-01 RX ORDER — ALBUTEROL SULFATE 90 UG/1
2 AEROSOL, METERED RESPIRATORY (INHALATION) 4 TIMES DAILY
Status: DISCONTINUED | OUTPATIENT
Start: 2018-08-01 | End: 2018-08-03

## 2018-08-01 RX ORDER — METOCLOPRAMIDE HYDROCHLORIDE 5 MG/ML
10 INJECTION INTRAMUSCULAR; INTRAVENOUS EVERY 6 HOURS PRN
Status: CANCELLED | OUTPATIENT
Start: 2018-08-01

## 2018-08-01 RX ORDER — FONDAPARINUX SODIUM 2.5 MG/.5ML
2.5 INJECTION SUBCUTANEOUS DAILY
Status: DISCONTINUED | OUTPATIENT
Start: 2018-08-02 | End: 2018-08-11 | Stop reason: HOSPADM

## 2018-08-01 RX ORDER — ATORVASTATIN CALCIUM 40 MG/1
40 TABLET, FILM COATED ORAL NIGHTLY
Status: DISCONTINUED | OUTPATIENT
Start: 2018-08-01 | End: 2018-08-11 | Stop reason: HOSPADM

## 2018-08-01 RX ORDER — FUROSEMIDE 40 MG/1
40 TABLET ORAL DAILY
Status: CANCELLED | OUTPATIENT
Start: 2018-08-02

## 2018-08-01 RX ORDER — ACETAMINOPHEN 325 MG/1
650 TABLET ORAL EVERY 4 HOURS PRN
Qty: 120 TABLET | Refills: 3 | DISCHARGE
Start: 2018-08-01

## 2018-08-01 RX ORDER — ALBUTEROL SULFATE 90 UG/1
2 AEROSOL, METERED RESPIRATORY (INHALATION) 4 TIMES DAILY
Qty: 1 INHALER | Refills: 3 | DISCHARGE
Start: 2018-08-01 | End: 2018-08-13

## 2018-08-01 RX ORDER — UREA 10 %
3 LOTION (ML) TOPICAL NIGHTLY PRN
Status: DISCONTINUED | OUTPATIENT
Start: 2018-08-01 | End: 2018-08-07

## 2018-08-01 RX ORDER — ATORVASTATIN CALCIUM 40 MG/1
40 TABLET, FILM COATED ORAL NIGHTLY
Status: CANCELLED | OUTPATIENT
Start: 2018-08-01

## 2018-08-01 RX ORDER — AMIODARONE HYDROCHLORIDE 200 MG/1
200 TABLET ORAL DAILY
Qty: 30 TABLET | Refills: 0 | Status: CANCELLED | DISCHARGE
Start: 2018-08-01 | End: 2018-08-31

## 2018-08-01 RX ORDER — TRAZODONE HYDROCHLORIDE 50 MG/1
50 TABLET ORAL NIGHTLY PRN
Status: DISCONTINUED | OUTPATIENT
Start: 2018-08-01 | End: 2018-08-03

## 2018-08-01 RX ORDER — ONDANSETRON 8 MG/1
8 TABLET, ORALLY DISINTEGRATING ORAL EVERY 8 HOURS PRN
Status: DISCONTINUED | OUTPATIENT
Start: 2018-08-01 | End: 2018-08-03

## 2018-08-01 RX ORDER — UREA 10 %
3 LOTION (ML) TOPICAL NIGHTLY PRN
Status: CANCELLED | OUTPATIENT
Start: 2018-08-01

## 2018-08-01 RX ORDER — ACETAMINOPHEN 325 MG/1
650 TABLET ORAL EVERY 4 HOURS PRN
Status: CANCELLED | OUTPATIENT
Start: 2018-08-01

## 2018-08-01 RX ORDER — FAMOTIDINE 20 MG/1
20 TABLET, FILM COATED ORAL 2 TIMES DAILY
Status: DISCONTINUED | OUTPATIENT
Start: 2018-08-01 | End: 2018-08-03

## 2018-08-01 RX ORDER — POTASSIUM CHLORIDE 20 MEQ/1
20 TABLET, EXTENDED RELEASE ORAL DAILY
Status: DISCONTINUED | OUTPATIENT
Start: 2018-08-02 | End: 2018-08-07

## 2018-08-01 RX ORDER — UREA 10 %
3 LOTION (ML) TOPICAL NIGHTLY PRN
Status: DISCONTINUED | OUTPATIENT
Start: 2018-08-01 | End: 2018-08-01 | Stop reason: HOSPADM

## 2018-08-01 RX ORDER — ONDANSETRON 4 MG/1
8 TABLET, ORALLY DISINTEGRATING ORAL EVERY 8 HOURS PRN
Status: CANCELLED | OUTPATIENT
Start: 2018-08-01

## 2018-08-01 RX ORDER — SODIUM CHLORIDE 0.9 % (FLUSH) 0.9 %
10 SYRINGE (ML) INJECTION EVERY 12 HOURS SCHEDULED
Status: CANCELLED | OUTPATIENT
Start: 2018-08-01

## 2018-08-01 RX ADMIN — LORAZEPAM 0.5 MG: 0.5 TABLET ORAL at 10:16

## 2018-08-01 RX ADMIN — FONDAPARINUX SODIUM 2.5 MG: 2.5 INJECTION, SOLUTION SUBCUTANEOUS at 14:19

## 2018-08-01 RX ADMIN — Medication 15 ML: at 20:53

## 2018-08-01 RX ADMIN — METOCLOPRAMIDE 10 MG: 5 INJECTION, SOLUTION INTRAMUSCULAR; INTRAVENOUS at 10:16

## 2018-08-01 RX ADMIN — IPRATROPIUM BROMIDE 0.5 MG: 0.5 SOLUTION RESPIRATORY (INHALATION) at 06:07

## 2018-08-01 RX ADMIN — FUROSEMIDE 40 MG: 40 TABLET ORAL at 13:04

## 2018-08-01 RX ADMIN — FAMOTIDINE 20 MG: 20 TABLET ORAL at 20:54

## 2018-08-01 RX ADMIN — FENTANYL CITRATE 25 MCG: 50 INJECTION INTRAMUSCULAR; INTRAVENOUS at 01:58

## 2018-08-01 RX ADMIN — Medication 15 ML: at 13:05

## 2018-08-01 RX ADMIN — Medication 2 PUFF: at 09:29

## 2018-08-01 RX ADMIN — ONDANSETRON 4 MG: 2 INJECTION INTRAMUSCULAR; INTRAVENOUS at 01:58

## 2018-08-01 RX ADMIN — TRAMADOL HYDROCHLORIDE 50 MG: 50 TABLET, FILM COATED ORAL at 20:58

## 2018-08-01 RX ADMIN — AMIODARONE HYDROCHLORIDE 200 MG: 200 TABLET ORAL at 13:03

## 2018-08-01 RX ADMIN — POTASSIUM CHLORIDE 20 MEQ: 20 TABLET, EXTENDED RELEASE ORAL at 13:04

## 2018-08-01 RX ADMIN — DOCUSATE SODIUM 100 MG: 100 CAPSULE, LIQUID FILLED ORAL at 13:04

## 2018-08-01 RX ADMIN — DOCUSATE SODIUM 100 MG: 100 CAPSULE, LIQUID FILLED ORAL at 20:54

## 2018-08-01 RX ADMIN — Medication 2 PUFF: at 20:24

## 2018-08-01 RX ADMIN — TRAMADOL HYDROCHLORIDE 50 MG: 50 TABLET, FILM COATED ORAL at 10:44

## 2018-08-01 RX ADMIN — ONDANSETRON 4 MG: 2 INJECTION INTRAMUSCULAR; INTRAVENOUS at 09:08

## 2018-08-01 RX ADMIN — Medication 2 PUFF: at 11:45

## 2018-08-01 RX ADMIN — LORAZEPAM 0.5 MG: 0.5 TABLET ORAL at 04:30

## 2018-08-01 RX ADMIN — Medication 2 PUFF: at 09:30

## 2018-08-01 RX ADMIN — Medication 3 MG: at 00:31

## 2018-08-01 RX ADMIN — ASPIRIN 325 MG: 325 TABLET, DELAYED RELEASE ORAL at 13:04

## 2018-08-01 RX ADMIN — ATORVASTATIN CALCIUM 40 MG: 40 TABLET, FILM COATED ORAL at 20:54

## 2018-08-01 RX ADMIN — METOPROLOL TARTRATE 25 MG: 25 TABLET ORAL at 20:54

## 2018-08-01 RX ADMIN — FAMOTIDINE 20 MG: 20 TABLET ORAL at 13:03

## 2018-08-01 RX ADMIN — TIOTROPIUM BROMIDE 18 MCG: 18 CAPSULE ORAL; RESPIRATORY (INHALATION) at 09:29

## 2018-08-01 RX ADMIN — METOPROLOL TARTRATE 25 MG: 25 TABLET ORAL at 13:03

## 2018-08-01 RX ADMIN — Medication 400 MG: at 13:04

## 2018-08-01 RX ADMIN — LEVOTHYROXINE SODIUM 137 MCG: 25 TABLET ORAL at 13:03

## 2018-08-01 RX ADMIN — TRAZODONE HYDROCHLORIDE 50 MG: 50 TABLET ORAL at 20:58

## 2018-08-01 ASSESSMENT — PAIN SCALES - GENERAL
PAINLEVEL_OUTOF10: 6
PAINLEVEL_OUTOF10: 0
PAINLEVEL_OUTOF10: 6
PAINLEVEL_OUTOF10: 4
PAINLEVEL_OUTOF10: 0
PAINLEVEL_OUTOF10: 6
PAINLEVEL_OUTOF10: 0

## 2018-08-01 NOTE — PROGRESS NOTES
LUMPECTOMY Bilateral 2000    CARPAL TUNNEL RELEASE Left 12/22/2016    and left index and ring trigger finger release    CARPAL TUNNEL RELEASE Right 04/06/2017    w/R ring finger trigger finger release    COLONOSCOPY  2010    COLPOPEXY  11/19/2014    Dr. Raisa Kat. Miguel Ángel Hernandez COLPORRHAPHY  11/19/2014    A&P- Dr. Raisa Kat. Miguel Ángel Hernandez CORONARY ARTERY BYPASS GRAFT  07/26/2018    [id number] Myrtie Grigsby)    HIP SURGERY Right 05/2014    Dr. Sid Aguilera, VAGINAL  11/19/2014    Dr. Raisa Kat. Aissatou Bosalinasclara     INGUINAL HERNIA REPAIR Bilateral 1980s    LUNG REMOVAL, PARTIAL Right 04/2010    RML lobectomy in 4/2010, & RLL lobectomy in 5/2004    MT CABG, VEIN, SINGLE N/A 7/26/2018    CORONARY ARTHERY BYPASS GRAFT, INTERNAL MAMMARY AND SAPHENOUS VEIN GRAFT , TCP-BP performed by Jovan Gerardo MD at [address] SALPINGO-OOPHORECTOMY Bilateral 11/19/2014    Dr. Raisa Kat. Miguel Ángelsadia Hernandez UPPER GASTROINTESTINAL ENDOSCOPY  6/2010    URETHRAL STRICTURE DILATATION      VASCULAR SURGERY Right 2003    leg       Level of Consciousness: Alert, Oriented, and Cooperative = 0    Level of Activity: Walking with assistance = 1    Respiratory Pattern: Increased; RR 21-30 = 1    Breath Sounds: Diminshed bilaterally and/or crackles = 2    Sputum   ,  ,    Cough: Strong, productive = 1    Vital Signs   BP (!) 143/83   Pulse 76   Temp 98 °F (36.7 °C) (Oral)   Resp 20   Ht 5' 2\" (1.575 m)   Wt 173 lb 11 oz (78.8 kg)   BMI 31.77 kg/m²   SPO2 (COPD values may differ): 88-89% on room air or greater than 92% on FiO2 28- 35% = 2    Peak Flow (asthma only): not applicable = 0    RSI: 72-97 = Q6H or QID and Q4HPRN for dyspnea        Plan       Goals: medication delivery    Patient/caregiver was educated on the proper method of use for Respiratory Care Devices:  Yes      Level of patient/caregiver understanding able to:   [x] Verbalize understanding   [x] Demonstrate understanding       [] Teach back        [] Needs reinforcement       []  No available caregiver               []  Other:     Response to education:  Good     Is patient being placed on Home Treatment Regimen? No     Does the patient have everything they need prior to discharge? NA     Comments: Albuterol mdi QID and prn per  Protocol. Plan of Care: Spiriva Q am, Dulera 200-5 x 2 bid, Albuterol mdi x 2 QID and Q 6 prn. Electronically signed by Carroll Mckeon RCP on 8/1/2018 at 5:57 PM    Respiratory Protocol Guidelines     1. Assessment and treatment by Respiratory Therapy will be initiated for medication and therapeutic interventions upon initiation of aerosolized medication. 2. Physician will be contacted for respiratory rate (RR) greater than 35 breaths per minute. Therapy will be held for heart rate (HR) greater than 140 beats per minute, pending direction from physician. 3. Bronchodilators will be administered via Metered Dose Inhaler (MDI) with spacer when the following criteria are met:  a. Alert and cooperative     b. HR < 140 bpm  c. RR < 30 bpm                d. Can demonstrate a 23 second inspiratory hold  4. Bronchodilators will be administered via Hand Held Nebulizer SEBASTIAN [facility]) to patients when ANY of the following criteria are met  a. Incognizant or uncooperative          b. Patients treated with HHN at Home        c. Unable to demonstrate proper use of MDI with spacer     d. RR > 30 bpm   5. Bronchodilators will be delivered via Metered Dose Inhaler (MDI), HHN, Aerogen to intubated patients on mechanical ventilation. 6. Inhalation medication orders will be delivered and/or substituted as outlined below. Aerosolized Medications Ordering and Administration Guidelines:    1. All Medications will be ordered by a physician, and their frequency and/or modality will be adjusted as defined by the patients Respiratory Severity Index (RSI) score.   2. If the patient does not have documented COPD, consider discontinuing anticholinergics when RSI is less than 9.  3. If the bronchospasm worsens (increased RSI), then the bronchodilator frequency can be increased to a maximum of every 4 hours. If greater than every 4 hours is required, the physician will be contacted. 4. If the bronchospasm improves, the frequency of the bronchodilator can be decreased, based on the patient's RSI, but not less than home treatment regimen frequency. 5. Bronchodilator(s) will be discontinued if patient has a RSI less than 9 and has received no scheduled or as needed treatment for 72  Hrs. Patients Ordered on a Mucolytic Agent:    1. Must always be administered with a bronchodilator. 2. Discontinue if patient experiences worsened bronchospasm, or secretions have lessened to the point that the patient is able to clear them with a cough. Anti-inflammatory and Combination Medications:    1. If the patient lacks prior history of lung disease, is not using inhaled anti-inflammatory medication at home, and lacks wheezing by examination or by history for at least 24 hours, contact physician for possible discontinuation.

## 2018-08-01 NOTE — PROGRESS NOTES
cardiac diet with 2000 ml fluid restriction  · VTE prophylaxis - SCD and ROCK hose, Arixtra  · Continue Ultram/Tylenol prn for pain;  Xanax prn anxiety  · Continue preop Synthroid  · Disposition - Continue PT/OT, dc to ARU when bed available  Discussed patient with Dr Samson Chowdary, APRN  [phone] pager  8/1/2018  7:13 AM

## 2018-08-01 NOTE — PROGRESS NOTES
Care Devices:  Yes      Level of patient/caregiver understanding able to:   [x] Verbalize understanding   [x] Demonstrate understanding       [] Teach back        [] Needs reinforcement       []  No available caregiver               []  Other:     Response to education:  Good     Is patient being placed on Home Treatment Regimen? No     Does the patient have everything they need prior to discharge? NA     Comments: Change to albuterol mdi x 2 Qid and prn. Plan of Care: Albuterol mdi x 2 QID and Q 4 prn, MDI Dulera 200-5 x 2 prn, Spiriva Q am, IS QID. Electronically signed by Hazel White RCP on 8/1/2018 at 9:46 AM    Respiratory Protocol Guidelines     1. Assessment and treatment by Respiratory Therapy will be initiated for medication and therapeutic interventions upon initiation of aerosolized medication. 2. Physician will be contacted for respiratory rate (RR) greater than 35 breaths per minute. Therapy will be held for heart rate (HR) greater than 140 beats per minute, pending direction from physician. 3. Bronchodilators will be administered via Metered Dose Inhaler (MDI) with spacer when the following criteria are met:  a. Alert and cooperative     b. HR < 140 bpm  c. RR < 30 bpm                d. Can demonstrate a 23 second inspiratory hold  4. Bronchodilators will be administered via Hand Held Nebulizer SEBASTIAN [facility]) to patients when ANY of the following criteria are met  a. Incognizant or uncooperative          b. Patients treated with HHN at Home        c. Unable to demonstrate proper use of MDI with spacer     d. RR > 30 bpm   5. Bronchodilators will be delivered via Metered Dose Inhaler (MDI), HHN, Aerogen to intubated patients on mechanical ventilation. 6. Inhalation medication orders will be delivered and/or substituted as outlined below. Aerosolized Medications Ordering and Administration Guidelines:    1.  All Medications will be ordered by a physician, and their frequency and/or modality

## 2018-08-01 NOTE — PROGRESS NOTES
Occupational Therapy   Occupational Therapy Initial Assessment/Tx Note  Date: 2018   Patient Name: Carl Urias  MRN: [id number]     : 1950    Date of Service: 2018    Discharge Recommendations: Carl Urias scored a 18/24 on the AM-PAC ADL Inpatient form. Current research shows that an AM-PAC score of 18 or greater is typically associated with a discharge to the patient's home setting. Based on the patients AM-PAC score and their current ADL deficits, it is recommended that the patient have 2-3 sessions per week of Occupational Therapy at d/c to increase the patients independence. OT Equipment Recommendations  Equipment Needed: No        Treatment Diagnosis: Decreased activity tolerance, impaired ADls and mobility      Restrictions  Position Activity Restriction  Other position/activity restrictions: Up as tolerated    Subjective   General  Chart Reviewed: Yes  Additional Pertinent Hx: 69 y.o. F admitted . Underwent cardiac cath and CABG x3 on . PMhx includes CAD, COPD, lung CA, spinal stenosis, TAA, back surgery, hip surgery, hernia repair  Family / Caregiver Present: Yes (spouse, daughter, son-in-law)  Referring Practitioner: John Fowler  Diagnosis: CABG  Subjective  Subjective: Pt in chair on entry. Pleasant and cooperative with therapy. \"I can't walk and talk. I couldn't do that before this. \"  Pain Assessment  Patient Currently in Pain: Yes    Social/Functional History  Social/Functional History  Lives With: Spouse (Daughter, son in law, and 5 grandchildren)  Type of Home: House  Home Layout:  (Lives in basement)  Home Access: Level entry  Bathroom Shower/Tub: Walk-in shower  Bathroom Toilet: Handicap height  Bathroom Equipment: Grab bars in shower, Shower chair  Bathroom Accessibility: Wheelchair accessible  Home Equipment: Cane  ADL Assistance: Independent  Homemaking Assistance: Independent  Ambulation Assistance: Independent  Active : Yes  Type of occupation: Dining  at [employer]     Objective  Treatment included: functional transfer training, ADL, pt education         Orientation  Overall Orientation Status: Within Functional Limits     Balance  Sitting Balance: Independent  Standing Balance: Contact guard assistance  Standing Balance  Time: 3 min + 2 min + 3 min + 3 min   Activity: functional mobility in room, bathroom, mckenzie; ADLs  Sit to stand: Contact guard assistance  Stand to sit: Contact guard assistance  Comment: Pt requires CGA, min cues for transfers. CGA for gait and stance. One minor LOB initially in standing when closing eyes, corrected with min assist.   Toilet Transfers  Toilet - Technique: Ambulating  Equipment Used: Standard toilet  Toilet Transfer: Contact guard assistance  Toilet Transfers Comments: min cues  ADL  Feeding: Independent; Beverage management  Grooming: Independent (hand hygiene, wash hands)  LE Dressing: Moderate assistance (pulled up socks; needs assist to fully don)  Toileting: Contact guard assistance;Setup     Transfers  Sit to stand: Contact guard assistance  Stand to sit: Contact guard assistance     Cognition  Overall Cognitive Status: [facility]       Assessment   Performance deficits / Impairments: Decreased functional mobility ; Decreased ADL status; Decreased balance;Decreased endurance;Decreased high-level IADLs  Assessment: Functional independence is decreased from baseline. Activity tolerance is limited to short walks/time in stance. pt requires CGA for mobility and assist with lower body ADLs. Recommend continued OT at d/c. Treatment Diagnosis: Decreased activity tolerance, impaired ADls and mobility  Decision Making: Medium Complexity  Patient Education: Role of OT, d/c planning, activity promotion - verb understanding  REQUIRES OT FOLLOW UP: Yes  Activity Tolerance  Activity Tolerance: Patient Tolerated treatment well  Activity Tolerance: On 4L O2. Mild lightheadedness, nausea, and SOB with activity at times.  spO2 in 80s after walking, improves with seated rest break and when avoiding speaking during mobility. Safety Devices  Safety Devices in place: Yes  Type of devices: Call light within reach; Left in chair;Chair alarm in place;Nurse notified (needs in reach, family in room)         Plan  If pt discharges prior to next tx, this note will serve as d/c summary. Continue per POC if pt does not d/c.     Plan  Times per week: 5-7x  Times per day: Daily    G-Code  OT G-codes  Functional Limitation: Self care  Self Care Current Status (): At least 40 percent but less than 60 percent impaired, limited or restricted  Self Care Goal Status (): At least 20 percent but less than 40 percent impaired, limited or restricted    AM-PAC Score  AM-PAC Inpatient Daily Activity Raw Score: 18  AM-PAC Inpatient ADL T-Scale Score : 38.66  ADL Inpatient CMS 0-100% Score: 46.65  ADL Inpatient CMS G-Code Modifier : CK    Goals  Short term goals  Time Frame for Short term goals: by D/C  Short term goal 1:  Increase activity tolerance in stance x 8 min - Not met  Short term goal 2: Dress lower body with SBA - Not met  Short term goal 3: Transfer to/from toilet with spvn - Not met  Patient Goals   Patient goals : to get better, take better care of herself       Therapy Time   Individual Concurrent Group Co-treatment   Time In 1321         Time Out 1400         Minutes 39          Timed Code Tx Min: 24  Total Tx Time: [facility], OT

## 2018-08-01 NOTE — PROGRESS NOTES
Physical Therapy  Facility/Department: [facility]  Daily Treatment Note  NAME: Gina Miller  : 1950  MRN: [id number]    Date of Service: 2018    Discharge Recommendations: Gina Miller scored a 17/24 on the AM-PAC short mobility form. Current research shows that an AM-PAC score of 17 or less is typically not associated with a discharge to the patient's home setting. Based on the patients AM-PAC score and their current functional mobility deficits, it is recommended that the patient have 5-7 sessions per week of Physical Therapy at d/c to increase the patients independence. Patient would benefit from continued therapy after discharge   PT Equipment Recommendations  Equipment Needed: No    Patient Diagnosis(es): The primary encounter diagnosis was Chest pain, unspecified type. A diagnosis of Anxiety was also pertinent to this visit. has a past medical history of Brain aneurysm; CAD (coronary artery disease); COPD (chronic obstructive pulmonary disease) ([address] Hyperlipidemia; Hypertension; Hypothyroidism; Lumbar spondylosis; Lung cancer ([address] Renal cyst; Spinal stenosis of lumbar region; and Thoracic aortic aneurysm ([facility] [address] has a past surgical history that includes Lung removal, partial (Right, 2010); Colonoscopy (); Upper gastrointestinal endoscopy (2010); Inguinal hernia repair (Bilateral, ); vascular surgery (Right, ); back surgery (); Hysterectomy, vaginal (2014); back surgery (2013); Breast lumpectomy (Bilateral, ); hip surgery (Right, 2014); Salpingo-oophorectomy (Bilateral, 2014); Colporrhaphy (2014); Colpopexy (2014); Carpal tunnel release (Left, 2016); Carpal tunnel release (Right, 2017); Urethra dilation; Coronary artery bypass graft (2018); and pr cabg, vein, single (N/A, 2018).     Restrictions  Position Activity Restriction  Other position/activity restrictions: Up as tolerated  Subjective

## 2018-08-01 NOTE — PROGRESS NOTES
Report called to Em Saleem. Belongings collected. Pt safely transferred to ARU. Receiving RN at [facility].

## 2018-08-01 NOTE — PROGRESS NOTES
Pt very sob, 02 sats dropped to 84% on 3L NC. Pt using accessory muscles, expiratory wheezes, RR in 30's. Dr Sanjuana Keenan. RT called to BS. Will cont to closely monitor respiratory status.

## 2018-08-01 NOTE — PLAN OF CARE
Problem: Falls - Risk of:  Goal: Will remain free from falls  Will remain free from falls    Outcome: Ongoing  Fall risk protocol in place: Bed alarm on, fall risk bracelet applied, yellow indicator in hallway on, non-skid footwear in use. Patient/family educated on fall risk protocol, instructed to call for assistance when needed. Problem: Risk for Impaired Skin Integrity  Goal: Tissue integrity - skin and mucous membranes  Structural intactness and normal physiological function of skin and  mucous membranes. Outcome: Ongoing  Patient turns self adequately in bed, Sacral Heart Mepilex in place to protect, skin intact underneath. Problem: Confusion - Acute:  Goal: Mental status will be restored to baseline  Mental status will be restored to baseline   Outcome: Ongoing  Patient increasingly restless throughout the evening, requested Xanax for anxiety early and Benadryl for sleep a little later. By midnight was verbalizing forgetfulness and hallucinations, knows that she is experiencing this. Walked in hallway, returned to sit in chair, lights and TV turned on. Patient stayed awake for a little while, had a snack, and verbalized feeling a little better, denied further hallucinations. Was intermittently restless the rest of the night, but did not have any additional confusion. Will monitor. Problem: Injury - Risk of, Physical Injury:  Goal: Will remain free from falls  Will remain free from falls    Outcome: Ongoing  Fall risk protocol in place: Bed alarm on, fall risk bracelet applied, yellow indicator in hallway on, non-skid footwear in use. Patient/family educated on fall risk protocol, instructed to call for assistance when needed.
Problem: Falls - Risk of:  Goal: Will remain free from falls  Will remain free from falls    Outcome: Ongoing  Fall risk protocol in place: Bed alarm on, fall risk bracelet applied, yellow indicator in hallway on, non-skid footwear in use. Patient/family educated on fall risk protocol, instructed to call for assistance when needed. Problem: Risk for Impaired Skin Integrity  Goal: Tissue integrity - skin and mucous membranes  Structural intactness and normal physiological function of skin and  mucous membranes. Outcome: Ongoing  Patient turns self adequately in bed, Sacral Heart Mepilex in place to protect, skin intact underneath. Problem: Injury - Risk of, Physical Injury:  Goal: Will remain free from falls  Will remain free from falls    Outcome: Ongoing  Fall risk protocol in place: Bed alarm on, fall risk bracelet applied, yellow indicator in hallway on, non-skid footwear in use. Patient/family educated on fall risk protocol, instructed to call for assistance when needed.
Problem: Nutrition  Goal: Optimal nutrition therapy  Outcome: Ongoing  Nutrition Problem: Inadequate oral intake  Intervention: Food and/or Nutrient Delivery: Continue current diet  Nutritional Goals: Patient will consume 50% or greater of all meals
Problem: Pain:  Goal: Control of acute pain  Control of acute pain   Outcome: Ongoing  D: patient denies any s/s of discomfort   A: continue to monitor s/s of discomfort    Problem: Falls - Risk of:  Goal: Will remain free from falls  Will remain free from falls    Outcome: Ongoing  D: patient remains a fall risk r/t weakness   A: fall precautions in place including bed and chair alarm , call light within reach , hourly rounding continued   R: no s/s of injury noted    Problem:  Activity Intolerance:  Goal: Able to perform prescribed physical activity  Able to perform prescribed physical activity   Outcome: Ongoing  D: patient ambulated 100 ft with much encouragement , with 4 wheeled walker and stand by assistance and freq rest periods   A: continue to encourage activity level    Problem: Injury - Risk of, Physical Injury:  Goal: Will remain free from falls  Will remain free from falls    Outcome: Ongoing  D: patient remains a fall risk r/t weakness   A: fall precautions in place including bed and chair alarm , call light within reach , hourly rounding continued   R: no s/s of injury noted
Problem: Pain:  Goal: Control of acute pain  Control of acute pain   Outcome: Ongoing  See pain flow. Problem: Falls - Risk of:  Goal: Will remain free from falls  Will remain free from falls   Outcome: Met This Shift  Patient does not attempt to get OOB. Steady gait with walker. Problem: Risk for Impaired Skin Integrity  Goal: Tissue integrity - skin and mucous membranes  Structural intactness and normal physiological function of skin and  mucous membranes. Outcome: Ongoing  No new skin/wound changes noted. Continue to monitor. Problem: Activity Intolerance:  Goal: Ability to tolerate increased activity will improve  Ability to tolerate increased activity will improve   Outcome: Ongoing  Delayed ambulation earlier in day due to low blood pressure, but felt a bit better after ambulating in mckenzie and to bathroom x3 this evening, appearing stronger with legs. Problem: Infection - Central Venous Catheter-Associated Bloodstream Infection:  Goal: Will show no infection signs and symptoms  Will show no infection signs and symptoms   Outcome: Ongoing  Per policy. Problem: Infection - Surgical Site:  Goal: Will show no infection signs and symptoms  Will show no infection signs and symptoms   Outcome: Ongoing  Per policy. Problem: Gas Exchange - Impaired:  Goal: Levels of oxygenation will improve  Levels of oxygenation will improve   Outcome: Ongoing  Requiring oxygen per cannula (HFNC on today). Increased to 6 liters with ambulation, now on 4 liters. Breath sounds grossly unchanged. Using incentive spirometer with encouragement. Problem: Nausea/Vomiting:  Goal: Absence of nausea/vomiting  Absence of nausea/vomiting   Outcome: Ongoing  Zofran x2 and reglan given today; unclear if oxycodone is contributing to nausea; tylenol given for pain this evening without oxycodone and patient appeared to get pain relief without increased nausea. Continue to monitor.
70s O2 titrated keeping sats >92 currently at 6 liters pulmonary toilet encouraged through out this shift.

## 2018-08-01 NOTE — PROGRESS NOTES
Occupational/Physical Therapy    Attempted therapy session. Pt with new onset labored breathing. RN suggests holding therapy until pt gets a breathing treatment. Will f/u later today.      Dustin Escalante, OTR/L, [address]  [facility], PT, DPT  [phone]

## 2018-08-01 NOTE — PROGRESS NOTES
4 Eyes Admission Assessment     I agree as the admission nurse that 2 RN's have performed a thorough Head to Toe Skin Assessment on the patient. ALL assessment sites listed below have been assessed on admission. Areas assessed by both nurses:   [x]   Head, Face, and Ears   [x]   Shoulders, Back, and Chest  [x]   Arms, Elbows, and Hands   [x]   Coccyx, Sacrum, and Ischum  [x]   Legs, Feet, and Heels        Does the Patient have Skin Breakdown? Surgical incisions on bilateral medial knee, mid sternal chest, and 2 puncture areas with sutures on upper abdomen. Scattered bruising. Large bruised area noted to left and rt thigh.        Theodore Prevention initiated: no score 20  Wound Care Orders initiated:  No      WOC nurse consulted for Pressure Injury (Stage 3,4, Unstageable, DTI, NWPT, and Complex wounds):  No      Nurse 1 eSignature: Electronically signed by Jacinta Aguilar RN on 8/1/18 at 4:59 PM    **SHARE this note so that the co-signing nurse is able to place an eSignature**    Nurse 2 eSignature: Electronically signed by Veronique Noland RN on 8/8/18 at 10:21 PM

## 2018-08-01 NOTE — PLAN OF CARE
ARU PATIENT TREATMENT PLAN  The [address] [place] [address] Drive,[address] [place] [address] [address] [facility], [address]  [address]    : 1950  [facility]  MRN: [id number]   PHYSICIAN:  Kim Melendrez MD  Primary Problem    Patient Active Problem List   Diagnosis    Hyperlipidemia    HTN (hypertension)    COPD (chronic obstructive pulmonary disease)   Yoselyn Tellez Hypothyroid    CAD (coronary artery disease)    Ascending aortic aneurysm    Lung cancer    Lumbar spinal stenosis    Lumbar spondylosis    DDD (degenerative disc disease), lumbosacral    Trigger middle finger of left hand    Painful lumpy right breast    Hemoptysis    Dyspepsia    Chest pain    NSTEMI (non-ST elevated myocardial infarction) ([facility] [address]    S/P CABG x 3    Acute postoperative pain    CAD in native artery       Rehabilitation Diagnosis: Cardiac, 9.0, Cardiac   ADMIT DATE:2018    Patient Goals: Return home, get back to work  Admitting Impairments: s/p CABG-NSTEMI resulting in diminished motor function  Activity Restrictions: Reduced independence with ADL, transfers and mobility  Participation Limitations:    CARE PLAN     NURSING:  Pro Siu while on this unit will:   [] Be continent of bowel and bladder      [] Have an adequate number of bowel movements   [] Urinate with no urinary retention >300ml in bladder   [] Complete bladder protocol with rodriguez removal   [] Maintain O2 SATs at ___%   [] Have pain managed while on ARU        [] Be pain free by discharge    [x] Have no skin breakdown while on ARU   [] Have improved skin integrity via wound measurements   [x] Have no signs/symptoms of infection at the wound site  [x] Be free from injury during hospitalization   [] Complete education with patient/family with understanding demonstrated for:  [] Adjustment   [] Other:   Nursing Interventions will include:  [] bowel/bladder training   [x] education for medical assistive devices   [x] medication education   [] O2 saturation management   [x] energy conservation   [] stress management techniques   [x] fall prevention   [x] alarms protocol   [] seating and positioning   [x] skin/wound care   [x] pressure relief instruction   [] dressing changes     [] infection protection   [] DVT prophylaxis  [x] assistance with in room safety with transfers to bed, toilet, wheelchair, shower   [] bathroom activities and hygiene  [] Other:    Patient/Caregiver Education for:   [x] Disease/sustained injury/management      [x] Medication Use   [x] Surgical intervention   [] Safety   [] Body mechanics and or joint protection   [] Health maintenance      [] Other:     PHYSICAL THERAPY:  Goals:                  Short term goals  Time Frame for Short term goals: 2 weeks   Short term goal 1: Ind with bed mobility maintaining sternal precautions  Short term goal 2: Ind with transf excluding floor transf  Short term goal 3: Ind with amb with LRAD for distances of 350' at a time  Short term goal 4: MI with amb up and down 12 steps             Long term goals  Time Frame for Long term goals : STG=LTG  These goals were reviewed with this patient at the time of assessment and Daria Sigala is in agreement.      Plan of Care: Pt to be seen 5 out of 7 days per week per ARU protocol ( 75  minutes with PT)                  Current Treatment Recommendations: Gait Training, Functional Mobility Training, Balance Training, Transfer Training, Endurance Training, Stair training, Safety Education & Training    OCCUPATIONAL THERAPY:  Goals:             Short term goals  Time Frame for Short term goals: STG=LTG  :  Long term goals  Time Frame for Long term goals : 2 weeks  Long term goal 1: Pt will complete toileting and toilet transfers MOD I  Long term goal 2: Pt will complete LE dressing I'ly   Long term goal 3: Pt will complete light meal prep task in stance for 10 mins MOD I w/o rest break  Long term goal 4: Pt will complete shower transfers and bathing tasks MOD I  Long term goal 5: Pt will be independent w/ UE HEP to increase activity tolerance for participation in ADLs, IADLs and return to work  : These goals were reviewed with this patient at the time of assessment and [facility] is in agreement    Plan of Care:  Pt to be seen 5 out of 7 days per week per ARU protocol (   75 minutes with OT)  Patient Education: Role of OT, oriented to ARU, activity promotion and PLB for SOB, sternal precautions--pt verb understanding     SPEECH THERAPY: Goals will be left blank if speech is not following this patient. Goals:                            Short-term Goals  Goal 1: Patient will be able to demonstrate appropriate diaphragmatic breathing and speech/breathing coordination for speech production with min cues. Goal 2: Pt will complete graded problem-solving tasks with 85% accuracy given min cues. Goal 3: Pt will complete medication management and finance management tasks with 100% accuracy given min-no cues. Goal 4: Pt will complete executive function tasks (i.e. planning, deductive reasoning, inferential, functional organization tasks) with 80% accuracy independently. Goal 5: Pt will complete short-term memory tasks, of increasing length and complexity, with 85% accuracy given min cues  Goal 6: Pt will complete additional cognitive assessment with goals to be added per POC as needed.                   These goals were reviewed with this patient at the time of assessment and [facility] is in agreement    Plan of Care:  Pt to be seen 5 out of 7 days per week per ARU protocol (30 minutes with SLP)    Therapy Treatments will include:  [x]  therapeutic exercises    []  gait training     []  neuromuscular re-ed                            [x]  transfer training             [x] community reintegration    [x] bed mobility                          []  w/c mobility and training  [x]  self care    [x]home mgmt    [x]  cognitive training [x]  energy conservation        []  dysphagia tx    [x]  speech/language/communication therapy   []  group therapy    [x]  patient/family education    [] Other:    CASE MANAGEMENT:  Goals:   Assist patient/family with discharge planning, patient/family counseling,   and coordination with insurance during ARU stay. Pt had lived at home with her  and 5 grandchildren before hospital admission. 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 Pt plans to return home with her . Pt has a rolling walker, cane and raised toilet seat at home. Pt stated she would be agreeable with Jessica  services through [facility] if needed. Pt had driven independently before admission but  stated he would drive her anywhere she needs. DME needs TBD.       Admission Period/Goal FIM SCORES  Admit Score Goal Score   Eatin - Feeds self with adaptive equipment/dentures and/or feeds self with modified diet and/or performs own tube feeding GOAL: Eatin (pt wears upper/lower partials (permanent))     GOAL: Groomin   Bathing: 3 - Able to bathe 5-7 areas GOAL: Bathin     GOAL: Dressing-Upper: 7   Dressing-Lower: 2 - Requires assist with 4-5 parts of dressing GOAL: Dressing-Lower: 7   Toiletin - Requires steadying assistance only GOAL: Toiletin   Bladder Frequency of Accidents:  (X 1)     Bowel Frequency of Accidents:  (no value.)     Bed, Chair, Wheel Chair: 4 - Requires steadying assistance only <25% assist  and/or requires assist with one leg only GOAL: Bed, Chair, Wheel Chair: 7   Toilet Transfer: 4 - Requires steadying assistance only < 25% assist GOAL: Toilet: 6     GOAL: Tub, Shower: 6   Primary Mode: Walk    Distance Walked: 60'         Walk: 4 - Contact Guard/Minimal Assistance Requires up to Contact Guard or Minimal Assistance to walk/operate wheelchair at least 150 feet         FIM:    FIM: GOAL: Walk/Wheel Chair: 6   Stairs: 1- Total Assistance perfoms less

## 2018-08-02 LAB
ANION GAP SERPL CALCULATED.3IONS-SCNC: 13 MMOL/L (ref 3–16)
BUN BLDV-MCNC: 15 MG/DL (ref 7–20)
CALCIUM SERPL-MCNC: 9.5 MG/DL (ref 8.3–10.6)
CHLORIDE BLD-SCNC: 97 MMOL/L (ref 99–110)
CO2: 29 MMOL/L (ref 21–32)
CREAT SERPL-MCNC: <0.5 MG/DL (ref 0.6–1.2)
GFR AFRICAN AMERICAN: >60
GFR NON-AFRICAN AMERICAN: >60
GLUCOSE BLD-MCNC: 116 MG/DL (ref 70–99)
HCT VFR BLD CALC: 30.4 % (ref 36–48)
HEMOGLOBIN: 10 G/DL (ref 12–16)
MAGNESIUM: 2.3 MG/DL (ref 1.8–2.4)
MCH RBC QN AUTO: 30.9 PG (ref 26–34)
MCHC RBC AUTO-ENTMCNC: 32.9 G/DL (ref 31–36)
MCV RBC AUTO: 94.1 FL (ref 80–100)
PDW BLD-RTO: 16.1 % (ref 12.4–15.4)
PLATELET # BLD: 301 K/UL (ref 135–450)
PMV BLD AUTO: 8 FL (ref 5–10.5)
POTASSIUM SERPL-SCNC: 3.8 MMOL/L (ref 3.5–5.1)
RBC # BLD: 3.23 M/UL (ref 4–5.2)
SODIUM BLD-SCNC: 139 MMOL/L (ref 136–145)
WBC # BLD: 11 K/UL (ref 4–11)

## 2018-08-02 PROCEDURE — 92523 SPEECH SOUND LANG COMPREHEN: CPT

## 2018-08-02 PROCEDURE — 2700000000 HC OXYGEN THERAPY PER DAY

## 2018-08-02 PROCEDURE — 1280000000 HC REHAB R&B

## 2018-08-02 PROCEDURE — 85027 COMPLETE CBC AUTOMATED: CPT

## 2018-08-02 PROCEDURE — 94640 AIRWAY INHALATION TREATMENT: CPT

## 2018-08-02 PROCEDURE — 97161 PT EVAL LOW COMPLEX 20 MIN: CPT | Performed by: PHYSICAL THERAPIST

## 2018-08-02 PROCEDURE — 6360000002 HC RX W HCPCS: Performed by: PHYSICAL MEDICINE & REHABILITATION

## 2018-08-02 PROCEDURE — 92526 ORAL FUNCTION THERAPY: CPT

## 2018-08-02 PROCEDURE — 97116 GAIT TRAINING THERAPY: CPT | Performed by: PHYSICAL THERAPIST

## 2018-08-02 PROCEDURE — 97535 SELF CARE MNGMENT TRAINING: CPT

## 2018-08-02 PROCEDURE — G9168 MEMORY CURRENT STATUS: HCPCS

## 2018-08-02 PROCEDURE — 6370000000 HC RX 637 (ALT 250 FOR IP): Performed by: PHYSICAL MEDICINE & REHABILITATION

## 2018-08-02 PROCEDURE — 97530 THERAPEUTIC ACTIVITIES: CPT | Performed by: PHYSICAL THERAPIST

## 2018-08-02 PROCEDURE — G9169 MEMORY GOAL STATUS: HCPCS

## 2018-08-02 PROCEDURE — 92610 EVALUATE SWALLOWING FUNCTION: CPT

## 2018-08-02 PROCEDURE — 83735 ASSAY OF MAGNESIUM: CPT

## 2018-08-02 PROCEDURE — 36415 COLL VENOUS BLD VENIPUNCTURE: CPT

## 2018-08-02 PROCEDURE — 80048 BASIC METABOLIC PNL TOTAL CA: CPT

## 2018-08-02 PROCEDURE — 94761 N-INVAS EAR/PLS OXIMETRY MLT: CPT

## 2018-08-02 PROCEDURE — 97165 OT EVAL LOW COMPLEX 30 MIN: CPT

## 2018-08-02 RX ORDER — ALBUTEROL SULFATE 90 UG/1
2 AEROSOL, METERED RESPIRATORY (INHALATION) EVERY 4 HOURS PRN
Status: DISCONTINUED | OUTPATIENT
Start: 2018-08-02 | End: 2018-08-03

## 2018-08-02 RX ADMIN — METOPROLOL TARTRATE 25 MG: 25 TABLET ORAL at 08:58

## 2018-08-02 RX ADMIN — FAMOTIDINE 20 MG: 20 TABLET ORAL at 09:37

## 2018-08-02 RX ADMIN — TRAMADOL HYDROCHLORIDE 50 MG: 50 TABLET, FILM COATED ORAL at 11:44

## 2018-08-02 RX ADMIN — TIOTROPIUM BROMIDE 18 MCG: 18 CAPSULE ORAL; RESPIRATORY (INHALATION) at 07:25

## 2018-08-02 RX ADMIN — ASPIRIN 325 MG: 325 TABLET, DELAYED RELEASE ORAL at 09:37

## 2018-08-02 RX ADMIN — Medication 15 ML: at 08:58

## 2018-08-02 RX ADMIN — POTASSIUM CHLORIDE 20 MEQ: 20 TABLET, EXTENDED RELEASE ORAL at 08:58

## 2018-08-02 RX ADMIN — ACETAMINOPHEN 650 MG: 325 TABLET, FILM COATED ORAL at 23:30

## 2018-08-02 RX ADMIN — LEVOTHYROXINE SODIUM 137 MCG: 25 TABLET ORAL at 05:35

## 2018-08-02 RX ADMIN — DOCUSATE SODIUM 100 MG: 100 CAPSULE, LIQUID FILLED ORAL at 09:37

## 2018-08-02 RX ADMIN — ATORVASTATIN CALCIUM 40 MG: 40 TABLET, FILM COATED ORAL at 20:25

## 2018-08-02 RX ADMIN — Medication 2 PUFF: at 02:46

## 2018-08-02 RX ADMIN — Medication 2 PUFF: at 11:14

## 2018-08-02 RX ADMIN — ACETAMINOPHEN 650 MG: 325 TABLET, FILM COATED ORAL at 07:37

## 2018-08-02 RX ADMIN — Medication 400 MG: at 08:58

## 2018-08-02 RX ADMIN — FAMOTIDINE 20 MG: 20 TABLET ORAL at 20:27

## 2018-08-02 RX ADMIN — FONDAPARINUX SODIUM 2.5 MG: 2.5 INJECTION, SOLUTION SUBCUTANEOUS at 08:58

## 2018-08-02 RX ADMIN — Medication 2 PUFF: at 23:36

## 2018-08-02 RX ADMIN — AMIODARONE HYDROCHLORIDE 200 MG: 200 TABLET ORAL at 09:36

## 2018-08-02 RX ADMIN — Medication 2 PUFF: at 07:25

## 2018-08-02 RX ADMIN — FUROSEMIDE 40 MG: 40 TABLET ORAL at 08:58

## 2018-08-02 RX ADMIN — Medication 2 PUFF: at 07:24

## 2018-08-02 RX ADMIN — TRAZODONE HYDROCHLORIDE 50 MG: 50 TABLET ORAL at 20:26

## 2018-08-02 RX ADMIN — METOPROLOL TARTRATE 25 MG: 25 TABLET ORAL at 20:26

## 2018-08-02 RX ADMIN — TRAMADOL HYDROCHLORIDE 50 MG: 50 TABLET, FILM COATED ORAL at 20:25

## 2018-08-02 RX ADMIN — Medication 15 ML: at 20:27

## 2018-08-02 ASSESSMENT — PAIN DESCRIPTION - FREQUENCY
FREQUENCY: CONTINUOUS

## 2018-08-02 ASSESSMENT — PAIN DESCRIPTION - ORIENTATION
ORIENTATION: MID
ORIENTATION: UPPER
ORIENTATION: LEFT;UPPER
ORIENTATION: MID
ORIENTATION: LEFT;UPPER
ORIENTATION: MID
ORIENTATION: LEFT;UPPER

## 2018-08-02 ASSESSMENT — PAIN DESCRIPTION - PAIN TYPE
TYPE: ACUTE PAIN

## 2018-08-02 ASSESSMENT — PAIN SCALES - GENERAL
PAINLEVEL_OUTOF10: 9
PAINLEVEL_OUTOF10: 9
PAINLEVEL_OUTOF10: 6
PAINLEVEL_OUTOF10: 5
PAINLEVEL_OUTOF10: 0
PAINLEVEL_OUTOF10: 3
PAINLEVEL_OUTOF10: 2
PAINLEVEL_OUTOF10: 3
PAINLEVEL_OUTOF10: 4
PAINLEVEL_OUTOF10: 0
PAINLEVEL_OUTOF10: 0
PAINLEVEL_OUTOF10: 3
PAINLEVEL_OUTOF10: 6
PAINLEVEL_OUTOF10: 3
PAINLEVEL_OUTOF10: 4
PAINLEVEL_OUTOF10: 4

## 2018-08-02 ASSESSMENT — PAIN DESCRIPTION - LOCATION
LOCATION: SHOULDER
LOCATION: CHEST
LOCATION: SHOULDER

## 2018-08-02 ASSESSMENT — PAIN DESCRIPTION - DESCRIPTORS
DESCRIPTORS: ACHING
DESCRIPTORS: BURNING
DESCRIPTORS: ACHING
DESCRIPTORS: BURNING
DESCRIPTORS: ACHING

## 2018-08-02 ASSESSMENT — PAIN DESCRIPTION - PROGRESSION
CLINICAL_PROGRESSION: GRADUALLY IMPROVING
CLINICAL_PROGRESSION: GRADUALLY WORSENING

## 2018-08-02 ASSESSMENT — PAIN DESCRIPTION - ONSET
ONSET: ON-GOING

## 2018-08-02 ASSESSMENT — PAIN - FUNCTIONAL ASSESSMENT: PAIN_FUNCTIONAL_ASSESSMENT: 0-10

## 2018-08-02 NOTE — PROGRESS NOTES
Physical Therapy    Facility/Department: [facility] ACUTE REHAB UNIT  Initial Assessment/Daily treatment Note    NAME: Jimmie Jacobs  : 1950  MRN: [id number]    Date of Service: 2018    Discharge Recommendations:  Home with Home health PT   PT Equipment Recommendations  Equipment Needed: No    Patient Diagnosis(es): There were no encounter diagnoses. has a past medical history of Brain aneurysm; CAD (coronary artery disease); COPD (chronic obstructive pulmonary disease) ([facility] [address] Hyperlipidemia; Hypertension; Hypothyroidism; Lumbar spondylosis; Lung cancer ([facility] [address] Renal cyst; Spinal stenosis of lumbar region; and Thoracic aortic aneurysm ([facility] [address] has a past surgical history that includes Lung removal, partial (Right, 2010); Colonoscopy (); Upper gastrointestinal endoscopy (2010); Inguinal hernia repair (Bilateral, ); vascular surgery (Right, ); back surgery (); Hysterectomy, vaginal (2014); back surgery (2013); Breast lumpectomy (Bilateral, ); hip surgery (Right, 2014); Salpingo-oophorectomy (Bilateral, 2014); Colporrhaphy (2014); Colpopexy (2014); Carpal tunnel release (Left, 2016); Carpal tunnel release (Right, 2017); Urethra dilation; Coronary artery bypass graft (2018); and pr cabg, vein, single (N/A, 2018). Restrictions  Position Activity Restriction  Sternal Precautions: No Pushing, No Pulling, 5# Lifting Restrictions  Other position/activity restrictions: Up as tolerated. Sternal precautions. Vision/Hearing  Vision: Impaired  Vision Exceptions: Wears glasses at all times  Hearing: Exceptions to [facility]  Hearing Exceptions: Bilateral hearing aid (Still requested for therapist to repeat insttructions multiple times)     Subjective  General  Chart Reviewed: Yes  Additional Pertinent Hx: Pt admitted on 18 with CAD. Pt underwent cardiac cath on  and CABG x 3 on . Pt extubated on .   H/o brain anuerysm, CAD, COPD, lumbar spondylosis, lung cancer, renal cyst, spinal stenosis, thoracic aortic aneurysm, back surgery, B CTR, R hip surgery, R partial lung removal.  Family / Caregiver Present: No  Referring Practitioner: Dr. Rebecca Thompson  Diagnosis: CAD  Follows Commands: Within Functional Limits  Subjective  Subjective: Pt sitting in BS chairwhen PT arrived. Pt  agreeable to therapy. Pain Screening  Patient Currently in Pain: Yes  Pain Assessment  Pain Type: Acute pain  Pain Location: Shoulder  Pain Orientation: Left;Upper  Pain Descriptors: Aching  Pain Frequency: Continuous  Pain Onset: On-going  Clinical Progression: Gradually worsening  Patient's Stated Pain Goal: No pain  Multiple Pain Sites: No  Vital Signs  Patient Currently in Pain: Yes       Orientation  Orientation  Overall Orientation Status: Within Normal Limits    Social/Functional History  Social/Functional History  Lives With: Spouse (pt lives in her daughter's house with son in law and 5 grandchildren. she lives in basement floor.)  Type of Home: [address] [place] [address],[address]:  (pt lives in basement of daughters home. 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 Pt has 3 daughters who live locally.     Objective          AROM RLE (degrees)  RLE AROM: WFL  AROM LLE (degrees)  LLE AROM : WFL  Strength RLE  Strength RLE: WFL  Comment: >3/5 B LE(reports intermittent pain and stiffness. Note, pt had a THR in the past)  Strength LLE  Strength LLE: WFL  Comment: >3/5 B LE        Bed mobility  Rolling to Left: Supervision (VC for energy efficient technique)  Rolling to Right: Supervision (VC for energy efficient technique)  Supine to Sit: Minimal assistance (VC for energy efficient technique)  Sit to Supine: Supervision (VC for energy efficient technique)  Scooting: Supervision  Transfers  Sit to Stand: Contact guard assistance;Stand by assistance (From BS chair, bed, and w/c. VC for sternal precautions)  Stand to sit: Supervision  Bed to Chair: Stand by assistance;Contact guard assistance (w/o use of AD, SPT)  Ambulation  Ambulation?: Yes  WB Status: No WB restrictions noted in chart  Ambulation 1  Surface: level tile  Device: Rolling Walker  Other Apparatus: O2;Wheelchair follow (4L)  Assistance: Contact guard assistance;Stand by assistance (to SBA at times)  Quality of Gait: Decreased annie, \"guarded \" gt pattern LLANOS  Distance: 60', 50'   Comments: PT transported chair/ O2  Stairs/Curb  Stairs?: Yes  Stairs  # Steps : 3  Stairs Height: 6\"  Rails: Bilateral  Device: No Device  Assistance: Contact guard assistance;Stand by assistance  Comment: LLANOS with this activity     Balance  Sitting - Static: Good  Sitting - Dynamic: Good (AEB donning shoes)  Standing - Static: Fair (W/o AD)        Assessment   Body structures, Functions, Activity limitations: Decreased strength;Decreased endurance;Decreased functional mobility ; Decreased balance  Assessment: Pt is 70 yo F with decreased overall mobility secondary  a CABGx3. Pt presented with significant SOB throughout treatment. Pt req additional time to get started with therapy and consistently demo guarded behaviors 2/2 to the severe SOB. Pt monitored O2 sats at 95% . Pt is well motivated to return to PLOF.  Pt is well below baseline and would benefit from continued therapy to maximize potential and increase functional mobility towards ind to allow for a safer return home. Treatment Diagnosis: Decreased functional mobility related to CAD  Prognosis: Good  Patient Education: Role of PT- pt verbalized understanding  REQUIRES PT FOLLOW UP: Yes  Activity Tolerance  Activity Tolerance: Patient limited by fatigue;Patient limited by endurance;Treatment limited secondary to medical complications (free text)  Activity Tolerance: Limited by SOB with PT increasing O2 from 3L to 4 L . PT also instructed pt with diaphragmtic breathing ex including postural cues         Plan   Plan  Times per week: 5 of 7 days x 90 minutes   Times per day: Daily  Current Treatment Recommendations: Gait Training, Functional Mobility Training, Balance Training, Transfer Training, Endurance Training, Stair training, Safety Education & Training  Safety Devices  Type of devices: Nurse notified, Call light within reach, Gait belt, Chair alarm in place, Left in chair  Restraints  Initially in place: No    G-Code     OutComes Score                                           AM-PAC Score             Goals  Short term goals  Time Frame for Short term goals: 2 weeks   Short term goal 1: Ind with bed mobility maintaining sternal precautions  Short term goal 2: Ind with transf excluding floor transf  Short term goal 3:  Ind with amb with LRAD for distances of 350' at a time  Short term goal 4: MI with amb up and down 12 steps   Long term goals  Time Frame for Long term goals : STG=LTG  Patient Goals   Patient goals : Return to being Ind       Therapy Time   Individual Concurrent Group Co-treatment   Time In 0735         Time Out 0845         Minutes 70         Timed Code Treatment Minutes: 633 Jaems Montanez, PT

## 2018-08-02 NOTE — PLAN OF CARE
Problem: Falls - Risk of:  Goal: Will remain free from falls  Will remain free from falls   Outcome: Ongoing  Pt. Admitted to unit with impaired gait and weakness. Fall prevention measures activated to promote safety and reduce the risk of falls: Fall alarm activated, bed wheels locked and in lowest position, fall sign posted on door, video surveillance on-going. Hourly rounding and frequent visual checks on-going. Will consider to monitor. Problem: Pain:  Goal: Pain level will decrease  Pain level will decrease   Outcome: Ongoing  Pain level will be decreased or be adequately managed by discharge. Pain is being managed with pharmacological and non pharmacological intervention.

## 2018-08-02 NOTE — CARE COORDINATION
CTC attempted to meet with the Pt to complete the Inpatient Interview. Pt was with therapy and CTC will attempt to meet Pt at a later time. ANASTASIA ChristineN, RN  Care Transition Coordinator  Contact Number:  [phone]

## 2018-08-02 NOTE — PROGRESS NOTES
Standards (Estimated Nutrition Needs):  · Estimated Daily Total Kcal: 1505-4173  · Estimated Daily Protein (g): 60-75    Estimated Intake vs Estimated Needs: Intake Less Than Needs    Nutrition Risk Level: Moderate    Nutrition Interventions:   Continue current diet, Start ONS  Continued Inpatient Monitoring    Nutrition Evaluation:   · Evaluation: Goals set   · Goals: pt will improve po intake to consume greater than 50% of meals and supplements through admission    · Monitoring: Meal Intake, Supplement Intake    See Adult Nutrition Doc Flowsheet for more detail.      Electronically signed by Sohan Bullock, AG, DARIANA on 8/2/18 at 2:15 PM    Contact Number: [phone]

## 2018-08-02 NOTE — PROGRESS NOTES
repair. Admitted to ARU 8/1   Family / Caregiver Present: No  Referring Practitioner: Dr Giuliana Gresham  Diagnosis: CABG  Subjective  Subjective: Pt was seated in recliner chair upon arrival. \"I've been waiting for you, I'd love a shower\". Pt agreeable to OT evaluation. Pt is on 3L O2   Pain Assessment  Patient Currently in Pain: Yes (Pt c/o low back pain and shoulder pain. Pt did not rate but stated she received some tylenol recently )  Pain Assessment: 0-10  Pain Level: 4  Pain Type: Acute pain  Pain Location: Shoulder  Pain Orientation: Left, Upper  Pain Radiating Towards: Na  Pain Descriptors: Aching  Pain Frequency: Continuous  Clinical Progression: Gradually worsening  Patient's Stated Pain Goal: No pain  Effect of Pain on Daily Activities: increased activity intolerance  Pain Intervention(s): Rest, Cold applied  Response to Pain Intervention: Patient Satisfied  Multiple Pain Sites: No  RASS Score (Ventilated): Alert and calm       Social/Functional History  Social/Functional History  Lives With: Spouse  Type of Home: House  Home Layout:  (pt lives in basement of daughters home. )  Home Access: Level entry  Bathroom Shower/Tub: Walk-in shower  Bathroom Toilet: Handicap height  Bathroom Equipment: Grab bars in shower, Shower chair  Bathroom Accessibility: Walker accessible, Wheelchair accessible  Home Equipment: Cane  ADL Assistance: Independent  Homemaking Assistance: Independent  Homemaking Responsibilities: Yes  Ambulation Assistance: Independent  Transfer Assistance: Independent  Active : Yes  Occupation: Full time employment  Type of occupation: [address]  at [employer]  Additional Comments: Pt's  is retired and available 24/7. Pt has 3 daughters who live locally.            Objective        Orientation  Overall Orientation Status: Within Functional Limits     Balance  Sitting Balance: Supervision (seated on TTB)  Standing Balance: Contact guard assistance  Standing Balance  Time: 5 mins Commands: Follows one step commands with increased time  Attention Span: Appears intact  Memory: Appears intact  Safety Judgement: Decreased awareness of need for assistance  Problem Solving: Able to problem solve independently  Insights: Fully aware of deficits  Initiation: Requires cues for some      Perception  Overall Perceptual Status: WFL                LUE AROM (degrees)  LUE AROM : WFL  RUE AROM (degrees)  RUE AROM : WFL                         Assessment   Performance deficits / Impairments: Decreased functional mobility ; Decreased ADL status; Decreased balance;Decreased endurance;Decreased high-level IADLs  Assessment: Pt is a 68 yo female who presents to [facility] s/p CABG and is currently on 3L O2 and limited by decreased activity tolerance and SOB. Prior to admit pt was independent w/ all ADLS and IADLs and was working full time. Pt is currently requiring increased assistance for ADLs d/t fatigue and SOB. Pt benefits from OT in order to maximize functional independence. Treatment Diagnosis: Decreased activity tolerance, impaired ADls and mobility  Prognosis: Good  Decision Making: Low Complexity  Patient Education: Role of OT, oriented to ARU, activity promotion and PLB for SOB, sternal precautions--pt verb understanding   REQUIRES OT FOLLOW UP: Yes  Activity Tolerance  Activity Tolerance: Patient limited by fatigue;Patient limited by pain  Activity Tolerance: Pt on 3L O2. Pt limited by increased SOB and requires cues for PLB. Extended rest breaks required to recover. Safety Devices  Safety Devices in place: Yes  Type of devices: Call light within reach; Chair alarm in place; Left in chair         Plan   Plan  Times per week: 5x a week for 75 mins daily   Times per day: Daily  Current Treatment Recommendations: Strengthening, Functional Mobility Training, Balance Training, Endurance Training, Self-Care / ADL, Safety Education & Training, Patient/Caregiver Education & Training, Home Management

## 2018-08-02 NOTE — PROGRESS NOTES
Goals:  Short-term Goals  Goal 1: Patient will be able to demonstrate appropriate diaphragmatic breathing and speech/breathing coordination for speech production with min cues. Goal 2: Pt will complete graded problem-solving tasks with 85% accuracy given min cues. Goal 3: Pt will complete medication management and finance management tasks with 100% accuracy given min-no cues. Goal 4: Pt will complete executive function tasks (i.e. planning, deductive reasoning, inferential, functional organization tasks) with 80% accuracy independently. Goal 5: Pt will complete short-term memory tasks, of increasing length and complexity, with 85% accuracy given min cues  Goal 6: Pt will complete additional cognitive assessment with goals to be added per POC as needed. Long-term Goals  Goal 1: Pt will demonstrate improved cognitive linguistic function for safe return to prior level of care. Goal 2: Pt will demonstrate improved breathing/speech coordination for speech production. Patient/family involved in developing goals and treatment plan: yes, patient and     Subjective:   Previous level of function and limitations: pt previously was independent with money and medication management. General  Chart Reviewed: Yes  Family / Caregiver Present: Yes ( in and out during evaluation)  Subjective  Subjective: Pt seen sitting upright in recliner chair. She was alert and cooperative throughout session. Social/Functional History  Lives With: Spouse (pt lives in her daughter's house with son in law and 5 grandchildren.  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 Patient Education Response: Verbalizes understanding    G-Code:  SLP G-Codes  Functional Limitations: Memory  Memory Current Status (): At least 20 percent but less than 40 percent impaired, limited or restricted  Memory Goal Status ():  At least 1 percent but less than 20 percent impaired, limited or restricted         Therapy Time:   Individual Concurrent Group Co-treatment   Time In 1115         Time Out 1145         Minutes 2020 Donnell Montanez, M.SGuilherme Reddy Pathologist    8/2/2018 3:55 PM

## 2018-08-02 NOTE — PLAN OF CARE
Problem: Falls - Risk of:  Goal: Will remain free from falls  Will remain free from falls   Fall precautions are in place. Call light and bedside table are within reach. Educated to use call light to call for assistance with transfers.

## 2018-08-02 NOTE — PROGRESS NOTES
Assessment completed, medications given. Fall precautions are in place. Call light and bedside table are within reach. Patient is able to teach back to call for transfer.

## 2018-08-02 NOTE — H&P
Patient: Austin Vazquez  [id number]  Date: 8/2/2018      Chief Complaint: CAD    History of Present Illness/Hospital Course:  Ms Juan F Fong is a 69year old female admitted with symptoms of angina with enzyme elevations consistent with NSTEMI. She underwent cardiac cath that revealed 3 vessel CAD. Attempt was made at PCI with difficulty deploying the stent. She then developed localized dissection as well as hematoma at the catheterization site. She subsequently underwent 3 vessel CABG performed by Dr. Abhay Bonner. She was started on amiodarone for perioperative afib. She endorses mild exertional dyspnea and jose-incisional pain. Prior Level of Function:  Independent    Current Level of Function:  CGA     has a past medical history of Brain aneurysm; CAD (coronary artery disease); COPD (chronic obstructive pulmonary disease) ([facility] [address] Hyperlipidemia; Hypertension; Hypothyroidism; Lumbar spondylosis; Lung cancer ([facility] [address] Renal cyst; Spinal stenosis of lumbar region; and Thoracic aortic aneurysm ([facility] [address] has a past surgical history that includes Lung removal, partial (Right, 04/2010); Colonoscopy (2010); Upper gastrointestinal endoscopy (6/2010); Inguinal hernia repair (Bilateral, 1980s); vascular surgery (Right, 2003); back surgery (1990); Hysterectomy, vaginal (11/19/2014); back surgery (11/2013); Breast lumpectomy (Bilateral, 2000); hip surgery (Right, 05/2014); Salpingo-oophorectomy (Bilateral, 11/19/2014); Colporrhaphy (11/19/2014); Colpopexy (11/19/2014); Carpal tunnel release (Left, 12/22/2016); Carpal tunnel release (Right, 04/06/2017); Urethra dilation; Coronary artery bypass graft (07/26/2018); and pr cabg, vein, single (N/A, 7/26/2018). reports that she quit smoking about 18 years ago. She has a 30.00 pack-year smoking history. She has never used smokeless tobacco. She reports that she drinks about 4.2 oz of alcohol per week . She reports that she does not use drugs.     family history includes Dementia in her 30 mL Oral Daily PRN Jose Carlos Minor MD        amiodarone (CORDARONE) tablet 200 mg  200 mg Oral Daily Jose Carlos Minor MD   200 mg at 08/02/18 0936    furosemide (LASIX) tablet 40 mg  40 mg Oral Daily Jose Carlos Minor MD   40 mg at 08/02/18 0858    levothyroxine (SYNTHROID) tablet 137 mcg  137 mcg Oral Daily Jose Carlos Minor MD   137 mcg at 08/02/18 0535    LORazepam (ATIVAN) tablet 0.5 mg  0.5 mg Oral Q8H PRN Jose Carlos Minor MD        melatonin tablet 3 mg  3 mg Oral Nightly PRN Jose Carlos Minor MD        metoprolol tartrate (LOPRESSOR) tablet 25 mg  25 mg Oral BID Jose Carlos Minor MD   25 mg at 08/02/18 0858    mometasone-formoterol (DULERA) 200-5 MCG/ACT inhaler 2 puff  2 puff Inhalation BID Jose Carlos Minor MD   2 puff at 08/02/18 0725    ondansetron (ZOFRAN-ODT) disintegrating tablet 8 mg  8 mg Oral Q8H PRN Jose Carlos Minor MD        tiotropium [facility]) inhalation capsule 18 mcg  18 mcg Inhalation Daily Jose Carlos Minor MD   18 mcg at 08/02/18 0725    traMADol (ULTRAM) tablet 50 mg  50 mg Oral Q6H PRN Jose Carlos Minor MD   50 mg at 08/01/18 2058    traZODone (DESYREL) tablet 50 mg  50 mg Oral Nightly PRN Jose Carlos Minor MD   50 mg at 08/01/18 2058    albuterol sulfate  (90 Base) MCG/ACT inhaler 2 puff  2 puff Inhalation 4x daily Jose Carlos Minor MD   2 puff at 08/02/18 0724         REVIEW OF SYSTEMS:   CONSTITUTIONAL: negative for fevers, chills, diaphoresis, activity change, appetite change, fatigue, night sweats and unexpected weight change. EYES: negative for blurred vision, eye discharge, visual disturbance and icterus. HEENT: negative for hearing loss, tinnitus, ear drainage, sinus pressure, nasal congestion, epistaxis and snoring. RESPIRATORY: Negative for hemoptysis, cough, sputum production. CARDIOVASCULAR: negative for chest pain, palpitations, exertional chest pressure/discomfort, edema, syncope.    GASTROINTESTINAL: negative for nausea, vomiting, diarrhea, reveals normal strength in all four limbs diffusely. Skin: Normal temperature and turgor; sternal incision c/d/i  MSK: No joint abnormalities noted. Ext: No significant edema appreciated. No varicosities. Lab Results   Component Value Date    WBC 11.0 08/02/2018    HGB 10.0 (L) 08/02/2018    HCT 30.4 (L) 08/02/2018    MCV 94.1 08/02/2018     08/02/2018     Lab Results   Component Value Date    INR 1.15 (H) 07/30/2018    INR 1.19 (H) 07/29/2018    INR 1.20 (H) 07/28/2018    PROTIME 13.1 (H) 07/30/2018    PROTIME 13.6 (H) 07/29/2018    PROTIME 13.7 (H) 07/28/2018     Lab Results   Component Value Date    CREATININE <0.5 (L) 08/02/2018    BUN 15 08/02/2018     08/02/2018    K 3.8 08/02/2018    CL 97 (L) 08/02/2018    CO2 29 08/02/2018     Lab Results   Component Value Date    ALT 16 07/25/2018    AST 17 07/25/2018    ALKPHOS 61 07/25/2018    BILITOT <0.2 07/25/2018     CT ABDOMEN PELVIS WO CONTRAST       Indication: Abdominal pain. Abdominal fluid on ultrasound. Recent cardiac catheterization.       Comparison: Same-day ultrasound. CT 7/24/18.       Technique:  CT of the abdomen and pelvis was obtained without contrast. Coronal and sagittal reformations performed. Up-to-date CT equipment and radiation dose reduction techniques were employed. 100 mL Isovue-370       Findings:       There is a large hematoma most likely emanating from the right groin femoral puncture site. There is hemorrhagic fluid in the anterior proximal thigh extending throughout the right pelvic sidewall extraperitoneal spaces and tracking superiorly in the    retroperitoneum in the right inferior pararenal space.       Liver, spleen, pancreas, adrenal glands and kidneys are without acute or significant interval change. Vicarious excretion of contrast in the gallbladder. There is no evidence of bowel obstruction. Vascular calcifications.       No intraperitoneal free air.  There is minimal free fluid within the peritoneal cavity.       There is mass effect on the urinary bladder from the pelvic side wall/extraperitoneal hematoma.       Bones are osteopenic. Degenerative changes seen in the spine. Right total hip arthroplasty.       Slight increase in the small right basilar pleural effusion.               Impression       1.  Large hematoma emanating from the right groin femoral arterial puncture site throughout the adjacent extraperitoneal soft tissues, right pelvic sidewall and inferior right retroperitoneum. 2.  Slight increase in small volume of right pleural fluid.          The above laboratory data have been reviewed. The above imaging data have been reviewed. The above medical testing have been reviewed. Body mass index is 31.77 kg/m². Barriers to Discharge: Weakness, endurance    POST ADMISSION PHYSICIAN EVALUATION  The patient has agreed to being admitted to our comprehensive inpatient  rehabilitation facility consisting of at least 180 minutes of therapy a day,  5 out of 7 days a week. The patient/family has a good understanding of our discharge process. The  patient has potential to make improvement and is in need of at least two of  the following multidisciplinary therapies including but not limited to  physical, occupational, respiratory, and speech, nutritional services, wound care, and prosthetics and orthotics. Given the patients complex condition  and risk of further medical complications, rehabilitation services cannot be  safely provided at a lower level of care such as a skilled nursing facility. I have compared the patients medical and functional status at the time of the  preadmission screening and the same on this date, and there are no significant changes.     By signing this document, I acknowledge that I have personally performed a  full physical examination on this patient within 24 hours of admission to  this inpatient rehabilitation facility and have determined the patient to be  able

## 2018-08-02 NOTE — PLAN OF CARE
Problem: Nutrition  Goal: Optimal nutrition therapy  Outcome: Ongoing  Nutrition Problem: Increased nutrient needs  Intervention: Food and/or Nutrient Delivery: Continue current diet, Start ONS  Nutritional Goals: pt will improve po intake to consume greater than 50% of meals and supplements through admission

## 2018-08-03 LAB
ANION GAP SERPL CALCULATED.3IONS-SCNC: 11 MMOL/L (ref 3–16)
BUN BLDV-MCNC: 11 MG/DL (ref 7–20)
CALCIUM SERPL-MCNC: 9.2 MG/DL (ref 8.3–10.6)
CHLORIDE BLD-SCNC: 99 MMOL/L (ref 99–110)
CO2: 33 MMOL/L (ref 21–32)
CREAT SERPL-MCNC: <0.5 MG/DL (ref 0.6–1.2)
GFR AFRICAN AMERICAN: >60
GFR NON-AFRICAN AMERICAN: >60
GLUCOSE BLD-MCNC: 110 MG/DL (ref 70–99)
HCT VFR BLD CALC: 30.7 % (ref 36–48)
HEMOGLOBIN: 10.1 G/DL (ref 12–16)
MAGNESIUM: 2.3 MG/DL (ref 1.8–2.4)
MCH RBC QN AUTO: 31.1 PG (ref 26–34)
MCHC RBC AUTO-ENTMCNC: 33 G/DL (ref 31–36)
MCV RBC AUTO: 94.4 FL (ref 80–100)
PDW BLD-RTO: 16.4 % (ref 12.4–15.4)
PLATELET # BLD: 396 K/UL (ref 135–450)
PMV BLD AUTO: 6.9 FL (ref 5–10.5)
POTASSIUM SERPL-SCNC: 4.2 MMOL/L (ref 3.5–5.1)
RBC # BLD: 3.25 M/UL (ref 4–5.2)
SODIUM BLD-SCNC: 143 MMOL/L (ref 136–145)
WBC # BLD: 9.6 K/UL (ref 4–11)

## 2018-08-03 PROCEDURE — 6370000000 HC RX 637 (ALT 250 FOR IP): Performed by: STUDENT IN AN ORGANIZED HEALTH CARE EDUCATION/TRAINING PROGRAM

## 2018-08-03 PROCEDURE — 97110 THERAPEUTIC EXERCISES: CPT | Performed by: PHYSICAL THERAPIST

## 2018-08-03 PROCEDURE — 94761 N-INVAS EAR/PLS OXIMETRY MLT: CPT

## 2018-08-03 PROCEDURE — 97535 SELF CARE MNGMENT TRAINING: CPT

## 2018-08-03 PROCEDURE — 2700000000 HC OXYGEN THERAPY PER DAY

## 2018-08-03 PROCEDURE — 80048 BASIC METABOLIC PNL TOTAL CA: CPT

## 2018-08-03 PROCEDURE — 83735 ASSAY OF MAGNESIUM: CPT

## 2018-08-03 PROCEDURE — 6370000000 HC RX 637 (ALT 250 FOR IP): Performed by: INTERNAL MEDICINE

## 2018-08-03 PROCEDURE — 99232 SBSQ HOSP IP/OBS MODERATE 35: CPT | Performed by: INTERNAL MEDICINE

## 2018-08-03 PROCEDURE — 6360000002 HC RX W HCPCS: Performed by: PHYSICAL MEDICINE & REHABILITATION

## 2018-08-03 PROCEDURE — 6370000000 HC RX 637 (ALT 250 FOR IP): Performed by: PHYSICAL MEDICINE & REHABILITATION

## 2018-08-03 PROCEDURE — 85027 COMPLETE CBC AUTOMATED: CPT

## 2018-08-03 PROCEDURE — 97530 THERAPEUTIC ACTIVITIES: CPT

## 2018-08-03 PROCEDURE — 97116 GAIT TRAINING THERAPY: CPT | Performed by: PHYSICAL THERAPIST

## 2018-08-03 PROCEDURE — 97530 THERAPEUTIC ACTIVITIES: CPT | Performed by: PHYSICAL THERAPIST

## 2018-08-03 PROCEDURE — 1280000000 HC REHAB R&B

## 2018-08-03 PROCEDURE — 94640 AIRWAY INHALATION TREATMENT: CPT

## 2018-08-03 PROCEDURE — 94664 DEMO&/EVAL PT USE INHALER: CPT

## 2018-08-03 PROCEDURE — 97110 THERAPEUTIC EXERCISES: CPT

## 2018-08-03 PROCEDURE — 94150 VITAL CAPACITY TEST: CPT

## 2018-08-03 PROCEDURE — 36415 COLL VENOUS BLD VENIPUNCTURE: CPT

## 2018-08-03 PROCEDURE — 97127 HC SP THER IVNTJ W/FOCUS COG FUNCJ: CPT

## 2018-08-03 RX ORDER — ALBUTEROL SULFATE 90 UG/1
2 AEROSOL, METERED RESPIRATORY (INHALATION) EVERY 4 HOURS PRN
Status: DISCONTINUED | OUTPATIENT
Start: 2018-08-03 | End: 2018-08-06

## 2018-08-03 RX ORDER — BISMUTH SUBSALICYLATE 262 MG/1
524 TABLET, CHEWABLE ORAL EVERY 4 HOURS PRN
Status: DISCONTINUED | OUTPATIENT
Start: 2018-08-03 | End: 2018-08-11 | Stop reason: HOSPADM

## 2018-08-03 RX ORDER — TRAZODONE HYDROCHLORIDE 50 MG/1
100 TABLET ORAL NIGHTLY PRN
Status: DISCONTINUED | OUTPATIENT
Start: 2018-08-03 | End: 2018-08-06

## 2018-08-03 RX ORDER — SENNA PLUS 8.6 MG/1
1 TABLET ORAL 2 TIMES DAILY
Status: DISCONTINUED | OUTPATIENT
Start: 2018-08-04 | End: 2018-08-04

## 2018-08-03 RX ORDER — BISMUTH SUBSALICYLATE 262 MG/1
524 TABLET, CHEWABLE ORAL
Status: DISCONTINUED | OUTPATIENT
Start: 2018-08-03 | End: 2018-08-03

## 2018-08-03 RX ORDER — PANTOPRAZOLE SODIUM 40 MG/1
40 TABLET, DELAYED RELEASE ORAL
Status: DISCONTINUED | OUTPATIENT
Start: 2018-08-04 | End: 2018-08-11 | Stop reason: HOSPADM

## 2018-08-03 RX ADMIN — FAMOTIDINE 20 MG: 20 TABLET ORAL at 07:57

## 2018-08-03 RX ADMIN — BISMUTH SUBSALICYLATE 524 MG: 262 TABLET, CHEWABLE ORAL at 12:17

## 2018-08-03 RX ADMIN — DOCUSATE SODIUM 100 MG: 100 CAPSULE, LIQUID FILLED ORAL at 21:41

## 2018-08-03 RX ADMIN — Medication 400 MG: at 07:56

## 2018-08-03 RX ADMIN — Medication 15 ML: at 07:57

## 2018-08-03 RX ADMIN — METOPROLOL TARTRATE 25 MG: 25 TABLET ORAL at 07:57

## 2018-08-03 RX ADMIN — Medication 37.5 MG: at 21:41

## 2018-08-03 RX ADMIN — Medication 15 ML: at 21:41

## 2018-08-03 RX ADMIN — ATORVASTATIN CALCIUM 40 MG: 40 TABLET, FILM COATED ORAL at 21:41

## 2018-08-03 RX ADMIN — FONDAPARINUX SODIUM 2.5 MG: 2.5 INJECTION, SOLUTION SUBCUTANEOUS at 07:57

## 2018-08-03 RX ADMIN — ACETAMINOPHEN 650 MG: 325 TABLET, FILM COATED ORAL at 17:03

## 2018-08-03 RX ADMIN — DOCUSATE SODIUM 100 MG: 100 CAPSULE, LIQUID FILLED ORAL at 07:56

## 2018-08-03 RX ADMIN — ACETAMINOPHEN 650 MG: 325 TABLET, FILM COATED ORAL at 11:00

## 2018-08-03 RX ADMIN — FUROSEMIDE 40 MG: 40 TABLET ORAL at 07:57

## 2018-08-03 RX ADMIN — Medication 2 PUFF: at 07:17

## 2018-08-03 RX ADMIN — ONDANSETRON 8 MG: 8 TABLET, ORALLY DISINTEGRATING ORAL at 06:28

## 2018-08-03 RX ADMIN — Medication 2 PUFF: at 20:21

## 2018-08-03 RX ADMIN — AMIODARONE HYDROCHLORIDE 200 MG: 200 TABLET ORAL at 07:56

## 2018-08-03 RX ADMIN — ACETAMINOPHEN 650 MG: 325 TABLET, FILM COATED ORAL at 21:41

## 2018-08-03 RX ADMIN — LEVOTHYROXINE SODIUM 137 MCG: 25 TABLET ORAL at 07:56

## 2018-08-03 RX ADMIN — ASPIRIN 325 MG: 325 TABLET, DELAYED RELEASE ORAL at 07:57

## 2018-08-03 RX ADMIN — POTASSIUM CHLORIDE 20 MEQ: 20 TABLET, EXTENDED RELEASE ORAL at 07:56

## 2018-08-03 ASSESSMENT — PAIN DESCRIPTION - PROGRESSION
CLINICAL_PROGRESSION: GRADUALLY WORSENING

## 2018-08-03 ASSESSMENT — PAIN DESCRIPTION - FREQUENCY
FREQUENCY: INTERMITTENT
FREQUENCY: INTERMITTENT
FREQUENCY: CONTINUOUS
FREQUENCY: INTERMITTENT
FREQUENCY: INTERMITTENT

## 2018-08-03 ASSESSMENT — PAIN DESCRIPTION - ORIENTATION
ORIENTATION: RIGHT
ORIENTATION: MID
ORIENTATION: MID
ORIENTATION: RIGHT
ORIENTATION: MID

## 2018-08-03 ASSESSMENT — PAIN DESCRIPTION - DESCRIPTORS
DESCRIPTORS: ACHING
DESCRIPTORS: ACHING
DESCRIPTORS: BURNING
DESCRIPTORS: ACHING;BURNING
DESCRIPTORS: BURNING;ACHING

## 2018-08-03 ASSESSMENT — PAIN SCALES - GENERAL
PAINLEVEL_OUTOF10: 3
PAINLEVEL_OUTOF10: 7
PAINLEVEL_OUTOF10: 3
PAINLEVEL_OUTOF10: 1
PAINLEVEL_OUTOF10: 5
PAINLEVEL_OUTOF10: 5
PAINLEVEL_OUTOF10: 0
PAINLEVEL_OUTOF10: 2

## 2018-08-03 ASSESSMENT — PAIN DESCRIPTION - LOCATION
LOCATION: LEG
LOCATION: BACK
LOCATION: LEG
LOCATION: BACK
LOCATION: CHEST;KNEE

## 2018-08-03 ASSESSMENT — PAIN DESCRIPTION - ONSET
ONSET: ON-GOING
ONSET: ON-GOING
ONSET: GRADUAL
ONSET: ON-GOING
ONSET: ON-GOING

## 2018-08-03 ASSESSMENT — PAIN DESCRIPTION - PAIN TYPE
TYPE: ACUTE PAIN
TYPE: ACUTE PAIN
TYPE: SURGICAL PAIN;ACUTE PAIN
TYPE: ACUTE PAIN
TYPE: ACUTE PAIN

## 2018-08-03 NOTE — PROGRESS NOTES
Physical Therapy  Facility/Department: [facility] ACUTE REHAB UNIT  Daily Treatment Note  NAME: Emily Ribera  : 1950  MRN: [id number]    Date of Service: 8/3/2018    Discharge Recommendations:  Home with Home health PT   PT Equipment Recommendations  Equipment Needed: No    Patient Diagnosis(es): There were no encounter diagnoses. has a past medical history of Brain aneurysm; CAD (coronary artery disease); COPD (chronic obstructive pulmonary disease) ([facility] [address] Hyperlipidemia; Hypertension; Hypothyroidism; Lumbar spondylosis; Lung cancer ([facility] [address] Renal cyst; Spinal stenosis of lumbar region; and Thoracic aortic aneurysm ([facility] [address] has a past surgical history that includes Lung removal, partial (Right, 2010); Colonoscopy (); Upper gastrointestinal endoscopy (2010); Inguinal hernia repair (Bilateral, ); vascular surgery (Right, ); back surgery (); Hysterectomy, vaginal (2014); back surgery (2013); Breast lumpectomy (Bilateral, ); hip surgery (Right, 2014); Salpingo-oophorectomy (Bilateral, 2014); Colporrhaphy (2014); Colpopexy (2014); Carpal tunnel release (Left, 2016); Carpal tunnel release (Right, 2017); Urethra dilation; Coronary artery bypass graft (2018); and pr cabg, vein, single (N/A, 2018). Restrictions  Position Activity Restriction  Sternal Precautions: No Pushing, No Pulling, 5# Lifting Restrictions  Other position/activity restrictions: Up as tolerated. Sternal precautions. Subjective   General  Chart Reviewed: Yes  Additional Pertinent Hx: Pt admitted on 18 with CAD. Pt underwent cardiac cath on  and CABG x 3 on . Pt extubated on .   H/o brain anuerysm, CAD, COPD, lumbar spondylosis, lung cancer, renal cyst, spinal stenosis, thoracic aortic aneurysm, back surgery, B CTR, R hip surgery, R partial lung removal.  Family / Caregiver Present: No  Referring Practitioner: Dr. Kedar Méndez: Pt sitting in BS chair when PT arrived. Pt  agreeable to therapy. General Comment  Comments: Pt immediately reported that she has been noxious all am. Pt was sitting with an emesis pan on her lap. Thick secretions noted in the pan. Pain Screening  Patient Currently in Pain: Yes  Pain Assessment  Pain Level: 7  Pain Type: Acute pain  Pain Location: Leg  Pain Orientation: Right  Pain Descriptors: Aching;Burning  Pain Frequency: Intermittent  Pain Onset: On-going  Clinical Progression: Gradually worsening  Effect of Pain on Daily Activities: decreased tolerance for activity  Patient's Stated Pain Goal: No pain  Pain Intervention(s): Repositioned; Ambulation/Increased activity; Therapeutic presence  Multiple Pain Sites: No  Vital Signs  Patient Currently in Pain: Yes       Orientation  Orientation  Overall Orientation Status: Within Normal Limits  Objective   Transfers  Sit to Stand: Supervision (From BS chair, commode, and w/c to a RW. VC for sternal precautions)  Stand to sit: Supervision (VC for sternal precautions)  Ambulation  Ambulation?: Yes  WB Status: No WB restrictions noted in chart  Ambulation 1  Surface: level tile  Device: Rolling Walker  Other Apparatus: O2 (4L)  Assistance: Stand by assistance  Quality of Gait: Decreased annie, \"guarded \" gt pattern, LLANOS  Distance: 20' x2  Comments: PT transported  O2  Stairs/Curb  Stairs?: No      Comment: Pt req use of the restroom. Pt was able to lower pants/briefs with use of one had on the BB. Pt was incontinent of bladder and req A with changing briefs. Pt was ind with pericare. Pt req additional time on the commode. This was in part to the SOB that she was experiencing and the nausea that req multiple \"spit ups into the emesis pan. PT instructed pt with the following sitting ex;   1. TR/HR   2. LAQS  3.  Marching  4.  Hip abd/add  Belén 10 reps of each to BLES but with multiple rests between w/c/o nausea     Second session: Pt sitting in BS chair when PT arrived. Pt agreeable to therapy but cont to report feeling terrible\"  Pt 's  present for treatment. Pt reports that she is too nauseated to walk but was agreeable to ex. BS chair >bed with a SPT and CGA    Bed mobility  Rolling to Left: Independent  Supine to Sit: Supervision (VC for energy efficient technique)  Transfers  Sit to Stand: Supervision (From [facility] chair. VC for sternal precautions)  Stand to sit: Supervision (VC for sternal precautions)  PT instructed pt with the following ex performed in supine to BLES:  1. AP/ QS/ GS  ( combination isometrics)  2. Bridges with hold of \"3\" ( UES across chest)  3. Alternating hip/knee flex/ext  4. Hip abd   5. Marching in hooklying  6. LAQS in hooklying  7. SLRs    Belén 10 reps of each. (performed at a slow pace)  Pt remained in bed with bed alarm reactivated. Call light and phone placed within reach. Assessment   Body structures, Functions, Activity limitations: Decreased strength;Decreased endurance;Decreased functional mobility ; Decreased balance  Assessment: Pt req additional time to get started with therapy  2/2 to both nausea and SOB. Pt monitored O2 sats at 94% . Pt appears very frustrated and concerned about her R leg , the nausea and the SOB. All 3 factors are quite limiting at this time. Pt is well below baseline and would benefit from continued therapy to maximize potential and increase functional mobility towards ind to allow for a safer return home. Treatment Diagnosis: Decreased functional mobility related to CAD  Prognosis: Good  Patient Education: Diaphragmatic breathing , ther ex, gt training, transf training  REQUIRES PT FOLLOW UP: Yes  Activity Tolerance  Activity Tolerance: Patient limited by fatigue;Patient limited by endurance;Treatment limited secondary to medical complications (free text) (Limited by nausea)  Activity Tolerance: Limited by SOB despite  PT increasing O2 from 3L to 4 L .  PT also instructed pt with diaphragmtic

## 2018-08-03 NOTE — PROGRESS NOTES
Occupational Therapy  Facility/Department: [facility] ACUTE REHAB UNIT  Daily Treatment Note  NAME: Hunter Perry  : 1950  MRN: [id number]    Date of Service: 8/3/2018    Discharge Recommendations:  Home with Home health OT, Home with assist PRN  OT Equipment Recommendations  Other: Pt has a shower chair, grab bars, handicap height toilet. Continue to assess for additional needs     Patient Diagnosis(es): There were no encounter diagnoses. has a past medical history of Brain aneurysm; CAD (coronary artery disease); COPD (chronic obstructive pulmonary disease) ([facility] [address] Hyperlipidemia; Hypertension; Hypothyroidism; Lumbar spondylosis; Lung cancer ([facility] [address] Renal cyst; Spinal stenosis of lumbar region; and Thoracic aortic aneurysm ([facility] [address] has a past surgical history that includes Lung removal, partial (Right, 2010); Colonoscopy (); Upper gastrointestinal endoscopy (2010); Inguinal hernia repair (Bilateral, ); vascular surgery (Right, ); back surgery (); Hysterectomy, vaginal (2014); back surgery (2013); Breast lumpectomy (Bilateral, ); hip surgery (Right, 2014); Salpingo-oophorectomy (Bilateral, 2014); Colporrhaphy (2014); Colpopexy (2014); Carpal tunnel release (Left, 2016); Carpal tunnel release (Right, 2017); Urethra dilation; Coronary artery bypass graft (2018); and pr cabg, vein, single (N/A, 2018). Restrictions  Position Activity Restriction  Sternal Precautions: No Pushing, No Pulling, 5# Lifting Restrictions  Other position/activity restrictions: Up as tolerated. Sternal precautions. Subjective   General  Chart Reviewed: Yes  Additional Pertinent Hx: 69 y.o. F admitted . Underwent cardiac cath and CABG x3 on . PMhx includes CAD, COPD, lung CA, spinal stenosis, TAA, back surgery, hip surgery, hernia repair.   Admitted to ARU    Family / Caregiver Present: No  Referring Practitioner: Dr Harinder Aparicio  Diagnosis: CABG  Subjective  Subjective: Pt was seated in recliner chair upon arrival. Pt reported being nauseaus and had several episodes of emesis this morning. Pt agreeable to OT. Pt also on 3L O2   Pain Assessment  Patient Currently in Pain: Yes (RLE pain, pt reports she did not want meds but agreeable to ice pack)  Vital Signs  Patient Currently in Pain: Yes (RLE pain, pt reports she did not want meds but agreeable to ice pack)   Orientation  Orientation  Overall Orientation Status: Within Functional Limits  Objective    ADL  Grooming: Increased time to complete;Contact guard assistance (Pt completed oral care and washed face in stance at sink w/ seated rest break needed after ~ 1 min in stance. Pt reported being very SOB and unable to stand for completion of task. Initial CGA needed for balance. Spvn seated on TTB at sink to complete)  UE Dressing: Setup; Increased time to complete (Setup to don tshirt. Increased time needed d/t increased SOB after threading BUEs into sleeves.)  Additional Comments: Pt required increased amount of time for grooming and UE dressing tasks this morning d/t c/o nausea and SOB. Pt requires cues for PLB and significant amount of time to recover. Balance  Sitting Balance: Supervision  Standing Balance: Contact guard assistance (CGA-SBA)  Standing Balance  Time: 2 mins total  Activity: functional mobility to/from bathroom, stance for grooming   Sit to stand: Stand by assistance  Stand to sit: Stand by assistance  Comment: Limited standing tolerance during grooming d/t increased SOB. SpO2 94% after ambulation. SpO2 measured throughout treatment and remained above 92%  Functional Mobility  Functional - Mobility Device: Rolling Walker  Activity: To/from bathroom  Assist Level: Contact guard assistance  Functional Mobility Comments: Limited by SOB.  Rest break needed upon arrival into bathroom   Toilet Transfers  Toilet - Technique: Ambulating  Equipment Used: Standard toilet  Toilet Transfer: Stand by assistance  Toilet Transfers Comments: Pt took rest break seated on toilet upon arrival into bathroom           Transfers  Sit to stand: Stand by assistance  Stand to sit: Stand by assistance                            Cognition  Arousal/Alertness: Appropriate responses to stimuli  Following Commands: Follows one step commands with repetition ([place])  Attention Span: Appears intact  Memory: Appears intact  Safety Judgement: Decreased awareness of need for assistance  Problem Solving: Able to problem solve independently  Insights: Fully aware of deficits  Initiation: Requires cues for some                                         2nd session: Pt was seated in recliner chair upon arrival. \"I feel terrible\". Pt reported that she felt worse now than this morning and is feeling more fatigued. OT encouraged pt to get up and attempt to go to the gym. Pt reported being too tired to get up and requested to complete therapy in the room. OT educated pt regarding activity promotion however pt still declined to get up. Seated UE exercises initiated to increase UE strength for improved activity tolerance and participation in ADLs. Pt used 2lb weights to complete the following: elbow flexion, forward punches x 10 reps each. Pt required significant amount of time to complete 2 sets of exercises and required rest breaks after every 3rd rep. Pt instructed to pace inhale and exhale w/ exercises ( elbow flexion-inhale, elbow extension-exhale). Pt appeared to control breathing better w/ this method. SpO2 monitored throughout exercises. SpO2 never dropped below 94%. Pt engaged in 2nd set of exercises, elbow flexion and forward punches x 15 reps. Again, exercises completed w/ increased time d/t increased SOB. Min VCs needed for PLB. Attempted additional exercises including shoulder flexion and horizontal abduction however pt reported increased pain to chest area therefore exercises deemed unsafe.  Multiple rest breaks

## 2018-08-03 NOTE — CARE COORDINATION
8/3/2018  [facility] Case Management Department    Patient: Daria Sigala  MRN: [id number] / : 1950  ACCT:  [de-identified]    Emergency Contacts  Contact 1: Name: Aparna Sharif  Contact 1: Number: [phone]  Contact 1: Relationship: daughter  Contact 2: Name: Kendal Pérez  Contact 2: Number: [phone]  Contact 2: Relationship:     Admission Documentation  Attending Provider: Elwanda Hashimoto, MD  Admit date/time: 2018  4:11 PM  Status: Inpatient  Diagnosis: CAD in native artery     Readmission within last 30 days:  no     Living Situation  Discharge Planning  Living Arrangements: Spouse/Significant Other  Support Systems: Spouse/Significant Other, Children  Potential Assistance Needed: Durable Medical Equipment  DME: Beverly Ganser, Bedside Commode, Cane  Type of Home Care Services: None  Patient expects to be discharged to[de-identified] home  Expected Discharge Date: 08/15/18    Service Assessment       Values / Beliefs  Do you have any ethnic, cultural, sacramental, or spiritual Anabaptist needs you would like us to be aware of while you are in the hospital?: No    Advance Directives (For Healthcare)  Pre-existing DNR Comfort Care/DNR Arrest/DNI Order: Yes, notify physician for order  Healthcare Directive: Yes, patient has an advance directive for healthcare treatment  Type of Healthcare Directive: Durable power of  for health care, Living will  Copy in Chart: No, copy requested from family                        [address] with     [address]   Pt has a rolling walker, cane and raised toilet seat at home. Home Health/Skilled Nursing  Home care at home?  No Provider: None Provider Phone: None     Therapy Consults  PT evaluation needed?: Yes (Comment)  OT Evalulation Needed?: Yes (Comment)  SLP evaluation needed?: No    Home Medical Care  None   Pharmacy:[facility] [phone]     Potential Assistance Purchasing Medications:  No  Does patient want to participate in

## 2018-08-03 NOTE — PROGRESS NOTES
Severity Index (RSI) score. 2. If the patient does not have documented COPD, consider discontinuing anticholinergics when RSI is less than 9.  3. If the bronchospasm worsens (increased RSI), then the bronchodilator frequency can be increased to a maximum of every 4 hours. If greater than every 4 hours is required, the physician will be contacted. 4. If the bronchospasm improves, the frequency of the bronchodilator can be decreased, based on the patient's RSI, but not less than home treatment regimen frequency. 5. Bronchodilator(s) will be discontinued if patient has a RSI less than 9 and has received no scheduled or as needed treatment for 72  Hrs. Patients Ordered on a Mucolytic Agent:    1. Must always be administered with a bronchodilator. 2. Discontinue if patient experiences worsened bronchospasm, or secretions have lessened to the point that the patient is able to clear them with a cough. Anti-inflammatory and Combination Medications:    1. If the patient lacks prior history of lung disease, is not using inhaled anti-inflammatory medication at home, and lacks wheezing by examination or by history for at least 24 hours, contact physician for possible discontinuation.

## 2018-08-03 NOTE — PROGRESS NOTES
Date    WBC 9.6 08/03/2018    HGB 10.1 (L) 08/03/2018    HCT 30.7 (L) 08/03/2018    MCV 94.4 08/03/2018     08/03/2018       Lab Results   Component Value Date     08/03/2018    K 4.2 08/03/2018    K 3.5 07/24/2018    CL 99 08/03/2018    CO2 33 08/03/2018    BUN 11 08/03/2018    CREATININE <0.5 08/03/2018    GLUCOSE 110 08/03/2018    GLUCOSE 97 02/07/2012    CALCIUM 9.2 08/03/2018        Therapy progress:  PT  Position Activity Restriction  Sternal Precautions: No Pushing, No Pulling, 5# Lifting Restrictions  Other position/activity restrictions: Up as tolerated. Sternal precautions. Objective     Sit to Stand: Contact guard assistance, Stand by assistance (From BS chair, bed, and w/c. VC for sternal precautions)  Stand to sit: Supervision  Bed to Chair: Stand by assistance, Contact guard assistance (w/o use of AD, SPT)  Device: Rolling Walker  Other Apparatus: O2, Wheelchair follow (4L)  Assistance: Contact guard assistance, Stand by assistance (to SBA at times)  Distance: 60', 50'   OT  PT Equipment Recommendations  Equipment Needed: No  Toilet - Technique: Ambulating  Equipment Used: Standard toilet  Toilet Transfers Comments: cues for sternal precautions   Assessment        SLP  Current Diet : Regular  Current Liquid Diet : Thin  Diet Solids Recommendation: Regular  Liquid Consistency Recommendation: Thin    Body mass index is 31.77 kg/m². Rehabilitation Diagnosis:  Cardiac, 9.0, Cardiac     Assessment and Plan:  CAD s/p CABG. Sternal precautions. Aspirin. Statin. BB. Lasix. Mg.   - Pain management   - Due to complaints of nausea with tramadol it was Dc'd at her request   - Tylenol 650mg prn for now, pt verbalized understanding and agreed >> does not want opioids    COPD. Spiriva, dulera, oxygen as needed.      Perioperative afib. Continue amiodarone protocol; now to 200mg daily.        Bowels: Schedule colace + senna. Follow bowel movements.  Enema or suppository if needed.      Bladder: Check PVR x 3. [address] if PVR > 200ml or if any volume is > 500 ml.      Sleep  - Increased to Trazodone to 100mg hs prn     GERD with hx of PUD  - Dc'd Pepcid  - Start Protonic 40mg qd    Nausea  - Dc'd zofran at pt's request  - Start pepto-bismol per pt's request    W/D/W/A - MD Marita Nelson,   Internal Medicine, PGY1  Pager: [phone]    This patient has been seen, examined, and discussed with the resident. This note has been altered to reflect my own examination findings, impression, and recommendations. Blaze Calvillo MD 8/3/2018, 11:51 AM

## 2018-08-03 NOTE — PROGRESS NOTES
patient declined  [x] N/A  [] Other:    Subjective:     Pt seen sitting upright in recliner chair. She had just finished using restroom with RN and reported feeling nauseated, stating she needed a bin because she felt like she was going to get sick. SLP got emesis basin for pt. She was noted with belching and expectoration of clear colored mucus but no emesis. RN aware pt not feeling well. Despite pt not feeling well, she was agreeable to therapy and participated in all tasks presented to her. Pt seen sitting upright in recliner chair. She reported still feeling nauseated--she was feeling better but started feeling sick again after walking back from bathroom with RN. She was alert and cooperative during session.  present towards end of session. Objective / Goals:     Pt will be able to demonstrate appropriate diaphragmatic breathing and speech/breathing coordination for speech production with min cues. Goal not addressed this session. Goal not addressed this session. Pt will complete graded problem-solving tasks with 85% accuracy given min cues. Goal not addressed this session. Pt was able to provide 2-3 solutions to a given scenario with 88% accuracy given min verbal cues. Pt will complete medication management and finance management tasks with 100% accuracy given min-no cues. Goal not addressed this session. Goal not addressed this session. Pt will complete executive function tasks (i.e. planning, deductive reasoning, inferential, functional organization tasks) with 80% accuracy independently. Goal not addressed this session. Goal not addressed this session. Pt will complete short-term memory tasks, of increasing length and complexity, with 85% accuracy given min cues   Goal not addressed this session. Educated pt re: memory strategies and provided examples of repetition, visualization, and mnemonic devices.  Given a 4-item grocery list, pt was able to create mnemonic device with minimal cues. Following a 5 minute delay, she was able to recall 4/4 items on grocery list using mnemonic device with increased time. Pt will complete additional cognitive assessment with goals to be added per POC as needed. Patient was administered portions of the BRITTANY (Assessment of Language-Related Functional Activities) with the following results:  · Counting Money: 10/10 (100%)  · Solving Daily Math Problems: 10/10 (100%)  · Understanding Medication Labels:  10/10 (100%)  -All indicating high probability of independent functioning on these tasks. Goal not addressed this session. Other areas targeted: When pt feeling nauseated, she began taking quick, deep breaths through mouth; needed demonstrations and cues for pursed-lip breathing.  N/A   Education:   Pt educated re: role of SLP, results of testing, purpose of visit, pursed-lip breathing Pt educated re: role of SLP, purpose of visit/activities addressed in session, memory strategies   Safety Devices: [x] Call light within reach  [x] Chair alarm activated and connected to nurse call light system  [] Bed alarm activated   [] Other: [x] Call light within reach  [x] Chair alarm activated and connected to nurse call light system  [] Bed alarm activated   [x] Other: left in presence of    Assessment:    Plan:      Interventions used this date:  [] Speech/Language Treatment  [] Instruction in HEP  [] Dysphagia Treatment [x] Cognitive Treatment   [] Other:    Discharge recommendations:  [] Home independently  [x] Home with assistance []  24 hour supervision  [] ECF [] Other  Continued Tx Upon Discharge: ? [] Yes    [] No    [x] TBD based on progress while on ARU     [] Vital Stim indicated     [] Other:   Estimated discharge date: Not yet established      Electronically signed by  MARIA ISABEL Hoskins Led  Speech-Language Pathologist

## 2018-08-04 LAB
ANION GAP SERPL CALCULATED.3IONS-SCNC: 12 MMOL/L (ref 3–16)
BUN BLDV-MCNC: 16 MG/DL (ref 7–20)
CALCIUM SERPL-MCNC: 9.5 MG/DL (ref 8.3–10.6)
CHLORIDE BLD-SCNC: 98 MMOL/L (ref 99–110)
CO2: 31 MMOL/L (ref 21–32)
CREAT SERPL-MCNC: 0.6 MG/DL (ref 0.6–1.2)
GFR AFRICAN AMERICAN: >60
GFR NON-AFRICAN AMERICAN: >60
GLUCOSE BLD-MCNC: 140 MG/DL (ref 70–99)
HCT VFR BLD CALC: 32.4 % (ref 36–48)
HEMOGLOBIN: 10.6 G/DL (ref 12–16)
MAGNESIUM: 2.4 MG/DL (ref 1.8–2.4)
MCH RBC QN AUTO: 30.8 PG (ref 26–34)
MCHC RBC AUTO-ENTMCNC: 32.7 G/DL (ref 31–36)
MCV RBC AUTO: 94.2 FL (ref 80–100)
PDW BLD-RTO: 15.7 % (ref 12.4–15.4)
PLATELET # BLD: 482 K/UL (ref 135–450)
PMV BLD AUTO: 7.3 FL (ref 5–10.5)
POTASSIUM SERPL-SCNC: 3.6 MMOL/L (ref 3.5–5.1)
RBC # BLD: 3.44 M/UL (ref 4–5.2)
SODIUM BLD-SCNC: 141 MMOL/L (ref 136–145)
WBC # BLD: 9.5 K/UL (ref 4–11)

## 2018-08-04 PROCEDURE — 6370000000 HC RX 637 (ALT 250 FOR IP): Performed by: INTERNAL MEDICINE

## 2018-08-04 PROCEDURE — 6370000000 HC RX 637 (ALT 250 FOR IP): Performed by: STUDENT IN AN ORGANIZED HEALTH CARE EDUCATION/TRAINING PROGRAM

## 2018-08-04 PROCEDURE — 83735 ASSAY OF MAGNESIUM: CPT

## 2018-08-04 PROCEDURE — 97535 SELF CARE MNGMENT TRAINING: CPT

## 2018-08-04 PROCEDURE — 6360000002 HC RX W HCPCS: Performed by: PHYSICAL MEDICINE & REHABILITATION

## 2018-08-04 PROCEDURE — 94640 AIRWAY INHALATION TREATMENT: CPT

## 2018-08-04 PROCEDURE — 1280000000 HC REHAB R&B

## 2018-08-04 PROCEDURE — 85027 COMPLETE CBC AUTOMATED: CPT

## 2018-08-04 PROCEDURE — 6370000000 HC RX 637 (ALT 250 FOR IP): Performed by: PHYSICAL MEDICINE & REHABILITATION

## 2018-08-04 PROCEDURE — 80048 BASIC METABOLIC PNL TOTAL CA: CPT

## 2018-08-04 PROCEDURE — 94761 N-INVAS EAR/PLS OXIMETRY MLT: CPT

## 2018-08-04 PROCEDURE — 36415 COLL VENOUS BLD VENIPUNCTURE: CPT

## 2018-08-04 PROCEDURE — 2700000000 HC OXYGEN THERAPY PER DAY

## 2018-08-04 PROCEDURE — 97530 THERAPEUTIC ACTIVITIES: CPT

## 2018-08-04 RX ORDER — SENNA PLUS 8.6 MG/1
2 TABLET ORAL 2 TIMES DAILY
Status: DISCONTINUED | OUTPATIENT
Start: 2018-08-04 | End: 2018-08-09

## 2018-08-04 RX ADMIN — AMIODARONE HYDROCHLORIDE 200 MG: 200 TABLET ORAL at 12:37

## 2018-08-04 RX ADMIN — ACETAMINOPHEN 650 MG: 325 TABLET, FILM COATED ORAL at 19:45

## 2018-08-04 RX ADMIN — TRAZODONE HYDROCHLORIDE 100 MG: 50 TABLET ORAL at 00:16

## 2018-08-04 RX ADMIN — Medication 37.5 MG: at 12:38

## 2018-08-04 RX ADMIN — FUROSEMIDE 40 MG: 40 TABLET ORAL at 12:39

## 2018-08-04 RX ADMIN — POTASSIUM CHLORIDE 20 MEQ: 20 TABLET, EXTENDED RELEASE ORAL at 12:36

## 2018-08-04 RX ADMIN — PANTOPRAZOLE SODIUM 40 MG: 40 TABLET, DELAYED RELEASE ORAL at 06:50

## 2018-08-04 RX ADMIN — ACETAMINOPHEN 650 MG: 325 TABLET, FILM COATED ORAL at 06:52

## 2018-08-04 RX ADMIN — FONDAPARINUX SODIUM 2.5 MG: 2.5 INJECTION, SOLUTION SUBCUTANEOUS at 12:39

## 2018-08-04 RX ADMIN — LEVOTHYROXINE SODIUM 137 MCG: 25 TABLET ORAL at 06:50

## 2018-08-04 RX ADMIN — ACETAMINOPHEN 650 MG: 325 TABLET, FILM COATED ORAL at 15:54

## 2018-08-04 ASSESSMENT — PAIN DESCRIPTION - LOCATION
LOCATION: BACK
LOCATION: CHEST
LOCATION: CHEST

## 2018-08-04 ASSESSMENT — PAIN DESCRIPTION - PROGRESSION
CLINICAL_PROGRESSION: NOT CHANGED

## 2018-08-04 ASSESSMENT — PAIN DESCRIPTION - DESCRIPTORS
DESCRIPTORS: ACHING

## 2018-08-04 ASSESSMENT — PAIN SCALES - GENERAL
PAINLEVEL_OUTOF10: 4
PAINLEVEL_OUTOF10: 2
PAINLEVEL_OUTOF10: 4
PAINLEVEL_OUTOF10: 2
PAINLEVEL_OUTOF10: 3

## 2018-08-04 ASSESSMENT — PAIN DESCRIPTION - PAIN TYPE
TYPE: ACUTE PAIN;SURGICAL PAIN
TYPE: SURGICAL PAIN
TYPE: ACUTE PAIN;SURGICAL PAIN
TYPE: ACUTE PAIN

## 2018-08-04 ASSESSMENT — PAIN DESCRIPTION - ORIENTATION
ORIENTATION: MID
ORIENTATION: MID;UPPER

## 2018-08-04 ASSESSMENT — PAIN DESCRIPTION - ONSET
ONSET: GRADUAL
ONSET: UNABLE TO TELL
ONSET: ON-GOING

## 2018-08-04 ASSESSMENT — PAIN DESCRIPTION - FREQUENCY
FREQUENCY: INTERMITTENT
FREQUENCY: INTERMITTENT
FREQUENCY: CONTINUOUS

## 2018-08-04 NOTE — PLAN OF CARE
Problem: Falls - Risk of:  Goal: Will remain free from falls  Will remain free from falls   Outcome: Ongoing  Patient remains free from falls, bed alarm engaged, call bell and bedside table in reach.

## 2018-08-04 NOTE — PLAN OF CARE
Problem: Falls - Risk of: Intervention: Toileting assistance  Assist x 1 to with walker and GB pt tolerates well aside from LLANOS, ambulates with O2 on.

## 2018-08-04 NOTE — PROGRESS NOTES
Occupational Therapy  Facility/Department: [place] [address] [facility]  Daily Treatment Note  NAME: Yaron Tracy  : 1950  MRN: [id number]    Date of Service: 2018    Discharge Recommendations:  Home with Home health OT, Home with assist PRN  OT Equipment Recommendations  Other: Pt has a shower chair, grab bars, handicap height toilet. Continue to assess for additional needs     Patient Diagnosis(es): There were no encounter diagnoses. has a past medical history of Brain aneurysm; CAD (coronary artery disease); COPD (chronic obstructive pulmonary disease) ([facility] [address] Hyperlipidemia; Hypertension; Hypothyroidism; Lumbar spondylosis; Lung cancer ([facility] [address] Renal cyst; Spinal stenosis of lumbar region; and Thoracic aortic aneurysm ([facility] [address] has a past surgical history that includes Lung removal, partial (Right, 2010); Colonoscopy (); Upper gastrointestinal endoscopy (2010); Inguinal hernia repair (Bilateral, ); vascular surgery (Right, ); back surgery (); Hysterectomy, vaginal (2014); back surgery (2013); Breast lumpectomy (Bilateral, ); hip surgery (Right, 2014); Salpingo-oophorectomy (Bilateral, 2014); Colporrhaphy (2014); Colpopexy (2014); Carpal tunnel release (Left, 2016); Carpal tunnel release (Right, 2017); Urethra dilation; Coronary artery bypass graft (2018); and pr cabg, vein, single (N/A, 2018). Restrictions  Position Activity Restriction  Sternal Precautions: No Pushing, No Pulling, 5# Lifting Restrictions  Other position/activity restrictions: Up as tolerated. Sternal precautions. Subjective   General  Chart Reviewed: Yes  Additional Pertinent Hx: 69 y.o. F admitted . Underwent cardiac cath and CABG x3 on . PMhx includes CAD, COPD, lung CA, spinal stenosis, TAA, back surgery, hip surgery, hernia repair.   Admitted to ARU    Family / Caregiver Present: No  Referring Practitioner: Dr Alina Gannon  Diagnosis: CABG  Subjective  Subjective: Pt was seated EOB w/ bed alarm off upon arrival. RN notified of pt's position. Pt reported being too nauseaus to get OOB and did not sleep at all last night. Pt was dry heaving but unable to spit anything up. Pt reported she did not want to eat breakfast d/t nausea but was agreeable to some dry toast. Pt encouraged to at least get up into the chair this morning but refused any other therapy at this time. General Comment  Comments: Pt remains on 3L O2   Pain Assessment  Patient Currently in Pain: Denies (Pt denies pain but c/o nausea )  Vital Signs  Patient Currently in Pain: Denies (Pt denies pain but c/o nausea )   Orientation     Objective                Balance  Sitting Balance: Supervision (seated EOB)  Standing Balance: Contact guard assistance  Standing Balance  Time: < 1 min  Activity: stand step from EOB to recliner chair   Sit to stand: Stand by assistance  Stand to sit: Stand by assistance     Functional Mobility  Functional - Mobility Device: Rolling Walker  Activity: Other (EOB to recliner chair)  Assist Level: Contact guard assistance  Bed mobility  Supine to Sit: Contact guard assistance  Sit to Supine: Minimal assistance  Comment: Pt was seated EOB upon arrival. Pt initially requested to lay back down and was assisted w/ min A into bed. Pt reported bed was too uncomfortable and was assisted back to EOB. OT encouraged pt to get up into chair for increased comfort and safety. Transfers  Sit to stand: Stand by assistance  Stand to sit: Stand by assistance                            Cognition  Arousal/Alertness: Appropriate responses to stimuli  Following Commands:  Follows one step commands with repetition  Attention Span: Appears intact  Memory: Appears intact  Safety Judgement: Decreased awareness of need for assistance  Problem Solving: Able to problem solve independently  Insights: Fully aware of deficits  Initiation: Requires cues for some  Cognition Comment: Pt was very lethargic and unable to participate fully in OT this AM d/t nausea and lack of sleep. Pt is frustrated w/ current functional status                                          2nd session: Pt was seated in recliner chair upon arrival. \"I'm feeling a little better now, can I get a shower? \". Pt agreeable to ADL w/ OT. Pt on 3L O2. Sit to stand from recliner to RW w/ SBA. Pt demos good adherence to sternal precautions during transfers. Pt ambulated w/ RW and CGA to bathroom. Transfer to TTB complete w/ CGA. Pt doffed t-shirt, pants, underwear, and socks w/ SBA. Pt completed UE bathing and LE bathing w/ SBA-CGA. CGA needed in stance to wash bottom and jose area w/ use of grab bar for UE support. Pt washed BLEs via figure 4 position. Pt required increased time to complete all bathing tasks d/t increased SOB. O2 requirement increased to 4L during shower d/t increased SOB and increased time needed to recover. Following bathing tasks pt donned tshirt w/ setup assist. Pt donned underwear and pants while seated w/ setup assist and CGA to pull up over hips in stance. OT assisted w/ donning ROCK hose. Pt donned socks via figure 4 w/ increased time to complete. Pt required many rest breaks during dressing tasks d/t SOB. Sit to stand from TTB to RW w/ CGA. Pt ambulated back to recliner chair and was left in chair w/ chair alarm on and call light within reach. Pt back on 3L O2 following shower. Assessment   Performance deficits / Impairments: Decreased functional mobility ; Decreased ADL status; Decreased balance;Decreased endurance;Decreased high-level IADLs  Assessment: Pt refused majority of OT session this morning d/t nausea and lack of sleep last night. Pt was only able to participate in transfer to recliner chair and reported being too tired to do anything else. Pt was dry heaving this morning but unable to spit anything up. Pt continues to benefit from OT.  Cont OT per POC   Treatment Diagnosis: Decreased

## 2018-08-05 LAB
ANION GAP SERPL CALCULATED.3IONS-SCNC: 11 MMOL/L (ref 3–16)
BUN BLDV-MCNC: 15 MG/DL (ref 7–20)
CALCIUM SERPL-MCNC: 9.3 MG/DL (ref 8.3–10.6)
CHLORIDE BLD-SCNC: 102 MMOL/L (ref 99–110)
CO2: 29 MMOL/L (ref 21–32)
CREAT SERPL-MCNC: <0.5 MG/DL (ref 0.6–1.2)
GFR AFRICAN AMERICAN: >60
GFR NON-AFRICAN AMERICAN: >60
GLUCOSE BLD-MCNC: 106 MG/DL (ref 70–99)
HCT VFR BLD CALC: 33.4 % (ref 36–48)
HEMOGLOBIN: 10.9 G/DL (ref 12–16)
MAGNESIUM: 2.3 MG/DL (ref 1.8–2.4)
MCH RBC QN AUTO: 31.1 PG (ref 26–34)
MCHC RBC AUTO-ENTMCNC: 32.6 G/DL (ref 31–36)
MCV RBC AUTO: 95.2 FL (ref 80–100)
PDW BLD-RTO: 16.7 % (ref 12.4–15.4)
PLATELET # BLD: 443 K/UL (ref 135–450)
PMV BLD AUTO: 8 FL (ref 5–10.5)
POTASSIUM SERPL-SCNC: 3.8 MMOL/L (ref 3.5–5.1)
RBC # BLD: 3.5 M/UL (ref 4–5.2)
SODIUM BLD-SCNC: 142 MMOL/L (ref 136–145)
WBC # BLD: 9.6 K/UL (ref 4–11)

## 2018-08-05 PROCEDURE — 1280000000 HC REHAB R&B

## 2018-08-05 PROCEDURE — 94761 N-INVAS EAR/PLS OXIMETRY MLT: CPT

## 2018-08-05 PROCEDURE — 83735 ASSAY OF MAGNESIUM: CPT

## 2018-08-05 PROCEDURE — 6370000000 HC RX 637 (ALT 250 FOR IP): Performed by: PHYSICAL MEDICINE & REHABILITATION

## 2018-08-05 PROCEDURE — 2700000000 HC OXYGEN THERAPY PER DAY

## 2018-08-05 PROCEDURE — 36415 COLL VENOUS BLD VENIPUNCTURE: CPT

## 2018-08-05 PROCEDURE — 80048 BASIC METABOLIC PNL TOTAL CA: CPT

## 2018-08-05 PROCEDURE — 6370000000 HC RX 637 (ALT 250 FOR IP): Performed by: INTERNAL MEDICINE

## 2018-08-05 PROCEDURE — 6360000002 HC RX W HCPCS: Performed by: PHYSICAL MEDICINE & REHABILITATION

## 2018-08-05 PROCEDURE — 85027 COMPLETE CBC AUTOMATED: CPT

## 2018-08-05 PROCEDURE — 6370000000 HC RX 637 (ALT 250 FOR IP): Performed by: STUDENT IN AN ORGANIZED HEALTH CARE EDUCATION/TRAINING PROGRAM

## 2018-08-05 RX ADMIN — SENNOSIDES 17.2 MG: 8.6 TABLET, FILM COATED ORAL at 05:48

## 2018-08-05 RX ADMIN — AMIODARONE HYDROCHLORIDE 200 MG: 200 TABLET ORAL at 09:31

## 2018-08-05 RX ADMIN — ASPIRIN 325 MG: 325 TABLET, DELAYED RELEASE ORAL at 09:32

## 2018-08-05 RX ADMIN — Medication 37.5 MG: at 09:31

## 2018-08-05 RX ADMIN — ATORVASTATIN CALCIUM 40 MG: 40 TABLET, FILM COATED ORAL at 20:23

## 2018-08-05 RX ADMIN — ACETAMINOPHEN 650 MG: 325 TABLET, FILM COATED ORAL at 09:31

## 2018-08-05 RX ADMIN — ACETAMINOPHEN 650 MG: 325 TABLET, FILM COATED ORAL at 20:22

## 2018-08-05 RX ADMIN — LEVOTHYROXINE SODIUM 137 MCG: 25 TABLET ORAL at 06:29

## 2018-08-05 RX ADMIN — PANTOPRAZOLE SODIUM 40 MG: 40 TABLET, DELAYED RELEASE ORAL at 06:29

## 2018-08-05 RX ADMIN — FONDAPARINUX SODIUM 2.5 MG: 2.5 INJECTION, SOLUTION SUBCUTANEOUS at 09:34

## 2018-08-05 RX ADMIN — ACETAMINOPHEN 650 MG: 325 TABLET, FILM COATED ORAL at 05:23

## 2018-08-05 RX ADMIN — Medication 400 MG: at 09:32

## 2018-08-05 RX ADMIN — FUROSEMIDE 40 MG: 40 TABLET ORAL at 09:32

## 2018-08-05 RX ADMIN — POTASSIUM CHLORIDE 20 MEQ: 20 TABLET, EXTENDED RELEASE ORAL at 09:31

## 2018-08-05 RX ADMIN — Medication 37.5 MG: at 20:23

## 2018-08-05 RX ADMIN — ACETAMINOPHEN 650 MG: 325 TABLET, FILM COATED ORAL at 16:24

## 2018-08-05 ASSESSMENT — PAIN SCALES - GENERAL
PAINLEVEL_OUTOF10: 8
PAINLEVEL_OUTOF10: 4
PAINLEVEL_OUTOF10: 6
PAINLEVEL_OUTOF10: 0
PAINLEVEL_OUTOF10: 6
PAINLEVEL_OUTOF10: 4

## 2018-08-05 ASSESSMENT — PAIN DESCRIPTION - PROGRESSION
CLINICAL_PROGRESSION: NOT CHANGED
CLINICAL_PROGRESSION: RAPIDLY WORSENING
CLINICAL_PROGRESSION: NOT CHANGED

## 2018-08-05 ASSESSMENT — PAIN DESCRIPTION - ONSET
ONSET: AWAKENED FROM SLEEP
ONSET: GRADUAL

## 2018-08-05 ASSESSMENT — PAIN DESCRIPTION - LOCATION
LOCATION: FLANK
LOCATION: SHOULDER;FLANK
LOCATION: CHEST

## 2018-08-05 ASSESSMENT — PAIN DESCRIPTION - PAIN TYPE
TYPE: ACUTE PAIN
TYPE: ACUTE PAIN
TYPE: SURGICAL PAIN

## 2018-08-05 ASSESSMENT — PAIN DESCRIPTION - DESCRIPTORS
DESCRIPTORS: ACHING
DESCRIPTORS: SHARP

## 2018-08-05 ASSESSMENT — PAIN DESCRIPTION - ORIENTATION
ORIENTATION: RIGHT
ORIENTATION: MID
ORIENTATION: RIGHT

## 2018-08-05 NOTE — PROGRESS NOTES
Pt sleeping/resting in bed. BP 95/62, metoprolol held per parameters. Pt refused all other pm meds as meds cause nausea and vomiting.

## 2018-08-05 NOTE — PLAN OF CARE
Problem: Falls - Risk of:  Goal: Will remain free from falls  Will remain free from falls   Outcome: Ongoing  Patient remains free from falls, fall prevention measures are in place. Bed alarm engaged, call bell and bedside table in reach. Problem: Pain:  Goal: Control of acute pain  Control of acute pain   Outcome: Ongoing  Tylenol has been effective for sternal pain control, patient states she is satisfied.

## 2018-08-06 ENCOUNTER — APPOINTMENT (OUTPATIENT)
Dept: GENERAL RADIOLOGY | Age: 68
DRG: 949 | End: 2018-08-06
Attending: PHYSICAL MEDICINE & REHABILITATION
Payer: MEDICARE

## 2018-08-06 ENCOUNTER — TELEPHONE (OUTPATIENT)
Dept: INTERNAL MEDICINE | Age: 68
End: 2018-08-06

## 2018-08-06 LAB
ANION GAP SERPL CALCULATED.3IONS-SCNC: 9 MMOL/L (ref 3–16)
BUN BLDV-MCNC: 16 MG/DL (ref 7–20)
CALCIUM SERPL-MCNC: 9.2 MG/DL (ref 8.3–10.6)
CHLORIDE BLD-SCNC: 100 MMOL/L (ref 99–110)
CO2: 30 MMOL/L (ref 21–32)
CREAT SERPL-MCNC: <0.5 MG/DL (ref 0.6–1.2)
GFR AFRICAN AMERICAN: >60
GFR NON-AFRICAN AMERICAN: >60
GLUCOSE BLD-MCNC: 121 MG/DL (ref 70–99)
HCT VFR BLD CALC: 32.5 % (ref 36–48)
HEMOGLOBIN: 10.5 G/DL (ref 12–16)
MAGNESIUM: 2.3 MG/DL (ref 1.8–2.4)
MCH RBC QN AUTO: 30.8 PG (ref 26–34)
MCHC RBC AUTO-ENTMCNC: 32.5 G/DL (ref 31–36)
MCV RBC AUTO: 94.8 FL (ref 80–100)
PDW BLD-RTO: 16.3 % (ref 12.4–15.4)
PLATELET # BLD: 585 K/UL (ref 135–450)
PMV BLD AUTO: 7.4 FL (ref 5–10.5)
POTASSIUM SERPL-SCNC: 3.6 MMOL/L (ref 3.5–5.1)
RBC # BLD: 3.42 M/UL (ref 4–5.2)
SODIUM BLD-SCNC: 139 MMOL/L (ref 136–145)
WBC # BLD: 11.8 K/UL (ref 4–11)

## 2018-08-06 PROCEDURE — 94761 N-INVAS EAR/PLS OXIMETRY MLT: CPT

## 2018-08-06 PROCEDURE — 6370000000 HC RX 637 (ALT 250 FOR IP): Performed by: INTERNAL MEDICINE

## 2018-08-06 PROCEDURE — 6360000002 HC RX W HCPCS: Performed by: PHYSICAL MEDICINE & REHABILITATION

## 2018-08-06 PROCEDURE — 94640 AIRWAY INHALATION TREATMENT: CPT

## 2018-08-06 PROCEDURE — 97116 GAIT TRAINING THERAPY: CPT | Performed by: PHYSICAL THERAPIST

## 2018-08-06 PROCEDURE — 80048 BASIC METABOLIC PNL TOTAL CA: CPT

## 2018-08-06 PROCEDURE — 92507 TX SP LANG VOICE COMM INDIV: CPT

## 2018-08-06 PROCEDURE — 85027 COMPLETE CBC AUTOMATED: CPT

## 2018-08-06 PROCEDURE — 83735 ASSAY OF MAGNESIUM: CPT

## 2018-08-06 PROCEDURE — 97127 HC SP THER IVNTJ W/FOCUS COG FUNCJ: CPT

## 2018-08-06 PROCEDURE — 97530 THERAPEUTIC ACTIVITIES: CPT | Performed by: PHYSICAL THERAPIST

## 2018-08-06 PROCEDURE — 36415 COLL VENOUS BLD VENIPUNCTURE: CPT

## 2018-08-06 PROCEDURE — 2700000000 HC OXYGEN THERAPY PER DAY

## 2018-08-06 PROCEDURE — 71045 X-RAY EXAM CHEST 1 VIEW: CPT

## 2018-08-06 PROCEDURE — 1280000000 HC REHAB R&B

## 2018-08-06 PROCEDURE — 97535 SELF CARE MNGMENT TRAINING: CPT

## 2018-08-06 PROCEDURE — 6370000000 HC RX 637 (ALT 250 FOR IP): Performed by: STUDENT IN AN ORGANIZED HEALTH CARE EDUCATION/TRAINING PROGRAM

## 2018-08-06 PROCEDURE — 6370000000 HC RX 637 (ALT 250 FOR IP): Performed by: PHYSICAL MEDICINE & REHABILITATION

## 2018-08-06 RX ORDER — DIPHENHYDRAMINE HCL 25 MG
25 TABLET ORAL NIGHTLY PRN
Status: DISCONTINUED | OUTPATIENT
Start: 2018-08-06 | End: 2018-08-11 | Stop reason: HOSPADM

## 2018-08-06 RX ADMIN — ACETAMINOPHEN 650 MG: 325 TABLET, FILM COATED ORAL at 20:19

## 2018-08-06 RX ADMIN — ACETAMINOPHEN 650 MG: 325 TABLET, FILM COATED ORAL at 06:47

## 2018-08-06 RX ADMIN — ACETAMINOPHEN 650 MG: 325 TABLET, FILM COATED ORAL at 11:53

## 2018-08-06 RX ADMIN — Medication 37.5 MG: at 20:19

## 2018-08-06 RX ADMIN — ATORVASTATIN CALCIUM 40 MG: 40 TABLET, FILM COATED ORAL at 20:20

## 2018-08-06 RX ADMIN — LEVOTHYROXINE SODIUM 137 MCG: 25 TABLET ORAL at 06:42

## 2018-08-06 RX ADMIN — FONDAPARINUX SODIUM 2.5 MG: 2.5 INJECTION, SOLUTION SUBCUTANEOUS at 09:10

## 2018-08-06 RX ADMIN — SENNOSIDES 17.2 MG: 8.6 TABLET, FILM COATED ORAL at 20:19

## 2018-08-06 RX ADMIN — ACETAMINOPHEN 650 MG: 325 TABLET, FILM COATED ORAL at 02:07

## 2018-08-06 RX ADMIN — SENNOSIDES 17.2 MG: 8.6 TABLET, FILM COATED ORAL at 09:10

## 2018-08-06 RX ADMIN — POTASSIUM CHLORIDE 20 MEQ: 20 TABLET, EXTENDED RELEASE ORAL at 09:11

## 2018-08-06 RX ADMIN — Medication 400 MG: at 09:11

## 2018-08-06 RX ADMIN — ACETAMINOPHEN 650 MG: 325 TABLET, FILM COATED ORAL at 23:53

## 2018-08-06 RX ADMIN — DIPHENHYDRAMINE HCL 25 MG: 25 TABLET ORAL at 20:20

## 2018-08-06 RX ADMIN — AMIODARONE HYDROCHLORIDE 200 MG: 200 TABLET ORAL at 09:11

## 2018-08-06 RX ADMIN — FUROSEMIDE 40 MG: 40 TABLET ORAL at 09:11

## 2018-08-06 RX ADMIN — ASPIRIN 325 MG: 325 TABLET, DELAYED RELEASE ORAL at 09:11

## 2018-08-06 RX ADMIN — Medication 37.5 MG: at 09:10

## 2018-08-06 RX ADMIN — PANTOPRAZOLE SODIUM 40 MG: 40 TABLET, DELAYED RELEASE ORAL at 06:42

## 2018-08-06 RX ADMIN — Medication 3 MG: at 23:54

## 2018-08-06 ASSESSMENT — PAIN SCALES - GENERAL
PAINLEVEL_OUTOF10: 0
PAINLEVEL_OUTOF10: 0
PAINLEVEL_OUTOF10: 1
PAINLEVEL_OUTOF10: 0
PAINLEVEL_OUTOF10: 4
PAINLEVEL_OUTOF10: 4
PAINLEVEL_OUTOF10: 0
PAINLEVEL_OUTOF10: 5
PAINLEVEL_OUTOF10: 0
PAINLEVEL_OUTOF10: 3

## 2018-08-06 ASSESSMENT — PAIN DESCRIPTION - PROGRESSION
CLINICAL_PROGRESSION: GRADUALLY WORSENING
CLINICAL_PROGRESSION: GRADUALLY WORSENING

## 2018-08-06 ASSESSMENT — PAIN DESCRIPTION - LOCATION
LOCATION: GENERALIZED
LOCATION: CHEST

## 2018-08-06 ASSESSMENT — PAIN DESCRIPTION - ORIENTATION
ORIENTATION: RIGHT;MID
ORIENTATION: RIGHT
ORIENTATION: RIGHT;LEFT;ANTERIOR
ORIENTATION: MID
ORIENTATION: RIGHT

## 2018-08-06 ASSESSMENT — PAIN DESCRIPTION - FREQUENCY
FREQUENCY: INTERMITTENT
FREQUENCY: INTERMITTENT

## 2018-08-06 ASSESSMENT — PAIN DESCRIPTION - PAIN TYPE
TYPE: ACUTE PAIN
TYPE: ACUTE PAIN;SURGICAL PAIN
TYPE: SURGICAL PAIN;ACUTE PAIN
TYPE: ACUTE PAIN;SURGICAL PAIN

## 2018-08-06 ASSESSMENT — PAIN DESCRIPTION - DESCRIPTORS
DESCRIPTORS: SHARP
DESCRIPTORS: DISCOMFORT
DESCRIPTORS: ACHING
DESCRIPTORS: ACHING;BURNING;SHARP

## 2018-08-06 ASSESSMENT — PAIN DESCRIPTION - ONSET
ONSET: ON-GOING
ONSET: ON-GOING

## 2018-08-06 NOTE — PROGRESS NOTES
ACUTE REHAB UNIT  SPEECH/LANGUAGE PATHOLOGY      [x] Daily  [] Weekly Care Conference Note  [] Discharge    Patient:Bia Huang      Y:[birth year]  JZE:[id number]  Rehab Dx/Hx: CAD in native artery [I25.10]    Precautions: [] Aspiration  [x] Fall risk  [x] Sternal  [] Seizure [] Hip  [] Weight Bearing [] Other  Lives With: Spouse (pt lives in her daughter's house with son in law and 5 grandchildren.  she lives in basement floor.)  ST Dx: [] Aphasia  [] Dysarthria  [] Apraxia   [] Oropharyngeal dysphagia [x] Cognitive Impairment  [x] Other:  Speech/breathing coordination  Date of Admit: 8/1/2018  Room #: 3109/3109-01  Date: 8/6/2018          Current Diet Order:DIET CARDIAC; Daily Fluid Restriction: 2000 ml  Dietary Nutrition Supplements: Frozen Oral Supplement   Recommended Form of Meds: Whole with water (pt reports she takes potassium pill in applesauce because it is so big)  Compensatory Swallowing Strategies:  (standard aspiration precautions)     Dentition:  (permanent upper/lower partials; missing a few molars on bottom but reports this does not affect chewing ability)  Vision  Vision: Impaired  Vision Exceptions: Wears glasses at all times  Hearing  Hearing: Exceptions to [facility]  Hearing Exceptions: Bilateral hearing aid  Barriers toward progress: Cognitive deficit and Decreased endurance        Date: 8/6/2018     Tx session 1   Total Timed Code Min 40   Total Treatment Minutes 60   Individual Treatment Minutes 60   Group Treatment Minutes 0   Co-Treat Minutes 0   Brief Exception: N/A   Pain In chest; not rated; declined intervention initially but then c/o increasing pain as session continued     Pain Intervention: [x] RN notified  [] Repositioned  [] Intervention offered and patient declined  [] N/A  [x] Other: pt independently implemented call light to notify RN of pain and requested Tylenol      Subjective:     Pt up in chair with  at side upon entry;  left at beginning and returned at end

## 2018-08-06 NOTE — PROGRESS NOTES
Ana  Diagnosis: CABG  Subjective  Subjective: Pt was ambulating to bathroom w/ PCA present upon arrival. Pt agreeable to OT   General Comment  Comments: Pt on 3L O2. Pt c/o sharp pain to R breast which hurts w/ movement and when coughing. RN notified    Pain Assessment  Patient Currently in Pain: Yes (Pt c/o sharp pain under R breast w/ movement. RN notified)  Vital Signs  Patient Currently in Pain: Yes (Pt c/o sharp pain under R breast w/ movement. RN notified)   Orientation  Orientation  Overall Orientation Status: Within Functional Limits  Objective      ADL  Equipment Provided: Reacher  Grooming: Increased time to complete;Stand by assistance (Stance at sink to complete oral care, hand hygiene, and wash face. Pt maintained stance for ~ 5 mins w/ SBA. Cues needed for PLB d/t SOB in stance. )  LE Dressing: Setup;Verbal cueing; Increased time to complete;Maximum assistance (Pt required assist to thread BLEs into underwear and pants this morning. OT educated regarding use of reacher to prevent bending however pt reported increased fatigue and SOB and could not complete. Pt was able to pull pants up over hips in stance. )  Toileting: Stand by assistance; Increased time to complete (Pt completed jose care and managed pants up and down. Use of grab bar in stance for UE support. Increased time needed d/t SOB and fatigue. )  Additional Comments: Pt requires an increased amount of time for grooming tasks d/t SOB and fatigue. Pt did not have any nausea this AM however c/o sharp pain under R breast w/ movement. Pt requires some encouragement to attempt tasks before asking for help. Pt observed small bump on R buttock.  RN notified           Balance  Sitting Balance: Supervision (on commode)  Standing Balance: Contact guard assistance (CGA-SBA)  Standing Balance  Time: 7 mins total  Activity: functional mobility to/from bathroom, stance for toileting, grooming, dressing  Sit to stand: Stand by assistance  Stand to sit: Stand d/t pain in her chest. RN and MD aware. Pt continues to benefit from OT. Cont OT per POC   Treatment Diagnosis: Decreased activity tolerance, impaired ADls and mobility  Patient Education: LE dressing and use of reacher, UE HEP w/ reacher, increasing activity tolerance--pt verb understanding but benefits from reinforcement   REQUIRES OT FOLLOW UP: Yes  Activity Tolerance  Activity Tolerance: Patient limited by fatigue;Patient limited by pain  Activity Tolerance: Increased SOB and pain under R breast w/ coughing and movement. RN notified   Safety Devices  Safety Devices in place: Yes  Type of devices: Call light within reach; Chair alarm in place;Nurse notified; Left in chair          Plan   Plan  Times per week: 5x a week for 75 mins daily   Times per day: Daily  Current Treatment Recommendations: Strengthening, Functional Mobility Training, Balance Training, Endurance Training, Self-Care / ADL, Safety Education & Training, Patient/Caregiver Education & Training, Home Management Training  G-Code     OutComes Score                                           AM-PAC Score             Goals  Short term goals  Time Frame for Short term goals: STG=LTG   Long term goals  Time Frame for Long term goals : 2 weeks  Long term goal 1: Pt will complete toileting and toilet transfers MOD I  Long term goal 2: Pt will complete LE dressing I'ly   Long term goal 3: Pt will complete light meal prep task in stance for 10 mins MOD I w/o rest break  Long term goal 4: Pt will complete shower transfers and bathing tasks MOD I  Long term goal 5: Pt will be independent w/ UE HEP to increase activity tolerance for participation in ADLs, IADLs and return to work   Patient Goals   Patient goals : \"to breath better\", \"to be able to do everything I did before\"       Therapy Time   Individual Concurrent Group Co-treatment   Time In 0930         Time Out 1000         Minutes 30         Timed Code Treatment Minutes: 30 Minutes  Variance: 60  Reason: Refusal (Pt reported pain in chest and unable to particpate in OT this afternoon)    Adam Bagley, OT

## 2018-08-06 NOTE — PLAN OF CARE
Problem: Falls - Risk of:  Goal: Will remain free from falls  Will remain free from falls   Outcome: Ongoing  Fall precautions in place. Bed in low and locked position. Bed alarm set. She has called appropriately for assistance. prior to getting up. Call light in reach. Problem: Respiratory  Goal: No pulmonary complications  Outcome: Ongoing  Respirations labored at rest with increased dyspnea on exertion. Breath sounds diminished. Occasional productive cough. Oxygen in use at 2.5 L per nasal cannula continuously. Oxygen saturation 96%.

## 2018-08-06 NOTE — PATIENT CARE CONFERENCE
functional mobility towards ind to allow for a safer return home. SPEECH THERAPY: (intentionally left blank if not actively being seen by this service)   Diet Level: DIET CARDIAC; Daily Fluid Restriction: 2000 ml  Dietary Nutrition Supplements: Frozen Oral Supplement  FIMS:  Comprehension: 6 - Complex ideas 90% or device (hearing aid/glasses)  Expression: 5 - Expresses basic ideas/needs only (hungry/hot/pain)  Social Interaction: 5 - Patient is appropriate with supervision/cues  Problem Solvin - Patient able to solve simple/routine tasks  Memory: 5 - Patient requires prompting with stress/unfamiliar situations    Assessment: Speech Therapy Diagnosis  Cognitive Diagnosis: Pt presents with mild cognitive impairment characterized by deficits in memory and problem solving. Speech Diagnosis: Pt presents with mild difficulty with coordination of speech/breathing, which causes fatigue when speaking and pt requires frequent rest breaks  Progressing towards goals. Impaired breath support negatively impacts communication for more complex / lengthy speaking tasks. Mild impulsivity noted with cognitive tasks. OCCUPATIONAL THERAPY:  FIMS:  Eatin - Feeds self with adaptive equipment/dentures and/or feeds self with modified diet and/or performs own tube feeding  Groomin - Requires setup/cues to do all tasks  Bathin - Able to bathe 8-9 areas  Dressing-Upper: 5 - Requires setup/supervision/cues and/or requires assist with presthesis/brace only  Dressing-Lower: 2 - Requires assist with 4-5 parts of dressing  Toiletin - Requires setup/supervision/cues  Toilet Transfer: 5 - Requires setup/supervision/cues  Shower Transfer: 4 - Minimal contact assistance, pt. expends 75% or more effort     Assessment: Pt was limited by SOB and fatigue this AM and required increased time for grooming and toileting tasks. Pt required increased assistance for LE dressing d/t fatigue and pain.  Pt refused 2nd session today d/t pain in her chest. RN and MD aware. Pt continues to benefit from OT. Cont OT per POC     NUTRITION:  Weight: 169 lb 5 oz (76.8 kg) / Body mass index is 30.97 kg/m². Diet Order/Supplements:  DIET CARDIAC; Daily Fluid Restriction: 2000 ml  Dietary Nutrition Supplements: Frozen Oral Supplement  Please see nutrition note for details. NURSING:FIMS:  Bladder Level of Assistance: 6- Requires bedpan, urinal, diaper, catheter but patient obtains and empties own device. Or requires bladder management medication  Bladder Frequency of Accidents:  (0)  Bowel Level of Assistance: 6- Requires device like bedpan, diaper, bedside commode, but patient obtains and empties own device including colostomy. Or requires bowel management medication including stool softeners, laxatives, inserts own suppository  Bowel Frequency of Accidents:  (0)    [place] Fall Risk Score: 40  Wounds/Incisions/Ulcers: Mid sternal and bilateral leg incisions. Pacing wires intact. Scattered bruising on abdomen and bilateral lower extremities. Medication Review: Benadryl added for sleep. Patient would like to take a second dose as needed. Pain: Sharp chest pain with coughing. Tylenol for pain  Consultations/Labs/X-rays: WBC 11.8, CXR - bibasilar airspace disease and pleural effusions    Patient/Family Education provided by team: PLB, increasing activity tolerance and participation in therapy     CASE MANAGEMENT:  Assessment:  Pt from home with  and plans to return home. Pt has a rolling walker, cane and raised toilet seat at home.  plans to transport pt home upon D/C. TEAM SUMMARY:  Team to see patient following TC today . Rx for night cough. Complaining of R thigh. Improving for PT/OT, SOB limiting factor. Multiple rest breaks. Down in minutes. Look at different sized potassium tablet (smaller). Challenge patient to cooperate. Est DC Aug 11, sooner if continues to refuse.        DISCHARGE PLAN:  Risk for Readmission:

## 2018-08-06 NOTE — PROGRESS NOTES
Pt up to chair eating breakfast at this time. No c/o pain voiced, no s/sx of distress noted. Tolerated morning meds and fluids well. Fall precautions in place. Call light maintained within reach. Will continue to monitor throughout shift.

## 2018-08-06 NOTE — TELEPHONE ENCOUNTER
Pt was sent to hospital from our office on 7/24/18  She has since has heart surgery and is still admitted   Her work,[employer] short term disability papers to be filled out  Pt was asking if those had been sent yet

## 2018-08-06 NOTE — PROGRESS NOTES
Physical Therapy  Facility/Department: [facility] ACUTE REHAB UNIT  Daily Treatment Note  NAME: Rachel Cope  : 1950  MRN: [id number]    Date of Service: 2018    Discharge Recommendations:  Home with Home health PT   PT Equipment Recommendations  Equipment Needed: No    Patient Diagnosis(es): There were no encounter diagnoses. has a past medical history of Brain aneurysm; CAD (coronary artery disease); COPD (chronic obstructive pulmonary disease) ([facility] [address] Hyperlipidemia; Hypertension; Hypothyroidism; Lumbar spondylosis; Lung cancer ([facility] [address] Renal cyst; Spinal stenosis of lumbar region; and Thoracic aortic aneurysm ([facility] [address] has a past surgical history that includes Lung removal, partial (Right, 2010); Colonoscopy (); Upper gastrointestinal endoscopy (2010); Inguinal hernia repair (Bilateral, ); vascular surgery (Right, ); back surgery (); Hysterectomy, vaginal (2014); back surgery (2013); Breast lumpectomy (Bilateral, ); hip surgery (Right, 2014); Salpingo-oophorectomy (Bilateral, 2014); Colporrhaphy (2014); Colpopexy (2014); Carpal tunnel release (Left, 2016); Carpal tunnel release (Right, 2017); Urethra dilation; Coronary artery bypass graft (2018); and pr cabg, vein, single (N/A, 2018). Restrictions  Position Activity Restriction  Sternal Precautions: No Pushing, No Pulling, 5# Lifting Restrictions  Other position/activity restrictions: Up as tolerated. Sternal precautions. Subjective   General  Additional Pertinent Hx: Pt admitted on 18 with CAD. Pt underwent cardiac cath on  and CABG x 3 on . Pt extubated on .   H/o brain anuerysm, CAD, COPD, lumbar spondylosis, lung cancer, renal cyst, spinal stenosis, thoracic aortic aneurysm, back surgery, B CTR, R hip surgery, R partial lung removal.  Family / Caregiver Present: Yes ()  Referring Practitioner: Dr. Avendaño Nicely  Subjective  Subjective: Pt sitting in [facility] chair when PT arrived. Pt  agreeable to therapy. General Comment  Comments: Pt reports that she feels a little better than she did last week. Pt ststes that \"she did not do much over the weekend because of how rough she felt\" Pt states that she still has pain in the R side of her chest as \"though a pin is sticking out\" Pt also states that she has R thigh pain and showed therapist a large discoloration on R thigh. Pain Screening  Patient Currently in Pain: Yes  Pain Assessment  Pain Type: Acute pain;Surgical pain  Pain Location: Chest  Pain Orientation: Right;Left; Anterior  Pain Descriptors: Aching;Burning; Sharp  Pain Frequency: Intermittent  Pain Onset: On-going (Increases with movement)  Clinical Progression: Gradually worsening  Effect of Pain on Daily Activities: Limits her overall activity level  Patient's Stated Pain Goal: No pain  Pain Intervention(s): Ambulation/Increased activity;Repositioned; Therapeutic presence  Response to Pain Intervention: None  Vital Signs  Patient Currently in Pain: Yes       Orientation  Orientation  Overall Orientation Status: Within Normal Limits  Objective      Transfers  Sit to Stand: Supervision (From [facility] chair, armed chair,  to a .  for sternal precautions)  Stand to sit: Supervision (VC for sternal precautions)  Ambulation  Ambulation?: Yes  WB Status: No WB restrictions noted in chart  Ambulation 1  Surface: level tile  Device: Rolling Walker  Other Apparatus: O2 (4L)  Assistance: Stand by assistance;Supervision  Quality of Gait: Decreased annie, \"guarded \" gt pattern, LLANOS  Distance: 265', 180'   Comments: PT transported  O2  Stairs/Curb  Stairs?: No                  Comment: Pt req assist to kennedy compression hose and shoes. Pt also req rests between theses activities 2/2 to severe c/o SOB. Assessment   Body structures, Functions, Activity limitations: Decreased strength;Decreased endurance;Decreased functional mobility ; Decreased

## 2018-08-07 LAB
ANION GAP SERPL CALCULATED.3IONS-SCNC: 11 MMOL/L (ref 3–16)
BUN BLDV-MCNC: 17 MG/DL (ref 7–20)
CALCIUM SERPL-MCNC: 9.2 MG/DL (ref 8.3–10.6)
CHLORIDE BLD-SCNC: 101 MMOL/L (ref 99–110)
CO2: 27 MMOL/L (ref 21–32)
CREAT SERPL-MCNC: <0.5 MG/DL (ref 0.6–1.2)
GFR AFRICAN AMERICAN: >60
GFR NON-AFRICAN AMERICAN: >60
GLUCOSE BLD-MCNC: 134 MG/DL (ref 70–99)
HCT VFR BLD CALC: 31.8 % (ref 36–48)
HEMOGLOBIN: 10.2 G/DL (ref 12–16)
MAGNESIUM: 2.3 MG/DL (ref 1.8–2.4)
MCH RBC QN AUTO: 30.4 PG (ref 26–34)
MCHC RBC AUTO-ENTMCNC: 32.2 G/DL (ref 31–36)
MCV RBC AUTO: 94.3 FL (ref 80–100)
PDW BLD-RTO: 17 % (ref 12.4–15.4)
PLATELET # BLD: 668 K/UL (ref 135–450)
PMV BLD AUTO: 7.3 FL (ref 5–10.5)
POTASSIUM SERPL-SCNC: 3.6 MMOL/L (ref 3.5–5.1)
RBC # BLD: 3.37 M/UL (ref 4–5.2)
SODIUM BLD-SCNC: 139 MMOL/L (ref 136–145)
WBC # BLD: 15.1 K/UL (ref 4–11)

## 2018-08-07 PROCEDURE — 94761 N-INVAS EAR/PLS OXIMETRY MLT: CPT

## 2018-08-07 PROCEDURE — 97530 THERAPEUTIC ACTIVITIES: CPT | Performed by: PHYSICAL THERAPIST

## 2018-08-07 PROCEDURE — 83735 ASSAY OF MAGNESIUM: CPT

## 2018-08-07 PROCEDURE — 6370000000 HC RX 637 (ALT 250 FOR IP): Performed by: INTERNAL MEDICINE

## 2018-08-07 PROCEDURE — 2700000000 HC OXYGEN THERAPY PER DAY

## 2018-08-07 PROCEDURE — 97110 THERAPEUTIC EXERCISES: CPT | Performed by: PHYSICAL THERAPIST

## 2018-08-07 PROCEDURE — 6360000002 HC RX W HCPCS: Performed by: PHYSICAL MEDICINE & REHABILITATION

## 2018-08-07 PROCEDURE — 97116 GAIT TRAINING THERAPY: CPT | Performed by: PHYSICAL THERAPIST

## 2018-08-07 PROCEDURE — 94640 AIRWAY INHALATION TREATMENT: CPT

## 2018-08-07 PROCEDURE — 87086 URINE CULTURE/COLONY COUNT: CPT

## 2018-08-07 PROCEDURE — 87186 SC STD MICRODIL/AGAR DIL: CPT

## 2018-08-07 PROCEDURE — 97530 THERAPEUTIC ACTIVITIES: CPT

## 2018-08-07 PROCEDURE — 80048 BASIC METABOLIC PNL TOTAL CA: CPT

## 2018-08-07 PROCEDURE — 97110 THERAPEUTIC EXERCISES: CPT

## 2018-08-07 PROCEDURE — 87077 CULTURE AEROBIC IDENTIFY: CPT

## 2018-08-07 PROCEDURE — 1280000000 HC REHAB R&B

## 2018-08-07 PROCEDURE — 6370000000 HC RX 637 (ALT 250 FOR IP): Performed by: PHYSICAL MEDICINE & REHABILITATION

## 2018-08-07 PROCEDURE — 97535 SELF CARE MNGMENT TRAINING: CPT

## 2018-08-07 PROCEDURE — 85027 COMPLETE CBC AUTOMATED: CPT

## 2018-08-07 PROCEDURE — 6370000000 HC RX 637 (ALT 250 FOR IP): Performed by: STUDENT IN AN ORGANIZED HEALTH CARE EDUCATION/TRAINING PROGRAM

## 2018-08-07 PROCEDURE — 94664 DEMO&/EVAL PT USE INHALER: CPT

## 2018-08-07 PROCEDURE — 36415 COLL VENOUS BLD VENIPUNCTURE: CPT

## 2018-08-07 PROCEDURE — 97127 HC SP THER IVNTJ W/FOCUS COG FUNCJ: CPT

## 2018-08-07 RX ORDER — POTASSIUM CHLORIDE 750 MG/1
10 TABLET, EXTENDED RELEASE ORAL 2 TIMES DAILY
Status: DISCONTINUED | OUTPATIENT
Start: 2018-08-07 | End: 2018-08-11 | Stop reason: HOSPADM

## 2018-08-07 RX ADMIN — SENNOSIDES 17.2 MG: 8.6 TABLET, FILM COATED ORAL at 08:28

## 2018-08-07 RX ADMIN — Medication 400 MG: at 08:28

## 2018-08-07 RX ADMIN — LORAZEPAM 0.5 MG: 0.5 TABLET ORAL at 16:07

## 2018-08-07 RX ADMIN — POTASSIUM CHLORIDE 20 MEQ: 20 TABLET, EXTENDED RELEASE ORAL at 08:29

## 2018-08-07 RX ADMIN — POTASSIUM CHLORIDE 10 MEQ: 10 TABLET, EXTENDED RELEASE ORAL at 21:16

## 2018-08-07 RX ADMIN — DIPHENHYDRAMINE HCL 25 MG: 25 TABLET ORAL at 21:16

## 2018-08-07 RX ADMIN — LACTULOSE 10 G: 20 SOLUTION ORAL at 05:31

## 2018-08-07 RX ADMIN — PANTOPRAZOLE SODIUM 40 MG: 40 TABLET, DELAYED RELEASE ORAL at 08:28

## 2018-08-07 RX ADMIN — AMIODARONE HYDROCHLORIDE 200 MG: 200 TABLET ORAL at 08:29

## 2018-08-07 RX ADMIN — ACETAMINOPHEN 650 MG: 325 TABLET, FILM COATED ORAL at 21:17

## 2018-08-07 RX ADMIN — Medication 37.5 MG: at 08:29

## 2018-08-07 RX ADMIN — ACETAMINOPHEN 650 MG: 325 TABLET, FILM COATED ORAL at 13:25

## 2018-08-07 RX ADMIN — Medication 15 ML: at 08:28

## 2018-08-07 RX ADMIN — FUROSEMIDE 40 MG: 40 TABLET ORAL at 08:28

## 2018-08-07 RX ADMIN — ASPIRIN 325 MG: 325 TABLET, DELAYED RELEASE ORAL at 08:29

## 2018-08-07 RX ADMIN — Medication 37.5 MG: at 21:15

## 2018-08-07 RX ADMIN — ATORVASTATIN CALCIUM 40 MG: 40 TABLET, FILM COATED ORAL at 21:16

## 2018-08-07 RX ADMIN — LEVOTHYROXINE SODIUM 137 MCG: 25 TABLET ORAL at 08:28

## 2018-08-07 RX ADMIN — FONDAPARINUX SODIUM 2.5 MG: 2.5 INJECTION, SOLUTION SUBCUTANEOUS at 08:28

## 2018-08-07 ASSESSMENT — PAIN DESCRIPTION - PAIN TYPE
TYPE: ACUTE PAIN;SURGICAL PAIN
TYPE: ACUTE PAIN

## 2018-08-07 ASSESSMENT — PAIN SCALES - GENERAL
PAINLEVEL_OUTOF10: 6
PAINLEVEL_OUTOF10: 0
PAINLEVEL_OUTOF10: 3
PAINLEVEL_OUTOF10: 0

## 2018-08-07 ASSESSMENT — PAIN DESCRIPTION - PROGRESSION
CLINICAL_PROGRESSION: GRADUALLY WORSENING
CLINICAL_PROGRESSION: GRADUALLY WORSENING

## 2018-08-07 ASSESSMENT — PAIN DESCRIPTION - DESCRIPTORS
DESCRIPTORS: ACHING;DISCOMFORT
DESCRIPTORS: ACHING

## 2018-08-07 ASSESSMENT — PAIN DESCRIPTION - LOCATION
LOCATION: LEG
LOCATION: CHEST

## 2018-08-07 ASSESSMENT — PAIN DESCRIPTION - FREQUENCY
FREQUENCY: INTERMITTENT
FREQUENCY: INTERMITTENT

## 2018-08-07 ASSESSMENT — PAIN DESCRIPTION - ORIENTATION: ORIENTATION: RIGHT

## 2018-08-07 ASSESSMENT — PAIN DESCRIPTION - ONSET
ONSET: ON-GOING
ONSET: PROGRESSIVE

## 2018-08-07 NOTE — PROGRESS NOTES
Oxygen gradually decreased from 3lpm to 1lpm from 1530 to 1830. Pt's oxygen saturation maintained 92%-94% on 1lpm oxygen nc. Pt observed to have increased anxiety and requested to have flow rate increased. 1:1 and therapeutic presence provided, pt relaxed and agreed to keep flow rate at 1lpm until she laid down for the night because she understands that the goal is for her to be weaned off of oxygen. Pt's daughter later called the RN yelling at her stating \"if I was working I would never decrease a pt's oxygen straight from 3 to 1\". It was explained to the pt's daughter that the flow rate was gradually decreased. Pt's daughter stated that she does not care and wants her mother's flow rate increased. When talking to the pt directly after phone call pt yelled at nurse stating that she wants her flow rate increased to 3lpm and that she has a pulmonologist and that she was going to call him and that she plans on discharging on oxygen and wearing home oxygen for \"at least two weeks\". At this time pt's saturation was 94% on 1lpm. D/t increased anxiety and agitation flow rate was increased to 3lpm. Will continue to monitor.

## 2018-08-07 NOTE — PROGRESS NOTES
Patient verbalizing anxiety about her difficulty sleeping. She states that she is often awakened due to coughing and feeling like she is not getting enough air. She then has increased anxiety. Breath sounds diminished with decreased breath sounds at left base. Patient declined taking Ativan because she didn't like how it made her feel. Oxygen in use at 3 L. Oxygen saturation 94%. Oxygen increased to 5L for comfort. Medicated with Tylenol and Benadryl as ordered.

## 2018-08-07 NOTE — PLAN OF CARE
Problem: Falls - Risk of:  Goal: Will remain free from falls  Will remain free from falls   Outcome: Ongoing  Patient has called appropriately for assistance before getting up. Fall precautions in place. Bed alarm set. Be din low and locked position. Call light in reach. She has remained free of any injury. Problem: Respiratory  Goal: O2 Sat > 90%  Outcome: Ongoing  Respirations labored at rest with increased dyspnea on exertion. Patient complains of feeling that she is not getting enough air. Oxygen in use continuosly at 3-5 liters. Oxygen saturation 94%. Breath sounds diminished with occasional wheezing and crackles.  Occasional productive cough

## 2018-08-07 NOTE — PROGRESS NOTES
tasks completed to increase standing tolerance and balance for participation in ADLs and IADLs. Pt completed ball bounces w/ small unweighted ball x 15 reps + shoulder flexion to 90 degrees w/ BUEs w/ unweighted ball x 15 reps. Pt required seated rest break following exercises. Pt completed in stance w/ CGA-SBA and no LOB. Pt most limited by SOB and requires cues for PLB. Pt also completed toe taps to 3'' cones w/o BUE support on RW. Pt instructed to tap cones in color sequence as called out by OT. Pt completed w/ CGA and one minor LOB which required up to min A to correct. Increased SOB w/ activity w/ multiple standing rest breaks needed. Pt required seated rest break following task. Functional Mobility  Functional - Mobility Device: Rolling Walker  Activity: To/from bathroom; Other (to/from gym)  Assist Level: Contact guard assistance  Functional Mobility Comments: 3L O2. Assist to manage tank   Toilet Transfers  Toilet - Technique: Ambulating  Equipment Used: Standard toilet  Toilet Transfer: Contact guard assistance          Transfers  Sit to stand: Stand by assistance (cues for sternal precautions )  Stand to sit: Stand by assistance                                Cognition  Arousal/Alertness: Appropriate responses to stimuli  Following Commands: Follows one step commands with repetition  Attention Span: Appears intact  Memory: Appears intact  Safety Judgement: Decreased awareness of need for assistance  Problem Solving: Able to problem solve independently  Insights: Fully aware of deficits  Initiation: Requires cues for some  Cognition Comment: Increased axniety which increases SOB. Type of ROM/Therapeutic Exercise  Type of ROM/Therapeutic Exercise: AROM; Cane/Wand  Comment: Seated UE exercises completed w/ 1lb weight including:   Exercises  Scapular Protraction: chest press 15 rep + forward rows 15 reps + backward rows 15 reps   Shoulder Flexion: 15 reps to 90 degrees   Elbow RW and SBA back to room. Pt left in recliner chair w/ chair alarm on and call light within reach. Assessment   Performance deficits / Impairments: Decreased functional mobility ; Decreased ADL status; Decreased balance;Decreased endurance;Decreased high-level IADLs  Assessment: Pt demos improved activity tolerance this date and was able to ambulate to/from the gym as well as complete standing balance task up to 5 mins before requiring rest break. Pt however is still limited by SOB and requires cues for PLB and multiple rest breaks needed. Pt continues to benefit from OT. Cont OT per POC   Treatment Diagnosis: Decreased activity tolerance, impaired ADls and mobility  Patient Education: PLB for SOB, anxiety management, UE HEP, increasing standing tolerance--pt verb understanding but benefits from reinforcement   REQUIRES OT FOLLOW UP: Yes  Activity Tolerance  Activity Tolerance: Patient Tolerated treatment well;Patient limited by fatigue  Activity Tolerance: Improved activity tolerance this date however still SOB and requiring increased time for tasks and multiple rest breaks. Increased anxiety which contributes to SOB. Pt c/o \"not being able to get a good breath\"   Safety Devices  Safety Devices in place: Yes  Type of devices: Call light within reach; Chair alarm in place; Left in chair;Nurse notified          Plan   Plan  Times per week: 5x a week for 75 mins daily   Times per day: Daily  Current Treatment Recommendations: Strengthening, Functional Mobility Training, Balance Training, Endurance Training, Self-Care / ADL, Safety Education & Training, Patient/Caregiver Education & Training, Home Management Training  G-Code     OutComes Score                                           AM-PAC Score             Goals  Short term goals  Time Frame for Short term goals: STG=LTG   Long term goals  Time Frame for Long term goals : 2 weeks  Long term goal 1: Pt will complete toileting and toilet transfers MOD I  Long term goal 2: Pt will complete LE dressing I'ly   Long term goal 3: Pt will complete light meal prep task in stance for 10 mins MOD I w/o rest break  Long term goal 4: Pt will complete shower transfers and bathing tasks MOD I  Long term goal 5: Pt will be independent w/ UE HEP to increase activity tolerance for participation in ADLs, IADLs and return to work   Patient Goals   Patient goals : \"to breath better\", \"to be able to do everything I did before\"       Therapy Time   Individual Concurrent Group Co-treatment   Time In 1030         Time Out 1130         Minutes 60                Second Session Therapy Time:   Individual Concurrent Group Co-treatment   Time In  1315         Time Out  1330         Minutes  15           Timed Code Treatment Minutes:  15    Third Session Therapy Time:   Individual Concurrent Group Co-treatment   Time In  1500         Time Out  1530         Minutes  30           Timed Code Treatment Minutes: 30     Total Treatment Minutes:  60+15+30= [facility], OT

## 2018-08-07 NOTE — PROGRESS NOTES
Physical Therapy  Facility/Department: [facility] ACUTE REHAB UNIT  Daily Treatment Note  NAME: Bernie Sheppard  : 1950  MRN: [id number]    Date of Service: 2018    Discharge Recommendations:  Home with Home health PT   PT Equipment Recommendations  Equipment Needed: No    Patient Diagnosis(es): There were no encounter diagnoses. has a past medical history of Brain aneurysm; CAD (coronary artery disease); COPD (chronic obstructive pulmonary disease) ([facility] [address] Hyperlipidemia; Hypertension; Hypothyroidism; Lumbar spondylosis; Lung cancer ([facility] [address] Renal cyst; Spinal stenosis of lumbar region; and Thoracic aortic aneurysm ([facility] [address] has a past surgical history that includes Lung removal, partial (Right, 2010); Colonoscopy (); Upper gastrointestinal endoscopy (2010); Inguinal hernia repair (Bilateral, ); vascular surgery (Right, ); back surgery (); Hysterectomy, vaginal (2014); back surgery (2013); Breast lumpectomy (Bilateral, ); hip surgery (Right, 2014); Salpingo-oophorectomy (Bilateral, 2014); Colporrhaphy (2014); Colpopexy (2014); Carpal tunnel release (Left, 2016); Carpal tunnel release (Right, 2017); Urethra dilation; Coronary artery bypass graft (2018); and pr cabg, vein, single (N/A, 2018). Restrictions  Position Activity Restriction  Sternal Precautions: No Pushing, No Pulling, 5# Lifting Restrictions  Other position/activity restrictions: Up as tolerated. Sternal precautions. Subjective   General  Chart Reviewed: Yes  Additional Pertinent Hx: Pt admitted on 18 with CAD. Pt underwent cardiac cath on  and CABG x 3 on . Pt extubated on .   H/o brain anuerysm, CAD, COPD, lumbar spondylosis, lung cancer, renal cyst, spinal stenosis, thoracic aortic aneurysm, back surgery, B CTR, R hip surgery, R partial lung removal.  Family / Caregiver Present: No  Referring Practitioner: Dr. Blaine Zarate: Pt sitting in BS chair when PT arrived. Pt  agreeable to therapy. General Comment  Comments: Pt reports that she is feeling better today. Pt states that she finally had a BM. Pt states that she feels so much better  Pain Screening  Patient Currently in Pain: Yes  Pain Assessment  Pain Level: 6  Pain Type: Acute pain  Pain Location: Leg  Pain Orientation: Right  Pain Descriptors: Aching;Discomfort  Pain Frequency: Intermittent  Pain Onset: On-going (Increases with movement)  Clinical Progression: Gradually worsening  Patient's Stated Pain Goal: No pain  Pain Intervention(s): Repositioned; Ambulation/Increased activity; Therapeutic presence  Vital Signs  Patient Currently in Pain: Yes       Orientation  Orientation  Overall Orientation Status: Within Normal Limits  Objective      Transfers  Sit to Stand: Supervision (From [facility] chair, armed chair, to a RW. Progressing to MI )  Stand to sit: Supervision (VC for sternal precautions)  Ambulation  Ambulation?: Yes  WB Status: No WB restrictions noted in chart  Ambulation 1  Surface: level tile  Device: Rolling Walker  Other Apparatus: O2 (3L)  Assistance: Supervision  Quality of Gait: Decreased annie, LLANOS  Distance: 265', 185, 80'  Comments: PT transported  O2  PT instructed pt with  The following standing ex using  BUE support:  1. Toe raises/heel raises  2. Marching   3. Hip abd( alternating)  4. Hamstring curls ( alternating)   5. Mini squats  6. Hip flex with knee ext  Belén 10 reps of each with brief rests between 2/2  To SOB    Comment: Pt req assist to kennedy a thigh hi compression hose to RLE. Pt was able to kennedy B shoes but unable to lace the shoes 2/2 to the increased flexion of trunk which resulted in increased pain to the chest. Pt req additional time to complete these activities req multiple rests  2/2 to c/o  severe SOB. Seated stepper x 2 minutes with maintaining a lit screen the entire time. Second session: Pt sitting in BS chair when PT arrived.  Pt

## 2018-08-07 NOTE — PROGRESS NOTES
Monique Hernández  8/7/2018  [id number]    Chief Complaint: CAD s/p CABG    Subjective:   Discussed care w/ patient and family at bedside; patient now agreeable to take anxiety medication. Also requests second dose of benadryl at night for sleep. ROS: Denies chest pain, dyspnea, diaphoresis, fever, or chills. Objective:  Patient Vitals for the past 24 hrs:   BP Temp Temp src Pulse Resp SpO2 Weight   08/07/18 0827 112/74 98.6 °F (37 °C) Oral 79 16 95 % -   08/07/18 0736 - - - - 16 97 % -   08/07/18 0515 - - - - - - 168 lb 14 oz (76.6 kg)   08/06/18 2018 129/80 97.5 °F (36.4 °C) Oral 84 18 94 % -     Gen: No distress, pleasant. HEENT: Normocephalic, atraumatic. CV: Regular rate and rhythm. Median sternotomy site healing without dehiscence or infection  Resp: No respiratory distress. Abd: Soft, nontender   Ext: No edema. Neuro: Alert, oriented, appropriately interactive. Wt Readings from Last 3 Encounters:   08/07/18 168 lb 14 oz (76.6 kg)   08/01/18 173 lb 11.6 oz (78.8 kg)   07/24/18 167 lb (75.8 kg)       Laboratory data:   Lab Results   Component Value Date    WBC 15.1 (H) 08/07/2018    HGB 10.2 (L) 08/07/2018    HCT 31.8 (L) 08/07/2018    MCV 94.3 08/07/2018     (H) 08/07/2018       Lab Results   Component Value Date     08/07/2018    K 3.6 08/07/2018    K 3.5 07/24/2018     08/07/2018    CO2 27 08/07/2018    BUN 17 08/07/2018    CREATININE <0.5 08/07/2018    GLUCOSE 134 08/07/2018    GLUCOSE 97 02/07/2012    CALCIUM 9.2 08/07/2018        Therapy progress:  PT  Position Activity Restriction  Sternal Precautions: No Pushing, No Pulling, 5# Lifting Restrictions  Other position/activity restrictions: Up as tolerated. Sternal precautions. Objective     Sit to Stand: Supervision (From [facility] chair, armed chair, to a RW.   Progressing to MI )  Stand to sit: Supervision (VC for sternal precautions)  Bed to Chair: Stand by assistance, Contact guard assistance (w/o use of AD,

## 2018-08-08 LAB
ANION GAP SERPL CALCULATED.3IONS-SCNC: 10 MMOL/L (ref 3–16)
BACTERIA: ABNORMAL /HPF
BILIRUBIN URINE: NEGATIVE
BLOOD, URINE: NEGATIVE
BUN BLDV-MCNC: 18 MG/DL (ref 7–20)
CALCIUM SERPL-MCNC: 9.1 MG/DL (ref 8.3–10.6)
CHLORIDE BLD-SCNC: 102 MMOL/L (ref 99–110)
CLARITY: CLEAR
CO2: 28 MMOL/L (ref 21–32)
COLOR: YELLOW
CREAT SERPL-MCNC: 0.5 MG/DL (ref 0.6–1.2)
EPITHELIAL CELLS, UA: ABNORMAL /HPF
GFR AFRICAN AMERICAN: >60
GFR NON-AFRICAN AMERICAN: >60
GLUCOSE BLD-MCNC: 126 MG/DL (ref 70–99)
GLUCOSE URINE: NEGATIVE MG/DL
HCT VFR BLD CALC: 33.9 % (ref 36–48)
HEMOGLOBIN: 10.9 G/DL (ref 12–16)
KETONES, URINE: NEGATIVE MG/DL
LEUKOCYTE ESTERASE, URINE: ABNORMAL
MAGNESIUM: 2.4 MG/DL (ref 1.8–2.4)
MCH RBC QN AUTO: 30.9 PG (ref 26–34)
MCHC RBC AUTO-ENTMCNC: 32.2 G/DL (ref 31–36)
MCV RBC AUTO: 96 FL (ref 80–100)
MICROSCOPIC EXAMINATION: YES
NITRITE, URINE: NEGATIVE
PDW BLD-RTO: 17 % (ref 12.4–15.4)
PH UA: 6
PLATELET # BLD: 660 K/UL (ref 135–450)
PMV BLD AUTO: 7 FL (ref 5–10.5)
POTASSIUM SERPL-SCNC: 3.5 MMOL/L (ref 3.5–5.1)
PROTEIN UA: ABNORMAL MG/DL
RBC # BLD: 3.53 M/UL (ref 4–5.2)
RBC UA: ABNORMAL /HPF (ref 0–2)
SODIUM BLD-SCNC: 140 MMOL/L (ref 136–145)
SPECIFIC GRAVITY UA: 1.01
UROBILINOGEN, URINE: 1 E.U./DL
WBC # BLD: 10.7 K/UL (ref 4–11)
WBC UA: ABNORMAL /HPF (ref 0–5)

## 2018-08-08 PROCEDURE — 97530 THERAPEUTIC ACTIVITIES: CPT | Performed by: PHYSICAL THERAPIST

## 2018-08-08 PROCEDURE — 97127 HC SP THER IVNTJ W/FOCUS COG FUNCJ: CPT

## 2018-08-08 PROCEDURE — 6370000000 HC RX 637 (ALT 250 FOR IP): Performed by: PHYSICAL MEDICINE & REHABILITATION

## 2018-08-08 PROCEDURE — 36415 COLL VENOUS BLD VENIPUNCTURE: CPT

## 2018-08-08 PROCEDURE — 1280000000 HC REHAB R&B

## 2018-08-08 PROCEDURE — 94761 N-INVAS EAR/PLS OXIMETRY MLT: CPT

## 2018-08-08 PROCEDURE — 2700000000 HC OXYGEN THERAPY PER DAY

## 2018-08-08 PROCEDURE — 80048 BASIC METABOLIC PNL TOTAL CA: CPT

## 2018-08-08 PROCEDURE — 6370000000 HC RX 637 (ALT 250 FOR IP): Performed by: STUDENT IN AN ORGANIZED HEALTH CARE EDUCATION/TRAINING PROGRAM

## 2018-08-08 PROCEDURE — 6360000002 HC RX W HCPCS: Performed by: PHYSICAL MEDICINE & REHABILITATION

## 2018-08-08 PROCEDURE — 97116 GAIT TRAINING THERAPY: CPT | Performed by: PHYSICAL THERAPIST

## 2018-08-08 PROCEDURE — 97530 THERAPEUTIC ACTIVITIES: CPT

## 2018-08-08 PROCEDURE — 97110 THERAPEUTIC EXERCISES: CPT | Performed by: PHYSICAL THERAPIST

## 2018-08-08 PROCEDURE — 97535 SELF CARE MNGMENT TRAINING: CPT

## 2018-08-08 PROCEDURE — 83735 ASSAY OF MAGNESIUM: CPT

## 2018-08-08 PROCEDURE — 94640 AIRWAY INHALATION TREATMENT: CPT

## 2018-08-08 PROCEDURE — 6370000000 HC RX 637 (ALT 250 FOR IP): Performed by: INTERNAL MEDICINE

## 2018-08-08 PROCEDURE — 81001 URINALYSIS AUTO W/SCOPE: CPT

## 2018-08-08 PROCEDURE — 85027 COMPLETE CBC AUTOMATED: CPT

## 2018-08-08 RX ADMIN — ACETAMINOPHEN 650 MG: 325 TABLET, FILM COATED ORAL at 01:27

## 2018-08-08 RX ADMIN — ASPIRIN 325 MG: 325 TABLET, DELAYED RELEASE ORAL at 07:48

## 2018-08-08 RX ADMIN — ACETAMINOPHEN 650 MG: 325 TABLET, FILM COATED ORAL at 10:35

## 2018-08-08 RX ADMIN — LEVOTHYROXINE SODIUM 137 MCG: 25 TABLET ORAL at 05:20

## 2018-08-08 RX ADMIN — Medication 400 MG: at 07:48

## 2018-08-08 RX ADMIN — POTASSIUM CHLORIDE 10 MEQ: 10 TABLET, EXTENDED RELEASE ORAL at 07:48

## 2018-08-08 RX ADMIN — FUROSEMIDE 40 MG: 40 TABLET ORAL at 07:48

## 2018-08-08 RX ADMIN — DIPHENHYDRAMINE HCL 25 MG: 25 TABLET ORAL at 01:27

## 2018-08-08 RX ADMIN — Medication 37.5 MG: at 20:44

## 2018-08-08 RX ADMIN — Medication 37.5 MG: at 07:47

## 2018-08-08 RX ADMIN — ATORVASTATIN CALCIUM 40 MG: 40 TABLET, FILM COATED ORAL at 20:44

## 2018-08-08 RX ADMIN — FONDAPARINUX SODIUM 2.5 MG: 2.5 INJECTION, SOLUTION SUBCUTANEOUS at 07:48

## 2018-08-08 RX ADMIN — PANTOPRAZOLE SODIUM 40 MG: 40 TABLET, DELAYED RELEASE ORAL at 05:20

## 2018-08-08 RX ADMIN — AMIODARONE HYDROCHLORIDE 200 MG: 200 TABLET ORAL at 07:48

## 2018-08-08 RX ADMIN — ACETAMINOPHEN 650 MG: 325 TABLET, FILM COATED ORAL at 21:00

## 2018-08-08 RX ADMIN — SENNOSIDES 17.2 MG: 8.6 TABLET, FILM COATED ORAL at 07:48

## 2018-08-08 RX ADMIN — Medication 15 ML: at 07:47

## 2018-08-08 RX ADMIN — DIPHENHYDRAMINE HCL 25 MG: 25 TABLET ORAL at 21:00

## 2018-08-08 RX ADMIN — Medication 15 ML: at 20:44

## 2018-08-08 RX ADMIN — POTASSIUM CHLORIDE 10 MEQ: 10 TABLET, EXTENDED RELEASE ORAL at 20:44

## 2018-08-08 RX ADMIN — ACETAMINOPHEN 650 MG: 325 TABLET, FILM COATED ORAL at 15:37

## 2018-08-08 ASSESSMENT — PAIN DESCRIPTION - PAIN TYPE
TYPE: ACUTE PAIN;SURGICAL PAIN

## 2018-08-08 ASSESSMENT — PAIN SCALES - GENERAL
PAINLEVEL_OUTOF10: 0
PAINLEVEL_OUTOF10: 3
PAINLEVEL_OUTOF10: 3
PAINLEVEL_OUTOF10: 0
PAINLEVEL_OUTOF10: 3
PAINLEVEL_OUTOF10: 0
PAINLEVEL_OUTOF10: 1
PAINLEVEL_OUTOF10: 3
PAINLEVEL_OUTOF10: 0
PAINLEVEL_OUTOF10: 2

## 2018-08-08 ASSESSMENT — PAIN DESCRIPTION - FREQUENCY
FREQUENCY: INTERMITTENT

## 2018-08-08 ASSESSMENT — PAIN DESCRIPTION - ONSET
ONSET: ON-GOING
ONSET: ON-GOING
ONSET: PROGRESSIVE
ONSET: PROGRESSIVE

## 2018-08-08 ASSESSMENT — PAIN DESCRIPTION - DESCRIPTORS
DESCRIPTORS: ACHING

## 2018-08-08 ASSESSMENT — PAIN DESCRIPTION - LOCATION
LOCATION: CHEST

## 2018-08-08 ASSESSMENT — PAIN DESCRIPTION - PROGRESSION
CLINICAL_PROGRESSION: GRADUALLY WORSENING
CLINICAL_PROGRESSION: GRADUALLY WORSENING
CLINICAL_PROGRESSION: NOT CHANGED
CLINICAL_PROGRESSION: NOT CHANGED

## 2018-08-08 ASSESSMENT — PAIN DESCRIPTION - ORIENTATION
ORIENTATION: RIGHT;MID

## 2018-08-08 NOTE — PROGRESS NOTES
ACUTE REHAB UNIT  SPEECH/LANGUAGE PATHOLOGY      [x] Daily  [] Weekly Care Conference Note  [] Discharge    Patient:Bia Foster      DSW:[id number]  DJF:[id number]  Rehab Dx/Hx: CAD in native artery [I25.10]    Precautions: [] Aspiration  [x] Fall risk  [x] Sternal  [] Seizure [] Hip  [] Weight Bearing [] Other  Lives With: Spouse (pt lives in her daughter's house with son in law and 5 grandchildren. she lives in basement floor.)  ST Dx: [] Aphasia  [] Dysarthria  [] Apraxia   [] Oropharyngeal dysphagia [x] Cognitive Impairment  [x] Other:  Speech/breathing coordination  Date of Admit: 8/1/2018  Room #: 3109/3109-01  Date: 8/8/2018          Current Diet Order:DIET CARDIAC; Daily Fluid Restriction: 2000 ml  Dietary Nutrition Supplements: Frozen Oral Supplement   Recommended Form of Meds: Whole with water (pt reports she takes potassium pill in applesauce because it is so big)  Compensatory Swallowing Strategies:  (standard aspiration precautions)     Dentition:  (permanent upper/lower partials; missing a few molars on bottom but reports this does not affect chewing ability)  Vision  Vision: Impaired  Vision Exceptions: Wears glasses at all times  Hearing  Hearing: Exceptions to [facility]  Hearing Exceptions: Bilateral hearing aid  Barriers toward progress: Decreased endurance, anxiety      Date: 8/8/2018     Tx session 1   Total Timed Code Min 30   Total Treatment Minutes 30   Individual Treatment Minutes 30   Group Treatment Minutes 0   Co-Treat Minutes 0   Brief Exception: N/A   Pain Endorsed pain in chest; did not rate   Pain Intervention: [] RN notified  [] Repositioned  [x] Intervention offered and patient declined  [] N/A  [x] Other: pt reported receiving Tylenol earlier     Subjective:     Pt up in chair with  at side upon entry;  left prior to initiation of tx session. Pt reported doing \"really well\" today and improvement in anxiety.  During session, pt expressed frustration at performance on be noted; pt with mild executive dysfunction in completing novel cognitive task.    Plan: Continue per POC     Interventions used this date:  [] Speech/Language Treatment  [] Instruction in HEP  [] Dysphagia Treatment [x] Cognitive Treatment   [] Other:    Discharge recommendations:  [x] Home independently  [] Home with assistance []  24 hour supervision  [] ECF [] Other  Continued Tx Upon Discharge: ? [] Yes    [] No    [x] TBD based on progress while on ARU     [] Vital Stim indicated     [] Other:   Estimated discharge date: 8/11/18      Electronically signed by  Chino Almanza MA CCC-SLP; [id number]   Speech-Language Pathologist

## 2018-08-08 NOTE — PLAN OF CARE
Problem: Falls - Risk of:  Goal: Will remain free from falls  Will remain free from falls   Outcome: Ongoing  Pt is alert oriented and tt calls light appropriately with toileting. Fall protocol in place and pt is aware of the same. Absence of falls and injury this shift. Bed alarm is ON and call light within reach. Pt remains safe and will cont to reassess fall precaution.

## 2018-08-08 NOTE — PROGRESS NOTES
Physical Therapy  Facility/Department: [facility] ACUTE REHAB UNIT  Daily Treatment Note  NAME: Bridger Cordero  : 1950  MRN: [id number]    Date of Service: 2018    Discharge Recommendations:  Home with Home health PT   PT Equipment Recommendations  Equipment Needed: No    Patient Diagnosis(es): There were no encounter diagnoses. has a past medical history of Brain aneurysm; CAD (coronary artery disease); COPD (chronic obstructive pulmonary disease) ([facility] [address] Hyperlipidemia; Hypertension; Hypothyroidism; Lumbar spondylosis; Lung cancer ([facility] [address] Renal cyst; Spinal stenosis of lumbar region; and Thoracic aortic aneurysm ([facility] [address] has a past surgical history that includes Lung removal, partial (Right, 2010); Colonoscopy (); Upper gastrointestinal endoscopy (2010); Inguinal hernia repair (Bilateral, ); vascular surgery (Right, ); back surgery (); Hysterectomy, vaginal (2014); back surgery (2013); Breast lumpectomy (Bilateral, ); hip surgery (Right, 2014); Salpingo-oophorectomy (Bilateral, 2014); Colporrhaphy (2014); Colpopexy (2014); Carpal tunnel release (Left, 2016); Carpal tunnel release (Right, 2017); Urethra dilation; Coronary artery bypass graft (2018); and pr cabg, vein, single (N/A, 2018). Restrictions  Position Activity Restriction  Sternal Precautions: No Pushing, No Pulling, 5# Lifting Restrictions  Other position/activity restrictions: Up as tolerated. Sternal precautions. Subjective   General  Chart Reviewed: Yes  Family / Caregiver Present: No  Referring Practitioner: Dr. Hina Good  Subjective  Subjective: Pt sitting in BS chair when PT arrived. Pt  agreeable to therapy. General Comment  Comments: Pt requested that her  be trained with walking with her when she is in the room. Pt also reprted that she continues to struggle to \"catch her breath\" and that no one knows why.  \"  Pain Screening  Patient Currently in use of gt belt ), (PT) educating patient and family on hospital policies regarding fall prevention such as need for gait belt for every transfer, application of non-skid socks or shoes,  Patient and family agree to adhere to stated policies. (  educated on how to activate the [employer] COM [facility]TL setting on  chair alarm in order to ensure that alarms remain activated. Patient and caregiver educated that chair/bed alarm will be activated whenever ) is not present in the room, and () instructed to ask a staff member to ensure alarm is on appropriately when family member must leave. Patient and family educated that ability to perform in room transfers without staff assist may be revoked if staff finds that hospital policies are not being adhered to or if the patient's functional/cognitive status changes. Patient and family verbalized agreement and understanding. PT also instructed pt with a stretching ex performed on the mat table . This included pt positioned in supine with all 4 extrem in an abd, extended and ER position. Pt then instructed to perform isometrics with Hold x 3 secs x 10 reps. PT also worked with pt on resistive breathing techniques as a calming activity for pt's breathing. PT used manual contacts for the resistive breathing and then instructed pt with her own manual contact. Pt reported that this felt like a \"good stretch. Transfers  Sit to Stand: Supervision;Modified independent (From BS chair, armed chair, to a RW. Progressing to MI )  Stand to sit: Modified independent  Ambulation 1  Surface: level tile  Device: Rolling Walker  Other Apparatus: O2 (Decreased to 1 L and monitored with pt having O2 sats between 95%-97%)  Assistance: Supervision;Modified Independent (Progressing to MI)  Quality of Gait: Decreased annie, LLANOS, appears guarded with decreased pelvic /trunk dissociation  Distance:  80'x2  Comments: PT transported  O2  Pt returned to room and positioned in BS chair.  Call

## 2018-08-08 NOTE — PROGRESS NOTES
CABG  Subjective  Subjective: Pt was seated in recliner chair upon arrival and agreeable to OT. General Comment  Comments: Pt on 2.5 L O2   Pain Assessment  Patient Currently in Pain: Denies  Vital Signs  Patient Currently in Pain: Denies   Orientation  Orientation  Overall Orientation Status: Within Normal Limits  Objective    ADL  Grooming: Independent (Stance at sink for oral care)  UE Bathing: Supervision (seated on TTB to complete )  LE Bathing: Stand by assistance;Verbal cueing (Seated on TTB to complete. Stance w/ grab bar for UE support. VCs for rest breaks )  UE Dressing: Setup (to don tshirt )  LE Dressing: Setup;Verbal cueing; Increased time to complete;Stand by assistance (Seated to thread BLEs into underwear and pants. Pt donned sneakers I'ly. SBA to pull pants over hips. Assist to don ROCK hose. )  Toileting: Supervision (use of grab bar for UE support)            Balance  Sitting Balance: Modified independent   Standing Balance: Stand by assistance  Standing Balance  Time: 15 mins total  Activity: functional mobility to/from bathroom, stance for ADLs   Sit to stand: Supervision  Stand to sit: Supervision  Functional Mobility  Functional - Mobility Device: Rolling Walker  Activity: To/from bathroom  Assist Level: Stand by assistance  Functional Mobility Comments: Assist to manage O2 tank   Toilet Transfers  Toilet - Technique: Ambulating  Equipment Used: Standard toilet  Toilet Transfer: Stand by assistance  Shower Transfers  Shower - Transfer From: Walker  Shower - Transfer Type: To and From  Shower - Transfer To: Transfer tub bench  Shower - Technique: Ambulating  Shower Transfers: Stand by assistance         Transfers  Sit to stand: Supervision  Stand to sit: Supervision                           Cognition  Arousal/Alertness: Appropriate responses to stimuli  Following Commands:  Follows one step commands with repetition  Attention Span: Appears intact  Memory: Appears intact  Safety Judgement: Decreased awareness of need for assistance  Problem Solving: Able to problem solve independently  Insights: Fully aware of deficits  Initiation: Requires cues for some  Cognition Comment: Increased axniety which increases SOB. 2nd session: Pt was seated in recliner chair w/  present upon arrival. Pt agreeable to OT. Pt on 2L O2. Sit to stand from recliner to RW w/ spvn. Pt ambulated w/ RW and SBA to therapy gym w/o rest break. Pt engaged in dynamic balance/functional item retrieval task while ambulating within gym to retrieve socks in various locations. Pt utilized reacher for safe item retrieval d/t concern for pt attempting to retrieve item from the ground. Pt retrieved all items safely w/ SBA, min VCs needed for obstacle navigation in tight/narrow spaces. Rest break required following task. Pt educated where to purchase reacher upon d/c to home. Pt engaged in arm bike exercise in stance to increase activity tolerance. Pt completed for 5 mins total w/ 2 rest breaks needed throughout task. Pt requires extensive time to recover d/t SOB. Cues needed for PLB. Pt ambulated w/ RW and SBA back to room. Pt requested to get back to bed. Pt doffed shoes EOB I'ly. Sit to supine completed w/ spvn. Pt left in bed w/ bed alarm on and call light within reach. Assessment   Performance deficits / Impairments: Decreased functional mobility ; Decreased ADL status; Decreased balance;Decreased endurance;Decreased high-level IADLs  Assessment: Pt completed ADLs this morning w/ increased independence and only required assist to don ROCK hose for LE dressing. Pt is still limited by SOB and anxiety but demos improvements this date. Pt continues to benefit from OT.  Cont OT per POC   Treatment Diagnosis: Decreased activity tolerance, impaired ADls and mobility  Patient Education: rest breaks during ADLs, PLB for SOB--pt verb understanding   REQUIRES OT FOLLOW UP: Yes  Activity

## 2018-08-09 LAB
ANION GAP SERPL CALCULATED.3IONS-SCNC: 10 MMOL/L (ref 3–16)
BUN BLDV-MCNC: 17 MG/DL (ref 7–20)
CALCIUM SERPL-MCNC: 9.2 MG/DL (ref 8.3–10.6)
CHLORIDE BLD-SCNC: 104 MMOL/L (ref 99–110)
CO2: 28 MMOL/L (ref 21–32)
CREAT SERPL-MCNC: 0.6 MG/DL (ref 0.6–1.2)
GFR AFRICAN AMERICAN: >60
GFR NON-AFRICAN AMERICAN: >60
GLUCOSE BLD-MCNC: 101 MG/DL (ref 70–99)
HCT VFR BLD CALC: 30.8 % (ref 36–48)
HEMOGLOBIN: 10.2 G/DL (ref 12–16)
MAGNESIUM: 2.1 MG/DL (ref 1.8–2.4)
MCH RBC QN AUTO: 31.5 PG (ref 26–34)
MCHC RBC AUTO-ENTMCNC: 33.2 G/DL (ref 31–36)
MCV RBC AUTO: 94.8 FL (ref 80–100)
PDW BLD-RTO: 16.6 % (ref 12.4–15.4)
PLATELET # BLD: 630 K/UL (ref 135–450)
PMV BLD AUTO: 7 FL (ref 5–10.5)
POTASSIUM SERPL-SCNC: 3.8 MMOL/L (ref 3.5–5.1)
RBC # BLD: 3.25 M/UL (ref 4–5.2)
SODIUM BLD-SCNC: 142 MMOL/L (ref 136–145)
WBC # BLD: 9.1 K/UL (ref 4–11)

## 2018-08-09 PROCEDURE — 6370000000 HC RX 637 (ALT 250 FOR IP): Performed by: PHYSICAL MEDICINE & REHABILITATION

## 2018-08-09 PROCEDURE — 85027 COMPLETE CBC AUTOMATED: CPT

## 2018-08-09 PROCEDURE — 97530 THERAPEUTIC ACTIVITIES: CPT | Performed by: PHYSICAL THERAPIST

## 2018-08-09 PROCEDURE — 2700000000 HC OXYGEN THERAPY PER DAY

## 2018-08-09 PROCEDURE — 83735 ASSAY OF MAGNESIUM: CPT

## 2018-08-09 PROCEDURE — 97530 THERAPEUTIC ACTIVITIES: CPT

## 2018-08-09 PROCEDURE — 80048 BASIC METABOLIC PNL TOTAL CA: CPT

## 2018-08-09 PROCEDURE — 6370000000 HC RX 637 (ALT 250 FOR IP): Performed by: STUDENT IN AN ORGANIZED HEALTH CARE EDUCATION/TRAINING PROGRAM

## 2018-08-09 PROCEDURE — 97535 SELF CARE MNGMENT TRAINING: CPT

## 2018-08-09 PROCEDURE — 94761 N-INVAS EAR/PLS OXIMETRY MLT: CPT

## 2018-08-09 PROCEDURE — 6370000000 HC RX 637 (ALT 250 FOR IP): Performed by: INTERNAL MEDICINE

## 2018-08-09 PROCEDURE — 36415 COLL VENOUS BLD VENIPUNCTURE: CPT

## 2018-08-09 PROCEDURE — 6360000002 HC RX W HCPCS: Performed by: PHYSICAL MEDICINE & REHABILITATION

## 2018-08-09 PROCEDURE — 97127 HC SP THER IVNTJ W/FOCUS COG FUNCJ: CPT

## 2018-08-09 PROCEDURE — 94640 AIRWAY INHALATION TREATMENT: CPT

## 2018-08-09 PROCEDURE — 1280000000 HC REHAB R&B

## 2018-08-09 PROCEDURE — 97116 GAIT TRAINING THERAPY: CPT | Performed by: PHYSICAL THERAPIST

## 2018-08-09 RX ORDER — SENNA AND DOCUSATE SODIUM 50; 8.6 MG/1; MG/1
2 TABLET, FILM COATED ORAL DAILY PRN
Status: DISCONTINUED | OUTPATIENT
Start: 2018-08-09 | End: 2018-08-11 | Stop reason: HOSPADM

## 2018-08-09 RX ADMIN — POTASSIUM CHLORIDE 10 MEQ: 10 TABLET, EXTENDED RELEASE ORAL at 20:57

## 2018-08-09 RX ADMIN — Medication 15 ML: at 09:40

## 2018-08-09 RX ADMIN — DIPHENHYDRAMINE HCL 25 MG: 25 TABLET ORAL at 02:12

## 2018-08-09 RX ADMIN — FONDAPARINUX SODIUM 2.5 MG: 2.5 INJECTION, SOLUTION SUBCUTANEOUS at 09:39

## 2018-08-09 RX ADMIN — Medication 400 MG: at 09:39

## 2018-08-09 RX ADMIN — Medication 37.5 MG: at 20:57

## 2018-08-09 RX ADMIN — DIPHENHYDRAMINE HCL 25 MG: 25 TABLET ORAL at 20:56

## 2018-08-09 RX ADMIN — ACETAMINOPHEN 650 MG: 325 TABLET, FILM COATED ORAL at 02:12

## 2018-08-09 RX ADMIN — PANTOPRAZOLE SODIUM 40 MG: 40 TABLET, DELAYED RELEASE ORAL at 05:31

## 2018-08-09 RX ADMIN — AMIODARONE HYDROCHLORIDE 200 MG: 200 TABLET ORAL at 09:39

## 2018-08-09 RX ADMIN — ASPIRIN 325 MG: 325 TABLET, DELAYED RELEASE ORAL at 09:39

## 2018-08-09 RX ADMIN — Medication 37.5 MG: at 09:39

## 2018-08-09 RX ADMIN — POTASSIUM CHLORIDE 10 MEQ: 10 TABLET, EXTENDED RELEASE ORAL at 09:39

## 2018-08-09 RX ADMIN — LEVOTHYROXINE SODIUM 137 MCG: 25 TABLET ORAL at 05:31

## 2018-08-09 RX ADMIN — ACETAMINOPHEN 650 MG: 325 TABLET, FILM COATED ORAL at 20:56

## 2018-08-09 RX ADMIN — ACETAMINOPHEN 650 MG: 325 TABLET, FILM COATED ORAL at 12:57

## 2018-08-09 RX ADMIN — FUROSEMIDE 40 MG: 40 TABLET ORAL at 09:39

## 2018-08-09 RX ADMIN — ATORVASTATIN CALCIUM 40 MG: 40 TABLET, FILM COATED ORAL at 20:56

## 2018-08-09 ASSESSMENT — PAIN SCALES - GENERAL
PAINLEVEL_OUTOF10: 0
PAINLEVEL_OUTOF10: 0
PAINLEVEL_OUTOF10: 2
PAINLEVEL_OUTOF10: 1
PAINLEVEL_OUTOF10: 0
PAINLEVEL_OUTOF10: 0
PAINLEVEL_OUTOF10: 3
PAINLEVEL_OUTOF10: 3

## 2018-08-09 ASSESSMENT — PAIN DESCRIPTION - DESCRIPTORS
DESCRIPTORS: ACHING
DESCRIPTORS: ACHING

## 2018-08-09 ASSESSMENT — PAIN DESCRIPTION - ONSET
ONSET: ON-GOING
ONSET: ON-GOING

## 2018-08-09 ASSESSMENT — PAIN DESCRIPTION - LOCATION
LOCATION: CHEST
LOCATION: CHEST

## 2018-08-09 ASSESSMENT — PAIN DESCRIPTION - PROGRESSION
CLINICAL_PROGRESSION: GRADUALLY WORSENING
CLINICAL_PROGRESSION: GRADUALLY WORSENING

## 2018-08-09 ASSESSMENT — PAIN DESCRIPTION - FREQUENCY
FREQUENCY: INTERMITTENT
FREQUENCY: INTERMITTENT

## 2018-08-09 ASSESSMENT — PAIN DESCRIPTION - PAIN TYPE
TYPE: SURGICAL PAIN
TYPE: ACUTE PAIN;SURGICAL PAIN

## 2018-08-09 ASSESSMENT — PAIN DESCRIPTION - ORIENTATION
ORIENTATION: RIGHT;MID
ORIENTATION: MID

## 2018-08-09 NOTE — PROGRESS NOTES
A/Ox4.VSS. Pt ambulated to restroom x1 with walker and gait belt and o2 tank. Pt is currently on 1.5 L and SaO2 96%. Pt aware that O2 requirements will need to be weaned this AM. Pt exhibiting some anxiety. Therapeutic communication given and pt instructed to perform deep breathing exercises. Pt felt better after this and returned to bed. Tolerates pills whole and Po fluids per orders. Bed alarm set. Call light in reach. Will continue to monitor.

## 2018-08-09 NOTE — PROGRESS NOTES
Physical Therapy  Facility/Department: [facility] ACUTE REHAB UNIT  Daily Treatment Note  NAME: Emy Adams  : 1950  MRN: [id number]    Date of Service: 2018    Discharge Recommendations:  Home with Home health PT   PT Equipment Recommendations  Equipment Needed: No    Patient Diagnosis(es): There were no encounter diagnoses. has a past medical history of Brain aneurysm; CAD (coronary artery disease); COPD (chronic obstructive pulmonary disease) ([facility] [address] Hyperlipidemia; Hypertension; Hypothyroidism; Lumbar spondylosis; Lung cancer ([facility] [address] Renal cyst; Spinal stenosis of lumbar region; and Thoracic aortic aneurysm ([facility] [address] has a past surgical history that includes Lung removal, partial (Right, 2010); Colonoscopy (); Upper gastrointestinal endoscopy (2010); Inguinal hernia repair (Bilateral, ); vascular surgery (Right, ); back surgery (); Hysterectomy, vaginal (2014); back surgery (2013); Breast lumpectomy (Bilateral, ); hip surgery (Right, 2014); Salpingo-oophorectomy (Bilateral, 2014); Colporrhaphy (2014); Colpopexy (2014); Carpal tunnel release (Left, 2016); Carpal tunnel release (Right, 2017); Urethra dilation; Coronary artery bypass graft (2018); and pr cabg, vein, single (N/A, 2018). Restrictions  Position Activity Restriction  Sternal Precautions: No Pushing, No Pulling, 5# Lifting Restrictions  Other position/activity restrictions: Up as tolerated. Sternal precautions. Subjective   General  Chart Reviewed: Yes  Additional Pertinent Hx: Pt admitted on 18 with CAD. Pt underwent cardiac cath on  and CABG x 3 on . Pt extubated on .   H/o brain anuerysm, CAD, COPD, lumbar spondylosis, lung cancer, renal cyst, spinal stenosis, thoracic aortic aneurysm, back surgery, B CTR, R hip surgery, R partial lung removal.  Family / Caregiver Present: No  Referring Practitioner: Dr. Quevedo Pontiff: Pt Score                                                    AM-PAC Score             Goals  Short term goals  Time Frame for Short term goals: 2 weeks   Short term goal 1: Ind with bed mobility maintaining sternal precautions. Ongoing  Short term goal 2: Ind with transf excluding floor transf. Ongoing  Short term goal 3: Ind with amb with LRAD for distances of 350' at a time. Ongoing  Short term goal 4: MI with amb up and down 12 steps .  Goal ongoing  Long term goals  Time Frame for Long term goals : STG=LTG  Patient Goals   Patient goals : Return to being Ind    Plan    Plan  Times per week: 5 of 7 days x 75 minutes   Times per day: Daily  Current Treatment Recommendations: Gait Training, Functional Mobility Training, Balance Training, Transfer Training, Endurance Training, Stair training, Safety Education & Training  Safety Devices  Type of devices: Call light within reach, Gait belt, Chair alarm in place, Left in chair  Restraints  Initially in place: No     Therapy Time   Individual Concurrent Group Co-treatment   Time In 0845         Time Out 0930         Minutes 45         Timed Code Treatment Minutes: [address] Time:   Individual Concurrent Group Co-treatment   Time In 1115         Time Out 1145         Minutes 30           Timed Code Treatment Minutes:  45+30    Total Treatment Minutes:  [facility], PT

## 2018-08-09 NOTE — PROGRESS NOTES
Voiding WNL. 3/10 leg pain. Pt described pain as muscle ache type pain. Tylenol given as well as benadryl per orders. Assisted back to bed x1 with walker and gait belt. Pt remains on 1.5  L O2. Pt wanted milagros hose off in bed overnight and to be put back on this AM. Bed alarm set. Call light in reach. Will continue to monitor.

## 2018-08-09 NOTE — PROGRESS NOTES
date:  [] Speech/Language Treatment  [] Instruction in HEP  [] Dysphagia Treatment [x] Cognitive Treatment   [] Other:    Discharge recommendations:  [x] Home independently  [] Home with assistance []  24 hour supervision  [] ECF [] Other  Continued Tx Upon Discharge: ? [] Yes    [] No    [x] TBD based on progress while on ARU     [] Vital Stim indicated     [] Other:   Estimated discharge date: 8/11/18      Electronically signed by  Rober Stark CCC-SLP; [id number]   Speech-Language Pathologist

## 2018-08-09 NOTE — PROGRESS NOTES
Occupational Therapy  Facility/Department: [facility] ACUTE REHAB UNIT  Daily Treatment Note  NAME: Yaron Tracy  : 1950  MRN: [id number]    Date of Service: 2018    Discharge Recommendations:  Home with Home health OT, Home with assist PRN  OT Equipment Recommendations  Other: Pt has a shower chair, grab bars, handicap height toilet. Continue to assess for additional needs     Patient Diagnosis(es): There were no encounter diagnoses. has a past medical history of Brain aneurysm; CAD (coronary artery disease); COPD (chronic obstructive pulmonary disease) ([facility] [address] Hyperlipidemia; Hypertension; Hypothyroidism; Lumbar spondylosis; Lung cancer ([facility] [address] Renal cyst; Spinal stenosis of lumbar region; and Thoracic aortic aneurysm ([facility] [address] has a past surgical history that includes Lung removal, partial (Right, 2010); Colonoscopy (); Upper gastrointestinal endoscopy (2010); Inguinal hernia repair (Bilateral, ); vascular surgery (Right, ); back surgery (); Hysterectomy, vaginal (2014); back surgery (2013); Breast lumpectomy (Bilateral, ); hip surgery (Right, 2014); Salpingo-oophorectomy (Bilateral, 2014); Colporrhaphy (2014); Colpopexy (2014); Carpal tunnel release (Left, 2016); Carpal tunnel release (Right, 2017); Urethra dilation; Coronary artery bypass graft (2018); and pr cabg, vein, single (N/A, 2018). Restrictions  Position Activity Restriction  Sternal Precautions: No Pushing, No Pulling, 5# Lifting Restrictions  Other position/activity restrictions: Up as tolerated. Sternal precautions. Subjective   General  Chart Reviewed: Yes  Additional Pertinent Hx: 69 y.o. F admitted . Underwent cardiac cath and CABG x3 on . PMhx includes CAD, COPD, lung CA, spinal stenosis, TAA, back surgery, hip surgery, hernia repair.   Admitted to ARU    Family / Caregiver Present: No  Referring Practitioner: Dr Alina Gannon  Diagnosis: CABG  Subjective  Subjective: Pt was asleep in bed upon arrival. Pt agreeable to OT  General Comment  Comments: Pt on 1.5 L O2   Pain Assessment  Patient Currently in Pain: Denies  Vital Signs  Patient Currently in Pain: Denies   Orientation  Orientation  Overall Orientation Status: Within Normal Limits  Objective    ADL  Feeding: Independent  Grooming: Independent (Stance at sink for oral care and to wash face. Increased time needed to complete d/t SOB. Multiple rest breaks needed)  LE Dressing: Setup; Increased time to complete;Supervision (Pt threaded underwear and pants onto BLEs w/ increased time needed. Assist needed w/ ROCK hose. Pt donned shoes I'ly. Use of stool for increased comfort while bending to tie shoelaces. )  Toileting: Supervision (use of grab bar in stance for UE support)  Additional Comments: Pt required increased time for ADL routine this morning and required multiple rest breaks while completing grooming and dressing tasks. Pt was able to complete LE dressing w/ spvn and needed assistance w/ ROCK hose only. While brushing teeth pt was spitting up some blood. Pt was very anxious about coughing up blood, RN notified. Balance  Sitting Balance: Modified independent   Standing Balance: Supervision  Standing Balance  Time: 15 mins total  Activity: functional mobility to/from bathroom, stance for grooming, toileting, and dressing, functional mobility in hallway   Sit to stand: Supervision  Stand to sit: Supervision  Functional Mobility  Functional - Mobility Device: Rolling Walker  Activity: To/from bathroom; Other (in hallway)  Assist Level: Supervision  Functional Mobility Comments: Assist to manage O2 tank   Toilet Transfers  Toilet - Technique: Ambulating  Equipment Used: Standard toilet  Toilet Transfer: Supervision      Bed mobility  Supine to Sit: Supervision    Transfers  Sit to stand: Supervision  Stand to sit: Supervision                                Cognition  Arousal/Alertness: Appropriate responses to stimuli  Following Commands: Follows one step commands with repetition  Attention Span: Appears intact  Memory: Appears intact  Safety Judgement: Decreased awareness of need for assistance  Problem Solving: Able to problem solve independently  Insights: Fully aware of deficits  Initiation: Requires cues for some  Cognition Comment: Increased axniety which increases SOB. 2nd session: Pt seated in recliner chair upon arrival and agreeable to OT. Pt on 1L O2. Pt ambulated w/ RW and Spvn to therapy gym. Seated rest break needed upon arrival. Pt engaged in dynamic balance task while reaching outside of NANCY for bean bags and tossed into bucket ~5ft away. Pt demos good balance while completing and demos no LOB. Pt required rest break following bout of stance d/t increased SOB. Pt completed task 2x w/ spvn and no LOB. Pt required use of restroom while in therapy gym. Toileting and toilet transfers completed w/ spvn. Pt required increased time to complete d/t SOB. Pt maintained stance at sink to wash hands. Pt ambulated out of bathroom back to chair. Seated UE exercise completed w/ 1lb weight including elbow flexion 15 reps. Pt unable to tolerate additional exercise d/t c/o nausea w/ UE movement. OT addressed any d/c concerns in prep for d/c Saturday. Pt reported she felt comfortable w/ going home and no concerns at this time. Pt ambulated w/ RW and Spvn back to room. Pt left in recliner chair w/ chair alarm on and call light within reach. Assessment   Performance deficits / Impairments: Decreased functional mobility ; Decreased ADL status; Decreased balance;Decreased endurance;Decreased high-level IADLs  Assessment: Pt demos good participation in OT this date and completed ADL routine including grooming, toileting, and dressing w/ spvn however increased time to complete d/t increased SOB and multiple rest breaks needed. Pt continues to benefit from OT. Treatment Minutes:  30    Total Treatment Minutes:  45+30=75  Marsha Muir, OT

## 2018-08-10 LAB
ANION GAP SERPL CALCULATED.3IONS-SCNC: 11 MMOL/L (ref 3–16)
BUN BLDV-MCNC: 12 MG/DL (ref 7–20)
CALCIUM SERPL-MCNC: 9 MG/DL (ref 8.3–10.6)
CHLORIDE BLD-SCNC: 105 MMOL/L (ref 99–110)
CO2: 25 MMOL/L (ref 21–32)
CREAT SERPL-MCNC: 0.6 MG/DL (ref 0.6–1.2)
GFR AFRICAN AMERICAN: >60
GFR NON-AFRICAN AMERICAN: >60
GLUCOSE BLD-MCNC: 97 MG/DL (ref 70–99)
HCT VFR BLD CALC: 33.6 % (ref 36–48)
HEMOGLOBIN: 11.1 G/DL (ref 12–16)
MAGNESIUM: 2 MG/DL (ref 1.8–2.4)
MCH RBC QN AUTO: 31.5 PG (ref 26–34)
MCHC RBC AUTO-ENTMCNC: 32.9 G/DL (ref 31–36)
MCV RBC AUTO: 95.8 FL (ref 80–100)
ORGANISM: ABNORMAL
PDW BLD-RTO: 17.2 % (ref 12.4–15.4)
PLATELET # BLD: 665 K/UL (ref 135–450)
PMV BLD AUTO: 6.9 FL (ref 5–10.5)
POTASSIUM SERPL-SCNC: 3.5 MMOL/L (ref 3.5–5.1)
RBC # BLD: 3.51 M/UL (ref 4–5.2)
SODIUM BLD-SCNC: 141 MMOL/L (ref 136–145)
URINE CULTURE, ROUTINE: ABNORMAL
URINE CULTURE, ROUTINE: ABNORMAL
WBC # BLD: 8.2 K/UL (ref 4–11)

## 2018-08-10 PROCEDURE — 6370000000 HC RX 637 (ALT 250 FOR IP): Performed by: PHYSICAL MEDICINE & REHABILITATION

## 2018-08-10 PROCEDURE — 80048 BASIC METABOLIC PNL TOTAL CA: CPT

## 2018-08-10 PROCEDURE — 6370000000 HC RX 637 (ALT 250 FOR IP): Performed by: STUDENT IN AN ORGANIZED HEALTH CARE EDUCATION/TRAINING PROGRAM

## 2018-08-10 PROCEDURE — 97127 HC SP THER IVNTJ W/FOCUS COG FUNCJ: CPT

## 2018-08-10 PROCEDURE — 94680 O2 UPTK RST&XERS DIR SIMPLE: CPT

## 2018-08-10 PROCEDURE — 97530 THERAPEUTIC ACTIVITIES: CPT

## 2018-08-10 PROCEDURE — 97116 GAIT TRAINING THERAPY: CPT | Performed by: PHYSICAL THERAPIST

## 2018-08-10 PROCEDURE — 97110 THERAPEUTIC EXERCISES: CPT

## 2018-08-10 PROCEDURE — 6370000000 HC RX 637 (ALT 250 FOR IP): Performed by: INTERNAL MEDICINE

## 2018-08-10 PROCEDURE — 94760 N-INVAS EAR/PLS OXIMETRY 1: CPT

## 2018-08-10 PROCEDURE — 97535 SELF CARE MNGMENT TRAINING: CPT

## 2018-08-10 PROCEDURE — 94664 DEMO&/EVAL PT USE INHALER: CPT

## 2018-08-10 PROCEDURE — 94640 AIRWAY INHALATION TREATMENT: CPT

## 2018-08-10 PROCEDURE — 85027 COMPLETE CBC AUTOMATED: CPT

## 2018-08-10 PROCEDURE — 94761 N-INVAS EAR/PLS OXIMETRY MLT: CPT

## 2018-08-10 PROCEDURE — 83735 ASSAY OF MAGNESIUM: CPT

## 2018-08-10 PROCEDURE — 6360000002 HC RX W HCPCS: Performed by: PHYSICAL MEDICINE & REHABILITATION

## 2018-08-10 PROCEDURE — 1280000000 HC REHAB R&B

## 2018-08-10 PROCEDURE — 97530 THERAPEUTIC ACTIVITIES: CPT | Performed by: PHYSICAL THERAPIST

## 2018-08-10 PROCEDURE — 36415 COLL VENOUS BLD VENIPUNCTURE: CPT

## 2018-08-10 RX ORDER — AMIODARONE HYDROCHLORIDE 200 MG/1
200 TABLET ORAL DAILY
Qty: 30 TABLET | Refills: 3 | Status: SHIPPED | OUTPATIENT
Start: 2018-08-11 | End: 2018-09-11

## 2018-08-10 RX ORDER — FUROSEMIDE 40 MG/1
40 TABLET ORAL DAILY
Qty: 60 TABLET | Refills: 3 | Status: SHIPPED | OUTPATIENT
Start: 2018-08-11 | End: 2018-09-11

## 2018-08-10 RX ORDER — POTASSIUM CHLORIDE 750 MG/1
10 CAPSULE, EXTENDED RELEASE ORAL 2 TIMES DAILY
Qty: 180 CAPSULE | Refills: 0 | Status: SHIPPED | OUTPATIENT
Start: 2018-08-10 | End: 2018-09-18

## 2018-08-10 RX ORDER — LEVOTHYROXINE SODIUM 137 UG/1
137 TABLET ORAL DAILY
Qty: 30 TABLET | Refills: 3 | Status: SHIPPED | OUTPATIENT
Start: 2018-08-11 | End: 2018-10-29

## 2018-08-10 RX ORDER — ALBUTEROL SULFATE 90 UG/1
2 AEROSOL, METERED RESPIRATORY (INHALATION) EVERY 6 HOURS PRN
Qty: 3 INHALER | Refills: 0 | Status: SHIPPED | OUTPATIENT
Start: 2018-08-10

## 2018-08-10 RX ORDER — METOPROLOL TARTRATE 37.5 MG/1
37.5 TABLET, FILM COATED ORAL 2 TIMES DAILY
Qty: 60 TABLET | Refills: 3 | Status: SHIPPED | OUTPATIENT
Start: 2018-08-10 | End: 2018-09-10

## 2018-08-10 RX ORDER — ATORVASTATIN CALCIUM 40 MG/1
40 TABLET, FILM COATED ORAL NIGHTLY
Qty: 30 TABLET | Refills: 3 | Status: SHIPPED | OUTPATIENT
Start: 2018-08-10 | End: 2018-11-27 | Stop reason: SDUPTHER

## 2018-08-10 RX ADMIN — Medication 400 MG: at 08:44

## 2018-08-10 RX ADMIN — LEVOTHYROXINE SODIUM 137 MCG: 25 TABLET ORAL at 08:43

## 2018-08-10 RX ADMIN — POTASSIUM CHLORIDE 10 MEQ: 10 TABLET, EXTENDED RELEASE ORAL at 08:44

## 2018-08-10 RX ADMIN — ASPIRIN 325 MG: 325 TABLET, DELAYED RELEASE ORAL at 08:44

## 2018-08-10 RX ADMIN — ATORVASTATIN CALCIUM 40 MG: 40 TABLET, FILM COATED ORAL at 21:33

## 2018-08-10 RX ADMIN — LACTULOSE 10 G: 20 SOLUTION ORAL at 10:23

## 2018-08-10 RX ADMIN — Medication 15 ML: at 08:44

## 2018-08-10 RX ADMIN — DIPHENHYDRAMINE HCL 25 MG: 25 TABLET ORAL at 01:53

## 2018-08-10 RX ADMIN — FUROSEMIDE 40 MG: 40 TABLET ORAL at 08:44

## 2018-08-10 RX ADMIN — AMIODARONE HYDROCHLORIDE 200 MG: 200 TABLET ORAL at 08:44

## 2018-08-10 RX ADMIN — Medication 37.5 MG: at 21:32

## 2018-08-10 RX ADMIN — DIPHENHYDRAMINE HCL 25 MG: 25 TABLET ORAL at 20:37

## 2018-08-10 RX ADMIN — PANTOPRAZOLE SODIUM 40 MG: 40 TABLET, DELAYED RELEASE ORAL at 08:44

## 2018-08-10 RX ADMIN — ACETAMINOPHEN 650 MG: 325 TABLET, FILM COATED ORAL at 16:29

## 2018-08-10 RX ADMIN — FONDAPARINUX SODIUM 2.5 MG: 2.5 INJECTION, SOLUTION SUBCUTANEOUS at 08:44

## 2018-08-10 RX ADMIN — Medication 37.5 MG: at 08:44

## 2018-08-10 RX ADMIN — POTASSIUM CHLORIDE 10 MEQ: 10 TABLET, EXTENDED RELEASE ORAL at 21:33

## 2018-08-10 ASSESSMENT — PAIN DESCRIPTION - ONSET
ONSET: ON-GOING
ONSET: GRADUAL

## 2018-08-10 ASSESSMENT — PAIN DESCRIPTION - DESCRIPTORS
DESCRIPTORS: ACHING
DESCRIPTORS: ACHING

## 2018-08-10 ASSESSMENT — PAIN SCALES - GENERAL
PAINLEVEL_OUTOF10: 0
PAINLEVEL_OUTOF10: 3
PAINLEVEL_OUTOF10: 0

## 2018-08-10 ASSESSMENT — PAIN DESCRIPTION - ORIENTATION
ORIENTATION: RIGHT;MID
ORIENTATION: MID

## 2018-08-10 ASSESSMENT — PAIN DESCRIPTION - LOCATION
LOCATION: CHEST
LOCATION: CHEST

## 2018-08-10 ASSESSMENT — PAIN DESCRIPTION - FREQUENCY
FREQUENCY: INTERMITTENT
FREQUENCY: INTERMITTENT

## 2018-08-10 ASSESSMENT — PAIN DESCRIPTION - PROGRESSION
CLINICAL_PROGRESSION: GRADUALLY WORSENING
CLINICAL_PROGRESSION: NOT CHANGED

## 2018-08-10 ASSESSMENT — PAIN DESCRIPTION - PAIN TYPE
TYPE: SURGICAL PAIN
TYPE: SURGICAL PAIN;ACUTE PAIN

## 2018-08-10 NOTE — PROGRESS NOTES
COPD, lung CA, spinal stenosis, TAA, back surgery, hip surgery, hernia repair. Admitted to ARU 8/1   Family / Caregiver Present: No  Referring Practitioner: Dr Madhu Romero  Diagnosis: CABG  Subjective  Subjective: Pt was seated in recliner chair upon arrival. Pt agreeable to ADL w/ OT. Pt c/o pain to her tongue. RN notified   General Comment  Comments: No O2 this AM   Pain Assessment  Patient Currently in Pain: Denies  Vital Signs  Patient Currently in Pain: Denies   Orientation  Orientation  Overall Orientation Status: Within Normal Limits       Objective    ADL  Feeding: Independent  Grooming: Independent (Stance at sink to comb hair and complete oral care)  UE Bathing: Modified independent ; Increased time to complete (seated on TTB to complete. Increased time to complete d/t SOB and increased fatigue)  LE Bathing: Modified independent ; Increased time to complete (Seated on TTB to complete for majority of task. Pt able to complete LE bathing via figure 4. Stance w/ grab bar to wash bottom and jose area. )  UE Dressing: Independent (To don tshirt)  LE Dressing: Modified independent ; Increased time to complete (Seated to thread BLEs into underwear and pants. Stance w/ use of RW for support to pull pants over hips. Pt able to don ROCK hose I'ly today. Pt able to don shoes and tie laces w/ use of foot stool to minimize bending. Increased time needed d/t SOB)  Toileting: Modified independent  (use of grab bar for UE support.)  Additional Comments: Pt completed ADLs this AM w/ MOD I however pt requires an increased amount of time for all tasks d/t SOB and multiple rest breaks needed. Pt has good awareness of SOB and takes rest breaks appropriately. Pt's O2 sats remained above 94% during session this AM. Pt educated to keep bathroom door open to minimze humidity for easier breathing. Pt has a shower chair, grab bars, handicap height toilet, and hand held shower head at home. Pt has all necessary equipment for d/c.

## 2018-08-10 NOTE — CARE COORDINATION
[facility]    DC order noted, all docs needed have been faxed to [facility] for home care services. Patient will DC Sat 8/11 with [facility] for home care. I have faxed docs, and all its taken care of.       Antonio Zuniga  Work mobile: [phone]  [facility] office: [phone]

## 2018-08-10 NOTE — PROGRESS NOTES
Monique Betty  8/10/2018  [id number]   Late Entry 8/9/18    Chief Complaint: CAD s/p CABG    Subjective:   Pleased that CT surgery stopped by to answer her questions. Feeling less anxious. ROS: Denies chest pain, dyspnea, diaphoresis, fever, or chills. Objective:  Patient Vitals for the past 24 hrs:   BP Temp Temp src Pulse Resp SpO2   08/10/18 0836 129/77 97.8 °F (36.6 °C) Oral 78 18 95 %   08/10/18 0733 - - - - 16 92 %   08/09/18 2028 128/82 98.1 °F (36.7 °C) Oral 77 18 93 %   08/09/18 1304 - - - - - 95 %   08/09/18 0956 - - - - - 97 %   08/09/18 0930 112/74 97.5 °F (36.4 °C) Oral 81 20 97 %     Gen: No distress, pleasant. HEENT: Normocephalic, atraumatic. CV: Regular rate and rhythm. Median sternotomy site healing without dehiscence or infection  Resp: No respiratory distress. Abd: Soft, nontender   Ext: No edema. Neuro: Alert, oriented, appropriately interactive. Wt Readings from Last 3 Encounters:   08/09/18 167 lb 12.3 oz (76.1 kg)   08/01/18 173 lb 11.6 oz (78.8 kg)   07/24/18 167 lb (75.8 kg)       Laboratory data:   Lab Results   Component Value Date    WBC 8.2 08/10/2018    HGB 11.1 (L) 08/10/2018    HCT 33.6 (L) 08/10/2018    MCV 95.8 08/10/2018     (H) 08/10/2018       Lab Results   Component Value Date     08/10/2018    K 3.5 08/10/2018    K 3.5 07/24/2018     08/10/2018    CO2 25 08/10/2018    BUN 12 08/10/2018    CREATININE 0.6 08/10/2018    GLUCOSE 97 08/10/2018    GLUCOSE 97 02/07/2012    CALCIUM 9.0 08/10/2018        Therapy progress:  PT  Position Activity Restriction  Sternal Precautions: No Pushing, No Pulling, 5# Lifting Restrictions  Other position/activity restrictions: Up as tolerated. Sternal precautions.    Objective     Sit to Stand: Modified independent (From BS chair, w/c, to a RW. )  Stand to sit: Modified independent  Bed to Chair: Stand by assistance, Contact guard assistance (w/o use of AD, SPT)  Device:  (Pushed the w/c)  Other Apparatus: O2 (Decreased to 1 L and monitored with pt having O2 sats between 94%-97%)  Assistance: Supervision, Modified Independent (Progressing to MI)  Distance: 450' x 2. 200' x 2(this included a ramp x 60' outside)Pt used the railings on the ramp   OT  PT Equipment Recommendations  Equipment Needed: No  Toilet - Technique: Ambulating  Equipment Used: Standard toilet  Toilet Transfers Comments: Rest break needed upon arrival to commode. Increased time needed to recover   Assessment        SLP  Current Diet : Regular  Current Liquid Diet : Thin  Diet Solids Recommendation: Regular  Liquid Consistency Recommendation: Thin    Body mass index is 30.69 kg/m². Rehabilitation Diagnosis:  Cardiac, 9.0, Cardiac     Assessment and Plan:  CAD s/p CABG. Sternal precautions. Aspirin. Statin. BB. Lasix. Mg.     - Pain management  Continue tylenol    COPD. -Spiriva, dulera d/c'd for advair, oxygen as needed.      Perioperative afib. Continue amiodarone protocol; now to 200mg daily. Anxiety. Ativan prn.     Bowels: Schedule colace + senna. Follow bowel movements. Enema or suppository if needed.      Bladder: Check PVR x 3. [address] if PVR > 200ml or if any volume is > 500 ml.      Sleep  - tylenol/benadryl nightly, can repeat x 1 dose    Leukocytosis. Check UA    GERD with hx of PUD  - Dc'd Pepcid  - Start Protonix 40mg qd    Nausea  - Dc'd zofran at pt's request  - Start pepto-bismol per pt's request    Jaime Chang MD 8/10/2018, 9:06 AM

## 2018-08-10 NOTE — PROGRESS NOTES
Physical Therapy  Facility/Department: [address] [facility] UNIT  Discharge Summary / Daily Treatment Note  NAME: Monique Hernández  : 1950  MRN: [id number]    Date of Service: 8/10/2018    Discharge Recommendations:  Home with Home health PT   PT Equipment Recommendations  Equipment Needed: No  Other: owns a RW    Patient Diagnosis(es): Diagnoses of Hyperlipidemia, HTN (hypertension), COPD (chronic obstructive pulmonary disease), Hypothyroid, CAD (coronary artery disease), and Ascending aortic aneurysm were pertinent to this visit. has a past medical history of Brain aneurysm; CAD (coronary artery disease); COPD (chronic obstructive pulmonary disease) ([facility] Hyperlipidemia; Hypertension; Hypothyroidism; Lumbar spondylosis; Lung cancer ([facility] [facility] Renal cyst; Spinal stenosis of lumbar region; and Thoracic aortic aneurysm ([facility] [facility] has a past surgical history that includes Lung removal, partial (Right, 2010); Colonoscopy (); Upper gastrointestinal endoscopy (2010); Inguinal hernia repair (Bilateral, ); vascular surgery (Right, ); back surgery (); Hysterectomy, vaginal (2014); back surgery (2013); Breast lumpectomy (Bilateral, ); hip surgery (Right, 2014); Salpingo-oophorectomy (Bilateral, 2014); Colporrhaphy (2014); Colpopexy (2014); Carpal tunnel release (Left, 2016); Carpal tunnel release (Right, 2017); Urethra dilation; Coronary artery bypass graft (2018); and pr cabg, vein, single (N/A, 2018). Restrictions  Position Activity Restriction  Sternal Precautions: No Pushing, No Pulling, 5# Lifting Restrictions  Other position/activity restrictions: Up as tolerated. Sternal precautions. Subjective   General  Chart Reviewed: Yes  Additional Pertinent Hx: Pt admitted on 18 with CAD. Pt underwent cardiac cath on  and CABG x 3 on . Pt extubated on .   H/o brain anuerysm, CAD, COPD, lumbar spondylosis, lung cancer, renal cyst, spinal stenosis, thoracic aortic aneurysm, back surgery, B CTR, R hip surgery, R partial lung removal.  Family / Caregiver Present: Yes ( in the beginning of treatment)  Referring Practitioner: Dr. Roro Rodríguez: Pt sitting in BS chair when PT arrived. Pt  agreeable to therapy. General Comment  Comments: Pt without O2. Pt reports that \"it makes her nervous\" PT monitored O2 sats in which pt stayed >95% throughot treatment  Pain Screening  Patient Currently in Pain: Yes  Pain Assessment  Pain Type: Surgical pain;Acute pain  Pain Location: Chest  Pain Orientation: Right;Mid  Pain Descriptors: Aching  Pain Frequency: Intermittent (Mostly with exertion)  Pain Onset: On-going  Clinical Progression: Not changed  Pain Intervention(s): Repositioned; Ambulation/Increased activity; Therapeutic presence  Response to Pain Intervention: Patient Satisfied  Vital Signs  Patient Currently in Pain: Yes       Orientation  Orientation  Overall Orientation Status: Within Normal Limits  Objective   Transfers  Sit to Stand: Modified independent (From BS chair, w/c, to a RW. )  Stand to sit: Modified independent  Bed to Chair: Modified independent (SPT)  Ambulation  Ambulation?: Yes  WB Status: No WB restrictions noted in chart  Ambulation 1  Surface: level tile;outdoors;ramp  Device:  (Pushed the w/c)  Assistance: Modified Independent  Quality of Gait: Decreased annie, LLANOS, appears guarded with decreased pelvic /trunk dissociation  Distance: 450' x 2. 200' x 2(this included a ramp x 60' outside)Pt pushed the w/c up and down the ramp      Second session: Pt sitting in chair when PT arrived. Pt agreeable to therapy. Bed mobility  Bridging: Independent  Rolling to Left: Independent  Rolling to Right: Independent  Supine to Sit: Independent  Sit to Supine: Independent  Scooting: Independent  Comment: Bed positioned flat with pt instructed not to use the bedrails.  2 pillows at [facility] 2/2 to pt's increased c/o

## 2018-08-10 NOTE — PROGRESS NOTES
ACUTE REHAB UNIT  SPEECH/LANGUAGE PATHOLOGY      [x] Daily  [] Weekly Care Conference Note  [x] Discharge    Patient:Bia Monique      AGP:[id number]  VINICIO:[id number]  Rehab Dx/Hx: CAD in native artery [I25.10]    Precautions: [] Aspiration  [x] Fall risk  [x] Sternal  [] Seizure [] Hip  [] Weight Bearing [] Other  Lives With: Spouse (pt lives in her daughter's house with son in law and 5 grandchildren. she lives in basement floor.)  ST Dx: [] Aphasia  [] Dysarthria  [] Apraxia   [] Oropharyngeal dysphagia [x] Cognitive Impairment  [x] Other:  Speech/breathing coordination  Date of Admit: 8/1/2018  Room #: 3109/3109-01  Date: 8/10/2018          Current Diet Order:DIET CARDIAC; Daily Fluid Restriction: 2000 ml  Dietary Nutrition Supplements: Frozen Oral Supplement   Recommended Form of Meds: Whole with water (pt reports she takes potassium pill in applesauce because it is so big)  Compensatory Swallowing Strategies:  (standard aspiration precautions)     Dentition:  (permanent upper/lower partials; missing a few molars on bottom but reports this does not affect chewing ability)  Vision  Vision: Impaired  Vision Exceptions: Wears glasses at all times  Hearing  Hearing: Exceptions to [facility]  Hearing Exceptions: Bilateral hearing aid  Barriers toward progress: Decreased endurance, anxiety      Date: 8/10/2018      Tx session 1 Discharge Summary   Total Timed Code Min 30    Total Treatment Minutes 30    Individual Treatment Minutes 30    Group Treatment Minutes 0    Co-Treat Minutes 0    Brief Exception: N/A    Pain Endorsed uncomfortable in back, did not rate    Pain Intervention: [] RN notified  [x] Repositioned  [] Intervention offered and patient declined  [] N/A  [x] Other: additional pillow provided; pt declined other interventions         Subjective:     Pt up in chair with  and SW at side, discussing d/c plans.  Pt endorsed feeling better about cognitive skills and reported paying bills yesterday, \"I

## 2018-08-11 VITALS
HEIGHT: 62 IN | WEIGHT: 165.1 LBS | BODY MASS INDEX: 30.38 KG/M2 | DIASTOLIC BLOOD PRESSURE: 76 MMHG | SYSTOLIC BLOOD PRESSURE: 102 MMHG | HEART RATE: 79 BPM | TEMPERATURE: 98.4 F | OXYGEN SATURATION: 95 % | RESPIRATION RATE: 18 BRPM

## 2018-08-11 LAB
ANION GAP SERPL CALCULATED.3IONS-SCNC: 9 MMOL/L (ref 3–16)
BUN BLDV-MCNC: 16 MG/DL (ref 7–20)
CALCIUM SERPL-MCNC: 9.2 MG/DL (ref 8.3–10.6)
CHLORIDE BLD-SCNC: 101 MMOL/L (ref 99–110)
CO2: 26 MMOL/L (ref 21–32)
CREAT SERPL-MCNC: 0.6 MG/DL (ref 0.6–1.2)
GFR AFRICAN AMERICAN: >60
GFR NON-AFRICAN AMERICAN: >60
GLUCOSE BLD-MCNC: 127 MG/DL (ref 70–99)
HCT VFR BLD CALC: 32.8 % (ref 36–48)
HEMOGLOBIN: 10.9 G/DL (ref 12–16)
MAGNESIUM: 2.1 MG/DL (ref 1.8–2.4)
MCH RBC QN AUTO: 31.6 PG (ref 26–34)
MCHC RBC AUTO-ENTMCNC: 33.3 G/DL (ref 31–36)
MCV RBC AUTO: 94.9 FL (ref 80–100)
PDW BLD-RTO: 17 % (ref 12.4–15.4)
PLATELET # BLD: 681 K/UL (ref 135–450)
PMV BLD AUTO: 6.8 FL (ref 5–10.5)
POTASSIUM SERPL-SCNC: 3.7 MMOL/L (ref 3.5–5.1)
RBC # BLD: 3.46 M/UL (ref 4–5.2)
SODIUM BLD-SCNC: 136 MMOL/L (ref 136–145)
WBC # BLD: 9.2 K/UL (ref 4–11)

## 2018-08-11 PROCEDURE — 94761 N-INVAS EAR/PLS OXIMETRY MLT: CPT

## 2018-08-11 PROCEDURE — 6370000000 HC RX 637 (ALT 250 FOR IP): Performed by: PHYSICAL MEDICINE & REHABILITATION

## 2018-08-11 PROCEDURE — 6370000000 HC RX 637 (ALT 250 FOR IP): Performed by: INTERNAL MEDICINE

## 2018-08-11 PROCEDURE — 94664 DEMO&/EVAL PT USE INHALER: CPT

## 2018-08-11 PROCEDURE — 85027 COMPLETE CBC AUTOMATED: CPT

## 2018-08-11 PROCEDURE — 36415 COLL VENOUS BLD VENIPUNCTURE: CPT

## 2018-08-11 PROCEDURE — 6360000002 HC RX W HCPCS: Performed by: PHYSICAL MEDICINE & REHABILITATION

## 2018-08-11 PROCEDURE — 2700000000 HC OXYGEN THERAPY PER DAY

## 2018-08-11 PROCEDURE — 6370000000 HC RX 637 (ALT 250 FOR IP): Performed by: STUDENT IN AN ORGANIZED HEALTH CARE EDUCATION/TRAINING PROGRAM

## 2018-08-11 PROCEDURE — 83735 ASSAY OF MAGNESIUM: CPT

## 2018-08-11 PROCEDURE — 94640 AIRWAY INHALATION TREATMENT: CPT

## 2018-08-11 PROCEDURE — 80048 BASIC METABOLIC PNL TOTAL CA: CPT

## 2018-08-11 RX ADMIN — POTASSIUM CHLORIDE 10 MEQ: 10 TABLET, EXTENDED RELEASE ORAL at 08:20

## 2018-08-11 RX ADMIN — ACETAMINOPHEN 650 MG: 325 TABLET, FILM COATED ORAL at 03:54

## 2018-08-11 RX ADMIN — AMIODARONE HYDROCHLORIDE 200 MG: 200 TABLET ORAL at 08:19

## 2018-08-11 RX ADMIN — FUROSEMIDE 40 MG: 40 TABLET ORAL at 08:19

## 2018-08-11 RX ADMIN — LEVOTHYROXINE SODIUM 137 MCG: 25 TABLET ORAL at 06:10

## 2018-08-11 RX ADMIN — ASPIRIN 325 MG: 325 TABLET, DELAYED RELEASE ORAL at 08:19

## 2018-08-11 RX ADMIN — Medication 400 MG: at 08:20

## 2018-08-11 RX ADMIN — ACETAMINOPHEN 650 MG: 325 TABLET, FILM COATED ORAL at 08:19

## 2018-08-11 RX ADMIN — PANTOPRAZOLE SODIUM 40 MG: 40 TABLET, DELAYED RELEASE ORAL at 06:09

## 2018-08-11 RX ADMIN — Medication 37.5 MG: at 08:20

## 2018-08-11 RX ADMIN — DIPHENHYDRAMINE HCL 25 MG: 25 TABLET ORAL at 01:11

## 2018-08-11 RX ADMIN — FONDAPARINUX SODIUM 2.5 MG: 2.5 INJECTION, SOLUTION SUBCUTANEOUS at 08:20

## 2018-08-11 ASSESSMENT — PAIN SCALES - GENERAL
PAINLEVEL_OUTOF10: 0
PAINLEVEL_OUTOF10: 4
PAINLEVEL_OUTOF10: 3

## 2018-08-11 ASSESSMENT — PAIN DESCRIPTION - FREQUENCY: FREQUENCY: INTERMITTENT

## 2018-08-11 ASSESSMENT — PAIN DESCRIPTION - ORIENTATION: ORIENTATION: MID

## 2018-08-11 ASSESSMENT — PAIN DESCRIPTION - ONSET: ONSET: GRADUAL

## 2018-08-11 ASSESSMENT — PAIN DESCRIPTION - DESCRIPTORS: DESCRIPTORS: ACHING

## 2018-08-11 ASSESSMENT — PAIN DESCRIPTION - LOCATION: LOCATION: CHEST

## 2018-08-11 ASSESSMENT — PAIN DESCRIPTION - PROGRESSION: CLINICAL_PROGRESSION: GRADUALLY WORSENING

## 2018-08-11 ASSESSMENT — PAIN DESCRIPTION - PAIN TYPE: TYPE: SURGICAL PAIN

## 2018-08-11 NOTE — PLAN OF CARE
Problem: Falls - Risk of:  Goal: Will remain free from falls  Will remain free from falls   Outcome: Ongoing  Client remains free from falls, bed/chair alarm in place, door open, encouraged to use call light for needs, call light is within reach, bed locked in lowest position,  Will continue to monitor.

## 2018-08-11 NOTE — PLAN OF CARE
Problem: Falls - Risk of:  Goal: Will remain free from falls  Will remain free from falls   Outcome: Ongoing  Pt is a high fall risk. Bed is locked and in lowest position, call light/bedside table/belongings are within reach. Bed/chair alarms are activated, pt calls out appropriately. Will continue to monitor. Problem: Pain:  Goal: Pain level will decrease  Pain level will decrease   Outcome: Ongoing  Pt has had one c/o pain this shift. Administered Tylenol per orders, pt had no other complaints and is currently resting well.  Will monitor

## 2018-08-11 NOTE — PROGRESS NOTES
Pt is awake and in bed, A&O x4 with  at bedside. Vitals are stable, pt denies pain. Pt took medications without difficulty and denied any other needs at this time.

## 2018-08-12 ENCOUNTER — CARE COORDINATION (OUTPATIENT)
Dept: CASE MANAGEMENT | Age: 68
End: 2018-08-12

## 2018-08-12 DIAGNOSIS — C34.10 MALIGNANT NEOPLASM OF UPPER LOBE OF LUNG, UNSPECIFIED LATERALITY (HCC): Primary | ICD-10-CM

## 2018-08-12 NOTE — CARE COORDINATION
[facility] Transitions Initial Follow Up Call    Call within 2 business days of discharge: Yes    Patient: Brooks Muse Patient : 1950   MRN: [id number]  Reason for Admission: S/p Coronary artery bypass  Discharge Date: 18 RARS: Readmission Risk Score: 19     Spoke with: [address] S[address]i: Jaleel Hurley     Non-face-to-face services provided:  Obtained and reviewed discharge summary and/or continuity of care documents  Communication with home health agencies or other community services the patient is currently [facility]    Care Transitions 24 Hour Call    Do you have any ongoing symptoms?:  No  Do you have a copy of your discharge instructions?:  Yes  Do you have all of your prescriptions and are they filled?:  Yes  Have you been contacted by a [employer] [address]?:  No  Have you scheduled your follow up appointment?:  Yes  How are you going to get to your appointment?:  Car - family or friend to transport  Were you discharged with any Home Care or Post Acute Services:  Yes  Post Acute Services:  Home Health (Comment: [facility])  Do you feel like you have everything you need to keep you well at home?:  Yes  Care Transitions Interventions       Spoke with patient who reports she is doing good. Patient denied any CP or discomfort, just stated she gets SOB at times. Patient reported incision sites looks good and denied any drainage, redness, or swelling. Reviewed medications with patient and she reports she is taking as prescribed except for ASA which patient reported she took 81 mg today instead of 325 mg and she will start tomorrow. (1111F order placed). Patient reports she is monitoring fluids and following cardiac diet. Patient reported [facility] will be out today at 130 pm. Patient denied any further needs at this time and agreed to follow up calls.  Reminded patient that if they have any questions/concerns at anytime, they can always utilize CTC or call PCP/specialist as they have an MD on

## 2018-08-13 ENCOUNTER — OFFICE VISIT (OUTPATIENT)
Dept: INTERNAL MEDICINE | Age: 68
End: 2018-08-13

## 2018-08-13 ENCOUNTER — TELEPHONE (OUTPATIENT)
Dept: INTERNAL MEDICINE | Age: 68
End: 2018-08-13

## 2018-08-13 VITALS
SYSTOLIC BLOOD PRESSURE: 126 MMHG | WEIGHT: 165.2 LBS | OXYGEN SATURATION: 90 % | HEIGHT: 62 IN | HEART RATE: 80 BPM | BODY MASS INDEX: 30.4 KG/M2 | DIASTOLIC BLOOD PRESSURE: 76 MMHG

## 2018-08-13 DIAGNOSIS — E03.4 HYPOTHYROIDISM DUE TO ACQUIRED ATROPHY OF THYROID: ICD-10-CM

## 2018-08-13 DIAGNOSIS — E78.2 MIXED HYPERLIPIDEMIA: ICD-10-CM

## 2018-08-13 DIAGNOSIS — I10 ESSENTIAL HYPERTENSION: ICD-10-CM

## 2018-08-13 DIAGNOSIS — I71.21 ASCENDING AORTIC ANEURYSM: ICD-10-CM

## 2018-08-13 DIAGNOSIS — J43.9 PULMONARY EMPHYSEMA, UNSPECIFIED EMPHYSEMA TYPE (HCC): ICD-10-CM

## 2018-08-13 DIAGNOSIS — I25.10 CAD IN NATIVE ARTERY: ICD-10-CM

## 2018-08-13 PROBLEM — G89.18 ACUTE POSTOPERATIVE PAIN: Status: RESOLVED | Noted: 2018-07-30 | Resolved: 2018-08-13

## 2018-08-13 PROBLEM — M65.332 TRIGGER MIDDLE FINGER OF LEFT HAND: Status: RESOLVED | Noted: 2018-03-12 | Resolved: 2018-08-13

## 2018-08-13 PROBLEM — R07.9 CHEST PAIN: Status: RESOLVED | Noted: 2018-07-24 | Resolved: 2018-08-13

## 2018-08-13 PROCEDURE — 1111F DSCHRG MED/CURRENT MED MERGE: CPT | Performed by: INTERNAL MEDICINE

## 2018-08-13 PROCEDURE — G8427 DOCREV CUR MEDS BY ELIG CLIN: HCPCS | Performed by: INTERNAL MEDICINE

## 2018-08-13 PROCEDURE — 1090F PRES/ABSN URINE INCON ASSESS: CPT | Performed by: INTERNAL MEDICINE

## 2018-08-13 PROCEDURE — 1123F ACP DISCUSS/DSCN MKR DOCD: CPT | Performed by: INTERNAL MEDICINE

## 2018-08-13 PROCEDURE — 3017F COLORECTAL CA SCREEN DOC REV: CPT | Performed by: INTERNAL MEDICINE

## 2018-08-13 PROCEDURE — 4040F PNEUMOC VAC/ADMIN/RCVD: CPT | Performed by: INTERNAL MEDICINE

## 2018-08-13 PROCEDURE — G8926 SPIRO NO PERF OR DOC: HCPCS | Performed by: INTERNAL MEDICINE

## 2018-08-13 PROCEDURE — 99214 OFFICE O/P EST MOD 30 MIN: CPT | Performed by: INTERNAL MEDICINE

## 2018-08-13 PROCEDURE — G8399 PT W/DXA RESULTS DOCUMENT: HCPCS | Performed by: INTERNAL MEDICINE

## 2018-08-13 PROCEDURE — G8598 ASA/ANTIPLAT THER USED: HCPCS | Performed by: INTERNAL MEDICINE

## 2018-08-13 PROCEDURE — G8417 CALC BMI ABV UP PARAM F/U: HCPCS | Performed by: INTERNAL MEDICINE

## 2018-08-13 PROCEDURE — 1101F PT FALLS ASSESS-DOCD LE1/YR: CPT | Performed by: INTERNAL MEDICINE

## 2018-08-13 PROCEDURE — 3023F SPIROM DOC REV: CPT | Performed by: INTERNAL MEDICINE

## 2018-08-13 PROCEDURE — 1036F TOBACCO NON-USER: CPT | Performed by: INTERNAL MEDICINE

## 2018-08-13 ASSESSMENT — ENCOUNTER SYMPTOMS
CHEST TIGHTNESS: 0
GASTROINTESTINAL NEGATIVE: 1
SHORTNESS OF BREATH: 0
WHEEZING: 0
RESPIRATORY NEGATIVE: 1

## 2018-08-14 ENCOUNTER — TELEPHONE (OUTPATIENT)
Dept: INTERNAL MEDICINE | Age: 68
End: 2018-08-14

## 2018-08-14 ENCOUNTER — PATIENT MESSAGE (OUTPATIENT)
Dept: INTERNAL MEDICINE | Age: 68
End: 2018-08-14

## 2018-08-14 NOTE — TELEPHONE ENCOUNTER
From: Rufino Alvarado  To: Karin Allen MD  Sent: 8/14/2018 10:22 AM EDT  Subject: Visit Follow-Up Question    My weight today is 164.4 same as yesterday when I took it here at home. Again I truly thank God for you moving in my life.  Yuli Knutson

## 2018-08-14 NOTE — DISCHARGE SUMMARY
Physical Medicine & Rehabilitation  Discharge Summary     Patient Identification:  Mayte Ornelas  : 1950  Admit date: 2018  Discharge date: 2018  Attending provider: No att. providers found        Primary care provider: Bernard Mccrary MD     Discharge Diagnoses:   Patient Active Problem List   Diagnosis    Hyperlipidemia    HTN (hypertension)    COPD (chronic obstructive pulmonary disease)    Hypothyroid    CAD (coronary artery disease)    Ascending aortic aneurysm    Lung cancer    Lumbar spinal stenosis    DDD (degenerative disc disease), lumbosacral    Painful lumpy right breast    Hemoptysis    Dyspepsia    NSTEMI (non-ST elevated myocardial infarction) ([facility] [address]    CAD in native artery       Discharge Functional Status:    Physical therapy:  Bed Mobility: Scooting: Independent  Transfers: Sit to Stand: Modified independent (From BS chair, w/c, to a RW. )  Stand to sit: Modified independent  Bed to Chair: Modified independent (SPT)  Comment:  Pt was able to kennedy B shoes but unable to lace the shoes 2/2 to the increased flexion of trunk which resulted in increased pain to the chest. Pt req additional time to complete these activities req multiple rests   Pt also req use of the restroom. Pt was able to pull pants up and down with use of GB. Pt was ind with pericare. Pt walked to the sink and was able to wash /dry hands Indly.  , WB Status: No WB restrictions noted in chart  Ambulation 1  Surface: level tile, outdoors, ramp  Device:  (Pushed the w/c)  Other Apparatus: O2 (Decreased to 1 L and monitored with pt having O2 sats between 94%-97%)  Assistance: Modified Independent  Quality of Gait: Decreased annie, LLANOS, appears guarded with decreased pelvic /trunk dissociation  Distance: 450' x 2. 200' x 2(this included a ramp x 60' outside)Pt pushed the w/c up and down the ramp  Comments: Note< Pt requested to practice this task since it simulates the entrance of her own home.  Pt murmurs noted  Abd: Soft, nontender  Ext: No edema  Neuro: Alert, fully oriented, appropriate   MSK: No joint abnormalities noted     Significant Diagnostics:   Lab Results   Component Value Date    CREATININE 0.6 08/11/2018    BUN 16 08/11/2018     08/11/2018    K 3.7 08/11/2018     08/11/2018    CO2 26 08/11/2018       Lab Results   Component Value Date    WBC 9.2 08/11/2018    HGB 10.9 (L) 08/11/2018    HCT 32.8 (L) 08/11/2018    MCV 94.9 08/11/2018     (H) 08/11/2018       Disposition:  Home in stable condition    Follow-up:  See after visit summary from hospitalization    Discharge Medications:     Medication List      START taking these medications    atorvastatin 40 MG tablet  Commonly known as:  LIPITOR  Take 1 tablet by mouth nightly  Notes to patient:  Atorvastatin (Lipitor)  Use: lower cholesterol; prevent heart attack/stroke  Side effects: headache, muscle pain/weakness     magnesium oxide 400 (241.3 Mg) MG Tabs tablet  Commonly known as:  MAG-OX  Take 1 tablet by mouth daily  Notes to patient:  Use:Dietary Supplement  Side effects: Nausea, diarrhea        CHANGE how you take these medications    furosemide 40 MG tablet  Commonly known as:  LASIX  Take 1 tablet by mouth daily  What changed:  · medication strength  · how much to take  · additional instructions     Metoprolol Tartrate 37.5 MG Tabs  Take 37.5 mg by mouth 2 times daily  What changed:  · medication strength  · how much to take        CONTINUE taking these medications    acetaminophen 325 MG tablet  Commonly known as:  TYLENOL  Take 2 tablets by mouth every 4 hours as needed for Pain or Fever     albuterol sulfate  (90 Base) MCG/ACT inhaler  Inhale 2 puffs into the lungs every 6 hours as needed for Wheezing     amiodarone 200 MG tablet  Commonly known as:  CORDARONE  Take 1 tablet by mouth daily     aspirin 325 MG EC tablet  Take 1 tablet by mouth daily     calcium carbonate 600 MG Tabs tablet  Commonly known as:

## 2018-08-15 VITALS — BODY MASS INDEX: 30.07 KG/M2 | WEIGHT: 164.4 LBS

## 2018-08-16 ENCOUNTER — CARE COORDINATION (OUTPATIENT)
Dept: CASE MANAGEMENT | Age: 68
End: 2018-08-16

## 2018-08-17 ENCOUNTER — OFFICE VISIT (OUTPATIENT)
Dept: CARDIOTHORACIC SURGERY | Age: 68
End: 2018-08-17

## 2018-08-17 VITALS
TEMPERATURE: 97.8 F | HEIGHT: 62 IN | SYSTOLIC BLOOD PRESSURE: 128 MMHG | BODY MASS INDEX: 30 KG/M2 | WEIGHT: 163 LBS | DIASTOLIC BLOOD PRESSURE: 88 MMHG | HEART RATE: 70 BPM | OXYGEN SATURATION: 94 %

## 2018-08-17 DIAGNOSIS — Z09 POSTOP CHECK: Primary | ICD-10-CM

## 2018-08-17 PROCEDURE — 99024 POSTOP FOLLOW-UP VISIT: CPT | Performed by: THORACIC SURGERY (CARDIOTHORACIC VASCULAR SURGERY)

## 2018-08-17 NOTE — PATIENT INSTRUCTIONS
PREVENTION OF RECURRENT ATHEROSCLEROSIS:  A MESSAGE TO PATIENTS AND THEIR LOVED ONES FROM YOUR SURGEON    You have recently had successful surgery for atherosclerosis. Now your real work begins. Atherosclerosis (hardening of the arteries by cholesterol and fat deposits) can be slowed or stopped from further blocking your own arteries or the new bypass grafts by following \"risk factor management\". If you follow these principles carefully, you can reduce your chances of having heart attacks, strokes, and the need for future surgery. Your cardiologist and primary care doctor should follow these concepts, but your surgeons believe that this is so important that we want you to begin thinking about and following these concepts now. These are the important risk factors you and your doctors should follow carefully. The marked ones apply to YOU:    [] SMOKING: Absolutely positively never smoke again. Keep your home and work place smoke­ free. Your doctors can prescribe patches, gum or other medications to help. [x] BLOOD PRESSURE CONTROL: Your blood pressure should be less than 140/90. If you have diabetes or kidney disease, your blood pressure should be less than 130/80. Weight reduction, exercise and medications can help you control high blood pressure. [x] CHOLESTEROL AND FATS: A diet low in saturated fat (< 7%) and low in cholesterol (< 200 mg/day), and weight reduction, and exercise, and medications as necessary can help you achieve these goals:  Low density (bad) cholesterol: <70 mg/dL (the lower, the better)   Triglycerides: <150 mg/dL (the lower, the better)   High density (good) cholesterol: >40 mg/dL (the higher, the better)  Make an appointment to see your primary care physician, Dr. Kaye Figueroa 2 months after your surgery to check your cholesterol profile.   [x] EXERCISE: Your cardiac rehabilitation program will help you work up to a regular make you sweat\" program of at least 30 minutes, at least 6 times per week, preferably daily. Make an appointment with your cardiologist Dr. Albino Luz 3-4 weeks after your surgery. [x] WEIGHT REDUCTION: Your ideal body mass index is 18.5-24.9 kg/m2. Your body mass index is Body mass index is 29.81 kg/m². . You may calculate this by using the following formula: (weight in pounds X 0.45) / (height in inches X 0.0254) squared. A waist circumference of < 40 inches for men and < 35 inches for women is recommended. A 10% weight reduction can lower your risk of future events. [] DIABETES: Your HbA1c should be <7%. Diet, exercise, weight reduction and proper medications can reduce your body's tendency toward high blood sugar. Check with your PCP for assistance. [x] PROPER MEDICATIONS:  [x] Aspirin  [] ACE inhibitor / ARB  (-opril / -sartan)  [x] Beta-blocker (-olol or -ilol)  [x] Statin    Risk factor management works. The person for whom this is most important is YOU. Post this information on your refrigerator or other prominent place as a reminder to you. Please call or write if you have further questions. We want you and your operation to last for many more years.

## 2018-08-17 NOTE — LETTER
08/17/18        [facility] ([facility]) Cardiovascular and Thoracic Surgeons  [address]  [address] [place] [address]        RE:    Bridger Cordero,   1950    Dear Dr. Patricia Oviedo,    It was my pleasure to see your patient, Bridger Cordero, in the office today in follow up of cabgx3 7/26/18 at [facility].    I have attached a copy of the office evaluation. Thank you for allowing me to participate in the care of this patient. Sincerely,     Vikram Luna MD    CC: Adriana Tao MD  [address]  [address] [facility] [address], [address] [facility]

## 2018-08-17 NOTE — PROGRESS NOTES
Progress Note    S/P CABG x3 on 7/26/18 at [facility]. MSI incision clean, dry, well approximated. 2 sutures removed intact. Patient tolerated well. Right leg and foot swollen. ROCK hose on. No weight gain. Does not have follow up with cardiology yet. Will give her Dr. Nathan Chavira office number to make appointment. Vital Signs:                                                 /88 (Site: Left Arm, Position: Sitting, Cuff Size: Medium Adult)   Pulse 70   Temp 97.8 °F (36.6 °C) (Oral)   Ht 5' 2\" (1.575 m)   Wt 163 lb (73.9 kg)   SpO2 94%   BMI 29.81 kg/m²      Preop weight: 170 lb    CV: reg, wound c/d/i, sternum stable  Pulm: clear, decreased at bases  Abd: soft  Ext: warm. 1+ edema. saph incision c/d/i. Medications:  Prior to Admission medications    Medication Sig Start Date End Date Taking? Authorizing Provider   Umeclidinium Bromide (INCRUSE ELLIPTA) 62.5 MCG/INH AEPB Inhale 1 puff into the lungs daily   Yes Historical Provider, MD   amiodarone (CORDARONE) 200 MG tablet Take 1 tablet by mouth daily 8/11/18  Yes Chi Mcmahon MD   atorvastatin (LIPITOR) 40 MG tablet Take 1 tablet by mouth nightly 8/10/18  Yes Chi Mcmahon MD   albuterol sulfate  (90 Base) MCG/ACT inhaler Inhale 2 puffs into the lungs every 6 hours as needed for Wheezing 8/10/18  Yes Chi Mcmahon MD   vitamin D ([employer] VIT D 5000 HIGH-POTENCY) 5000 units CAPS capsule Take 1 capsule by mouth daily Vitamin D for bones. 8/10/18  Yes Chi Mcmahon MD   cyanocobalamin ([employer] VITAMIN B12) 1000 MCG tablet Everyone should take OTC B12 1000 mcg twice a day for neurological health. High B12 levels help prevent dementia.  8/10/18  Yes Chi Mcmahon MD   fluticasone-salmeterol (ADVAIR) 250-50 MCG/DOSE AEPB Inhale 1 puff into the lungs every 12 hours 8/10/18  Yes Chi Mcmahon MD   furosemide (LASIX) 40 MG tablet Take 1 tablet by mouth daily 8/11/18  Yes Chi Mcmahon MD   magnesium oxide (MAG-OX)

## 2018-08-20 ENCOUNTER — TELEPHONE (OUTPATIENT)
Dept: CARDIOTHORACIC SURGERY | Age: 68
End: 2018-08-20

## 2018-08-23 ENCOUNTER — CARE COORDINATION (OUTPATIENT)
Dept: CASE MANAGEMENT | Age: 68
End: 2018-08-23

## 2018-08-23 NOTE — CARE COORDINATION
Jos [facility] Transitions Follow Up Call    2018    Patient: Emy Adams  Patient : 1950   MRN: [id number]   Reason for Admission: cabg  Discharge Date: 18 RARS: Readmission Risk Score: 23       Spoke with: [address] Transitions Subsequent and Final Call    Subsequent and Final Calls  Do you have any ongoing symptoms?:  Yes  Patient-reported symptoms:  Other  Interventions for patient-reported symptoms:  Other  Have your medications changed?:  No  Do you have any questions related to your medications?:  No  Do you currently have any active services?:  Yes  Are you currently active with any services?:  Home Health  Do you have any needs or concerns that I can assist you with?:  No  Identified Barriers:  None  Care Transitions Interventions  No Identified Needs  Other Interventions:          Summary  CTC spoke to the Pt who states she is doing \"well\". Pt reports that she is no longer having issues with SOB. Pt reports that swelling to right ankle / foot is improving and that she continues to elevate it. Pt reports that today's weight was 162.4 lbs and yesterday's was 162.8 lbs. Pt denies any additional needs or concerns and CT calls have concluded. CTC advised Pt of use urgent care or physicians 24 hr access line if assistance is needed after hours or on the weekend.      Follow Up  Future Appointments  Date Time Provider Kanu Adkins   2018 10:00 AM Eliceo Mandujano MD CVTS [facility]   2018 2:00 PM [facility]       Fiona Strange RN

## 2018-09-07 ENCOUNTER — OFFICE VISIT (OUTPATIENT)
Dept: CARDIOTHORACIC SURGERY | Age: 68
End: 2018-09-07

## 2018-09-07 VITALS
BODY MASS INDEX: 30.18 KG/M2 | HEIGHT: 62 IN | SYSTOLIC BLOOD PRESSURE: 138 MMHG | TEMPERATURE: 98.1 F | OXYGEN SATURATION: 95 % | DIASTOLIC BLOOD PRESSURE: 84 MMHG | WEIGHT: 164 LBS | HEART RATE: 70 BPM

## 2018-09-07 DIAGNOSIS — Z09 POSTOP CHECK: Primary | ICD-10-CM

## 2018-09-07 PROCEDURE — 99024 POSTOP FOLLOW-UP VISIT: CPT | Performed by: THORACIC SURGERY (CARDIOTHORACIC VASCULAR SURGERY)

## 2018-09-07 RX ORDER — METOPROLOL SUCCINATE 50 MG/1
50 TABLET, EXTENDED RELEASE ORAL DAILY
Qty: 30 TABLET | Refills: 3 | Status: SHIPPED | OUTPATIENT
Start: 2018-09-07 | End: 2018-09-10 | Stop reason: SDUPTHER

## 2018-09-07 RX ORDER — BENAZEPRIL HYDROCHLORIDE AND HYDROCHLOROTHIAZIDE 20; 12.5 MG/1; MG/1
1 TABLET ORAL DAILY
Qty: 30 TABLET | Refills: 3 | Status: SHIPPED | OUTPATIENT
Start: 2018-09-07 | End: 2018-09-10 | Stop reason: DRUGHIGH

## 2018-09-07 NOTE — PROGRESS NOTES
Progress Note    S/P CABGx3 7/26/18  MSI, CT sites, and right leg incision are healed and unremarkable. Needs to make a f/u appointment with Dr. Cheryl Marvin. SBP running higher than 120- has aneurysms. Will need refill on Metoprolol. Has been having shortness of breath. Has followed up with Dr. Royden Mohs (Pulmonology) and was started on Prednisone. Vital Signs:                                                 /84 (Site: Left Upper Arm, Position: Sitting)   Pulse 70   Temp 98.1 °F (36.7 °C) (Oral)   Ht 5' 2\" (1.575 m)   Wt 164 lb (74.4 kg)   SpO2 95%   BMI 30.00 kg/m²      Preop weight: 170 lb    CV: reg, wound c/d/i, sternum stable  Pulm: clear, decreased at bases  Abd: soft  Ext: warm. No edema. saph incision c/d/i. Medications:  Prior to Admission medications    Medication Sig Start Date End Date Taking? Authorizing Provider   Umeclidinium Bromide (INCRUSE ELLIPTA) 62.5 MCG/INH AEPB Inhale 1 puff into the lungs daily   Yes Historical Provider, MD   amiodarone (CORDARONE) 200 MG tablet Take 1 tablet by mouth daily 8/11/18  Yes Claudia William MD   atorvastatin (LIPITOR) 40 MG tablet Take 1 tablet by mouth nightly 8/10/18  Yes Claudia William MD   albuterol sulfate  (90 Base) MCG/ACT inhaler Inhale 2 puffs into the lungs every 6 hours as needed for Wheezing 8/10/18  Yes Claudia William MD   vitamin D (CVS VIT D 5000 HIGH-POTENCY) 5000 units CAPS capsule Take 1 capsule by mouth daily Vitamin D for bones. 8/10/18  Yes Claudia William MD   cyanocobalamin (CVS VITAMIN B12) 1000 MCG tablet Everyone should take OTC B12 1000 mcg twice a day for neurological health. High B12 levels help prevent dementia.  8/10/18  Yes Claudia William MD   fluticasone-salmeterol (ADVAIR) 250-50 MCG/DOSE AEPB Inhale 1 puff into the lungs every 12 hours 8/10/18  Yes Claudia William MD   furosemide (LASIX) 40 MG tablet Take 1 tablet by mouth daily 8/11/18  Yes Claudia William MD

## 2018-09-10 ENCOUNTER — TELEPHONE (OUTPATIENT)
Dept: CARDIOTHORACIC SURGERY | Age: 68
End: 2018-09-10

## 2018-09-10 DIAGNOSIS — I10 ESSENTIAL HYPERTENSION: Primary | ICD-10-CM

## 2018-09-10 DIAGNOSIS — I71.21 ASCENDING AORTIC ANEURYSM: ICD-10-CM

## 2018-09-10 PROBLEM — Z95.1 S/P CABG X 3: Status: ACTIVE | Noted: 2018-07-01

## 2018-09-10 RX ORDER — METOPROLOL SUCCINATE 50 MG/1
50 TABLET, EXTENDED RELEASE ORAL DAILY
Qty: 30 TABLET | Refills: 3 | Status: SHIPPED | OUTPATIENT
Start: 2018-09-10 | End: 2019-06-20 | Stop reason: SDUPTHER

## 2018-09-10 RX ORDER — BENAZEPRIL/HYDROCHLOROTHIAZIDE 20 MG-25MG
1 TABLET ORAL DAILY
Qty: 30 TABLET | Refills: 3 | Status: SHIPPED | OUTPATIENT
Start: 2018-09-10 | End: 2018-11-16 | Stop reason: SDUPTHER

## 2018-09-10 NOTE — TELEPHONE ENCOUNTER
Received call from patient clarifying dosage of pre-op BP medications re-ordered by Dr. Marga Sampson. Kahlil during [address] Friday, 9/7/2018, also requesting scripts be sent to different pharmacy. Scripts w/correct dosing sent to requested pharmacy via [facility]

## 2018-09-11 ENCOUNTER — OFFICE VISIT (OUTPATIENT)
Dept: CARDIOLOGY CLINIC | Age: 68
End: 2018-09-11

## 2018-09-11 VITALS
DIASTOLIC BLOOD PRESSURE: 60 MMHG | SYSTOLIC BLOOD PRESSURE: 100 MMHG | WEIGHT: 163.2 LBS | BODY MASS INDEX: 29.85 KG/M2 | HEART RATE: 72 BPM

## 2018-09-11 DIAGNOSIS — I71.21 ASCENDING AORTIC ANEURYSM: ICD-10-CM

## 2018-09-11 DIAGNOSIS — I25.10 CORONARY ARTERY DISEASE INVOLVING NATIVE CORONARY ARTERY OF NATIVE HEART WITHOUT ANGINA PECTORIS: ICD-10-CM

## 2018-09-11 DIAGNOSIS — I67.1 BRAIN ANEURYSM: ICD-10-CM

## 2018-09-11 DIAGNOSIS — I71.20 THORACIC AORTIC ANEURYSM WITHOUT RUPTURE: ICD-10-CM

## 2018-09-11 DIAGNOSIS — J43.9 PULMONARY EMPHYSEMA, UNSPECIFIED EMPHYSEMA TYPE (HCC): ICD-10-CM

## 2018-09-11 DIAGNOSIS — I10 ESSENTIAL HYPERTENSION: ICD-10-CM

## 2018-09-11 DIAGNOSIS — Z95.1 S/P CABG X 3: Primary | ICD-10-CM

## 2018-09-11 PROCEDURE — 1101F PT FALLS ASSESS-DOCD LE1/YR: CPT | Performed by: INTERNAL MEDICINE

## 2018-09-11 PROCEDURE — 3017F COLORECTAL CA SCREEN DOC REV: CPT | Performed by: INTERNAL MEDICINE

## 2018-09-11 PROCEDURE — G8399 PT W/DXA RESULTS DOCUMENT: HCPCS | Performed by: INTERNAL MEDICINE

## 2018-09-11 PROCEDURE — 4040F PNEUMOC VAC/ADMIN/RCVD: CPT | Performed by: INTERNAL MEDICINE

## 2018-09-11 PROCEDURE — 99203 OFFICE O/P NEW LOW 30 MIN: CPT | Performed by: INTERNAL MEDICINE

## 2018-09-11 PROCEDURE — 1123F ACP DISCUSS/DSCN MKR DOCD: CPT | Performed by: INTERNAL MEDICINE

## 2018-09-11 PROCEDURE — 1090F PRES/ABSN URINE INCON ASSESS: CPT | Performed by: INTERNAL MEDICINE

## 2018-09-11 PROCEDURE — G8427 DOCREV CUR MEDS BY ELIG CLIN: HCPCS | Performed by: INTERNAL MEDICINE

## 2018-09-11 PROCEDURE — G8417 CALC BMI ABV UP PARAM F/U: HCPCS | Performed by: INTERNAL MEDICINE

## 2018-09-11 PROCEDURE — G8926 SPIRO NO PERF OR DOC: HCPCS | Performed by: INTERNAL MEDICINE

## 2018-09-11 PROCEDURE — 3023F SPIROM DOC REV: CPT | Performed by: INTERNAL MEDICINE

## 2018-09-11 PROCEDURE — G8598 ASA/ANTIPLAT THER USED: HCPCS | Performed by: INTERNAL MEDICINE

## 2018-09-11 PROCEDURE — 1036F TOBACCO NON-USER: CPT | Performed by: INTERNAL MEDICINE

## 2018-09-11 NOTE — PROGRESS NOTES
1436   BP: 100/60   Pulse: 72   Weight: 163 lb 3.2 oz (74 kg)       Wt Readings from Last 3 Encounters:   09/11/18 163 lb 3.2 oz (74 kg)   09/07/18 164 lb (74.4 kg)   09/06/18 161 lb (73 kg)         General Appearance:  Alert, cooperative, no distress, appears stated age Appropriate weight   Head:  Normocephalic, without obvious abnormality, atraumatic   Eyes:  PERRL, conjunctiva/corneas clear EOM intact  Ears normal   Throat no lesions       Nose: Nares normal, no drainage or sinus tenderness   Throat: Lips, mucosa, and tongue normal   Neck: Supple, symmetrical, trachea midline, no adenopathy, thyroid: not enlarged, symmetric, no tenderness/mass/nodules, no carotid bruit       Lungs:   Diminished breath sounds left base, otherwise, clear to auscultation bilaterally, respirations unlabored   Chest Wall:  No tenderness or deformity; midline scar healing well.     Heart:  Regular rate and rhythm, S1, S2 normal, no murmur, no rub or gallop, no jvd, no edema   Abdomen:   Soft, non-tender, bowel sounds active all four quadrants,  no masses, no organomegaly       Extremities: Extremities normal, atraumatic, no cyanosis   Pulses: 2+ and symmetric   Skin: Skin color, texture, turgor normal, no rashes or lesions   Pysch: Normal mood and affect   Neurologic: Normal gross motor and sensory exam.  Cranial nerves intact        Labs:     Lab Results   Component Value Date    WBC 9.2 08/11/2018    HGB 10.9 (L) 08/11/2018    HCT 32.8 (L) 08/11/2018    MCV 94.9 08/11/2018     (H) 08/11/2018     Lab Results   Component Value Date     08/11/2018    K 3.7 08/11/2018     08/11/2018    CO2 26 08/11/2018    BUN 16 08/11/2018    CREATININE 0.6 08/11/2018    GLUCOSE 127 (H) 08/11/2018    CALCIUM 9.2 08/11/2018    PROT 5.8 (L) 07/25/2018    LABALBU 3.7 07/26/2018    BILITOT <0.2 07/25/2018    ALKPHOS 61 07/25/2018    AST 17 07/25/2018    ALT 16 07/25/2018    LABGLOM >60 08/11/2018    GFRAA >60 08/11/2018    AGRATIO 1.3

## 2018-09-12 ENCOUNTER — TELEPHONE (OUTPATIENT)
Dept: CARDIOLOGY CLINIC | Age: 68
End: 2018-09-12

## 2018-09-17 ENCOUNTER — HOSPITAL ENCOUNTER (OUTPATIENT)
Dept: CARDIAC REHAB | Age: 68
Setting detail: THERAPIES SERIES
Discharge: HOME | End: 2018-09-17
Payer: MEDICARE

## 2018-09-17 ENCOUNTER — TELEPHONE (OUTPATIENT)
Dept: INTERNAL MEDICINE | Age: 68
End: 2018-09-17

## 2018-09-17 NOTE — TELEPHONE ENCOUNTER
Noe Luz from [employer] placing a courtesy call to let Dr Steve Whipple know the pt is being discharged today

## 2018-09-18 ENCOUNTER — OFFICE VISIT (OUTPATIENT)
Dept: INTERNAL MEDICINE | Age: 68
End: 2018-09-18

## 2018-09-18 VITALS
DIASTOLIC BLOOD PRESSURE: 64 MMHG | OXYGEN SATURATION: 90 % | SYSTOLIC BLOOD PRESSURE: 110 MMHG | BODY MASS INDEX: 30.25 KG/M2 | HEIGHT: 62 IN | WEIGHT: 164.4 LBS | HEART RATE: 94 BPM

## 2018-09-18 DIAGNOSIS — K62.5 RECTAL BLEEDING: Primary | ICD-10-CM

## 2018-09-18 DIAGNOSIS — I10 ESSENTIAL HYPERTENSION: ICD-10-CM

## 2018-09-18 DIAGNOSIS — I25.10 CAD IN NATIVE ARTERY: ICD-10-CM

## 2018-09-18 DIAGNOSIS — J43.2 CENTRILOBULAR EMPHYSEMA (HCC): ICD-10-CM

## 2018-09-18 PROBLEM — N63.10 PAINFUL LUMPY RIGHT BREAST: Status: RESOLVED | Noted: 2018-01-11 | Resolved: 2018-09-18

## 2018-09-18 PROBLEM — Z95.1 S/P CABG X 3: Status: RESOLVED | Noted: 2018-07-01 | Resolved: 2018-09-18

## 2018-09-18 PROBLEM — N64.4 PAINFUL LUMPY RIGHT BREAST: Status: RESOLVED | Noted: 2018-01-11 | Resolved: 2018-09-18

## 2018-09-18 PROCEDURE — 99214 OFFICE O/P EST MOD 30 MIN: CPT | Performed by: INTERNAL MEDICINE

## 2018-09-18 PROCEDURE — 1101F PT FALLS ASSESS-DOCD LE1/YR: CPT | Performed by: INTERNAL MEDICINE

## 2018-09-18 PROCEDURE — G8399 PT W/DXA RESULTS DOCUMENT: HCPCS | Performed by: INTERNAL MEDICINE

## 2018-09-18 PROCEDURE — 1036F TOBACCO NON-USER: CPT | Performed by: INTERNAL MEDICINE

## 2018-09-18 PROCEDURE — 1123F ACP DISCUSS/DSCN MKR DOCD: CPT | Performed by: INTERNAL MEDICINE

## 2018-09-18 PROCEDURE — 4040F PNEUMOC VAC/ADMIN/RCVD: CPT | Performed by: INTERNAL MEDICINE

## 2018-09-18 PROCEDURE — G8427 DOCREV CUR MEDS BY ELIG CLIN: HCPCS | Performed by: INTERNAL MEDICINE

## 2018-09-18 PROCEDURE — G8926 SPIRO NO PERF OR DOC: HCPCS | Performed by: INTERNAL MEDICINE

## 2018-09-18 PROCEDURE — G8598 ASA/ANTIPLAT THER USED: HCPCS | Performed by: INTERNAL MEDICINE

## 2018-09-18 PROCEDURE — G8417 CALC BMI ABV UP PARAM F/U: HCPCS | Performed by: INTERNAL MEDICINE

## 2018-09-18 PROCEDURE — 3023F SPIROM DOC REV: CPT | Performed by: INTERNAL MEDICINE

## 2018-09-18 PROCEDURE — 3017F COLORECTAL CA SCREEN DOC REV: CPT | Performed by: INTERNAL MEDICINE

## 2018-09-18 PROCEDURE — 1090F PRES/ABSN URINE INCON ASSESS: CPT | Performed by: INTERNAL MEDICINE

## 2018-09-18 RX ORDER — PREDNISONE 10 MG/1
TABLET ORAL
COMMUNITY
Start: 2018-09-17 | End: 2018-11-08 | Stop reason: ALTCHOICE

## 2018-09-18 ASSESSMENT — PATIENT HEALTH QUESTIONNAIRE - PHQ9
2. FEELING DOWN, DEPRESSED OR HOPELESS: 0
SUM OF ALL RESPONSES TO PHQ QUESTIONS 1-9: 0
1. LITTLE INTEREST OR PLEASURE IN DOING THINGS: 0
SUM OF ALL RESPONSES TO PHQ QUESTIONS 1-9: 0
SUM OF ALL RESPONSES TO PHQ9 QUESTIONS 1 & 2: 0

## 2018-09-18 ASSESSMENT — ENCOUNTER SYMPTOMS
VOMITING: 0
BLOOD IN STOOL: 1
NAUSEA: 0
SHORTNESS OF BREATH: 0
CHEST TIGHTNESS: 0
RECTAL PAIN: 0
RESPIRATORY NEGATIVE: 1
CONSTIPATION: 0
WHEEZING: 0
ANAL BLEEDING: 1

## 2018-09-18 NOTE — PROGRESS NOTES
Subjective:      Patient ID: Joe Funk is a 69 y.o. y.o. female. Chief Complaint   Patient presents with    Rectal Bleeding       HPI    Patient is here for a recheck  She saw the pulmonary nurse practitioner yesterday. She had SOB and coughing. She started her yesterday on Prednisone and was told if she did not feel better she needed to see the pulmonologist again. She did not have a CXR and she was not able to get antibiotics. She has no chest pain. Last week she noticed bright red blood and mucous on the bowel movement. She then noticed it two days later. She has not noticed this since. She is having normal bowel movements. She has no abdominal pain or cramping. Her bowel movements since have been normal.        Vitals:    09/18/18 1555   BP: 110/64   Pulse: 94   SpO2: 90%       Wt Readings from Last 3 Encounters:   09/18/18 164 lb 6.4 oz (74.6 kg)   09/11/18 163 lb 3.2 oz (74 kg)   09/07/18 164 lb (74.4 kg)          Discussed use, benefit, and side effects of prescribed medications. Barriers to medication compliance addressed. All patient questions answered. Pt voiced understanding. Review of Systems   Constitutional: Negative. HENT: Negative. Respiratory: Negative. Negative for chest tightness, shortness of breath and wheezing. Cardiovascular: Negative. Negative for chest pain, palpitations and leg swelling. Gastrointestinal: Positive for anal bleeding and blood in stool. Negative for constipation, nausea, rectal pain and vomiting. Genitourinary: Negative. Musculoskeletal: Negative for arthralgias and myalgias. Neurological: Negative. Objective:   Physical Exam   Constitutional: She appears well-developed and well-nourished. Eyes: Conjunctivae and EOM are normal.   Neck: No JVD present. Carotid bruit is not present. No thyroid mass and no thyromegaly present. Cardiovascular: Normal rate, regular rhythm and normal heart sounds.     No murmur heard.  Pulmonary/Chest: Effort normal and breath sounds normal. She has no wheezes. Abdominal: Soft. Bowel sounds are normal. She exhibits no distension and no mass. There is no tenderness. There is no rebound and no guarding. Genitourinary: Rectal exam shows external hemorrhoid. Rectal exam shows guaiac negative stool. Musculoskeletal: She exhibits no edema. Assessment:      See Problem List assessment and plan       Plan:     Rectal bleeding  Rectal exam FIT negative  States had colonoscopy three years ago  Will check CBC  She saw a GI in [address] [place] [address] but she is no longer there. Referral to Dr Kenny Adhikari  Call any change      Centrilobular emphysema [facility])  Following with [facility]  Continue to follow    HTN (hypertension)  BP stable  Continue present treatment      CAD in native artery  Stable  No angina  No chest pain or SOB        Patient engaged in shared decision making. Information given to evaluate options of treatment, understand what is needed and discuss importance of following plan.

## 2018-09-19 ENCOUNTER — HOSPITAL ENCOUNTER (OUTPATIENT)
Dept: CARDIAC REHAB | Age: 68
Setting detail: THERAPIES SERIES
Discharge: HOME | End: 2018-09-19
Payer: MEDICARE

## 2018-09-19 PROCEDURE — 93798 PHYS/QHP OP CAR RHAB W/ECG: CPT

## 2018-09-21 ENCOUNTER — HOSPITAL ENCOUNTER (OUTPATIENT)
Dept: CARDIAC REHAB | Age: 68
Setting detail: THERAPIES SERIES
Discharge: HOME | End: 2018-09-21
Payer: MEDICARE

## 2018-09-21 PROCEDURE — 93798 PHYS/QHP OP CAR RHAB W/ECG: CPT

## 2018-09-24 ENCOUNTER — HOSPITAL ENCOUNTER (OUTPATIENT)
Dept: CARDIAC REHAB | Age: 68
Setting detail: THERAPIES SERIES
Discharge: HOME | End: 2018-09-24
Payer: MEDICARE

## 2018-09-24 PROCEDURE — 93798 PHYS/QHP OP CAR RHAB W/ECG: CPT

## 2018-09-26 ENCOUNTER — HOSPITAL ENCOUNTER (OUTPATIENT)
Dept: CARDIAC REHAB | Age: 68
Setting detail: THERAPIES SERIES
Discharge: HOME | End: 2018-09-26
Payer: MEDICARE

## 2018-09-26 PROCEDURE — 93798 PHYS/QHP OP CAR RHAB W/ECG: CPT

## 2018-09-28 ENCOUNTER — HOSPITAL ENCOUNTER (OUTPATIENT)
Dept: CARDIAC REHAB | Age: 68
Setting detail: THERAPIES SERIES
Discharge: HOME | End: 2018-09-28
Payer: MEDICARE

## 2018-09-28 PROCEDURE — 93798 PHYS/QHP OP CAR RHAB W/ECG: CPT

## 2018-10-01 ENCOUNTER — APPOINTMENT (OUTPATIENT)
Dept: CARDIAC REHAB | Age: 68
End: 2018-10-01
Payer: MEDICARE

## 2018-10-03 ENCOUNTER — HOSPITAL ENCOUNTER (OUTPATIENT)
Dept: CARDIAC REHAB | Age: 68
Setting detail: THERAPIES SERIES
Discharge: HOME | End: 2018-10-03
Payer: MEDICARE

## 2018-10-03 PROCEDURE — 93798 PHYS/QHP OP CAR RHAB W/ECG: CPT

## 2018-10-05 ENCOUNTER — HOSPITAL ENCOUNTER (OUTPATIENT)
Dept: CARDIAC REHAB | Age: 68
Setting detail: THERAPIES SERIES
Discharge: HOME | End: 2018-10-05
Payer: MEDICARE

## 2018-10-05 PROCEDURE — 93798 PHYS/QHP OP CAR RHAB W/ECG: CPT

## 2018-10-08 ENCOUNTER — HOSPITAL ENCOUNTER (OUTPATIENT)
Dept: CARDIAC REHAB | Age: 68
Setting detail: THERAPIES SERIES
Discharge: HOME | End: 2018-10-08
Payer: MEDICARE

## 2018-10-08 PROCEDURE — 93798 PHYS/QHP OP CAR RHAB W/ECG: CPT

## 2018-10-10 ENCOUNTER — APPOINTMENT (OUTPATIENT)
Dept: CARDIAC REHAB | Age: 68
End: 2018-10-10
Payer: MEDICARE

## 2018-10-12 ENCOUNTER — HOSPITAL ENCOUNTER (OUTPATIENT)
Dept: CARDIAC REHAB | Age: 68
Setting detail: THERAPIES SERIES
Discharge: HOME | End: 2018-10-12
Payer: MEDICARE

## 2018-10-12 PROCEDURE — 93798 PHYS/QHP OP CAR RHAB W/ECG: CPT

## 2018-10-15 ENCOUNTER — HOSPITAL ENCOUNTER (OUTPATIENT)
Dept: CARDIAC REHAB | Age: 68
Setting detail: THERAPIES SERIES
Discharge: HOME | End: 2018-10-15
Payer: MEDICARE

## 2018-10-15 PROCEDURE — 93798 PHYS/QHP OP CAR RHAB W/ECG: CPT

## 2018-10-17 ENCOUNTER — HOSPITAL ENCOUNTER (OUTPATIENT)
Dept: CARDIAC REHAB | Age: 68
Setting detail: THERAPIES SERIES
Discharge: HOME | End: 2018-10-17
Payer: MEDICARE

## 2018-10-17 PROCEDURE — 93798 PHYS/QHP OP CAR RHAB W/ECG: CPT

## 2018-10-19 ENCOUNTER — HOSPITAL ENCOUNTER (OUTPATIENT)
Dept: CARDIAC REHAB | Age: 68
Setting detail: THERAPIES SERIES
Discharge: HOME | End: 2018-10-19
Payer: MEDICARE

## 2018-10-19 PROCEDURE — 93798 PHYS/QHP OP CAR RHAB W/ECG: CPT

## 2018-10-22 ENCOUNTER — HOSPITAL ENCOUNTER (OUTPATIENT)
Dept: CARDIAC REHAB | Age: 68
Setting detail: THERAPIES SERIES
Discharge: HOME | End: 2018-10-22
Payer: MEDICARE

## 2018-10-22 PROCEDURE — 93798 PHYS/QHP OP CAR RHAB W/ECG: CPT

## 2018-10-24 ENCOUNTER — APPOINTMENT (OUTPATIENT)
Dept: CARDIAC REHAB | Age: 68
End: 2018-10-24
Payer: MEDICARE

## 2018-10-26 ENCOUNTER — HOSPITAL ENCOUNTER (OUTPATIENT)
Dept: CARDIAC REHAB | Age: 68
Setting detail: THERAPIES SERIES
Discharge: HOME | End: 2018-10-26
Payer: MEDICARE

## 2018-10-26 PROCEDURE — 93798 PHYS/QHP OP CAR RHAB W/ECG: CPT

## 2018-10-29 ENCOUNTER — APPOINTMENT (OUTPATIENT)
Dept: CARDIAC REHAB | Age: 68
End: 2018-10-29
Payer: MEDICARE

## 2018-10-29 RX ORDER — LEVOTHYROXINE SODIUM 137 MCG
TABLET ORAL
Qty: 90 TABLET | Refills: 0 | Status: SHIPPED | OUTPATIENT
Start: 2018-10-29 | End: 2019-01-26 | Stop reason: SDUPTHER

## 2018-10-31 ENCOUNTER — HOSPITAL ENCOUNTER (OUTPATIENT)
Dept: CARDIAC REHAB | Age: 68
Setting detail: THERAPIES SERIES
Discharge: HOME | End: 2018-10-31
Payer: MEDICARE

## 2018-10-31 PROCEDURE — 93798 PHYS/QHP OP CAR RHAB W/ECG: CPT

## 2018-11-02 ENCOUNTER — HOSPITAL ENCOUNTER (OUTPATIENT)
Dept: CARDIAC REHAB | Age: 68
Setting detail: THERAPIES SERIES
Discharge: HOME | End: 2018-11-02
Payer: MEDICARE

## 2018-11-02 PROCEDURE — 93798 PHYS/QHP OP CAR RHAB W/ECG: CPT

## 2018-11-05 ENCOUNTER — HOSPITAL ENCOUNTER (OUTPATIENT)
Dept: CARDIAC REHAB | Age: 68
Setting detail: THERAPIES SERIES
Discharge: HOME | End: 2018-11-05
Payer: MEDICARE

## 2018-11-05 PROCEDURE — 93798 PHYS/QHP OP CAR RHAB W/ECG: CPT

## 2018-11-07 ENCOUNTER — HOSPITAL ENCOUNTER (OUTPATIENT)
Dept: CARDIAC REHAB | Age: 68
Setting detail: THERAPIES SERIES
Discharge: HOME | End: 2018-11-07
Payer: MEDICARE

## 2018-11-07 PROCEDURE — 93798 PHYS/QHP OP CAR RHAB W/ECG: CPT

## 2018-11-08 ENCOUNTER — OFFICE VISIT (OUTPATIENT)
Dept: CARDIOLOGY CLINIC | Age: 68
End: 2018-11-08
Payer: MEDICARE

## 2018-11-08 VITALS
HEART RATE: 86 BPM | DIASTOLIC BLOOD PRESSURE: 60 MMHG | WEIGHT: 162 LBS | BODY MASS INDEX: 29.63 KG/M2 | SYSTOLIC BLOOD PRESSURE: 110 MMHG

## 2018-11-08 DIAGNOSIS — I71.21 ASCENDING AORTIC ANEURYSM: ICD-10-CM

## 2018-11-08 DIAGNOSIS — E78.5 HYPERLIPIDEMIA, UNSPECIFIED HYPERLIPIDEMIA TYPE: ICD-10-CM

## 2018-11-08 DIAGNOSIS — I10 ESSENTIAL HYPERTENSION: Primary | ICD-10-CM

## 2018-11-08 DIAGNOSIS — Z95.1 S/P CABG X 3: ICD-10-CM

## 2018-11-08 DIAGNOSIS — I25.10 CORONARY ARTERY DISEASE INVOLVING NATIVE CORONARY ARTERY OF NATIVE HEART WITHOUT ANGINA PECTORIS: ICD-10-CM

## 2018-11-08 PROCEDURE — G8427 DOCREV CUR MEDS BY ELIG CLIN: HCPCS | Performed by: INTERNAL MEDICINE

## 2018-11-08 PROCEDURE — 1123F ACP DISCUSS/DSCN MKR DOCD: CPT | Performed by: INTERNAL MEDICINE

## 2018-11-08 PROCEDURE — G8399 PT W/DXA RESULTS DOCUMENT: HCPCS | Performed by: INTERNAL MEDICINE

## 2018-11-08 PROCEDURE — 3017F COLORECTAL CA SCREEN DOC REV: CPT | Performed by: INTERNAL MEDICINE

## 2018-11-08 PROCEDURE — G8417 CALC BMI ABV UP PARAM F/U: HCPCS | Performed by: INTERNAL MEDICINE

## 2018-11-08 PROCEDURE — 4040F PNEUMOC VAC/ADMIN/RCVD: CPT | Performed by: INTERNAL MEDICINE

## 2018-11-08 PROCEDURE — G8598 ASA/ANTIPLAT THER USED: HCPCS | Performed by: INTERNAL MEDICINE

## 2018-11-08 PROCEDURE — 1101F PT FALLS ASSESS-DOCD LE1/YR: CPT | Performed by: INTERNAL MEDICINE

## 2018-11-08 PROCEDURE — 1036F TOBACCO NON-USER: CPT | Performed by: INTERNAL MEDICINE

## 2018-11-08 PROCEDURE — 99214 OFFICE O/P EST MOD 30 MIN: CPT | Performed by: INTERNAL MEDICINE

## 2018-11-08 PROCEDURE — G8482 FLU IMMUNIZE ORDER/ADMIN: HCPCS | Performed by: INTERNAL MEDICINE

## 2018-11-08 PROCEDURE — 1090F PRES/ABSN URINE INCON ASSESS: CPT | Performed by: INTERNAL MEDICINE

## 2018-11-08 ASSESSMENT — ENCOUNTER SYMPTOMS
CHOKING: 0
ALLERGIC/IMMUNOLOGIC NEGATIVE: 1
WHEEZING: 1
SHORTNESS OF BREATH: 1
EYES NEGATIVE: 1
COUGH: 1
CHEST TIGHTNESS: 0
APNEA: 0
GASTROINTESTINAL NEGATIVE: 1

## 2018-11-08 NOTE — PROGRESS NOTES
Bromide (INCRUSE ELLIPTA) 62.5 MCG/INH AEPB Inhale 1 puff into the lungs daily      atorvastatin (LIPITOR) 40 MG tablet Take 1 tablet by mouth nightly 30 tablet 3    albuterol sulfate  (90 Base) MCG/ACT inhaler Inhale 2 puffs into the lungs every 6 hours as needed for Wheezing 3 Inhaler 0    vitamin D (CVS VIT D 5000 HIGH-POTENCY) 5000 units CAPS capsule Take 1 capsule by mouth daily Vitamin D for bones. 90 capsule 12    cyanocobalamin (CVS VITAMIN B12) 1000 MCG tablet Everyone should take OTC B12 1000 mcg twice a day for neurological health. High B12 levels help prevent dementia. 180 tablet 3    fluticasone-salmeterol (ADVAIR) 250-50 MCG/DOSE AEPB Inhale 1 puff into the lungs every 12 hours 1 Inhaler 0    aspirin 325 MG EC tablet Take 1 tablet by mouth daily 30 tablet 3    acetaminophen (TYLENOL) 325 MG tablet Take 2 tablets by mouth every 4 hours as needed for Pain or Fever 120 tablet 3    esomeprazole Magnesium (NEXIUM) 40 MG PACK Take 40 mg by mouth daily       No current facility-administered medications for this visit. Vitals:    18 1458   BP: 110/60   Pulse: 86          [facility] 18  Cath Lab Report [id number] 2018  6:53 PM Mona Dubon MD   Signed by Mona Dubon on 18 at 1654      []Manual[]Template  []Copied                      [address]                               CARDIAC CATHETERIZATION     PATIENT NAME: Blanka Washington                  :        1950  MED REC NO:   [id number]                          ROOM:       [facility]  ACCOUNT NO:   [de-identified]                           ADMIT DATE: 2018  PROVIDER:     Erik Cazares. Treva Larson MD     DATE OF PROCEDURE:  2018     INDICATION FOR PROCEDURE:  The patient presents with chest burning for  several days. She was in her doctor's office. She called the squad, sent  her to the emergency room.   EKG shows

## 2018-11-09 ENCOUNTER — APPOINTMENT (OUTPATIENT)
Dept: CARDIAC REHAB | Age: 68
End: 2018-11-09
Payer: MEDICARE

## 2018-11-12 ENCOUNTER — HOSPITAL ENCOUNTER (OUTPATIENT)
Dept: CARDIAC REHAB | Age: 68
Setting detail: THERAPIES SERIES
Discharge: HOME | End: 2018-11-12
Payer: MEDICARE

## 2018-11-12 PROCEDURE — 93798 PHYS/QHP OP CAR RHAB W/ECG: CPT

## 2018-11-13 DIAGNOSIS — E78.5 HYPERLIPIDEMIA, UNSPECIFIED HYPERLIPIDEMIA TYPE: ICD-10-CM

## 2018-11-13 DIAGNOSIS — I25.10 CORONARY ARTERY DISEASE INVOLVING NATIVE CORONARY ARTERY OF NATIVE HEART WITHOUT ANGINA PECTORIS: ICD-10-CM

## 2018-11-13 DIAGNOSIS — Z95.1 S/P CABG X 3: ICD-10-CM

## 2018-11-13 DIAGNOSIS — I10 ESSENTIAL HYPERTENSION: ICD-10-CM

## 2018-11-13 DIAGNOSIS — I71.21 ASCENDING AORTIC ANEURYSM (HCC): ICD-10-CM

## 2018-11-13 LAB
A/G RATIO: 1.6 (ref 1.1–2.2)
ALBUMIN SERPL-MCNC: 4.2 G/DL (ref 3.4–5)
ALP BLD-CCNC: 97 U/L (ref 40–129)
ALT SERPL-CCNC: 15 U/L (ref 10–40)
ANION GAP SERPL CALCULATED.3IONS-SCNC: 14 MMOL/L (ref 3–16)
AST SERPL-CCNC: 15 U/L (ref 15–37)
BASOPHILS ABSOLUTE: 0.1 K/UL (ref 0–0.2)
BASOPHILS RELATIVE PERCENT: 1.1 %
BILIRUB SERPL-MCNC: <0.2 MG/DL (ref 0–1)
BUN BLDV-MCNC: 18 MG/DL (ref 7–20)
CALCIUM SERPL-MCNC: 10 MG/DL (ref 8.3–10.6)
CHLORIDE BLD-SCNC: 104 MMOL/L (ref 99–110)
CHOLESTEROL, TOTAL: 164 MG/DL (ref 0–199)
CO2: 28 MMOL/L (ref 21–32)
CREAT SERPL-MCNC: 0.7 MG/DL (ref 0.6–1.2)
EOSINOPHILS ABSOLUTE: 0.3 K/UL (ref 0–0.6)
EOSINOPHILS RELATIVE PERCENT: 5 %
GFR AFRICAN AMERICAN: >60
GFR NON-AFRICAN AMERICAN: >60
GLOBULIN: 2.6 G/DL
GLUCOSE BLD-MCNC: 108 MG/DL (ref 70–99)
HCT VFR BLD CALC: 41.1 % (ref 36–48)
HDLC SERPL-MCNC: 42 MG/DL (ref 40–60)
HEMOGLOBIN: 13.5 G/DL (ref 12–16)
LDL CHOLESTEROL CALCULATED: 94 MG/DL
LYMPHOCYTES ABSOLUTE: 2 K/UL (ref 1–5.1)
LYMPHOCYTES RELATIVE PERCENT: 30.3 %
MCH RBC QN AUTO: 31.4 PG (ref 26–34)
MCHC RBC AUTO-ENTMCNC: 32.8 G/DL (ref 31–36)
MCV RBC AUTO: 95.9 FL (ref 80–100)
MONOCYTES ABSOLUTE: 0.5 K/UL (ref 0–1.3)
MONOCYTES RELATIVE PERCENT: 8.3 %
NEUTROPHILS ABSOLUTE: 3.6 K/UL (ref 1.7–7.7)
NEUTROPHILS RELATIVE PERCENT: 55.3 %
PDW BLD-RTO: 13.9 % (ref 12.4–15.4)
PLATELET # BLD: 263 K/UL (ref 135–450)
PMV BLD AUTO: 8.1 FL (ref 5–10.5)
POTASSIUM SERPL-SCNC: 4 MMOL/L (ref 3.5–5.1)
RBC # BLD: 4.28 M/UL (ref 4–5.2)
SODIUM BLD-SCNC: 146 MMOL/L (ref 136–145)
TOTAL PROTEIN: 6.8 G/DL (ref 6.4–8.2)
TRIGL SERPL-MCNC: 138 MG/DL (ref 0–150)
VLDLC SERPL CALC-MCNC: 28 MG/DL
WBC # BLD: 6.5 K/UL (ref 4–11)

## 2018-11-14 ENCOUNTER — TELEPHONE (OUTPATIENT)
Dept: CARDIOLOGY CLINIC | Age: 68
End: 2018-11-14

## 2018-11-14 ENCOUNTER — HOSPITAL ENCOUNTER (OUTPATIENT)
Dept: CARDIAC REHAB | Age: 68
Setting detail: THERAPIES SERIES
Discharge: HOME | End: 2018-11-14
Payer: MEDICARE

## 2018-11-14 PROCEDURE — 93798 PHYS/QHP OP CAR RHAB W/ECG: CPT

## 2018-11-16 ENCOUNTER — APPOINTMENT (OUTPATIENT)
Dept: CARDIAC REHAB | Age: 68
End: 2018-11-16
Payer: MEDICARE

## 2018-11-16 ENCOUNTER — OFFICE VISIT (OUTPATIENT)
Dept: INTERNAL MEDICINE CLINIC | Age: 68
End: 2018-11-16
Payer: MEDICARE

## 2018-11-16 VITALS
OXYGEN SATURATION: 93 % | BODY MASS INDEX: 29.26 KG/M2 | SYSTOLIC BLOOD PRESSURE: 120 MMHG | WEIGHT: 160 LBS | DIASTOLIC BLOOD PRESSURE: 72 MMHG | HEART RATE: 71 BPM

## 2018-11-16 DIAGNOSIS — R10.13 DYSPEPSIA: ICD-10-CM

## 2018-11-16 DIAGNOSIS — E78.2 MIXED HYPERLIPIDEMIA: ICD-10-CM

## 2018-11-16 DIAGNOSIS — E03.4 HYPOTHYROIDISM DUE TO ACQUIRED ATROPHY OF THYROID: ICD-10-CM

## 2018-11-16 DIAGNOSIS — I71.21 ASCENDING AORTIC ANEURYSM: ICD-10-CM

## 2018-11-16 DIAGNOSIS — I10 ESSENTIAL HYPERTENSION: Primary | ICD-10-CM

## 2018-11-16 DIAGNOSIS — I25.10 CAD IN NATIVE ARTERY: ICD-10-CM

## 2018-11-16 PROBLEM — R04.2 HEMOPTYSIS: Status: RESOLVED | Noted: 2017-12-19 | Resolved: 2018-11-16

## 2018-11-16 PROBLEM — K62.5 RECTAL BLEEDING: Status: RESOLVED | Noted: 2018-09-18 | Resolved: 2018-11-16

## 2018-11-16 LAB — TSH SERPL DL<=0.05 MIU/L-ACNC: 4.58 UIU/ML (ref 0.27–4.2)

## 2018-11-16 PROCEDURE — G8482 FLU IMMUNIZE ORDER/ADMIN: HCPCS | Performed by: INTERNAL MEDICINE

## 2018-11-16 PROCEDURE — 1101F PT FALLS ASSESS-DOCD LE1/YR: CPT | Performed by: INTERNAL MEDICINE

## 2018-11-16 PROCEDURE — 1123F ACP DISCUSS/DSCN MKR DOCD: CPT | Performed by: INTERNAL MEDICINE

## 2018-11-16 PROCEDURE — G8399 PT W/DXA RESULTS DOCUMENT: HCPCS | Performed by: INTERNAL MEDICINE

## 2018-11-16 PROCEDURE — G8427 DOCREV CUR MEDS BY ELIG CLIN: HCPCS | Performed by: INTERNAL MEDICINE

## 2018-11-16 PROCEDURE — 3017F COLORECTAL CA SCREEN DOC REV: CPT | Performed by: INTERNAL MEDICINE

## 2018-11-16 PROCEDURE — 4040F PNEUMOC VAC/ADMIN/RCVD: CPT | Performed by: INTERNAL MEDICINE

## 2018-11-16 PROCEDURE — G8417 CALC BMI ABV UP PARAM F/U: HCPCS | Performed by: INTERNAL MEDICINE

## 2018-11-16 PROCEDURE — 1090F PRES/ABSN URINE INCON ASSESS: CPT | Performed by: INTERNAL MEDICINE

## 2018-11-16 PROCEDURE — 99214 OFFICE O/P EST MOD 30 MIN: CPT | Performed by: INTERNAL MEDICINE

## 2018-11-16 PROCEDURE — 1036F TOBACCO NON-USER: CPT | Performed by: INTERNAL MEDICINE

## 2018-11-16 PROCEDURE — G8598 ASA/ANTIPLAT THER USED: HCPCS | Performed by: INTERNAL MEDICINE

## 2018-11-16 RX ORDER — BENAZEPRIL/HYDROCHLOROTHIAZIDE 20 MG-25MG
1 TABLET ORAL DAILY
Qty: 90 TABLET | Refills: 1 | Status: SHIPPED | OUTPATIENT
Start: 2018-11-16 | End: 2019-07-14 | Stop reason: SDUPTHER

## 2018-11-16 ASSESSMENT — ENCOUNTER SYMPTOMS
SHORTNESS OF BREATH: 0
GASTROINTESTINAL NEGATIVE: 1
CHEST TIGHTNESS: 0
RESPIRATORY NEGATIVE: 1
WHEEZING: 0

## 2018-11-16 NOTE — PATIENT INSTRUCTIONS
Patient Self-Management Goal for Chronic Condition  Goal: I will take all medications as prescribed by my doctor, and I will call the office if I am having any medication problems. Barriers to success: none  Plan for overcoming my barriers: N/A     Confidence: 8/10  Date goal set: 11/16/18  Date goal attained:     Self- Management Goals for the Patient with High Cholesterol: It is important to have goals to work towards when you have High Cholesterol. Below is a list of goals your doctor would like you to work towards to help control your hyperlipidemia and also maintain and improve your overall health. Please select one of these goals to try before your next follow up visit:    Goal: I will take all my medications as prescribed, and call the office with any problems. Guess your barriers before they happen. Everyone runs into barriers to their goals. You may already know what's going to get in your way. Write down these problems (cost? time? stress? fear?), and think of ways to get around them.      Barriers to success: none  Plan for overcoming my barriers: N/A     Confidence: 9/10  Date goal set: 11/16/18  Date goal attained:

## 2018-11-19 ENCOUNTER — HOSPITAL ENCOUNTER (OUTPATIENT)
Dept: CARDIAC REHAB | Age: 68
Setting detail: THERAPIES SERIES
Discharge: HOME | End: 2018-11-19
Payer: MEDICARE

## 2018-11-19 PROCEDURE — 93798 PHYS/QHP OP CAR RHAB W/ECG: CPT

## 2018-11-19 RX ORDER — POTASSIUM CHLORIDE 750 MG/1
CAPSULE, EXTENDED RELEASE ORAL
Qty: 180 CAPSULE | Refills: 0 | Status: SHIPPED | OUTPATIENT
Start: 2018-11-19 | End: 2018-11-27 | Stop reason: SDUPTHER

## 2018-11-21 ENCOUNTER — APPOINTMENT (OUTPATIENT)
Dept: CARDIAC REHAB | Age: 68
End: 2018-11-21
Payer: MEDICARE

## 2018-11-23 ENCOUNTER — APPOINTMENT (OUTPATIENT)
Dept: CARDIAC REHAB | Age: 68
End: 2018-11-23
Payer: MEDICARE

## 2018-11-26 ENCOUNTER — APPOINTMENT (OUTPATIENT)
Dept: CARDIAC REHAB | Age: 68
End: 2018-11-26
Payer: MEDICARE

## 2018-11-27 RX ORDER — ATORVASTATIN CALCIUM 40 MG/1
TABLET, FILM COATED ORAL
Qty: 30 TABLET | Refills: 3 | Status: SHIPPED | OUTPATIENT
Start: 2018-11-27 | End: 2019-04-06 | Stop reason: SDUPTHER

## 2018-11-27 RX ORDER — POTASSIUM CHLORIDE 750 MG/1
CAPSULE, EXTENDED RELEASE ORAL
Qty: 180 CAPSULE | Refills: 0 | Status: SHIPPED | OUTPATIENT
Start: 2018-11-27 | End: 2020-02-28 | Stop reason: SDUPTHER

## 2018-11-28 ENCOUNTER — APPOINTMENT (OUTPATIENT)
Dept: CARDIAC REHAB | Age: 68
End: 2018-11-28
Payer: MEDICARE

## 2018-11-30 ENCOUNTER — APPOINTMENT (OUTPATIENT)
Dept: CARDIAC REHAB | Age: 68
End: 2018-11-30
Payer: MEDICARE

## 2018-12-14 ENCOUNTER — OFFICE VISIT (OUTPATIENT)
Dept: CARDIOTHORACIC SURGERY | Age: 68
End: 2018-12-14

## 2018-12-14 VITALS
BODY MASS INDEX: 29.26 KG/M2 | SYSTOLIC BLOOD PRESSURE: 108 MMHG | TEMPERATURE: 98.1 F | DIASTOLIC BLOOD PRESSURE: 70 MMHG | OXYGEN SATURATION: 96 % | WEIGHT: 159 LBS | HEART RATE: 68 BPM | HEIGHT: 62 IN

## 2018-12-14 DIAGNOSIS — Z09 POSTOP CHECK: Primary | ICD-10-CM

## 2018-12-14 PROCEDURE — 99024 POSTOP FOLLOW-UP VISIT: CPT | Performed by: THORACIC SURGERY (CARDIOTHORACIC VASCULAR SURGERY)

## 2018-12-14 RX ORDER — FUROSEMIDE 40 MG/1
40 TABLET ORAL PRN
COMMUNITY
End: 2019-06-10 | Stop reason: SDUPTHER

## 2018-12-14 NOTE — PROGRESS NOTES
MD   vitamin D (CVS VIT D 5000 HIGH-POTENCY) 5000 units CAPS capsule Take 1 capsule by mouth daily Vitamin D for bones. 8/10/18  Yes Talisha Newell MD   cyanocobalamin (CVS VITAMIN B12) 1000 MCG tablet Everyone should take OTC B12 1000 mcg twice a day for neurological health. High B12 levels help prevent dementia. 8/10/18  Yes Talisha Newell MD   fluticasone-salmeterol (ADVAIR) 250-50 MCG/DOSE AEPB Inhale 1 puff into the lungs every 12 hours 8/10/18  Yes Talisha Newell MD   aspirin 325 MG EC tablet Take 1 tablet by mouth daily 8/1/18  Yes NICOLÁS Miller CNP   acetaminophen (TYLENOL) 325 MG tablet Take 2 tablets by mouth every 4 hours as needed for Pain or Fever 8/1/18  Yes NICOLÁS Miller CNP   esomeprazole Magnesium (NEXIUM) 40 MG PACK Take 40 mg by mouth daily   Yes Historical Provider, MD        Data Review:  CBC:   Lab Results   Component Value Date    WBC 6.5 11/13/2018    HGB 13.5 11/13/2018    HCT 41.1 11/13/2018    MCV 95.9 11/13/2018     11/13/2018     BMP:   Lab Results   Component Value Date     11/13/2018    K 4.0 11/13/2018    K 3.5 07/24/2018     11/13/2018    CO2 28 11/13/2018    PHOS 4.1 07/26/2018    BUN 18 11/13/2018    CREATININE 0.7 11/13/2018    CALCIUM 10.0 11/13/2018    MG 2.10 08/11/2018     PT/INR:   Lab Results   Component Value Date    PROTIME 13.1 07/30/2018    INR 1.15 07/30/2018       Assessment/Plan:  - ascending aortic aneurysm  Discussed previous measurements 5/7/15 4.1, 8/1/16 don't have report, 1/30/17 4.3, 7/28/17 4.3, 7/24/18 4.1, 11/30/18 4.2  Maintain sbp<120s with BB, ACE or ARB  Continue serial imaging  Ferguson surgery for increase in dimension to 5-6 cm, rapid rate of growth, complication    - echo reviewed as well. Multiple questions about diastolic dysfunction, LA dilation answered to the best of my ability    - diaphragmatic dysfunction  R was elevated preop from hx lung resection.  L is not elevated, but recent sniff test shows dysfunction. Likely secondary to cabg, LIMA takedown, ice. Since not elevated and does have some motion, there is potential for improvement.     Milagro Borges MD  12/14/2018  12:02 PM

## 2018-12-14 NOTE — LETTER
12/14/18        [facility]  [address]  [address] [place] [address]        RE:    Farideh Mtz,   1950    Dear Dr. Dayana Lucio,    It was my pleasure to see your patient, Farideh Mtz, in the office today in follow up of cabgx3 7/23/18 at [facility]    I have attached a copy of the office evaluation. Thank you for allowing me to participate in the care of this patient. Sincerely,     Lamar Encarnacion MD    CC: Sandee Weldon MD  [address] [facility] [address], [place]  [address] [facility], [address] [facility],[address], [place]  [address] [facility].   [facility]  VIA Mail

## 2018-12-19 ENCOUNTER — OFFICE VISIT (OUTPATIENT)
Dept: ORTHOPEDIC SURGERY | Age: 68
End: 2018-12-19
Payer: MEDICARE

## 2018-12-19 VITALS — RESPIRATION RATE: 16 BRPM | HEIGHT: 62 IN | WEIGHT: 159 LBS | BODY MASS INDEX: 29.26 KG/M2

## 2018-12-19 DIAGNOSIS — M65.332 TRIGGER MIDDLE FINGER OF LEFT HAND: Primary | ICD-10-CM

## 2018-12-19 PROCEDURE — 1101F PT FALLS ASSESS-DOCD LE1/YR: CPT | Performed by: PHYSICIAN ASSISTANT

## 2018-12-19 PROCEDURE — 20550 NJX 1 TENDON SHEATH/LIGAMENT: CPT | Performed by: PHYSICIAN ASSISTANT

## 2018-12-19 PROCEDURE — G8427 DOCREV CUR MEDS BY ELIG CLIN: HCPCS | Performed by: PHYSICIAN ASSISTANT

## 2018-12-19 PROCEDURE — 4040F PNEUMOC VAC/ADMIN/RCVD: CPT | Performed by: PHYSICIAN ASSISTANT

## 2018-12-19 PROCEDURE — 1123F ACP DISCUSS/DSCN MKR DOCD: CPT | Performed by: PHYSICIAN ASSISTANT

## 2018-12-19 PROCEDURE — G8399 PT W/DXA RESULTS DOCUMENT: HCPCS | Performed by: PHYSICIAN ASSISTANT

## 2018-12-19 PROCEDURE — G8482 FLU IMMUNIZE ORDER/ADMIN: HCPCS | Performed by: PHYSICIAN ASSISTANT

## 2018-12-19 PROCEDURE — 1090F PRES/ABSN URINE INCON ASSESS: CPT | Performed by: PHYSICIAN ASSISTANT

## 2018-12-19 PROCEDURE — 99213 OFFICE O/P EST LOW 20 MIN: CPT | Performed by: PHYSICIAN ASSISTANT

## 2018-12-19 PROCEDURE — G8417 CALC BMI ABV UP PARAM F/U: HCPCS | Performed by: PHYSICIAN ASSISTANT

## 2018-12-19 PROCEDURE — G8598 ASA/ANTIPLAT THER USED: HCPCS | Performed by: PHYSICIAN ASSISTANT

## 2018-12-19 PROCEDURE — 1036F TOBACCO NON-USER: CPT | Performed by: PHYSICIAN ASSISTANT

## 2018-12-19 PROCEDURE — 3017F COLORECTAL CA SCREEN DOC REV: CPT | Performed by: PHYSICIAN ASSISTANT

## 2019-01-28 RX ORDER — LEVOTHYROXINE SODIUM 137 MCG
TABLET ORAL
Qty: 90 TABLET | Refills: 0 | Status: SHIPPED | OUTPATIENT
Start: 2019-01-28 | End: 2019-07-16 | Stop reason: SDUPTHER

## 2019-03-12 ENCOUNTER — TELEPHONE (OUTPATIENT)
Dept: INTERNAL MEDICINE CLINIC | Age: 69
End: 2019-03-12

## 2019-03-12 DIAGNOSIS — E55.9 VITAMIN D DEFICIENCY: ICD-10-CM

## 2019-03-12 DIAGNOSIS — I10 ESSENTIAL HYPERTENSION: ICD-10-CM

## 2019-03-12 DIAGNOSIS — Z00.00 ROUTINE GENERAL MEDICAL EXAMINATION AT A HEALTH CARE FACILITY: ICD-10-CM

## 2019-03-12 DIAGNOSIS — E03.4 HYPOTHYROIDISM DUE TO ACQUIRED ATROPHY OF THYROID: ICD-10-CM

## 2019-03-12 DIAGNOSIS — E78.2 MIXED HYPERLIPIDEMIA: Primary | ICD-10-CM

## 2019-03-18 ENCOUNTER — OFFICE VISIT (OUTPATIENT)
Dept: INTERNAL MEDICINE CLINIC | Age: 69
End: 2019-03-18
Payer: MEDICARE

## 2019-03-18 VITALS
DIASTOLIC BLOOD PRESSURE: 76 MMHG | WEIGHT: 163 LBS | OXYGEN SATURATION: 89 % | SYSTOLIC BLOOD PRESSURE: 118 MMHG | HEART RATE: 78 BPM | HEIGHT: 62 IN | BODY MASS INDEX: 30 KG/M2

## 2019-03-18 DIAGNOSIS — Z13.820 SCREENING FOR OSTEOPOROSIS: ICD-10-CM

## 2019-03-18 DIAGNOSIS — E78.2 MIXED HYPERLIPIDEMIA: ICD-10-CM

## 2019-03-18 DIAGNOSIS — Z00.00 ROUTINE GENERAL MEDICAL EXAMINATION AT A HEALTH CARE FACILITY: ICD-10-CM

## 2019-03-18 DIAGNOSIS — E55.9 VITAMIN D DEFICIENCY: ICD-10-CM

## 2019-03-18 DIAGNOSIS — M89.9 DISORDER OF BONE: ICD-10-CM

## 2019-03-18 DIAGNOSIS — I10 ESSENTIAL HYPERTENSION: ICD-10-CM

## 2019-03-18 DIAGNOSIS — I10 ESSENTIAL HYPERTENSION: Primary | ICD-10-CM

## 2019-03-18 DIAGNOSIS — Z12.39 SCREENING FOR BREAST CANCER: ICD-10-CM

## 2019-03-18 DIAGNOSIS — E03.4 HYPOTHYROIDISM DUE TO ACQUIRED ATROPHY OF THYROID: ICD-10-CM

## 2019-03-18 LAB
A/G RATIO: 1.7 (ref 1.1–2.2)
ALBUMIN SERPL-MCNC: 4.2 G/DL (ref 3.4–5)
ALP BLD-CCNC: 93 U/L (ref 40–129)
ALT SERPL-CCNC: 23 U/L (ref 10–40)
ANION GAP SERPL CALCULATED.3IONS-SCNC: 12 MMOL/L (ref 3–16)
AST SERPL-CCNC: 21 U/L (ref 15–37)
BASOPHILS ABSOLUTE: 0 K/UL (ref 0–0.2)
BASOPHILS RELATIVE PERCENT: 0.2 %
BILIRUB SERPL-MCNC: <0.2 MG/DL (ref 0–1)
BILIRUBIN URINE: NEGATIVE
BLOOD, URINE: NEGATIVE
BUN BLDV-MCNC: 21 MG/DL (ref 7–20)
CALCIUM SERPL-MCNC: 9.6 MG/DL (ref 8.3–10.6)
CHLORIDE BLD-SCNC: 101 MMOL/L (ref 99–110)
CHOLESTEROL, TOTAL: 168 MG/DL (ref 0–199)
CLARITY: CLEAR
CO2: 32 MMOL/L (ref 21–32)
COLOR: YELLOW
CREAT SERPL-MCNC: 0.6 MG/DL (ref 0.6–1.2)
EOSINOPHILS ABSOLUTE: 0 K/UL (ref 0–0.6)
EOSINOPHILS RELATIVE PERCENT: 0.5 %
EPITHELIAL CELLS, UA: 1 /HPF (ref 0–5)
GFR AFRICAN AMERICAN: >60
GFR NON-AFRICAN AMERICAN: >60
GLOBULIN: 2.5 G/DL
GLUCOSE BLD-MCNC: 84 MG/DL (ref 70–99)
GLUCOSE URINE: NEGATIVE MG/DL
HCT VFR BLD CALC: 39.1 % (ref 36–48)
HDLC SERPL-MCNC: 62 MG/DL (ref 40–60)
HEMOGLOBIN: 13 G/DL (ref 12–16)
HYALINE CASTS: 1 /LPF (ref 0–8)
KETONES, URINE: NEGATIVE MG/DL
LDL CHOLESTEROL CALCULATED: 82 MG/DL
LEUKOCYTE ESTERASE, URINE: NEGATIVE
LYMPHOCYTES ABSOLUTE: 1.9 K/UL (ref 1–5.1)
LYMPHOCYTES RELATIVE PERCENT: 39.3 %
MCH RBC QN AUTO: 31.7 PG (ref 26–34)
MCHC RBC AUTO-ENTMCNC: 33.2 G/DL (ref 31–36)
MCV RBC AUTO: 95.5 FL (ref 80–100)
MICROSCOPIC EXAMINATION: NORMAL
MONOCYTES ABSOLUTE: 0.8 K/UL (ref 0–1.3)
MONOCYTES RELATIVE PERCENT: 16.5 %
NEUTROPHILS ABSOLUTE: 2.1 K/UL (ref 1.7–7.7)
NEUTROPHILS RELATIVE PERCENT: 43.5 %
NITRITE, URINE: NEGATIVE
PDW BLD-RTO: 14.5 % (ref 12.4–15.4)
PH UA: 6.5 (ref 5–8)
PLATELET # BLD: 258 K/UL (ref 135–450)
PMV BLD AUTO: 7.8 FL (ref 5–10.5)
POTASSIUM SERPL-SCNC: 3.4 MMOL/L (ref 3.5–5.1)
PROTEIN UA: NEGATIVE MG/DL
RBC # BLD: 4.09 M/UL (ref 4–5.2)
RBC UA: 4 /HPF (ref 0–4)
SODIUM BLD-SCNC: 145 MMOL/L (ref 136–145)
SPECIFIC GRAVITY UA: 1.02 (ref 1–1.03)
TOTAL PROTEIN: 6.7 G/DL (ref 6.4–8.2)
TRIGL SERPL-MCNC: 118 MG/DL (ref 0–150)
TSH REFLEX: 1.6 UIU/ML (ref 0.27–4.2)
UROBILINOGEN, URINE: 0.2 E.U./DL
VITAMIN D 25-HYDROXY: 54.4 NG/ML
VLDLC SERPL CALC-MCNC: 24 MG/DL
WBC # BLD: 4.8 K/UL (ref 4–11)
WBC UA: 1 /HPF (ref 0–5)

## 2019-03-18 PROCEDURE — 93000 ELECTROCARDIOGRAM COMPLETE: CPT | Performed by: INTERNAL MEDICINE

## 2019-03-18 PROCEDURE — G0439 PPPS, SUBSEQ VISIT: HCPCS | Performed by: INTERNAL MEDICINE

## 2019-03-18 PROCEDURE — G8482 FLU IMMUNIZE ORDER/ADMIN: HCPCS | Performed by: INTERNAL MEDICINE

## 2019-03-18 PROCEDURE — 4040F PNEUMOC VAC/ADMIN/RCVD: CPT | Performed by: INTERNAL MEDICINE

## 2019-03-18 PROCEDURE — G8599 NO ASA/ANTIPLAT THER USE RNG: HCPCS | Performed by: INTERNAL MEDICINE

## 2019-03-18 ASSESSMENT — ENCOUNTER SYMPTOMS
WHEEZING: 0
EYE ITCHING: 0
ABDOMINAL PAIN: 0
ANAL BLEEDING: 0
VOMITING: 0
STRIDOR: 0
EYE PAIN: 0
BLOOD IN STOOL: 0
SINUS PRESSURE: 0
CHOKING: 0
APNEA: 0
PHOTOPHOBIA: 0
COLOR CHANGE: 0
CONSTIPATION: 0
EYE DISCHARGE: 0
COUGH: 1
TROUBLE SWALLOWING: 0
RHINORRHEA: 0
SORE THROAT: 0
SHORTNESS OF BREATH: 0
CHEST TIGHTNESS: 0
ABDOMINAL DISTENTION: 0
BACK PAIN: 0
VOICE CHANGE: 0
NAUSEA: 0
FACIAL SWELLING: 0
RECTAL PAIN: 0
DIARRHEA: 0
EYE REDNESS: 0

## 2019-03-18 ASSESSMENT — PATIENT HEALTH QUESTIONNAIRE - PHQ9
SUM OF ALL RESPONSES TO PHQ QUESTIONS 1-9: 0
SUM OF ALL RESPONSES TO PHQ QUESTIONS 1-9: 0

## 2019-03-18 ASSESSMENT — ANXIETY QUESTIONNAIRES: GAD7 TOTAL SCORE: 0

## 2019-03-18 ASSESSMENT — LIFESTYLE VARIABLES: HOW OFTEN DO YOU HAVE A DRINK CONTAINING ALCOHOL: 0

## 2019-04-08 RX ORDER — ATORVASTATIN CALCIUM 40 MG/1
TABLET, FILM COATED ORAL
Qty: 30 TABLET | Refills: 2 | Status: SHIPPED | OUTPATIENT
Start: 2019-04-08 | End: 2019-07-14 | Stop reason: SDUPTHER

## 2019-06-07 ENCOUNTER — OFFICE VISIT (OUTPATIENT)
Dept: INTERNAL MEDICINE CLINIC | Age: 69
End: 2019-06-07
Payer: MEDICARE

## 2019-06-07 VITALS
DIASTOLIC BLOOD PRESSURE: 70 MMHG | OXYGEN SATURATION: 95 % | BODY MASS INDEX: 30.73 KG/M2 | HEART RATE: 93 BPM | WEIGHT: 168 LBS | SYSTOLIC BLOOD PRESSURE: 114 MMHG

## 2019-06-07 DIAGNOSIS — I87.2 VENOUS INSUFFICIENCY: ICD-10-CM

## 2019-06-07 PROCEDURE — G8417 CALC BMI ABV UP PARAM F/U: HCPCS | Performed by: INTERNAL MEDICINE

## 2019-06-07 PROCEDURE — G8599 NO ASA/ANTIPLAT THER USE RNG: HCPCS | Performed by: INTERNAL MEDICINE

## 2019-06-07 PROCEDURE — 4040F PNEUMOC VAC/ADMIN/RCVD: CPT | Performed by: INTERNAL MEDICINE

## 2019-06-07 PROCEDURE — 1090F PRES/ABSN URINE INCON ASSESS: CPT | Performed by: INTERNAL MEDICINE

## 2019-06-07 PROCEDURE — 3017F COLORECTAL CA SCREEN DOC REV: CPT | Performed by: INTERNAL MEDICINE

## 2019-06-07 PROCEDURE — 1123F ACP DISCUSS/DSCN MKR DOCD: CPT | Performed by: INTERNAL MEDICINE

## 2019-06-07 PROCEDURE — G8427 DOCREV CUR MEDS BY ELIG CLIN: HCPCS | Performed by: INTERNAL MEDICINE

## 2019-06-07 PROCEDURE — 1036F TOBACCO NON-USER: CPT | Performed by: INTERNAL MEDICINE

## 2019-06-07 PROCEDURE — 99214 OFFICE O/P EST MOD 30 MIN: CPT | Performed by: INTERNAL MEDICINE

## 2019-06-07 PROCEDURE — G8399 PT W/DXA RESULTS DOCUMENT: HCPCS | Performed by: INTERNAL MEDICINE

## 2019-06-07 ASSESSMENT — ENCOUNTER SYMPTOMS
SHORTNESS OF BREATH: 0
WHEEZING: 0
CHEST TIGHTNESS: 0
RESPIRATORY NEGATIVE: 1
GASTROINTESTINAL NEGATIVE: 1

## 2019-06-07 NOTE — PROGRESS NOTES
Subjective:      Patient ID: Nash Alexis is a 77 y.o. y.o. female. Chief Complaint   Patient presents with    Leg Swelling       HPI    Patient is here for a recheck  She complains of intermittent swelling in her right leg. She can feel it swelling in the morning. She has aching and 'hot spots' in her leg. She is taking Lasix with some help. This is the leg the vein out of when she had her CABG. She has intermittent aching. She has no redness or heat. It is better in the morning when he leg is up. Vitals:    06/07/19 0902   BP: 114/70   Pulse: 93   SpO2: 95%       Wt Readings from Last 3 Encounters:   06/07/19 168 lb (76.2 kg)   03/18/19 163 lb (73.9 kg)   12/19/18 159 lb (72.1 kg)     \she was on a recent car vacation. The swelling was there prior to trip and is actually better since the trip. Discussed use, benefit, and side effects of prescribed medications. Barriers to medication compliance addressed. All patient questions answered. Pt voiced understanding. Review of Systems   Constitutional: Negative. HENT: Negative. Respiratory: Negative. Negative for chest tightness, shortness of breath and wheezing. Cardiovascular: Negative. Negative for chest pain, palpitations and leg swelling. Gastrointestinal: Negative. Genitourinary: Negative. Musculoskeletal: Negative for arthralgias and myalgias. Neurological: Negative. Objective:   Physical Exam   Constitutional: She appears well-developed and well-nourished. Eyes: Conjunctivae and EOM are normal.   Neck: No JVD present. Carotid bruit is not present. No thyroid mass and no thyromegaly present. Cardiovascular: Normal rate, regular rhythm and normal heart sounds. No murmur heard. Pulmonary/Chest: Effort normal and breath sounds normal. She has no wheezes. Musculoskeletal: She exhibits no edema. Left leg without swelling. Minimal swelling right leg.   Varicosities in both legs L>R  No redness, no painful or swollen areas         Assessment:      See Problem List assessment and plan       Plan:     Venous insufficiency  Continue hose  Continue Lasix prn  Elevate leg as much as possible      Patient engaged in shared decision making. Information given to evaluateoptions of treatment, understand what is needed and discuss importance of following plan.

## 2019-06-10 ENCOUNTER — OFFICE VISIT (OUTPATIENT)
Dept: CARDIOLOGY CLINIC | Age: 69
End: 2019-06-10
Payer: MEDICARE

## 2019-06-10 VITALS
WEIGHT: 168 LBS | BODY MASS INDEX: 30.73 KG/M2 | DIASTOLIC BLOOD PRESSURE: 70 MMHG | HEART RATE: 60 BPM | SYSTOLIC BLOOD PRESSURE: 100 MMHG

## 2019-06-10 DIAGNOSIS — R60.1 GENERALIZED EDEMA: ICD-10-CM

## 2019-06-10 DIAGNOSIS — I25.10 CORONARY ARTERY DISEASE INVOLVING NATIVE CORONARY ARTERY OF NATIVE HEART WITHOUT ANGINA PECTORIS: Primary | ICD-10-CM

## 2019-06-10 DIAGNOSIS — R06.02 SHORTNESS OF BREATH: ICD-10-CM

## 2019-06-10 DIAGNOSIS — E78.2 MIXED HYPERLIPIDEMIA: ICD-10-CM

## 2019-06-10 DIAGNOSIS — I71.21 ASCENDING AORTIC ANEURYSM: ICD-10-CM

## 2019-06-10 PROCEDURE — G8399 PT W/DXA RESULTS DOCUMENT: HCPCS | Performed by: INTERNAL MEDICINE

## 2019-06-10 PROCEDURE — 3017F COLORECTAL CA SCREEN DOC REV: CPT | Performed by: INTERNAL MEDICINE

## 2019-06-10 PROCEDURE — 1090F PRES/ABSN URINE INCON ASSESS: CPT | Performed by: INTERNAL MEDICINE

## 2019-06-10 PROCEDURE — G8599 NO ASA/ANTIPLAT THER USE RNG: HCPCS | Performed by: INTERNAL MEDICINE

## 2019-06-10 PROCEDURE — G8417 CALC BMI ABV UP PARAM F/U: HCPCS | Performed by: INTERNAL MEDICINE

## 2019-06-10 PROCEDURE — 1036F TOBACCO NON-USER: CPT | Performed by: INTERNAL MEDICINE

## 2019-06-10 PROCEDURE — 1123F ACP DISCUSS/DSCN MKR DOCD: CPT | Performed by: INTERNAL MEDICINE

## 2019-06-10 PROCEDURE — 99213 OFFICE O/P EST LOW 20 MIN: CPT | Performed by: INTERNAL MEDICINE

## 2019-06-10 PROCEDURE — G8427 DOCREV CUR MEDS BY ELIG CLIN: HCPCS | Performed by: INTERNAL MEDICINE

## 2019-06-10 PROCEDURE — 4040F PNEUMOC VAC/ADMIN/RCVD: CPT | Performed by: INTERNAL MEDICINE

## 2019-06-10 RX ORDER — FUROSEMIDE 40 MG/1
40 TABLET ORAL PRN
Qty: 90 TABLET | Refills: 3 | Status: SHIPPED | OUTPATIENT
Start: 2019-06-10 | End: 2020-09-01 | Stop reason: SDUPTHER

## 2019-06-10 RX ORDER — FUROSEMIDE 40 MG/1
40 TABLET ORAL PRN
Qty: 90 TABLET | Refills: 3 | OUTPATIENT
Start: 2019-06-10

## 2019-06-10 ASSESSMENT — ENCOUNTER SYMPTOMS
WHEEZING: 1
SHORTNESS OF BREATH: 1
CHEST TIGHTNESS: 0
GASTROINTESTINAL NEGATIVE: 1
COUGH: 1
APNEA: 0
EYES NEGATIVE: 1
CHOKING: 0
ALLERGIC/IMMUNOLOGIC NEGATIVE: 1

## 2019-06-10 NOTE — PROGRESS NOTES
Fear of current or ex partner: None     Emotionally abused: None     Physically abused: None     Forced sexual activity: None   Other Topics Concern    None   Social History Narrative    Works [employer] , [place] [employer] x 25 yrs before [place]. . 4 grown daughters 39, 36, 39, 29 in 11/14. HS in [address]. Family History   Problem Relation Age of Onset    Heart Failure Father     Heart Failure Mother     Dementia Mother     Stroke Mother     Other Sister         brain aneurysm    High Cholesterol Brother     High Blood Pressure Sister     High Cholesterol Sister         has a past medical history of Brain aneurysm, CAD (coronary artery disease), COPD (chronic obstructive pulmonary disease) ([address] Hyperlipidemia, Hypertension, Hypothyroidism, Lumbar spondylosis, Lung cancer ([address] Renal cyst, Spinal stenosis of lumbar region, and Thoracic aortic aneurysm ([address] Review of Systems   Constitutional: Negative. HENT: Negative. Eyes: Negative. Respiratory: Positive for cough, shortness of breath and wheezing. Negative for apnea, choking and chest tightness. Cardiovascular: Negative for chest pain, palpitations and leg swelling. Gastrointestinal: Negative. Endocrine: Negative. Genitourinary: Negative. Musculoskeletal: Negative. Skin: Negative. Allergic/Immunologic: Negative. Neurological: Negative. Hematological: Negative. Psychiatric/Behavioral: Negative. Vitals:    06/10/19 0922   BP: 100/70   Pulse: 60         Objective:   Physical Exam   Constitutional: She is oriented to person, place, and time. She appears well-developed and well-nourished. HENT:   Head: Normocephalic and atraumatic. Eyes: Pupils are equal, round, and reactive to light. Conjunctivae and EOM are normal.   Neck: Normal range of motion. Neck supple. Cardiovascular: Normal rate, regular rhythm, normal heart sounds and intact distal pulses.    Pulmonary/Chest: She has wheezes. Abdominal: Soft. Bowel sounds are normal.   Musculoskeletal: Normal range of motion. Neurological: She is alert and oriented to person, place, and time. Skin: Skin is warm and dry. Psychiatric: She has a normal mood and affect. Her behavior is normal. Judgment and thought content normal.       Current Outpatient Medications   Medication Sig Dispense Refill    atorvastatin (LIPITOR) 40 MG tablet TAKE 1 TABLET BY MOUTH EVERY DAY AT NIGHT 30 tablet 2    SYNTHROID 137 MCG tablet TAKE 1 TABLET BY MOUTH DAILY 90 tablet 0    furosemide (LASIX) 40 MG tablet Take 40 mg by mouth as needed      potassium chloride (MICRO-K) 10 MEQ extended release capsule TAKE 1 CAPSULE BY MOUTH TWICE A DAY (Patient taking differently: Take 2 capsules BID as needed when taking Lasix) 180 capsule 0    benazepril-hydrochlorthiazide (LOTENSIN HCT) 20-25 MG per tablet Take 1 tablet by mouth daily 90 tablet 1    metoprolol succinate (TOPROL XL) 50 MG extended release tablet Take 1 tablet by mouth daily 30 tablet 3    Umeclidinium Bromide (INCRUSE ELLIPTA) 62.5 MCG/INH AEPB Inhale 1 puff into the lungs daily      albuterol sulfate  (90 Base) MCG/ACT inhaler Inhale 2 puffs into the lungs every 6 hours as needed for Wheezing 3 Inhaler 0    vitamin D (CVS VIT D 5000 HIGH-POTENCY) 5000 units CAPS capsule Take 1 capsule by mouth daily Vitamin D for bones. 90 capsule 12    cyanocobalamin (CVS VITAMIN B12) 1000 MCG tablet Everyone should take OTC B12 1000 mcg twice a day for neurological health. High B12 levels help prevent dementia.  180 tablet 3    fluticasone-salmeterol (ADVAIR) 250-50 MCG/DOSE AEPB Inhale 1 puff into the lungs every 12 hours 1 Inhaler 0    aspirin 325 MG EC tablet Take 1 tablet by mouth daily 30 tablet 3    acetaminophen (TYLENOL) 325 MG tablet Take 2 tablets by mouth every 4 hours as needed for Pain or Fever 120 tablet 3    esomeprazole Magnesium (NEXIUM) 40 MG PACK Take 40 mg by mouth daily No current facility-administered medications for this visit. Vitals:    06/10/19 0922   BP: 100/70   Pulse: 60          [facility] 18  Cath Lab Report [id number] 2018  6:53 PM Reymundo James MD   Signed by Reymundo Jamse on 18 at 36      []Manual[]Template  []Copied                      [address]                 [facility], [address] [place] [address]                               CARDIAC CATHETERIZATION     PATIENT NAME: Xavi Arango                  :        1950  MED REC NO:   [id number]                          ROOM:       4517  ACCOUNT NO:   [de-identified]                           ADMIT DATE: 2018  PROVIDER:     Rylee Escamilla. Toni Beckwith MD     DATE OF PROCEDURE:  2018     INDICATION FOR PROCEDURE:  The patient presents with chest burning for  several days. She was in her doctor's office. She called the squad, sent  her to the emergency room. EKG shows inferior and anterolateral T-wave  inversions. Chest pain and chest burning were abated in the emergency room  with rest and medication. Troponin was measured at 0.16 and 0.17  consistent with a non-ST-elevation MI. It was felt prudent to invasively  risk stratify this patient with coronary angiography.     PROCEDURE:  Prepped and draped in sterile manner, locally anesthetized with  2% lidocaine, right femoral artery area, 6-Sri Lankan sheath was placed in  retrograde manner, right femoral artery over guidewire. Multiple catheters  were used for diagnostic interventional procedure.     FINDINGS:  Left main coronary artery appears to be normal.  Left anterior  descending coronary artery proximally is normal.  At mid vessel, there is a  tapering 80% stenosis that leads into an aneurysmal segment. The  aneurysmal segment gives rise to two septal  arteries and then,  it leads out of the aneurysm where there is another 80% stenosis. This is  on a mild bend.   It is calcified and hazy. The remainder of the LAD is  unremarkable. It is mildly diffusely diseased. The diagonal branch was  unremarkable beyond this stenosis.     The circumflex coronary artery at mid vessel has a bend in it and a 75%  hazy stenosis. The remainder of the circumflex is unremarkable. The  obtuse marginal branches beyond that are unremarkable.     JR-4 catheter would not engage the right coronary artery and 3-DRC catheter  engaged the right coronary artery. Injection showed mild narrowing  proximally. In the vertical limb of the right coronary artery, there is a  99% hazy calcific stenosis. There is NÉSTOR grade 2 flow in the right  coronary artery. This appears to be the culprit vessel. The right  posterior descending is small, but normal.  Right posterolateral branch is  small, but normal.     Pigtail catheter in the left ventricle showed an LVEDP post coronary  angiography of 15 mmHg. Power injection HARMAN projection shows LV ejection  fraction 35% with mild left ventricular enlargement qualitatively and  inferior wall hypokinesia. There was no mitral regurgitation. There was  no gradient upon pullback from the left ventricle ascending aorta.     I elected to proceed with PCI upon the right coronary artery as I felt that  it had NÉSTOR-2 flow and was the culprit lesion upfront. I was keenly aware  of the fact that this was going to be a very difficult angioplasty. I was  also thinking that the patient will need bypass surgery with her LV  systolic dysfunction and multivessel coronary disease, but given that this  was the culprit lesion with NÉSTOR-2 flow, I felt it was most expeditious to  try and improve this with balloon angioplasty and stenting.     INTERVENTIONAL PROCEDURE:  Heparin and Integrilin were given per protocol  after consultation with  _____ over the phone. We decided that  intervention was reasonable at this juncture. The 6-Danish sheath was  already in place.   A 6-Danish 3-DRC guide was used to engage the right  coronary artery. Heparin was given per protocol. A 190 BMW guidewire was  advanced with great difficulty into the distal right coronary artery. A  2.5 mm balloon would not pass. A 1.5 mm balloon would not pass. I elected  to change out and use a ChoICE PT extra support guidewire. The balloon  would not pass. I abandoned the 3-DRC 6-Angolan guide and went with an AL-1  guide and then, I used a ChoICE PT extra support guidewire. I was able to  pass the wire. A 2.5 mm balloon would not pass. A 1.5 mm balloon did  pass. I made several inflations to 14 and then 17 atmospheres x30 and 45  seconds. The flow had improved to NÉSTOR 3 flow. I elected to try and use a  stent. I picked a 2.75 x 15 mm Jeramie drug-eluting stent. This stent would  not cross the lesion. I elected to use a 2.5 mm x 15 mm balloon. With  great difficulty, I was able to get that to cross the lesion and did  several inflations to 14 to 15 atmospheres. There was a focal dissection,  but there was NÉSTOR 3 flow. I then tried to place the stent with deep  seating of the guide and I used a BMW or[place] wire with the ChoICE PT extra  support as a rail and I still could not get the stent to traverse even with  changing the configuration of the guide while the stent was near lesion. I  could not get the stent across. I elected to conclude the procedure at  this juncture as we were there for a while and I did improve the flow. I  still believe that the patient will benefit most from bypass surgery and I  am going to place the patient on Integrilin, heparin drip, and a  nitroglycerin drip in the intensive care unit. I have spoken to the  cardiothoracic surgeon and further evaluation will be done in the morning  as she is pain-free at this juncture with NÉSTOR grade 3 flow.   There is a  residual 80% to 90% stenosis at this calcified mid right coronary lesion.     Injection of femoral sheath shows there is coiling in the right common  femoral artery. For the patient's safety and comfort, a 6-Iraqi  Angio-Seal device was deployed and hemostasis was achieved. The ACT at the  conclusion of the procedure was 298 seconds.     PLAN:  ICU management. Heparin drip 1000 units an hour and nitroglycerin  drip 40 mcg per minute. Beta blockers will be initiated and Integrilin  will continue for 15 hours pending the evaluation from CT surgery. She is  critically ill and will be maintained in the ICU with hydration to  continue.           Ella Smith MD     D: 07/24/2018 18:53:52       T: 07/24/2018 18:58:22     [email or web address]  Job#: [id number]     Doc#: [id number]     CC:  Dano Castro MD             Assessment:       Diagnosis Orders   1. Coronary artery disease involving native coronary artery of native heart without angina pectoris     2. Ascending aortic aneurysm ([address]     3. Mixed hyperlipidemia     4. Generalized edema     5. Shortness of breath              Plan:   HLP-Liptior , lipid panel 7/26/18 HDL- 35, LDL 39  HTN-Controlled benazepril-hydrochlorthiazide, metoprolol   CAD- Lipitor,ASA and controlled. No chest pain  CABG- metoprolol and stable. AAA- CT 10/30/18 The ascending thoracic aorta is dilated to 4.2 cm in diameter. Stable  Yearly CT chest to reassess. COPD:  Supplemental O2 and inhalers. Continue cardiac rehab. Check labs and lipids.

## 2019-06-20 DIAGNOSIS — I71.21 ASCENDING AORTIC ANEURYSM: ICD-10-CM

## 2019-06-20 DIAGNOSIS — I10 ESSENTIAL HYPERTENSION: ICD-10-CM

## 2019-06-20 RX ORDER — METOPROLOL SUCCINATE 50 MG/1
50 TABLET, EXTENDED RELEASE ORAL DAILY
Qty: 90 TABLET | Refills: 5 | Status: SHIPPED | OUTPATIENT
Start: 2019-06-20 | End: 2020-07-06 | Stop reason: SDUPTHER

## 2019-06-27 ENCOUNTER — HOSPITAL ENCOUNTER (OUTPATIENT)
Dept: GENERAL RADIOLOGY | Age: 69
Discharge: HOME | End: 2019-06-27
Payer: MEDICARE

## 2019-06-27 DIAGNOSIS — M89.9 DISORDER OF BONE: ICD-10-CM

## 2019-06-27 DIAGNOSIS — Z13.820 SCREENING FOR OSTEOPOROSIS: ICD-10-CM

## 2019-06-27 PROCEDURE — 77080 DXA BONE DENSITY AXIAL: CPT

## 2019-07-14 DIAGNOSIS — I71.21 ASCENDING AORTIC ANEURYSM: ICD-10-CM

## 2019-07-14 DIAGNOSIS — I10 ESSENTIAL HYPERTENSION: ICD-10-CM

## 2019-07-15 RX ORDER — BENAZEPRIL/HYDROCHLOROTHIAZIDE 20 MG-25MG
TABLET ORAL
Qty: 90 TABLET | Refills: 1 | Status: SHIPPED | OUTPATIENT
Start: 2019-07-15 | End: 2020-01-16

## 2019-07-15 RX ORDER — ATORVASTATIN CALCIUM 40 MG/1
TABLET, FILM COATED ORAL
Qty: 30 TABLET | Refills: 2 | Status: SHIPPED | OUTPATIENT
Start: 2019-07-15 | End: 2019-07-23 | Stop reason: SDUPTHER

## 2019-07-16 RX ORDER — LEVOTHYROXINE SODIUM 137 MCG
TABLET ORAL
Qty: 90 TABLET | Refills: 0 | Status: SHIPPED | OUTPATIENT
Start: 2019-07-16 | End: 2019-07-22 | Stop reason: SDUPTHER

## 2019-07-17 ENCOUNTER — OFFICE VISIT (OUTPATIENT)
Dept: ORTHOPEDIC SURGERY | Age: 69
End: 2019-07-17
Payer: MEDICARE

## 2019-07-17 VITALS — WEIGHT: 168 LBS | HEIGHT: 62 IN | RESPIRATION RATE: 16 BRPM | BODY MASS INDEX: 30.91 KG/M2

## 2019-07-17 DIAGNOSIS — M65.332 TRIGGER MIDDLE FINGER OF LEFT HAND: Primary | ICD-10-CM

## 2019-07-17 DIAGNOSIS — M65.331 TRIGGER MIDDLE FINGER OF RIGHT HAND: ICD-10-CM

## 2019-07-17 PROCEDURE — 1123F ACP DISCUSS/DSCN MKR DOCD: CPT | Performed by: PHYSICIAN ASSISTANT

## 2019-07-17 PROCEDURE — 20550 NJX 1 TENDON SHEATH/LIGAMENT: CPT | Performed by: PHYSICIAN ASSISTANT

## 2019-07-17 PROCEDURE — G8427 DOCREV CUR MEDS BY ELIG CLIN: HCPCS | Performed by: PHYSICIAN ASSISTANT

## 2019-07-17 PROCEDURE — G8399 PT W/DXA RESULTS DOCUMENT: HCPCS | Performed by: PHYSICIAN ASSISTANT

## 2019-07-17 PROCEDURE — 99213 OFFICE O/P EST LOW 20 MIN: CPT | Performed by: PHYSICIAN ASSISTANT

## 2019-07-17 PROCEDURE — 1090F PRES/ABSN URINE INCON ASSESS: CPT | Performed by: PHYSICIAN ASSISTANT

## 2019-07-17 PROCEDURE — G8599 NO ASA/ANTIPLAT THER USE RNG: HCPCS | Performed by: PHYSICIAN ASSISTANT

## 2019-07-17 PROCEDURE — 3017F COLORECTAL CA SCREEN DOC REV: CPT | Performed by: PHYSICIAN ASSISTANT

## 2019-07-17 PROCEDURE — G8417 CALC BMI ABV UP PARAM F/U: HCPCS | Performed by: PHYSICIAN ASSISTANT

## 2019-07-17 PROCEDURE — 1036F TOBACCO NON-USER: CPT | Performed by: PHYSICIAN ASSISTANT

## 2019-07-17 PROCEDURE — 4040F PNEUMOC VAC/ADMIN/RCVD: CPT | Performed by: PHYSICIAN ASSISTANT

## 2019-07-19 ENCOUNTER — TELEPHONE (OUTPATIENT)
Dept: INTERNAL MEDICINE CLINIC | Age: 69
End: 2019-07-19

## 2019-07-19 DIAGNOSIS — I10 ESSENTIAL HYPERTENSION: ICD-10-CM

## 2019-07-19 DIAGNOSIS — E78.2 MIXED HYPERLIPIDEMIA: Primary | ICD-10-CM

## 2019-07-22 ENCOUNTER — OFFICE VISIT (OUTPATIENT)
Dept: INTERNAL MEDICINE CLINIC | Age: 69
End: 2019-07-22
Payer: MEDICARE

## 2019-07-22 VITALS
BODY MASS INDEX: 30.64 KG/M2 | HEART RATE: 80 BPM | OXYGEN SATURATION: 92 % | DIASTOLIC BLOOD PRESSURE: 76 MMHG | HEIGHT: 62 IN | SYSTOLIC BLOOD PRESSURE: 126 MMHG | WEIGHT: 166.5 LBS

## 2019-07-22 DIAGNOSIS — J43.9 PULMONARY EMPHYSEMA, UNSPECIFIED EMPHYSEMA TYPE (HCC): ICD-10-CM

## 2019-07-22 DIAGNOSIS — I25.10 CAD IN NATIVE ARTERY: ICD-10-CM

## 2019-07-22 DIAGNOSIS — I10 ESSENTIAL HYPERTENSION: ICD-10-CM

## 2019-07-22 DIAGNOSIS — E78.2 MIXED HYPERLIPIDEMIA: ICD-10-CM

## 2019-07-22 DIAGNOSIS — E03.4 HYPOTHYROIDISM DUE TO ACQUIRED ATROPHY OF THYROID: ICD-10-CM

## 2019-07-22 PROBLEM — M65.332 TRIGGER MIDDLE FINGER OF LEFT HAND: Status: RESOLVED | Noted: 2018-03-12 | Resolved: 2019-07-22

## 2019-07-22 LAB
A/G RATIO: 1.7 (ref 1.1–2.2)
ALBUMIN SERPL-MCNC: 4.5 G/DL (ref 3.4–5)
ALP BLD-CCNC: 100 U/L (ref 40–129)
ALT SERPL-CCNC: 21 U/L (ref 10–40)
ANION GAP SERPL CALCULATED.3IONS-SCNC: 16 MMOL/L (ref 3–16)
AST SERPL-CCNC: 16 U/L (ref 15–37)
BILIRUB SERPL-MCNC: 0.5 MG/DL (ref 0–1)
BUN BLDV-MCNC: 31 MG/DL (ref 7–20)
CALCIUM SERPL-MCNC: 9.7 MG/DL (ref 8.3–10.6)
CHLORIDE BLD-SCNC: 102 MMOL/L (ref 99–110)
CHOLESTEROL, FASTING: 182 MG/DL (ref 0–199)
CO2: 25 MMOL/L (ref 21–32)
CREAT SERPL-MCNC: 0.8 MG/DL (ref 0.6–1.2)
GFR AFRICAN AMERICAN: >60
GFR NON-AFRICAN AMERICAN: >60
GLOBULIN: 2.6 G/DL
GLUCOSE FASTING: 116 MG/DL (ref 70–99)
HDLC SERPL-MCNC: 54 MG/DL (ref 40–60)
LDL CHOLESTEROL CALCULATED: 105 MG/DL
POTASSIUM SERPL-SCNC: 3.7 MMOL/L (ref 3.5–5.1)
SODIUM BLD-SCNC: 143 MMOL/L (ref 136–145)
TOTAL PROTEIN: 7.1 G/DL (ref 6.4–8.2)
TRIGLYCERIDE, FASTING: 113 MG/DL (ref 0–150)
VLDLC SERPL CALC-MCNC: 23 MG/DL

## 2019-07-22 PROCEDURE — 4040F PNEUMOC VAC/ADMIN/RCVD: CPT | Performed by: INTERNAL MEDICINE

## 2019-07-22 PROCEDURE — G8427 DOCREV CUR MEDS BY ELIG CLIN: HCPCS | Performed by: INTERNAL MEDICINE

## 2019-07-22 PROCEDURE — G8926 SPIRO NO PERF OR DOC: HCPCS | Performed by: INTERNAL MEDICINE

## 2019-07-22 PROCEDURE — G8417 CALC BMI ABV UP PARAM F/U: HCPCS | Performed by: INTERNAL MEDICINE

## 2019-07-22 PROCEDURE — G8599 NO ASA/ANTIPLAT THER USE RNG: HCPCS | Performed by: INTERNAL MEDICINE

## 2019-07-22 PROCEDURE — 3017F COLORECTAL CA SCREEN DOC REV: CPT | Performed by: INTERNAL MEDICINE

## 2019-07-22 PROCEDURE — 1090F PRES/ABSN URINE INCON ASSESS: CPT | Performed by: INTERNAL MEDICINE

## 2019-07-22 PROCEDURE — 3023F SPIROM DOC REV: CPT | Performed by: INTERNAL MEDICINE

## 2019-07-22 PROCEDURE — 99214 OFFICE O/P EST MOD 30 MIN: CPT | Performed by: INTERNAL MEDICINE

## 2019-07-22 PROCEDURE — 1123F ACP DISCUSS/DSCN MKR DOCD: CPT | Performed by: INTERNAL MEDICINE

## 2019-07-22 PROCEDURE — G8399 PT W/DXA RESULTS DOCUMENT: HCPCS | Performed by: INTERNAL MEDICINE

## 2019-07-22 PROCEDURE — 1036F TOBACCO NON-USER: CPT | Performed by: INTERNAL MEDICINE

## 2019-07-22 RX ORDER — LEVOTHYROXINE SODIUM 137 UG/1
TABLET ORAL
Qty: 90 TABLET | Refills: 1 | Status: SHIPPED | OUTPATIENT
Start: 2019-07-22 | End: 2020-04-13

## 2019-07-22 ASSESSMENT — ENCOUNTER SYMPTOMS
WHEEZING: 0
RESPIRATORY NEGATIVE: 1
SHORTNESS OF BREATH: 0
CHEST TIGHTNESS: 0
GASTROINTESTINAL NEGATIVE: 1

## 2019-07-22 NOTE — PROGRESS NOTES
treatment      Hypothyroid  Stable  Check thyroid labs  Continue synthroid      COPD (chronic obstructive pulmonary disease)  Stable  Breathing well      Patient engaged in shared decision making. Information given to evaluateoptions of treatment, understand what is needed and discuss importance of following plan.

## 2019-07-23 DIAGNOSIS — Z00.00 HEALTH CARE MAINTENANCE: Primary | ICD-10-CM

## 2019-07-23 LAB
ESTIMATED AVERAGE GLUCOSE: 131.2 MG/DL
HBA1C MFR BLD: 6.2 %

## 2019-07-23 RX ORDER — ATORVASTATIN CALCIUM 40 MG/1
40 TABLET, FILM COATED ORAL 2 TIMES DAILY
Qty: 60 TABLET | Refills: 2 | Status: SHIPPED | OUTPATIENT
Start: 2019-07-23 | End: 2019-08-12

## 2019-08-12 RX ORDER — ATORVASTATIN CALCIUM 40 MG/1
TABLET, FILM COATED ORAL
Qty: 30 TABLET | Refills: 2 | Status: SHIPPED | OUTPATIENT
Start: 2019-08-12 | End: 2019-11-20 | Stop reason: SDUPTHER

## 2019-09-07 NOTE — CARE COORDINATION
MEERA spoke with pt and  in room about pt's discharge for 8/11 to home. Pt agreeable to [facility] services through [facility]. MEERA contacted Michelle Kent at [facility] and placed referral for [facility] services explaining pt to be d/c'd on 8/11. Pt's  stated he would be transporting pt home at 11am.  Pt aware she can purchase reacher and plans to get one at [facility]. Pt not using O2 and discussed if pt feels she needs this could talk with [address] [place] [address]. about this when visits at home. No other needs identified at this time. Meera spoke with Michelle Kent at [facility] and plans to vsiit with pt today and set up orders for [facility].       [place], [place]  O:  [phone] F:  [phone] Implemented All Fall with Harm Risk Interventions:  [place] to call system. Call bell, personal items and telephone within reach. Instruct patient to call for assistance. Room bathroom lighting operational. Non-slip footwear when patient is off stretcher. Physically safe environment: no spills, clutter or unnecessary equipment. Stretcher in lowest position, wheels locked, appropriate side rails in place. Provide visual cue, wrist band, yellow gown, etc. Monitor gait and stability. Monitor for mental status changes and reorient to person, place, and time. Review medications for side effects contributing to fall risk. Reinforce activity limits and safety measures with patient and family. Provide visual clues: red socks.

## 2019-09-16 ENCOUNTER — HOSPITAL ENCOUNTER (OUTPATIENT)
Age: 69
Setting detail: OUTPATIENT SURGERY
Discharge: HOME | End: 2019-09-16
Attending: INTERNAL MEDICINE | Admitting: INTERNAL MEDICINE
Payer: MEDICARE

## 2019-09-16 VITALS
OXYGEN SATURATION: 93 % | TEMPERATURE: 97.4 F | DIASTOLIC BLOOD PRESSURE: 67 MMHG | WEIGHT: 159 LBS | HEIGHT: 62 IN | SYSTOLIC BLOOD PRESSURE: 103 MMHG | BODY MASS INDEX: 29.26 KG/M2 | HEART RATE: 51 BPM | RESPIRATION RATE: 18 BRPM

## 2019-09-16 PROCEDURE — 88305 TISSUE EXAM BY PATHOLOGIST: CPT

## 2019-09-16 PROCEDURE — 6370000000 HC RX 637 (ALT 250 FOR IP): Performed by: INTERNAL MEDICINE

## 2019-09-16 PROCEDURE — 3609010300 HC COLONOSCOPY W/BIOPSY SINGLE/MULTIPLE: Performed by: INTERNAL MEDICINE

## 2019-09-16 PROCEDURE — 7100000010 HC PHASE II RECOVERY - FIRST 15 MIN: Performed by: INTERNAL MEDICINE

## 2019-09-16 PROCEDURE — 2709999900 HC NON-CHARGEABLE SUPPLY: Performed by: INTERNAL MEDICINE

## 2019-09-16 PROCEDURE — 6360000002 HC RX W HCPCS: Performed by: INTERNAL MEDICINE

## 2019-09-16 PROCEDURE — 7100000011 HC PHASE II RECOVERY - ADDTL 15 MIN: Performed by: INTERNAL MEDICINE

## 2019-09-16 PROCEDURE — 99152 MOD SED SAME PHYS/QHP 5/>YRS: CPT | Performed by: INTERNAL MEDICINE

## 2019-09-16 RX ORDER — FENTANYL CITRATE 50 UG/ML
INJECTION, SOLUTION INTRAMUSCULAR; INTRAVENOUS PRN
Status: DISCONTINUED | OUTPATIENT
Start: 2019-09-16 | End: 2019-09-16 | Stop reason: ALTCHOICE

## 2019-09-16 RX ORDER — ONDANSETRON 4 MG/1
4 TABLET, ORALLY DISINTEGRATING ORAL ONCE
Status: COMPLETED | OUTPATIENT
Start: 2019-09-16 | End: 2019-09-16

## 2019-09-16 RX ORDER — MIDAZOLAM HYDROCHLORIDE 1 MG/ML
INJECTION INTRAMUSCULAR; INTRAVENOUS PRN
Status: DISCONTINUED | OUTPATIENT
Start: 2019-09-16 | End: 2019-09-16 | Stop reason: ALTCHOICE

## 2019-09-16 RX ADMIN — ONDANSETRON 4 MG: 4 TABLET, ORALLY DISINTEGRATING ORAL at 10:38

## 2019-09-16 ASSESSMENT — PAIN SCALES - WONG BAKER
WONGBAKER_NUMERICALRESPONSE: 0

## 2019-09-16 ASSESSMENT — PAIN - FUNCTIONAL ASSESSMENT: PAIN_FUNCTIONAL_ASSESSMENT: 0-10

## 2019-09-16 ASSESSMENT — PAIN SCALES - GENERAL: PAINLEVEL_OUTOF10: 0

## 2019-09-16 NOTE — H&P
History and Physical / Pre-Sedation Assessment    Kaycee Brown is a 77 y.o. female who presents today for colonoscopy procedure. PMHx:    Past Medical History:   Diagnosis Date    Brain aneurysm     on right very small    CAD (coronary artery disease) 07/24/2018    NSTEMI    COPD (chronic obstructive pulmonary disease) (HCC)     Hyperlipidemia     Hypertension     Hypothyroidism     Lumbar spondylosis     per MRI done 5/23/2013 - Dr. Mi Inch [facility])     RLL 3.8cm adenoCa 2004. RML 2.6cm adenoCa 2010.  Renal cyst 10/27/2015    Spinal stenosis of lumbar region     per MRI done 5/23/2013    Thoracic aortic aneurysm (HCC)     4 cm       Medications:    Prior to Admission medications    Medication Sig Start Date End Date Taking?  Authorizing Provider   atorvastatin (LIPITOR) 40 MG tablet TAKE 1 TABLET BY MOUTH EVERY DAY AT NIGHT 8/12/19  Yes Mariela Colby MD   levothyroxine (SYNTHROID) 137 MCG tablet TAKE 1 TABLET BY MOUTH DAILY 7/22/19  Yes Mariela Colby MD   benazepril-hydrochlorthiazide (LOTENSIN HCT) 20-25 MG per tablet TAKE 1 TABLET BY MOUTH EVERY DAY 7/15/19  Yes Mariela Colby MD   metoprolol succinate (TOPROL XL) 50 MG extended release tablet Take 1 tablet by mouth daily 6/20/19  Yes Mirtha Chavez MD   furosemide (LASIX) 40 MG tablet Take 1 tablet by mouth as needed (BLE edema) 6/10/19  Yes iMrtha Chavez MD   potassium chloride (MICRO-K) 10 MEQ extended release capsule TAKE 1 CAPSULE BY MOUTH TWICE A DAY  Patient taking differently: Take 2 capsules BID as needed when taking Lasix 11/27/18  Yes Mariela Colby MD   Umeclidinium Bromide (INCRUSE ELLIPTA) 62.5 MCG/INH AEPB Inhale 1 puff into the lungs daily   Yes Historical Provider, MD   albuterol sulfate  (90 Base) MCG/ACT inhaler Inhale 2 puffs into the lungs every 6 hours as needed for Wheezing 8/10/18  Yes Rubi Suresh MD   vitamin D (CVS VIT D 5000 HIGH-POTENCY) 5000 units CAPS capsule Take 1 capsule by mouth daily Vitamin D for bones. 8/10/18  Yes Cole Green MD   cyanocobalamin (CVS VITAMIN B12) 1000 MCG tablet Everyone should take OTC B12 1000 mcg twice a day for neurological health. High B12 levels help prevent dementia. 8/10/18  Yes Cole Green MD   fluticasone-salmeterol (ADVAIR) 250-50 MCG/DOSE AEPB Inhale 1 puff into the lungs every 12 hours 8/10/18  Yes Cole Green MD   acetaminophen (TYLENOL) 325 MG tablet Take 2 tablets by mouth every 4 hours as needed for Pain or Fever 8/1/18  Yes NICOLÁS Clemons CNP   esomeprazole Magnesium (NEXIUM) 40 MG PACK Take 40 mg by mouth daily   Yes Historical Provider, MD   aspirin 325 MG EC tablet Take 1 tablet by mouth daily 8/1/18   NICOLÁS Clemons CNP       Allergies: Allergies   Allergen Reactions    Codeine Nausea And Vomiting    Demerol Nausea And Vomiting    Erythromycin      GI upset    Mobic [Meloxicam] Swelling    Morphine And Related Nausea And Vomiting    Sulfa Antibiotics Nausea And Vomiting       PSHx:    Past Surgical History:   Procedure Laterality Date    BACK SURGERY  11/2013 5th vert sciatic nerve release    BREAST LUMPECTOMY Bilateral 2000    CARPAL TUNNEL RELEASE Bilateral 12/22/2016    COLONOSCOPY  2010    COLPOPEXY  11/19/2014    Dr. Domingo Alonzo. Franklyn Singh COLPORRHAPHY  11/19/2014    A&P- Dr. Domingo Alonzo. [place], VAGINAL  11/19/2014    Dr. Domingo Alonzo. Franklyn Singh INGUINAL HERNIA REPAIR Bilateral 1980s    LUMBAR SPINE SURGERY  1990    L5    LUNG REMOVAL, PARTIAL Right 04/2010    RML lobectomy in 4/2010, & RLL lobectomy in 5/2004    IL CABG, VEIN, SINGLE N/A 7/26/2018    Dr. Gume Patton. Guillory - urgent x3 (LIMA-LAD, R SVG-D1, SVG-RCA)    SALPINGO-OOPHORECTOMY Bilateral 11/19/2014    Dr. Domingo Alonzo. [facility] ARTHROPLASTY Right 05/2014    Dr. Lees  6/2010    URETHRAL STRICTURE DILATATION      VASCULAR SURGERY Right 2003    leg       Social Hx:    Social

## 2019-11-18 ENCOUNTER — TELEPHONE (OUTPATIENT)
Dept: INTERNAL MEDICINE CLINIC | Age: 69
End: 2019-11-18

## 2019-11-18 DIAGNOSIS — E03.9 HYPOTHYROIDISM, UNSPECIFIED TYPE: ICD-10-CM

## 2019-11-18 DIAGNOSIS — E78.2 MIXED HYPERLIPIDEMIA: ICD-10-CM

## 2019-11-18 DIAGNOSIS — I10 ESSENTIAL HYPERTENSION: ICD-10-CM

## 2019-11-18 DIAGNOSIS — E55.9 VITAMIN D DEFICIENCY: Primary | ICD-10-CM

## 2019-11-20 RX ORDER — ATORVASTATIN CALCIUM 40 MG/1
TABLET, FILM COATED ORAL
Qty: 90 TABLET | Refills: 0 | Status: SHIPPED | OUTPATIENT
Start: 2019-11-20 | End: 2019-11-26

## 2019-11-25 ENCOUNTER — OFFICE VISIT (OUTPATIENT)
Dept: INTERNAL MEDICINE CLINIC | Age: 69
End: 2019-11-25
Payer: MEDICARE

## 2019-11-25 VITALS
WEIGHT: 163 LBS | HEART RATE: 80 BPM | HEIGHT: 62 IN | SYSTOLIC BLOOD PRESSURE: 130 MMHG | OXYGEN SATURATION: 93 % | BODY MASS INDEX: 30 KG/M2 | DIASTOLIC BLOOD PRESSURE: 74 MMHG

## 2019-11-25 DIAGNOSIS — I10 ESSENTIAL HYPERTENSION: ICD-10-CM

## 2019-11-25 DIAGNOSIS — I25.10 CAD IN NATIVE ARTERY: ICD-10-CM

## 2019-11-25 DIAGNOSIS — E78.2 MIXED HYPERLIPIDEMIA: ICD-10-CM

## 2019-11-25 DIAGNOSIS — J43.9 PULMONARY EMPHYSEMA, UNSPECIFIED EMPHYSEMA TYPE (HCC): ICD-10-CM

## 2019-11-25 DIAGNOSIS — E03.4 HYPOTHYROIDISM DUE TO ACQUIRED ATROPHY OF THYROID: ICD-10-CM

## 2019-11-25 DIAGNOSIS — I10 ESSENTIAL HYPERTENSION: Primary | ICD-10-CM

## 2019-11-25 LAB
A/G RATIO: 1.5 (ref 1.1–2.2)
ALBUMIN SERPL-MCNC: 4.5 G/DL (ref 3.4–5)
ALP BLD-CCNC: 101 U/L (ref 40–129)
ALT SERPL-CCNC: 24 U/L (ref 10–40)
ANION GAP SERPL CALCULATED.3IONS-SCNC: 17 MMOL/L (ref 3–16)
AST SERPL-CCNC: 20 U/L (ref 15–37)
BASOPHILS ABSOLUTE: 0.1 K/UL (ref 0–0.2)
BASOPHILS RELATIVE PERCENT: 0.8 %
BILIRUB SERPL-MCNC: 0.4 MG/DL (ref 0–1)
BUN BLDV-MCNC: 25 MG/DL (ref 7–20)
CALCIUM SERPL-MCNC: 10 MG/DL (ref 8.3–10.6)
CHLORIDE BLD-SCNC: 98 MMOL/L (ref 99–110)
CHOLESTEROL, FASTING: 189 MG/DL (ref 0–199)
CO2: 28 MMOL/L (ref 21–32)
CREAT SERPL-MCNC: 0.8 MG/DL (ref 0.6–1.2)
EOSINOPHILS ABSOLUTE: 0.2 K/UL (ref 0–0.6)
EOSINOPHILS RELATIVE PERCENT: 3.6 %
GFR AFRICAN AMERICAN: >60
GFR NON-AFRICAN AMERICAN: >60
GLOBULIN: 3 G/DL
GLUCOSE FASTING: 118 MG/DL (ref 70–99)
HCT VFR BLD CALC: 41.1 % (ref 36–48)
HDLC SERPL-MCNC: 57 MG/DL (ref 40–60)
HEMOGLOBIN: 14 G/DL (ref 12–16)
LDL CHOLESTEROL CALCULATED: 99 MG/DL
LYMPHOCYTES ABSOLUTE: 2.2 K/UL (ref 1–5.1)
LYMPHOCYTES RELATIVE PERCENT: 33.7 %
MCH RBC QN AUTO: 32.1 PG (ref 26–34)
MCHC RBC AUTO-ENTMCNC: 34 G/DL (ref 31–36)
MCV RBC AUTO: 94.4 FL (ref 80–100)
MONOCYTES ABSOLUTE: 0.7 K/UL (ref 0–1.3)
MONOCYTES RELATIVE PERCENT: 10.1 %
NEUTROPHILS ABSOLUTE: 3.4 K/UL (ref 1.7–7.7)
NEUTROPHILS RELATIVE PERCENT: 51.8 %
PDW BLD-RTO: 13.7 % (ref 12.4–15.4)
PLATELET # BLD: 262 K/UL (ref 135–450)
PMV BLD AUTO: 7.9 FL (ref 5–10.5)
POTASSIUM SERPL-SCNC: 3.2 MMOL/L (ref 3.5–5.1)
RBC # BLD: 4.35 M/UL (ref 4–5.2)
SODIUM BLD-SCNC: 143 MMOL/L (ref 136–145)
TOTAL PROTEIN: 7.5 G/DL (ref 6.4–8.2)
TRIGLYCERIDE, FASTING: 164 MG/DL (ref 0–150)
VLDLC SERPL CALC-MCNC: 33 MG/DL
WBC # BLD: 6.6 K/UL (ref 4–11)

## 2019-11-25 PROCEDURE — G8399 PT W/DXA RESULTS DOCUMENT: HCPCS | Performed by: INTERNAL MEDICINE

## 2019-11-25 PROCEDURE — G8926 SPIRO NO PERF OR DOC: HCPCS | Performed by: INTERNAL MEDICINE

## 2019-11-25 PROCEDURE — 1090F PRES/ABSN URINE INCON ASSESS: CPT | Performed by: INTERNAL MEDICINE

## 2019-11-25 PROCEDURE — 1123F ACP DISCUSS/DSCN MKR DOCD: CPT | Performed by: INTERNAL MEDICINE

## 2019-11-25 PROCEDURE — 4040F PNEUMOC VAC/ADMIN/RCVD: CPT | Performed by: INTERNAL MEDICINE

## 2019-11-25 PROCEDURE — G8482 FLU IMMUNIZE ORDER/ADMIN: HCPCS | Performed by: INTERNAL MEDICINE

## 2019-11-25 PROCEDURE — 3023F SPIROM DOC REV: CPT | Performed by: INTERNAL MEDICINE

## 2019-11-25 PROCEDURE — 3017F COLORECTAL CA SCREEN DOC REV: CPT | Performed by: INTERNAL MEDICINE

## 2019-11-25 PROCEDURE — 1036F TOBACCO NON-USER: CPT | Performed by: INTERNAL MEDICINE

## 2019-11-25 PROCEDURE — 99214 OFFICE O/P EST MOD 30 MIN: CPT | Performed by: INTERNAL MEDICINE

## 2019-11-25 PROCEDURE — G8599 NO ASA/ANTIPLAT THER USE RNG: HCPCS | Performed by: INTERNAL MEDICINE

## 2019-11-25 PROCEDURE — G8427 DOCREV CUR MEDS BY ELIG CLIN: HCPCS | Performed by: INTERNAL MEDICINE

## 2019-11-25 PROCEDURE — G8417 CALC BMI ABV UP PARAM F/U: HCPCS | Performed by: INTERNAL MEDICINE

## 2019-11-25 ASSESSMENT — PATIENT HEALTH QUESTIONNAIRE - PHQ9
2. FEELING DOWN, DEPRESSED OR HOPELESS: 0
1. LITTLE INTEREST OR PLEASURE IN DOING THINGS: 0
DEPRESSION UNABLE TO ASSESS: FUNCTIONAL CAPACITY MOTIVATION LIMITS ACCURACY
SUM OF ALL RESPONSES TO PHQ9 QUESTIONS 1 & 2: 0
SUM OF ALL RESPONSES TO PHQ QUESTIONS 1-9: 0
SUM OF ALL RESPONSES TO PHQ QUESTIONS 1-9: 0

## 2019-11-25 ASSESSMENT — ENCOUNTER SYMPTOMS
RESPIRATORY NEGATIVE: 1
CHEST TIGHTNESS: 0
GASTROINTESTINAL NEGATIVE: 1
WHEEZING: 0
SHORTNESS OF BREATH: 0

## 2019-11-26 ENCOUNTER — TELEPHONE (OUTPATIENT)
Dept: INTERNAL MEDICINE CLINIC | Age: 69
End: 2019-11-26

## 2019-11-26 RX ORDER — ATORVASTATIN CALCIUM 80 MG/1
TABLET, FILM COATED ORAL
Qty: 90 TABLET | Refills: 1
Start: 2019-11-26 | End: 2019-12-03

## 2019-11-27 DIAGNOSIS — I10 ESSENTIAL HYPERTENSION: Primary | ICD-10-CM

## 2019-12-16 ENCOUNTER — OFFICE VISIT (OUTPATIENT)
Dept: CARDIOLOGY CLINIC | Age: 69
End: 2019-12-16
Payer: MEDICARE

## 2019-12-16 VITALS
HEART RATE: 60 BPM | WEIGHT: 167.8 LBS | BODY MASS INDEX: 30.69 KG/M2 | SYSTOLIC BLOOD PRESSURE: 102 MMHG | DIASTOLIC BLOOD PRESSURE: 70 MMHG

## 2019-12-16 DIAGNOSIS — I10 ESSENTIAL HYPERTENSION: Primary | ICD-10-CM

## 2019-12-16 DIAGNOSIS — I25.10 CAD IN NATIVE ARTERY: ICD-10-CM

## 2019-12-16 DIAGNOSIS — E78.2 MIXED HYPERLIPIDEMIA: ICD-10-CM

## 2019-12-16 PROCEDURE — 3017F COLORECTAL CA SCREEN DOC REV: CPT | Performed by: INTERNAL MEDICINE

## 2019-12-16 PROCEDURE — 99213 OFFICE O/P EST LOW 20 MIN: CPT | Performed by: INTERNAL MEDICINE

## 2019-12-16 PROCEDURE — 4040F PNEUMOC VAC/ADMIN/RCVD: CPT | Performed by: INTERNAL MEDICINE

## 2019-12-16 PROCEDURE — 1036F TOBACCO NON-USER: CPT | Performed by: INTERNAL MEDICINE

## 2019-12-16 PROCEDURE — G8427 DOCREV CUR MEDS BY ELIG CLIN: HCPCS | Performed by: INTERNAL MEDICINE

## 2019-12-16 PROCEDURE — G8482 FLU IMMUNIZE ORDER/ADMIN: HCPCS | Performed by: INTERNAL MEDICINE

## 2019-12-16 PROCEDURE — G8399 PT W/DXA RESULTS DOCUMENT: HCPCS | Performed by: INTERNAL MEDICINE

## 2019-12-16 PROCEDURE — 1123F ACP DISCUSS/DSCN MKR DOCD: CPT | Performed by: INTERNAL MEDICINE

## 2019-12-16 PROCEDURE — 1090F PRES/ABSN URINE INCON ASSESS: CPT | Performed by: INTERNAL MEDICINE

## 2019-12-16 PROCEDURE — G8417 CALC BMI ABV UP PARAM F/U: HCPCS | Performed by: INTERNAL MEDICINE

## 2019-12-16 PROCEDURE — G8599 NO ASA/ANTIPLAT THER USE RNG: HCPCS | Performed by: INTERNAL MEDICINE

## 2019-12-16 ASSESSMENT — ENCOUNTER SYMPTOMS
SHORTNESS OF BREATH: 1
COUGH: 1
APNEA: 0
ALLERGIC/IMMUNOLOGIC NEGATIVE: 1
CHEST TIGHTNESS: 0
GASTROINTESTINAL NEGATIVE: 1
WHEEZING: 1
EYES NEGATIVE: 1
CHOKING: 0

## 2020-01-16 ENCOUNTER — TELEPHONE (OUTPATIENT)
Dept: CARDIOLOGY CLINIC | Age: 70
End: 2020-01-16

## 2020-01-16 RX ORDER — BENAZEPRIL/HYDROCHLOROTHIAZIDE 20 MG-25MG
TABLET ORAL
Qty: 90 TABLET | Refills: 1 | Status: SHIPPED | OUTPATIENT
Start: 2020-01-16 | End: 2020-07-15

## 2020-01-24 ENCOUNTER — OFFICE VISIT (OUTPATIENT)
Dept: CARDIOLOGY CLINIC | Age: 70
End: 2020-01-24
Payer: MEDICARE

## 2020-01-24 VITALS
BODY MASS INDEX: 31.5 KG/M2 | WEIGHT: 172.2 LBS | SYSTOLIC BLOOD PRESSURE: 110 MMHG | OXYGEN SATURATION: 93 % | HEART RATE: 64 BPM | DIASTOLIC BLOOD PRESSURE: 76 MMHG

## 2020-01-24 PROCEDURE — G8399 PT W/DXA RESULTS DOCUMENT: HCPCS | Performed by: INTERNAL MEDICINE

## 2020-01-24 PROCEDURE — G8417 CALC BMI ABV UP PARAM F/U: HCPCS | Performed by: INTERNAL MEDICINE

## 2020-01-24 PROCEDURE — 1036F TOBACCO NON-USER: CPT | Performed by: INTERNAL MEDICINE

## 2020-01-24 PROCEDURE — 1090F PRES/ABSN URINE INCON ASSESS: CPT | Performed by: INTERNAL MEDICINE

## 2020-01-24 PROCEDURE — 3023F SPIROM DOC REV: CPT | Performed by: INTERNAL MEDICINE

## 2020-01-24 PROCEDURE — 3017F COLORECTAL CA SCREEN DOC REV: CPT | Performed by: INTERNAL MEDICINE

## 2020-01-24 PROCEDURE — G8482 FLU IMMUNIZE ORDER/ADMIN: HCPCS | Performed by: INTERNAL MEDICINE

## 2020-01-24 PROCEDURE — 1123F ACP DISCUSS/DSCN MKR DOCD: CPT | Performed by: INTERNAL MEDICINE

## 2020-01-24 PROCEDURE — G8427 DOCREV CUR MEDS BY ELIG CLIN: HCPCS | Performed by: INTERNAL MEDICINE

## 2020-01-24 PROCEDURE — 99213 OFFICE O/P EST LOW 20 MIN: CPT | Performed by: INTERNAL MEDICINE

## 2020-01-24 PROCEDURE — G8926 SPIRO NO PERF OR DOC: HCPCS | Performed by: INTERNAL MEDICINE

## 2020-01-24 PROCEDURE — 4040F PNEUMOC VAC/ADMIN/RCVD: CPT | Performed by: INTERNAL MEDICINE

## 2020-01-24 ASSESSMENT — ENCOUNTER SYMPTOMS
APNEA: 0
SHORTNESS OF BREATH: 1
CHOKING: 0
GASTROINTESTINAL NEGATIVE: 1
CHEST TIGHTNESS: 0
WHEEZING: 1
EYES NEGATIVE: 1
ALLERGIC/IMMUNOLOGIC NEGATIVE: 1
COUGH: 1

## 2020-01-24 NOTE — PROGRESS NOTES
rhythm. Heart sounds: Normal heart sounds. Pulmonary:      Breath sounds: Wheezing present. Abdominal:      General: Bowel sounds are normal.      Palpations: Abdomen is soft. Musculoskeletal: Normal range of motion. Skin:     General: Skin is warm and dry. Neurological:      Mental Status: She is alert and oriented to person, place, and time. Psychiatric:         Behavior: Behavior normal.         Thought Content: Thought content normal.         Judgment: Judgment normal.         Current Outpatient Medications   Medication Sig Dispense Refill    benazepril-hydrochlorthiazide (LOTENSIN HCT) 20-25 MG per tablet TAKE 1 TABLET BY MOUTH EVERY DAY 90 tablet 1    atorvastatin (LIPITOR) 80 MG tablet Take 1 tablet by mouth daily 90 tablet 1    levothyroxine (SYNTHROID) 137 MCG tablet TAKE 1 TABLET BY MOUTH DAILY 90 tablet 1    metoprolol succinate (TOPROL XL) 50 MG extended release tablet Take 1 tablet by mouth daily 90 tablet 5    furosemide (LASIX) 40 MG tablet Take 1 tablet by mouth as needed (BLE edema) 90 tablet 3    potassium chloride (MICRO-K) 10 MEQ extended release capsule TAKE 1 CAPSULE BY MOUTH TWICE A DAY (Patient taking differently: Take 2 capsules BID as needed when taking Lasix) 180 capsule 0    Umeclidinium Bromide (INCRUSE ELLIPTA) 62.5 MCG/INH AEPB Inhale 1 puff into the lungs daily      albuterol sulfate  (90 Base) MCG/ACT inhaler Inhale 2 puffs into the lungs every 6 hours as needed for Wheezing 3 Inhaler 0    vitamin D (CVS VIT D 5000 HIGH-POTENCY) 5000 units CAPS capsule Take 1 capsule by mouth daily Vitamin D for bones. 90 capsule 12    cyanocobalamin ([employer]S VITAMIN B12) 1000 MCG tablet Everyone should take OTC B12 1000 mcg twice a day for neurological health. High B12 levels help prevent dementia.  180 tablet 3    fluticasone-salmeterol (ADVAIR) 250-50 MCG/DOSE AEPB Inhale 1 puff into the lungs every 12 hours 1 Inhaler 0    aspirin 325 MG EC tablet Take 1 tablet by mouth daily 30 tablet 3    acetaminophen (TYLENOL) 325 MG tablet Take 2 tablets by mouth every 4 hours as needed for Pain or Fever 120 tablet 3    esomeprazole Magnesium (NEXIUM) 40 MG PACK Take 40 mg by mouth daily       No current facility-administered medications for this visit. Vitals:    20 1511   BP: 110/76   Pulse: 64   SpO2: 93%          [facility] 18  Cath Lab Report [id number] 2018  6:53 PM Mayra Shook MD   Signed by Mayra Shook on 18 at 1654      []Manual[]Template  []Copied                      [address]                 [address]                               CARDIAC CATHETERIZATION     PATIENT NAME: Shahid Garcia                  :        1950  MED REC NO:   [id number]                          ROOM:       [facility]  ACCOUNT NO:   [de-identified]                           ADMIT DATE: 2018  PROVIDER:     Jason Tracy. Anna Viveros MD     DATE OF PROCEDURE:  2018     INDICATION FOR PROCEDURE:  The patient presents with chest burning for  several days. She was in her doctor's office. She called the squad, sent  her to the emergency room. EKG shows inferior and anterolateral T-wave  inversions. Chest pain and chest burning were abated in the emergency room  with rest and medication. Troponin was measured at 0.16 and 0.17  consistent with a non-ST-elevation MI. It was felt prudent to invasively  risk stratify this patient with coronary angiography.     PROCEDURE:  Prepped and draped in sterile manner, locally anesthetized with  2% lidocaine, right femoral artery area, 6-French sheath was placed in  retrograde manner, right femoral artery over guidewire.   Multiple catheters  were used for diagnostic interventional procedure.     FINDINGS:  Left main coronary artery appears to be normal.  Left anterior  descending coronary artery proximally is normal.  At mid vessel, there is a  tapering 80% stenosis that leads into an aneurysmal segment. The  aneurysmal segment gives rise to two septal  arteries and then,  it leads out of the aneurysm where there is another 80% stenosis. This is  on a mild bend. It is calcified and hazy. The remainder of the LAD is  unremarkable. It is mildly diffusely diseased. The diagonal branch was  unremarkable beyond this stenosis.     The circumflex coronary artery at mid vessel has a bend in it and a 75%  hazy stenosis. The remainder of the circumflex is unremarkable. The  obtuse marginal branches beyond that are unremarkable.     JR-4 catheter would not engage the right coronary artery and 3-DRC catheter  engaged the right coronary artery. Injection showed mild narrowing  proximally. In the vertical limb of the right coronary artery, there is a  99% hazy calcific stenosis. There is NÉSTOR grade 2 flow in the right  coronary artery. This appears to be the culprit vessel. The right  posterior descending is small, but normal.  Right posterolateral branch is  small, but normal.     Pigtail catheter in the left ventricle showed an LVEDP post coronary  angiography of 15 mmHg. Power injection HARMAN projection shows LV ejection  fraction 35% with mild left ventricular enlargement qualitatively and  inferior wall hypokinesia. There was no mitral regurgitation. There was  no gradient upon pullback from the left ventricle ascending aorta.     I elected to proceed with PCI upon the right coronary artery as I felt that  it had NÉSTOR-2 flow and was the culprit lesion upfront. I was keenly aware  of the fact that this was going to be a very difficult angioplasty.   I was  also thinking that the patient will need bypass surgery with her LV  systolic dysfunction and multivessel coronary disease, but given that this  was the culprit lesion with NÉSTOR-2 flow, I felt it was most expeditious to  try and improve this with balloon angioplasty and stenting.     INTERVENTIONAL PROCEDURE:  Heparin and Integrilin were given per protocol  after consultation with  _____ over the phone. We decided that  intervention was reasonable at this juncture. The 6-Irish sheath was  already in place. A 6-Irish 3-DRC guide was used to engage the right  coronary artery. Heparin was given per protocol. A 190 BMW guidewire was  advanced with great difficulty into the distal right coronary artery. A  2.5 mm balloon would not pass. A 1.5 mm balloon would not pass. I elected  to change out and use a ChoICE PT extra support guidewire. The balloon  would not pass. I abandoned the 3-DRC 6-Irish guide and went with an AL-1  guide and then, I used a ChoICE PT extra support guidewire. I was able to  pass the wire. A 2.5 mm balloon would not pass. A 1.5 mm balloon did  pass. I made several inflations to 14 and then 17 atmospheres x30 and 45  seconds. The flow had improved to NÉSTOR 3 flow. I elected to try and use a  stent. I picked a 2.75 x 15 mm Excel drug-eluting stent. This stent would  not cross the lesion. I elected to use a 2.5 mm x 15 mm balloon. With  great difficulty, I was able to get that to cross the lesion and did  several inflations to 14 to 15 atmospheres. There was a focal dissection,  but there was NÉSTOR 3 flow. I then tried to place the stent with deep  seating of the guide and I used a BMW orlando wire with the ChoICE PT extra  support as a rail and I still could not get the stent to traverse even with  changing the configuration of the guide while the stent was near lesion. I  could not get the stent across. I elected to conclude the procedure at  this juncture as we were there for a while and I did improve the flow. I  still believe that the patient will benefit most from bypass surgery and I  am going to place the patient on Integrilin, heparin drip, and a  nitroglycerin drip in the intensive care unit.   I have spoken to the  cardiothoracic surgeon and further evaluation her acute respiratory failure. She was aggressively diuresed and started on Precedex. She was able to be extubated thirty hours postoperatively. She transitioned to progressive care on the second postoperative day. She went into atrial fib with a rapid ventricular rate on the third postop day. She started on an Amiodarone protocol and converted back to sinus rhythm two hours later. PT/OT were consulted to assist with discharge. She was started on PRN Xanax and Ativan as she would get anxiety during the night and become tachypneic. She stated she has had this in the past and trying to do her exercise she learned in pulmonary rehab. She also has been having insomnia the past couple of nights and may need an medication for insomnia.  She was discharged on the sixth postop day to Acute Rehab.     Consults:  IP CONSULT TO HOSPITALIST  IP CONSULT TO CARDIOLOGY  IP CONSULT TO CARDIOTHORACIC SURGERY  IP CONSULT TO VASCULAR SURGERY  IP CONSULT TO CARDIAC REHAB  IP CONSULT TO CASE MANAGEMENT  IP CONSULT TO SOCIAL WORK  IP CONSULT TO DIETITIAN  IP CONSULT TO PULMONOLOGY  IP CONSULT TO PHYSICAL MEDICINE REHAB  IP CONSULT TO DIETITIAN  IP CONSULT TO SOCIAL WORK      Significant Diagnostic Studies:   Chest X-ray  EKG     Treatments: IV hydration, antibiotics: Ancef and vancomycin, cardiac meds: lisinopril (generic), metoprolol, Amiodarone and furosemide, anticoagulation: ASA, respiratory therapy: O2 and respiratory treatments and therapies: PT and OT     LABS:        Recent Labs      07/30/18   0344  07/31/18   0425  08/01/18   0427   WBC  9.7  10.6  11.6*   HGB  8.6*  9.3*  9.3*   HCT  25.2*  27.5*  28.0*   PLT  174  243  274                                                                        Recent Labs      07/30/18   0344  07/31/18   0426  08/01/18   0427   NA  137  140  139   K  3.5  4.5  3.8   CL  96*  101  98*   CO2  32  32  30   BUN  25*  18  16   CREATININE  <0.5*  <0.5*  <0.5*   GLUCOSE  111*  103*  113*     MCG/ACT inhaler  · amiodarone 200 MG tablet  · aspirin 325 MG EC tablet  · metoprolol tartrate 25 MG tablet  · tiotropium 18 MCG inhalation capsule               Allergies   Allergen Reactions    Codeine Nausea And Vomiting    Demerol Nausea And Vomiting    Erythromycin         GI upset    Mobic [Meloxicam] Swelling    Morphine And Related Nausea And Vomiting    Sulfa Antibiotics Nausea And Vomiting      Discharge Instructions: Activity: No heavy lifting greater than 5 pounds for 6 weeks post-op. Do not use arms to get up from a seated position. Instead rock in chair to give yourself momentum to sit up. No driving  Diet: cardiac diet  Wound Care: keep wound clean and dry and open to air. Use antibacterial soap and clean washcloth to cleanse incisional wounds daily.     Follow up Visits:  Dr Dierdre Soulier in 1-2 weeks after discharged from [facility]  Dr Maciej Rodriguez in 2-3 weeks after discharged from [facility]  Dr Gillian Reddy in 3-4 weeks after discharged from [address] [place] [address], [address] [place] [address] Drive  8/1/2018  12:24 PM      Nael Lowe MD  8/1/2018  1:04 PM       Assessment:       Diagnosis Orders   1. Mixed hyperlipidemia     2. CAD in native artery     3. Simple chronic bronchitis ([address]     4. S/P coronary artery bypass graft x 3              Plan:   HLP-Liptior , lipid panel 7/26/18 HDL- 35, LDL 39  HTN-Controlled benazepril-hydrochlorthiazide, metoprolol   CAD- Lipitor,ASA and controlled. No chest pain  CABG- metoprolol and stable. AAA- CT 10/30/18 The ascending thoracic aorta is dilated to 4.2 cm in diameter. Stable  Yearly   COPD:  Supplemental O2 and inhalers. May have p HTN. May benefit from O2.    ldl 39.

## 2020-01-27 ENCOUNTER — HOSPITAL ENCOUNTER (OUTPATIENT)
Age: 70
Discharge: HOME | End: 2020-01-27
Payer: MEDICARE

## 2020-01-27 ENCOUNTER — HOSPITAL ENCOUNTER (OUTPATIENT)
Dept: GENERAL RADIOLOGY | Age: 70
Discharge: HOME | End: 2020-01-27
Payer: MEDICARE

## 2020-01-27 ENCOUNTER — OFFICE VISIT (OUTPATIENT)
Dept: INTERNAL MEDICINE CLINIC | Age: 70
End: 2020-01-27
Payer: MEDICARE

## 2020-01-27 VITALS
SYSTOLIC BLOOD PRESSURE: 134 MMHG | DIASTOLIC BLOOD PRESSURE: 74 MMHG | OXYGEN SATURATION: 96 % | HEART RATE: 80 BPM | WEIGHT: 168.5 LBS | BODY MASS INDEX: 31.01 KG/M2 | HEIGHT: 62 IN

## 2020-01-27 PROBLEM — J01.40 ACUTE NON-RECURRENT PANSINUSITIS: Status: ACTIVE | Noted: 2020-01-27

## 2020-01-27 PROCEDURE — 1090F PRES/ABSN URINE INCON ASSESS: CPT | Performed by: INTERNAL MEDICINE

## 2020-01-27 PROCEDURE — 3017F COLORECTAL CA SCREEN DOC REV: CPT | Performed by: INTERNAL MEDICINE

## 2020-01-27 PROCEDURE — 71046 X-RAY EXAM CHEST 2 VIEWS: CPT

## 2020-01-27 PROCEDURE — G8417 CALC BMI ABV UP PARAM F/U: HCPCS | Performed by: INTERNAL MEDICINE

## 2020-01-27 PROCEDURE — 99213 OFFICE O/P EST LOW 20 MIN: CPT | Performed by: INTERNAL MEDICINE

## 2020-01-27 PROCEDURE — 1123F ACP DISCUSS/DSCN MKR DOCD: CPT | Performed by: INTERNAL MEDICINE

## 2020-01-27 PROCEDURE — G8926 SPIRO NO PERF OR DOC: HCPCS | Performed by: INTERNAL MEDICINE

## 2020-01-27 PROCEDURE — G8427 DOCREV CUR MEDS BY ELIG CLIN: HCPCS | Performed by: INTERNAL MEDICINE

## 2020-01-27 PROCEDURE — G8399 PT W/DXA RESULTS DOCUMENT: HCPCS | Performed by: INTERNAL MEDICINE

## 2020-01-27 PROCEDURE — 1036F TOBACCO NON-USER: CPT | Performed by: INTERNAL MEDICINE

## 2020-01-27 PROCEDURE — 3023F SPIROM DOC REV: CPT | Performed by: INTERNAL MEDICINE

## 2020-01-27 PROCEDURE — 4040F PNEUMOC VAC/ADMIN/RCVD: CPT | Performed by: INTERNAL MEDICINE

## 2020-01-27 PROCEDURE — G8482 FLU IMMUNIZE ORDER/ADMIN: HCPCS | Performed by: INTERNAL MEDICINE

## 2020-01-27 PROCEDURE — 70220 X-RAY EXAM OF SINUSES: CPT

## 2020-01-27 RX ORDER — CEFDINIR 300 MG/1
300 CAPSULE ORAL 2 TIMES DAILY
Qty: 20 CAPSULE | Refills: 0 | Status: SHIPPED | OUTPATIENT
Start: 2020-01-27 | End: 2020-02-06

## 2020-01-27 ASSESSMENT — ENCOUNTER SYMPTOMS
FACIAL SWELLING: 0
VOMITING: 0
RHINORRHEA: 1
CHEST TIGHTNESS: 0
COUGH: 1
WHEEZING: 0
SINUS PRESSURE: 1
SHORTNESS OF BREATH: 0
NAUSEA: 0

## 2020-01-27 NOTE — PROGRESS NOTES
Subjective:      Patient ID: Sergo Coles is a 68 y.o. y.o. female. Chief Complaint   Patient presents with    Cough     going on for 4 weeks    Congestion     going on for 4 weeks       HPI    Patient is here for a sick visit. She has thick congestion in her head and chest.  She has had these symptoms for 4 weeks. She saw her pulmonologist.  She was started on steroids and Cipro for one week. That did not help symptoms. She has sinus pressure and congestion. She has thick drainage. She has congestion in her chest.  She has cold spells but no fever. She has no SOB different from baseline. Vitals:    01/27/20 1425   BP: 134/74   Pulse: 80   SpO2: 96%       Wt Readings from Last 3 Encounters:   01/27/20 168 lb 8 oz (76.4 kg)   01/24/20 172 lb 3.2 oz (78.1 kg)   12/16/19 167 lb 12.8 oz (76.1 kg)       Discussed use, benefit, and side effects of prescribed medications. Barriers to medication compliance addressed. All patient questions answered. Pt voiced understanding. Review of Systems   Constitutional: Positive for chills. Negative for activity change, appetite change, diaphoresis and fever. HENT: Positive for congestion, postnasal drip, rhinorrhea, sinus pressure and sneezing. Negative for ear discharge, ear pain, facial swelling and hearing loss. Respiratory: Positive for cough. Negative for chest tightness, shortness of breath and wheezing. Gastrointestinal: Negative for nausea and vomiting. Musculoskeletal: Negative for neck pain. Objective:   Physical Exam  Constitutional:       Appearance: She is well-developed. HENT:      Head: Normocephalic and atraumatic. Right Ear: Tympanic membrane and ear canal normal.      Left Ear: Tympanic membrane and ear canal normal.      Nose: Mucosal edema and rhinorrhea present. Right Turbinates: Enlarged. Left Turbinates: Enlarged. Right Sinus: No maxillary sinus tenderness or frontal sinus tenderness.

## 2020-02-04 ENCOUNTER — TELEPHONE (OUTPATIENT)
Dept: INTERNAL MEDICINE CLINIC | Age: 70
End: 2020-02-04

## 2020-02-11 ENCOUNTER — PATIENT MESSAGE (OUTPATIENT)
Dept: INTERNAL MEDICINE CLINIC | Age: 70
End: 2020-02-11

## 2020-02-11 ENCOUNTER — OFFICE VISIT (OUTPATIENT)
Dept: INTERNAL MEDICINE CLINIC | Age: 70
End: 2020-02-11
Payer: MEDICARE

## 2020-02-11 VITALS
HEART RATE: 78 BPM | DIASTOLIC BLOOD PRESSURE: 76 MMHG | SYSTOLIC BLOOD PRESSURE: 120 MMHG | WEIGHT: 171 LBS | BODY MASS INDEX: 31.28 KG/M2 | OXYGEN SATURATION: 95 %

## 2020-02-11 PROBLEM — J02.8 ACUTE PHARYNGITIS DUE TO OTHER SPECIFIED ORGANISMS: Status: ACTIVE | Noted: 2020-02-11

## 2020-02-11 LAB — S PYO AG THROAT QL: NORMAL

## 2020-02-11 PROCEDURE — 4040F PNEUMOC VAC/ADMIN/RCVD: CPT | Performed by: NURSE PRACTITIONER

## 2020-02-11 PROCEDURE — G8510 SCR DEP NEG, NO PLAN REQD: HCPCS | Performed by: NURSE PRACTITIONER

## 2020-02-11 PROCEDURE — 1036F TOBACCO NON-USER: CPT | Performed by: NURSE PRACTITIONER

## 2020-02-11 PROCEDURE — G8427 DOCREV CUR MEDS BY ELIG CLIN: HCPCS | Performed by: NURSE PRACTITIONER

## 2020-02-11 PROCEDURE — 87880 STREP A ASSAY W/OPTIC: CPT | Performed by: NURSE PRACTITIONER

## 2020-02-11 PROCEDURE — G8399 PT W/DXA RESULTS DOCUMENT: HCPCS | Performed by: NURSE PRACTITIONER

## 2020-02-11 PROCEDURE — G8417 CALC BMI ABV UP PARAM F/U: HCPCS | Performed by: NURSE PRACTITIONER

## 2020-02-11 PROCEDURE — 1090F PRES/ABSN URINE INCON ASSESS: CPT | Performed by: NURSE PRACTITIONER

## 2020-02-11 PROCEDURE — 1123F ACP DISCUSS/DSCN MKR DOCD: CPT | Performed by: NURSE PRACTITIONER

## 2020-02-11 PROCEDURE — 3017F COLORECTAL CA SCREEN DOC REV: CPT | Performed by: NURSE PRACTITIONER

## 2020-02-11 PROCEDURE — 99213 OFFICE O/P EST LOW 20 MIN: CPT | Performed by: NURSE PRACTITIONER

## 2020-02-11 PROCEDURE — G8482 FLU IMMUNIZE ORDER/ADMIN: HCPCS | Performed by: NURSE PRACTITIONER

## 2020-02-11 RX ORDER — METHYLPREDNISOLONE 4 MG/1
TABLET ORAL
Qty: 1 KIT | Refills: 0 | Status: SHIPPED | OUTPATIENT
Start: 2020-02-11 | End: 2020-06-10

## 2020-02-11 ASSESSMENT — PATIENT HEALTH QUESTIONNAIRE - PHQ9
2. FEELING DOWN, DEPRESSED OR HOPELESS: 0
SUM OF ALL RESPONSES TO PHQ QUESTIONS 1-9: 0
DEPRESSION UNABLE TO ASSESS: FUNCTIONAL CAPACITY MOTIVATION LIMITS ACCURACY
1. LITTLE INTEREST OR PLEASURE IN DOING THINGS: 0
SUM OF ALL RESPONSES TO PHQ9 QUESTIONS 1 & 2: 0
SUM OF ALL RESPONSES TO PHQ QUESTIONS 1-9: 0

## 2020-02-11 ASSESSMENT — ENCOUNTER SYMPTOMS
BACK PAIN: 0
SINUS PAIN: 1
EYE ITCHING: 0
BLOOD IN STOOL: 0
ABDOMINAL PAIN: 0
DIARRHEA: 0
COUGH: 1
EYE REDNESS: 0
CONSTIPATION: 0
COLOR CHANGE: 0
SHORTNESS OF BREATH: 0
NAUSEA: 0
WHEEZING: 0
SINUS PRESSURE: 1
CHEST TIGHTNESS: 0
SORE THROAT: 0
RHINORRHEA: 0
VOMITING: 0

## 2020-02-11 NOTE — TELEPHONE ENCOUNTER
From: Blu Romero  To: Olivia Romero MD  Sent: 2/11/2020 8:00 AM EST  Subject: Non-Urgent Medical Question    Good morning. Not feeling so hot. Something going on. For the past 4 days I wake up in the middle of the night with a horrible sore throat. I feel like I have cuts on my tongue and small blisters on the inside of the bottom lip. I have a headache going along with it and my sinuses draining. I get up and take some Tylenol it helps then after I get up about an hour or so and the throat pain goes away. Last night I had to take my clap off was having a hard time breathing. Next question was reading my x rays says heart mildly enlarged any thing I need to be concerned about.       Thank you    Any suggestions as to what might be going on

## 2020-02-11 NOTE — ASSESSMENT & PLAN NOTE
Strep negative  Continue with symptomatic management. Recommend increased water intake as well as saline irrigation, mucolytics, and antihistamines as needed. Advised to call back if no improvement over the next 4-7 days. Antibiotic not indicated, discussed rationale of not giving antibiotics due to, but not limited to, side effects and build up of resistance. Advised if fever develops, symptoms worsen, or fail to improve with symptom management to return to office.

## 2020-02-11 NOTE — TELEPHONE ENCOUNTER
Sent pt a MyChart msg advising her I made an appointment to be seen this AM.    Advised with increasing SOB to go to the ER if unable to come into the Office. Waiting to hear back from the patient.

## 2020-02-11 NOTE — PROGRESS NOTES
Subjective:      Patient ID: Tricia Ramírez is a 68 y.o. female. Chief Complaint   Patient presents with    URI     sore throat (4 days), drainage, blisters on inside of lip and cuts on tongue       HPI  Veronica Harrell is in the office today with 4 day history of sinus pain and pressure and canker sores. She has not had any fevers. She has history of lobectomy, she is on continuous oxygen at baseline. She is not short of breath, does report increased wheezing at night. She is taking motrin with no improvement. Review of Systems   Constitutional: Negative for chills, fatigue and fever. HENT: Positive for sinus pressure and sinus pain. Negative for congestion, ear pain, postnasal drip, rhinorrhea, sneezing and sore throat. Eyes: Negative for redness and itching. Respiratory: Positive for cough. Negative for chest tightness, shortness of breath and wheezing. Cardiovascular: Negative for chest pain and palpitations. Gastrointestinal: Negative for abdominal pain, blood in stool, constipation, diarrhea, nausea and vomiting. Endocrine: Negative for cold intolerance and heat intolerance. Genitourinary: Negative for difficulty urinating, dysuria, flank pain, frequency, hematuria and urgency. Musculoskeletal: Negative for arthralgias, back pain, joint swelling and myalgias. Skin: Negative for color change, pallor, rash and wound. Allergic/Immunologic: Negative for environmental allergies and food allergies. Neurological: Negative for dizziness, seizures, syncope, weakness, light-headedness, numbness and headaches. Hematological: Negative for adenopathy. Does not bruise/bleed easily. Psychiatric/Behavioral: Negative for confusion, sleep disturbance and suicidal ideas. The patient is not nervous/anxious and is not hyperactive. Objective:   Physical Exam  Constitutional:       Appearance: She is well-developed. HENT:      Head: Normocephalic.       Right Ear: External ear normal.      Left Ear: External ear normal.   Eyes:      Conjunctiva/sclera: Conjunctivae normal.      Pupils: Pupils are equal, round, and reactive to light. Neck:      Musculoskeletal: Normal range of motion and neck supple. Vascular: No JVD. Cardiovascular:      Rate and Rhythm: Normal rate and regular rhythm. Heart sounds: No murmur. No friction rub. No gallop. Pulmonary:      Effort: Pulmonary effort is normal. No respiratory distress. Breath sounds: Examination of the right-upper field reveals decreased breath sounds. Examination of the right-middle field reveals decreased breath sounds. Decreased breath sounds present. No wheezing. Abdominal:      General: Bowel sounds are normal. There is no distension. Palpations: Abdomen is soft. There is no mass. Tenderness: There is no abdominal tenderness. There is no guarding or rebound. Musculoskeletal: Normal range of motion. General: No tenderness. Skin:     General: Skin is warm and dry. Neurological:      Mental Status: She is alert and oriented to person, place, and time. Deep Tendon Reflexes: Reflexes are normal and symmetric. Psychiatric:         Behavior: Behavior normal.         Assessment:      See Problem List assessment and plan       Plan:       Acute pharyngitis due to other specified organisms  Strep negative  Continue with symptomatic management. Recommend increased water intake as well as saline irrigation, mucolytics, and antihistamines as needed. Advised to call back if no improvement over the next 4-7 days. Antibiotic not indicated, discussed rationale of not giving antibiotics due to, but not limited to, side effects and build up of resistance. Advised if fever develops, symptoms worsen, or fail to improve with symptom management to return to office. Patient engaged in shared decision making.  Information given to evaluate options of treatment, understand what is needed and discuss importance of following plan.

## 2020-02-21 RX ORDER — ATORVASTATIN CALCIUM 40 MG/1
TABLET, FILM COATED ORAL
Qty: 90 TABLET | Refills: 0 | OUTPATIENT
Start: 2020-02-21

## 2020-02-28 ENCOUNTER — TELEPHONE (OUTPATIENT)
Dept: INTERNAL MEDICINE CLINIC | Age: 70
End: 2020-02-28

## 2020-02-28 RX ORDER — POTASSIUM CHLORIDE 750 MG/1
CAPSULE, EXTENDED RELEASE ORAL
Qty: 180 CAPSULE | Refills: 0 | Status: SHIPPED | OUTPATIENT
Start: 2020-02-28 | End: 2020-03-23

## 2020-02-28 NOTE — TELEPHONE ENCOUNTER
Patient requesting a medication refill.   Medication potassium chloride (MICRO-K)  Dosage  10 MEQ extended release capsule   FrequencyTAKE 1 CAPSULE BY MOUTH TWICE A DAY  Last filled on 11/27/18  [id number] [address] [address] [facility] -  [phone] - F [phone]

## 2020-03-18 ENCOUNTER — TELEPHONE (OUTPATIENT)
Dept: INTERNAL MEDICINE CLINIC | Age: 70
End: 2020-03-18

## 2020-03-23 RX ORDER — POTASSIUM CHLORIDE 750 MG/1
CAPSULE, EXTENDED RELEASE ORAL
Qty: 120 CAPSULE | Refills: 1 | Status: SHIPPED | OUTPATIENT
Start: 2020-03-23 | End: 2020-04-15

## 2020-04-10 ENCOUNTER — PATIENT MESSAGE (OUTPATIENT)
Dept: INTERNAL MEDICINE CLINIC | Age: 70
End: 2020-04-10

## 2020-04-13 RX ORDER — LEVOTHYROXINE SODIUM 137 UG/1
TABLET ORAL
Qty: 90 TABLET | Refills: 1 | Status: SHIPPED | OUTPATIENT
Start: 2020-04-13 | End: 2020-09-21

## 2020-04-14 ENCOUNTER — TELEMEDICINE (OUTPATIENT)
Dept: INTERNAL MEDICINE CLINIC | Age: 70
End: 2020-04-14
Payer: MEDICARE

## 2020-04-14 PROBLEM — J01.40 ACUTE NON-RECURRENT PANSINUSITIS: Status: RESOLVED | Noted: 2020-01-27 | Resolved: 2020-04-14

## 2020-04-14 PROBLEM — J02.8 ACUTE PHARYNGITIS DUE TO OTHER SPECIFIED ORGANISMS: Status: RESOLVED | Noted: 2020-02-11 | Resolved: 2020-04-14

## 2020-04-14 PROCEDURE — 3017F COLORECTAL CA SCREEN DOC REV: CPT | Performed by: INTERNAL MEDICINE

## 2020-04-14 PROCEDURE — 99213 OFFICE O/P EST LOW 20 MIN: CPT | Performed by: INTERNAL MEDICINE

## 2020-04-14 PROCEDURE — 1123F ACP DISCUSS/DSCN MKR DOCD: CPT | Performed by: INTERNAL MEDICINE

## 2020-04-14 PROCEDURE — G8428 CUR MEDS NOT DOCUMENT: HCPCS | Performed by: INTERNAL MEDICINE

## 2020-04-14 PROCEDURE — 4040F PNEUMOC VAC/ADMIN/RCVD: CPT | Performed by: INTERNAL MEDICINE

## 2020-04-14 PROCEDURE — 1090F PRES/ABSN URINE INCON ASSESS: CPT | Performed by: INTERNAL MEDICINE

## 2020-04-14 PROCEDURE — G8399 PT W/DXA RESULTS DOCUMENT: HCPCS | Performed by: INTERNAL MEDICINE

## 2020-04-14 NOTE — PROGRESS NOTES
2020    TELEHEALTH EVALUATION -- Audio/Visual (During PMDVA-21 public health emergency)    During the visit, the patient confirmed that this is a virtual visit. We will submit a claim for reimbursement with their insurance company. They are responsible for any copays, coinsurance amounts or other amounts not covered by the insurance company. HPI:    Brian Albright (:  1950) has requested an audio/video evaluation for the following concern(s):    CAD    She is doing well. She is not as active. She is not up and moving around as much. She is breathing well. She has no PND or orthopnea. She states she has a little swelling in her ankle. She had this since her angioplasty. She is taking her Lasix prn with some help. She is using her inhaler and breathing well. Patient is taking their thyroid medication without problem      Review of Systems   Constitutional: Negative. HENT: Negative. Respiratory: Negative. Negative for chest tightness, shortness of breath and wheezing. Cardiovascular: Negative. Negative for chest pain, palpitations and leg swelling. Gastrointestinal: Negative. Genitourinary: Negative. Musculoskeletal: Negative for arthralgias and myalgias. Neurological: Negative. Prior to Visit Medications    Medication Sig Taking? Authorizing Provider   levothyroxine (SYNTHROID) 137 MCG tablet TAKE 1 TABLET BY MOUTH EVERY DAY  Zoila Quintanilla MD   potassium chloride (MICRO-K) 10 MEQ extended release capsule TAKE 2 CAPSULES BY MOUTH TWICE A DAY AS NEEDED WHEN TAKING LASIX  NICOLÁS Longo CNP   methylPREDNISolone (MEDROL DOSEPACK) 4 MG tablet Take by mouth.   NICOLÁS Davis CNP   benazepril-hydrochlorthiazide (LOTENSIN HCT) 20-25 MG per tablet TAKE 1 TABLET BY MOUTH EVERY DAY  Zoila Quintanilla MD   atorvastatin (LIPITOR) 80 MG tablet Take 1 tablet by mouth daily  Zoila Quintanilla MD   metoprolol succinate (TOPROL XL) 50 MG extended

## 2020-04-15 RX ORDER — POTASSIUM CHLORIDE 750 MG/1
CAPSULE, EXTENDED RELEASE ORAL
Qty: 120 CAPSULE | Refills: 1 | Status: SHIPPED | OUTPATIENT
Start: 2020-04-15 | End: 2020-05-11

## 2020-04-27 NOTE — TELEPHONE ENCOUNTER
LSO brace ordered  Order and notes faxed to [facility]     Pt asking if she will need blood work prior to her 4/15 appt

## 2020-05-11 RX ORDER — POTASSIUM CHLORIDE 750 MG/1
CAPSULE, EXTENDED RELEASE ORAL
Qty: 120 CAPSULE | Refills: 1 | Status: SHIPPED | OUTPATIENT
Start: 2020-05-11 | End: 2020-07-17

## 2020-06-04 RX ORDER — POTASSIUM CHLORIDE 750 MG/1
CAPSULE, EXTENDED RELEASE ORAL
Qty: 120 CAPSULE | Refills: 1 | OUTPATIENT
Start: 2020-06-04

## 2020-06-10 ENCOUNTER — TELEMEDICINE (OUTPATIENT)
Dept: INTERNAL MEDICINE CLINIC | Age: 70
End: 2020-06-10
Payer: MEDICARE

## 2020-06-10 PROCEDURE — 99213 OFFICE O/P EST LOW 20 MIN: CPT | Performed by: INTERNAL MEDICINE

## 2020-06-10 PROCEDURE — 3017F COLORECTAL CA SCREEN DOC REV: CPT | Performed by: INTERNAL MEDICINE

## 2020-06-10 PROCEDURE — 1090F PRES/ABSN URINE INCON ASSESS: CPT | Performed by: INTERNAL MEDICINE

## 2020-06-10 PROCEDURE — 1123F ACP DISCUSS/DSCN MKR DOCD: CPT | Performed by: INTERNAL MEDICINE

## 2020-06-10 PROCEDURE — 4040F PNEUMOC VAC/ADMIN/RCVD: CPT | Performed by: INTERNAL MEDICINE

## 2020-06-10 PROCEDURE — G8399 PT W/DXA RESULTS DOCUMENT: HCPCS | Performed by: INTERNAL MEDICINE

## 2020-06-10 PROCEDURE — G8428 CUR MEDS NOT DOCUMENT: HCPCS | Performed by: INTERNAL MEDICINE

## 2020-06-10 ASSESSMENT — ENCOUNTER SYMPTOMS
RESPIRATORY NEGATIVE: 1
CHEST TIGHTNESS: 0
WHEEZING: 0
GASTROINTESTINAL NEGATIVE: 1
SHORTNESS OF BREATH: 0

## 2020-06-19 RX ORDER — ATORVASTATIN CALCIUM 80 MG/1
TABLET, FILM COATED ORAL
Qty: 90 TABLET | Refills: 1 | Status: SHIPPED | OUTPATIENT
Start: 2020-06-19

## 2020-07-01 ENCOUNTER — OFFICE VISIT (OUTPATIENT)
Dept: CARDIOLOGY CLINIC | Age: 70
End: 2020-07-01
Payer: MEDICARE

## 2020-07-01 VITALS
HEART RATE: 83 BPM | BODY MASS INDEX: 32.59 KG/M2 | WEIGHT: 178.2 LBS | SYSTOLIC BLOOD PRESSURE: 128 MMHG | DIASTOLIC BLOOD PRESSURE: 72 MMHG

## 2020-07-01 PROCEDURE — 1036F TOBACCO NON-USER: CPT | Performed by: INTERNAL MEDICINE

## 2020-07-01 PROCEDURE — G8427 DOCREV CUR MEDS BY ELIG CLIN: HCPCS | Performed by: INTERNAL MEDICINE

## 2020-07-01 PROCEDURE — G8417 CALC BMI ABV UP PARAM F/U: HCPCS | Performed by: INTERNAL MEDICINE

## 2020-07-01 PROCEDURE — 99213 OFFICE O/P EST LOW 20 MIN: CPT | Performed by: INTERNAL MEDICINE

## 2020-07-01 PROCEDURE — 1123F ACP DISCUSS/DSCN MKR DOCD: CPT | Performed by: INTERNAL MEDICINE

## 2020-07-01 PROCEDURE — 3017F COLORECTAL CA SCREEN DOC REV: CPT | Performed by: INTERNAL MEDICINE

## 2020-07-01 PROCEDURE — 1090F PRES/ABSN URINE INCON ASSESS: CPT | Performed by: INTERNAL MEDICINE

## 2020-07-01 PROCEDURE — 4040F PNEUMOC VAC/ADMIN/RCVD: CPT | Performed by: INTERNAL MEDICINE

## 2020-07-01 PROCEDURE — G8399 PT W/DXA RESULTS DOCUMENT: HCPCS | Performed by: INTERNAL MEDICINE

## 2020-07-01 ASSESSMENT — ENCOUNTER SYMPTOMS
CHOKING: 0
COUGH: 1
GASTROINTESTINAL NEGATIVE: 1
CHEST TIGHTNESS: 0
WHEEZING: 1
ALLERGIC/IMMUNOLOGIC NEGATIVE: 1
SHORTNESS OF BREATH: 1
APNEA: 0
EYES NEGATIVE: 1

## 2020-07-01 ASSESSMENT — PATIENT HEALTH QUESTIONNAIRE - PHQ9
1. LITTLE INTEREST OR PLEASURE IN DOING THINGS: 0
SUM OF ALL RESPONSES TO PHQ9 QUESTIONS 1 & 2: 0
SUM OF ALL RESPONSES TO PHQ QUESTIONS 1-9: 0
SUM OF ALL RESPONSES TO PHQ QUESTIONS 1-9: 0
2. FEELING DOWN, DEPRESSED OR HOPELESS: 0

## 2020-07-01 NOTE — PROGRESS NOTES
Intimate partner violence     Fear of current or ex partner: None     Emotionally abused: None     Physically abused: None     Forced sexual activity: None   Other Topics Concern    None   Social History Narrative    Works [place] , [employer] x 25 yrs before [place]. . 4 grown daughters 39, 36, 39, 29 in 11/14. HS in [address]. Family History   Problem Relation Age of Onset    Heart Failure Father     Heart Failure Mother     Dementia Mother     Stroke Mother     Other Sister         brain aneurysm    High Cholesterol Brother     High Blood Pressure Sister     High Cholesterol Sister         has a past medical history of Brain aneurysm, CAD (coronary artery disease), COPD (chronic obstructive pulmonary disease) ([address] Hyperlipidemia, Hypertension, Hypothyroidism, Lumbar spondylosis, Lung cancer ([address] Renal cyst, Spinal stenosis of lumbar region, and Thoracic aortic aneurysm ([facility] [address] Review of Systems   Constitutional: Negative. HENT: Negative. Eyes: Negative. Respiratory: Positive for cough, shortness of breath and wheezing. Negative for apnea, choking and chest tightness. Cardiovascular: Negative for chest pain, palpitations and leg swelling. Gastrointestinal: Negative. Endocrine: Negative. Genitourinary: Negative. Musculoskeletal: Negative. Skin: Negative. Allergic/Immunologic: Negative. Neurological: Negative. Hematological: Negative. Psychiatric/Behavioral: Negative. Vitals:    07/01/20 1418   BP: 128/72   Pulse: 83         Objective:   Physical Exam  Constitutional:       Appearance: She is well-developed. HENT:      Head: Normocephalic and atraumatic. Eyes:      Conjunctiva/sclera: Conjunctivae normal.      Pupils: Pupils are equal, round, and reactive to light. Neck:      Musculoskeletal: Normal range of motion and neck supple. Cardiovascular:      Rate and Rhythm: Normal rate and regular rhythm. Heart sounds: Normal heart sounds. Pulmonary:      Breath sounds: Wheezing present. Abdominal:      General: Bowel sounds are normal.      Palpations: Abdomen is soft. Musculoskeletal: Normal range of motion. Skin:     General: Skin is warm and dry. Neurological:      Mental Status: She is alert and oriented to person, place, and time. Psychiatric:         Behavior: Behavior normal.         Thought Content: Thought content normal.         Judgment: Judgment normal.         Current Outpatient Medications   Medication Sig Dispense Refill    atorvastatin (LIPITOR) 80 MG tablet TAKE 1 TABLET BY MOUTH EVERY DAY 90 tablet 1    potassium chloride (MICRO-K) 10 MEQ extended release capsule TAKE 2 CAPSULES BY MOUTH TWICE A DAY AS NEEDED WHEN TAKING LASIX 120 capsule 1    levothyroxine (SYNTHROID) 137 MCG tablet TAKE 1 TABLET BY MOUTH EVERY DAY 90 tablet 1    benazepril-hydrochlorthiazide (LOTENSIN HCT) 20-25 MG per tablet TAKE 1 TABLET BY MOUTH EVERY DAY 90 tablet 1    metoprolol succinate (TOPROL XL) 50 MG extended release tablet Take 1 tablet by mouth daily 90 tablet 5    furosemide (LASIX) 40 MG tablet Take 1 tablet by mouth as needed (BLE edema) 90 tablet 3    Umeclidinium Bromide (INCRUSE ELLIPTA) 62.5 MCG/INH AEPB Inhale 1 puff into the lungs daily      albuterol sulfate  (90 Base) MCG/ACT inhaler Inhale 2 puffs into the lungs every 6 hours as needed for Wheezing 3 Inhaler 0    vitamin D ([facility] VIT D 5000 HIGH-POTENCY) 5000 units CAPS capsule Take 1 capsule by mouth daily Vitamin D for bones. 90 capsule 12    cyanocobalamin ([facility]S VITAMIN B12) 1000 MCG tablet Everyone should take OTC B12 1000 mcg twice a day for neurological health. High B12 levels help prevent dementia.  180 tablet 3    fluticasone-salmeterol (ADVAIR) 250-50 MCG/DOSE AEPB Inhale 1 puff into the lungs every 12 hours 1 Inhaler 0    aspirin 325 MG EC tablet Take 1 tablet by mouth daily 30 tablet 3    acetaminophen (TYLENOL) 325 MG tablet Take 2 tablets by mouth every 4 hours as needed for Pain or Fever 120 tablet 3    esomeprazole Magnesium (NEXIUM) 40 MG PACK Take 40 mg by mouth daily       No current facility-administered medications for this visit. Vitals:    20 1418   BP: 128/72   Pulse: 83          [facility] 18  Cath Lab Report [id number] 2018  6:53 PM Kasey Mcintosh MD   Signed by Kasey Mcintosh on 18 at 1654      []Manual[]Template  []Copied                      [address]                 [address] [facility], [address] [place] [address]                               CARDIAC CATHETERIZATION     PATIENT NAME: Taras Price                  :        1950  MED REC NO:   [id number]                          ROOM:       [facility]  ACCOUNT NO:   [de-identified]                           ADMIT DATE: 2018  PROVIDER:     Iraida Munson. Starr Gunter MD     DATE OF PROCEDURE:  2018     INDICATION FOR PROCEDURE:  The patient presents with chest burning for  several days. She was in her doctor's office. She called the squad, sent  her to the emergency room. EKG shows inferior and anterolateral T-wave  inversions. Chest pain and chest burning were abated in the emergency room  with rest and medication. Troponin was measured at 0.16 and 0.17  consistent with a non-ST-elevation MI. It was felt prudent to invasively  risk stratify this patient with coronary angiography.     PROCEDURE:  Prepped and draped in sterile manner, locally anesthetized with  2% lidocaine, right femoral artery area, 6-French sheath was placed in  retrograde manner, right femoral artery over guidewire. Multiple catheters  were used for diagnostic interventional procedure.     FINDINGS:  Left main coronary artery appears to be normal.  Left anterior  descending coronary artery proximally is normal.  At mid vessel, there is a  tapering 80% stenosis that leads into an aneurysmal segment.   The  aneurysmal segment gives rise to two septal  arteries and then,  it leads out of the aneurysm where there is another 80% stenosis. This is  on a mild bend. It is calcified and hazy. The remainder of the LAD is  unremarkable. It is mildly diffusely diseased. The diagonal branch was  unremarkable beyond this stenosis.     The circumflex coronary artery at mid vessel has a bend in it and a 75%  hazy stenosis. The remainder of the circumflex is unremarkable. The  obtuse marginal branches beyond that are unremarkable.     JR-4 catheter would not engage the right coronary artery and 3-DRC catheter  engaged the right coronary artery. Injection showed mild narrowing  proximally. In the vertical limb of the right coronary artery, there is a  99% hazy calcific stenosis. There is NÉSTOR grade 2 flow in the right  coronary artery. This appears to be the culprit vessel. The right  posterior descending is small, but normal.  Right posterolateral branch is  small, but normal.     Pigtail catheter in the left ventricle showed an LVEDP post coronary  angiography of 15 mmHg. Power injection HARMAN projection shows LV ejection  fraction 35% with mild left ventricular enlargement qualitatively and  inferior wall hypokinesia. There was no mitral regurgitation. There was  no gradient upon pullback from the left ventricle ascending aorta.     I elected to proceed with PCI upon the right coronary artery as I felt that  it had NÉSTOR-2 flow and was the culprit lesion upfront. I was keenly aware  of the fact that this was going to be a very difficult angioplasty.   I was  also thinking that the patient will need bypass surgery with her LV  systolic dysfunction and multivessel coronary disease, but given that this  was the culprit lesion with NÉSTOR-2 flow, I felt it was most expeditious to  try and improve this with balloon angioplasty and stenting.     INTERVENTIONAL PROCEDURE:  Heparin and Integrilin were given per protocol  after consultation with  _____ over the phone. We decided that  intervention was reasonable at this juncture. The 6-Gibraltarian sheath was  already in place. A 6-Gibraltarian 3-DRC guide was used to engage the right  coronary artery. Heparin was given per protocol. A 190 BMW guidewire was  advanced with great difficulty into the distal right coronary artery. A  2.5 mm balloon would not pass. A 1.5 mm balloon would not pass. I elected  to change out and use a ChoICE PT extra support guidewire. The balloon  would not pass. I abandoned the 3-DRC 6-Gibraltarian guide and went with an AL-1  guide and then, I used a ChoICE PT extra support guidewire. I was able to  pass the wire. A 2.5 mm balloon would not pass. A 1.5 mm balloon did  pass. I made several inflations to 14 and then 17 atmospheres x30 and 45  seconds. The flow had improved to NÉSTOR 3 flow. I elected to try and use a  stent. I picked a 2.75 x 15 mm [place] drug-eluting stent. This stent would  not cross the lesion. I elected to use a 2.5 mm x 15 mm balloon. With  great difficulty, I was able to get that to cross the lesion and did  several inflations to 14 to 15 atmospheres. There was a focal dissection,  but there was NÉSTOR 3 flow. I then tried to place the stent with deep  seating of the guide and I used a BMW [place] wire with the ChoICE PT extra  support as a rail and I still could not get the stent to traverse even with  changing the configuration of the guide while the stent was near lesion. I  could not get the stent across. I elected to conclude the procedure at  this juncture as we were there for a while and I did improve the flow. I  still believe that the patient will benefit most from bypass surgery and I  am going to place the patient on Integrilin, heparin drip, and a  nitroglycerin drip in the intensive care unit.   I have spoken to the  cardiothoracic surgeon and further evaluation will be done in the morning  as she is pain-free at this juncture with NÉSTOR grade 3 flow. There is a  residual 80% to 90% stenosis at this calcified mid right coronary lesion.     Injection of femoral sheath shows there is coiling in the right common  femoral artery. For the patient's safety and comfort, a 6-Faroese  Angio-Seal device was deployed and hemostasis was achieved. The ACT at the  conclusion of the procedure was 298 seconds.     PLAN:  ICU management. Heparin drip 1000 units an hour and nitroglycerin  drip 40 mcg per minute. Beta blockers will be initiated and Integrilin  will continue for 15 hours pending the evaluation from CT surgery. She is  critically ill and will be maintained in the ICU with hydration to  continue.           Shaun West MD     D: 07/24/2018 18:53:52       T: 07/24/2018 18:58:22     [email or web address]  Job#: [id number]     Doc#: [id number]     CC:  John Henson. Mel Merlos MD         DISCHARGE SUMMARY     Patient ID: Marti Piedra  MRN:  [id number]  YOB: 1950        Admission Date:  7/24/2018 10:20 AM  Discharge Date:  08/01/18        Principle Diagnosis:  NSTEMI (non-ST elevated myocardial infarction) [facility])     Secondary Diagnosis:  Principal Problem:    NSTEMI (non-ST elevated myocardial infarction) ([facility]  Active Problems:    Hyperlipidemia    HTN (hypertension)    Hypothyroid    Ascending aortic aneurysm    Chest pain    S/P CABG x 3    Acute postoperative pain  Resolved Problems:    * No resolved hospital problems. *  Known Thoracic Aortic Aneurysm  Spinal stenosis  H/o lung cancer with subsequent s/p RML, RLL  Chronic hypertension  Chronic hyperlipidemia  Hypothyroidism  COPD  H/o cerebral aneurysm  PAD  GERD  Acute postoperative pain  Acute blood loss anemia     Procedure:    1.  ELVIRA. 2.  Endoscopic vein harvesting. 3.  Urgent coronary artery bypass grafting x3 (LIMA to LAD, SVG to D1, SVG to RCA).      History:  The patient is a 69 y.o. female who presented with anginal symptoms and ruled in for a non-ST elevation and started on Precedex. She was able to be extubated thirty hours postoperatively. She transitioned to progressive care on the second postoperative day. She went into atrial fib with a rapid ventricular rate on the third postop day. She started on an Amiodarone protocol and converted back to sinus rhythm two hours later. PT/OT were consulted to assist with discharge. She was started on PRN Xanax and Ativan as she would get anxiety during the night and become tachypneic. She stated she has had this in the past and trying to do her exercise she learned in pulmonary rehab. She also has been having insomnia the past couple of nights and may need an medication for insomnia. She was discharged on the sixth postop day to Acute Rehab.         Assessment:       Diagnosis Orders   1. Mixed hyperlipidemia     2. Essential hypertension     3. SARAH on CPAP              Plan:   HLP-Liptior , lipid panel 7/26/18 HDL- 35, LDL 39  HTN-Controlled benazepril-hydrochlorthiazide, metoprolol   CAD- Lipitor,ASA and controlled. No chest pain  CABG- metoprolol and stable. AAA- CT 10/30/18 The ascending thoracic aorta is dilated to 4.2 cm in diameter. Stable  Yearly   COPD:  Supplemental O2 and inhalers. May have p HTN and wears O2 with mask and activity. ldl 39. Continue current medications. Stable cardiovascular status.

## 2020-07-06 RX ORDER — METOPROLOL SUCCINATE 50 MG/1
50 TABLET, EXTENDED RELEASE ORAL DAILY
Qty: 90 TABLET | Refills: 3 | Status: SHIPPED | OUTPATIENT
Start: 2020-07-06

## 2020-07-06 NOTE — TELEPHONE ENCOUNTER
Citl[facility] called in needing a refill of   Metoprolol Succinate 50 mg extended release.   [facility] [phone]

## 2020-07-15 RX ORDER — BENAZEPRIL/HYDROCHLOROTHIAZIDE 20 MG-25MG
TABLET ORAL
Qty: 90 TABLET | Refills: 1 | Status: SHIPPED | OUTPATIENT
Start: 2020-07-15 | End: 2020-10-26 | Stop reason: SDUPTHER

## 2020-07-17 RX ORDER — POTASSIUM CHLORIDE 750 MG/1
CAPSULE, EXTENDED RELEASE ORAL
Qty: 120 CAPSULE | Refills: 1 | Status: SHIPPED | OUTPATIENT
Start: 2020-07-17

## 2020-09-01 ENCOUNTER — TELEMEDICINE (OUTPATIENT)
Dept: INTERNAL MEDICINE CLINIC | Age: 70
End: 2020-09-01
Payer: MEDICARE

## 2020-09-01 ENCOUNTER — TELEPHONE (OUTPATIENT)
Dept: INTERNAL MEDICINE CLINIC | Age: 70
End: 2020-09-01

## 2020-09-01 ENCOUNTER — PATIENT MESSAGE (OUTPATIENT)
Dept: INTERNAL MEDICINE CLINIC | Age: 70
End: 2020-09-01

## 2020-09-01 PROCEDURE — G8417 CALC BMI ABV UP PARAM F/U: HCPCS | Performed by: INTERNAL MEDICINE

## 2020-09-01 PROCEDURE — 4040F PNEUMOC VAC/ADMIN/RCVD: CPT | Performed by: INTERNAL MEDICINE

## 2020-09-01 PROCEDURE — 1123F ACP DISCUSS/DSCN MKR DOCD: CPT | Performed by: INTERNAL MEDICINE

## 2020-09-01 PROCEDURE — G8428 CUR MEDS NOT DOCUMENT: HCPCS | Performed by: INTERNAL MEDICINE

## 2020-09-01 PROCEDURE — 99213 OFFICE O/P EST LOW 20 MIN: CPT | Performed by: INTERNAL MEDICINE

## 2020-09-01 PROCEDURE — G8926 SPIRO NO PERF OR DOC: HCPCS | Performed by: INTERNAL MEDICINE

## 2020-09-01 PROCEDURE — 3023F SPIROM DOC REV: CPT | Performed by: INTERNAL MEDICINE

## 2020-09-01 PROCEDURE — G8399 PT W/DXA RESULTS DOCUMENT: HCPCS | Performed by: INTERNAL MEDICINE

## 2020-09-01 PROCEDURE — 1036F TOBACCO NON-USER: CPT | Performed by: INTERNAL MEDICINE

## 2020-09-01 PROCEDURE — 1090F PRES/ABSN URINE INCON ASSESS: CPT | Performed by: INTERNAL MEDICINE

## 2020-09-01 PROCEDURE — 3017F COLORECTAL CA SCREEN DOC REV: CPT | Performed by: INTERNAL MEDICINE

## 2020-09-01 RX ORDER — FUROSEMIDE 40 MG/1
40 TABLET ORAL DAILY
Qty: 90 TABLET | Refills: 1 | Status: SHIPPED | OUTPATIENT
Start: 2020-09-01

## 2020-09-01 ASSESSMENT — ENCOUNTER SYMPTOMS
RESPIRATORY NEGATIVE: 1
WHEEZING: 0
CHEST TIGHTNESS: 0
SHORTNESS OF BREATH: 0
GASTROINTESTINAL NEGATIVE: 1

## 2020-09-01 NOTE — LETTER
[facility] [address]  [address] [facility] [place] [address]  Phone: [phone]  Fax: [phone]    Ramin Mccormick MD        September 1, 2020     Patient: Albert Aranda   YOB: 1950   Date of Visit: 9/1/2020       To Whom It May Concern: It is my medical opinion that Nikki Alanis should remain out of work until 12/31/2020. If you have any questions or concerns, please don't hesitate to call.     Sincerely,          Ramin Mccormick MD

## 2020-09-01 NOTE — TELEPHONE ENCOUNTER
----- Message from Vadim Cantu sent at 9/1/2020  9:32 AM EDT -----  Subject: Message to Provider    QUESTIONS  Information for Provider? Patient needed assistance on using her virtual   features of my chart  ---------------------------------------------------------------------------  --------------  [address]  What is the best way for the office to contact you? OK to leave message on   voicemail  Preferred Call Back Phone Number? [phone]  ---------------------------------------------------------------------------  --------------  SCRIPT ANSWERS  Relationship to Patient?  Self

## 2020-09-01 NOTE — PROGRESS NOTES
2020    TELEHEALTH EVALUATION -- Audio/Visual (During SPPAM-93 public health emergency)    During the visit, the patient confirmed that this is a virtual visit. We will submit a claim for reimbursement with their insurance company. They are responsible for any copays, coinsurance amounts or other amounts not covered by the insurance company. HPI:    Albert Aranda (:  1950) has requested an audio/video evaluation for the following concern(s):    Hypertension    She is feeling well. She states her COPD is worse. She is using Incruse and Advair. She is using Proair 2-3 times daily with relief. Patient is taking their thyroid medication without problem  She has no chest pain or angina. Patient is taking blood pressure medication without problem     Review of Systems   Constitutional: Negative. HENT: Negative. Respiratory: Negative. Negative for chest tightness, shortness of breath and wheezing. Cardiovascular: Negative. Negative for chest pain, palpitations and leg swelling. Gastrointestinal: Negative. Genitourinary: Negative. Musculoskeletal: Negative for arthralgias and myalgias. Neurological: Negative. Prior to Visit Medications    Medication Sig Taking?  Authorizing Provider   furosemide (LASIX) 40 MG tablet Take 1 tablet by mouth daily Yes Ramin Mccormick MD   potassium chloride (MICRO-K) 10 MEQ extended release capsule TAKE 2 CAPSULES BY MOUTH TWICE A DAY AS NEEDED WHEN TAKING LASIX  Ramin Mccormick MD   benazepril-hydrochlorthiazide (LOTENSIN HCT) 20-25 MG per tablet TAKE 1 TABLET BY MOUTH EVERY DAY  Ramin Mccormick MD   metoprolol succinate (TOPROL XL) 50 MG extended release tablet Take 1 tablet by mouth daily  José Manuel Nipple, APRN - CNP   atorvastatin (LIPITOR) 80 MG tablet TAKE 1 TABLET BY MOUTH EVERY DAY  Ramin Mccormick MD   levothyroxine (SYNTHROID) 137 MCG tablet TAKE 1 TABLET BY MOUTH EVERY DAY  Ramin Mccormick MD   Umeclidinium Bromide (INCRUSE ELLIPTA) 62.5 MCG/INH AEPB Inhale 1 puff into the lungs daily  Historical Provider, MD   albuterol sulfate  (90 Base) MCG/ACT inhaler Inhale 2 puffs into the lungs every 6 hours as needed for Wheezing  Reynold Chua MD   vitamin D ([facility] VIT D 5000 HIGH-POTENCY) 5000 units CAPS capsule Take 1 capsule by mouth daily Vitamin D for bones. Reynold Chua MD   cyanocobalamin ([facility] VITAMIN B12) 1000 MCG tablet Everyone should take OTC B12 1000 mcg twice a day for neurological health. High B12 levels help prevent dementia. Reynold Chua MD   fluticasone-salmeterol (ADVAIR) 250-50 MCG/DOSE AEPB Inhale 1 puff into the lungs every 12 hours  Reynold Chua MD   aspirin 325 MG EC tablet Take 1 tablet by mouth daily  NICOLÁS Aaron CNP   acetaminophen (TYLENOL) 325 MG tablet Take 2 tablets by mouth every 4 hours as needed for Pain or Fever  NICOLÁS Aaron CNP   esomeprazole Magnesium (NEXIUM) 40 MG PACK Take 40 mg by mouth daily  Historical Provider, MD       Allergies   Allergen Reactions    Codeine Nausea And Vomiting    Demerol Nausea And Vomiting    Erythromycin      GI upset    Mobic [Meloxicam] Swelling    Morphine And Related Nausea And Vomiting    Sulfa Antibiotics Nausea And Vomiting       Past Medical History:   Diagnosis Date    Brain aneurysm     on right very small    CAD (coronary artery disease) 07/24/2018    NSTEMI    COPD (chronic obstructive pulmonary disease) ([facility]     Hyperlipidemia     Hypertension     Hypothyroidism     Lumbar spondylosis     per MRI done 5/23/2013 [facility]     RLL 3.8cm adenoCa 2004. RML 2.6cm adenoCa 2010.     Renal cyst 10/27/2015    Spinal stenosis of lumbar region     per MRI done 5/23/2013   Prince Nguyen Thoracic aortic aneurysm (H[facility])     4 cm       Past Surgical History:   Procedure Laterality Date    BACK SURGERY  11/2013    5th vert sciatic nerve release    BREAST LUMPECTOMY Bilateral 2000    CARPAL Due to this being a TeleHealth encounter, evaluation of the following organ systems is limited: Vitals/Constitutional/EENT/Resp/CV/GI//MS/Neuro/Skin/Heme-Lymph-Imm. ASSESSMENT/PLAN:  CAD in native artery  Stable  No angina  No chest pain or SOB      COPD (chronic obstructive pulmonary disease)  Continue inhalers  Doing well    Hypothyroid  Stable  Check thyroid labs  Continue synthroid      Ascending aortic aneurysm  CT reviewed  Continue yearly CT    Lung cancer  CT reviewed  Continue to monitor  No recurrence      No follow-ups on file. An  electronic signature was used to authenticate this note. --Edd Mojica MD on 9/1/2020 at 2:09 PM        Pursuant to the emergency declaration under the [facility] waiver authority and the [employer] Act, this Virtual  Visit was conducted, with patient's consent, to reduce the patient's risk of exposure to COVID-19 and provide continuity of care for an established patient. Services were provided through a video synchronous discussion virtually to substitute for in-person clinic visit.

## 2020-09-02 ENCOUNTER — TELEPHONE (OUTPATIENT)
Dept: INTERNAL MEDICINE CLINIC | Age: 70
End: 2020-09-02

## 2020-09-02 NOTE — TELEPHONE ENCOUNTER
Pt asking for her letter to be emailed to a different address  She wants the letter to say she should stay out of the building and can work from home due to Rayna and her own conditions  [email or web address]

## 2020-09-15 ENCOUNTER — TELEPHONE (OUTPATIENT)
Dept: INTERNAL MEDICINE CLINIC | Age: 70
End: 2020-09-15

## 2020-09-15 NOTE — TELEPHONE ENCOUNTER
----- Message from Billyjakobgauri Gonzalez sent at 9/15/2020  4:28 PM EDT -----  Subject: Message to Provider    QUESTIONS  Information for Provider? Patient wants a letter saying when her last   wellness visits were for 2018 and 2019 for insurance purposes. There is no   rush on this request. Thank you so much!   ---------------------------------------------------------------------------  --------------  CALL BACK INFO  What is the best way for the office to contact you? OK to leave message on   voicemail  Preferred Call Back Phone Number? [phone]  ---------------------------------------------------------------------------  --------------  SCRIPT ANSWERS  Relationship to Patient?  Self

## 2020-09-16 NOTE — TELEPHONE ENCOUNTER
03/18/2019 is the ONLY AWV/Physical the patient has on File. I can write a Letter for that date, but there is nothing else listed. Call to the patient and advised. LMOM for the patient to call back and let us know what she would like to do.

## 2020-09-21 RX ORDER — LEVOTHYROXINE SODIUM 137 UG/1
TABLET ORAL
Qty: 90 TABLET | Refills: 1 | Status: SHIPPED | OUTPATIENT
Start: 2020-09-21

## 2020-10-07 ENCOUNTER — OFFICE VISIT (OUTPATIENT)
Dept: ORTHOPEDIC SURGERY | Age: 70
End: 2020-10-07
Payer: MEDICARE

## 2020-10-07 ENCOUNTER — PATIENT MESSAGE (OUTPATIENT)
Dept: INTERNAL MEDICINE CLINIC | Age: 70
End: 2020-10-07

## 2020-10-07 VITALS — TEMPERATURE: 97.7 F | BODY MASS INDEX: 32.59 KG/M2 | HEIGHT: 62 IN | RESPIRATION RATE: 18 BRPM

## 2020-10-07 PROCEDURE — 99214 OFFICE O/P EST MOD 30 MIN: CPT | Performed by: ORTHOPAEDIC SURGERY

## 2020-10-07 PROCEDURE — 1090F PRES/ABSN URINE INCON ASSESS: CPT | Performed by: ORTHOPAEDIC SURGERY

## 2020-10-07 PROCEDURE — 1123F ACP DISCUSS/DSCN MKR DOCD: CPT | Performed by: ORTHOPAEDIC SURGERY

## 2020-10-07 PROCEDURE — G8417 CALC BMI ABV UP PARAM F/U: HCPCS | Performed by: ORTHOPAEDIC SURGERY

## 2020-10-07 PROCEDURE — G8399 PT W/DXA RESULTS DOCUMENT: HCPCS | Performed by: ORTHOPAEDIC SURGERY

## 2020-10-07 PROCEDURE — G8427 DOCREV CUR MEDS BY ELIG CLIN: HCPCS | Performed by: ORTHOPAEDIC SURGERY

## 2020-10-07 PROCEDURE — 20550 NJX 1 TENDON SHEATH/LIGAMENT: CPT | Performed by: ORTHOPAEDIC SURGERY

## 2020-10-07 PROCEDURE — 3017F COLORECTAL CA SCREEN DOC REV: CPT | Performed by: ORTHOPAEDIC SURGERY

## 2020-10-07 PROCEDURE — G8482 FLU IMMUNIZE ORDER/ADMIN: HCPCS | Performed by: ORTHOPAEDIC SURGERY

## 2020-10-07 PROCEDURE — 1036F TOBACCO NON-USER: CPT | Performed by: ORTHOPAEDIC SURGERY

## 2020-10-07 PROCEDURE — 4040F PNEUMOC VAC/ADMIN/RCVD: CPT | Performed by: ORTHOPAEDIC SURGERY

## 2020-10-07 RX ORDER — ASPIRIN 81 MG/1
TABLET ORAL
COMMUNITY

## 2020-10-07 NOTE — Clinical Note
Dear  Tasneem Escalante MD,    Thank you very much for your referral or Ms. Raymundo Lott to me for evaluation and treatment of her Hand & Wrist condition. I appreciate your confidence in me and thank you for allowing me the opportunity to care for your patients. If I can be of any further assistance to you on this or any other patient, please do not hesitate to contact me. Sincerely,    Burke Davis.  Jm Bravo MD

## 2020-10-07 NOTE — TELEPHONE ENCOUNTER
Labs ordered   Patient advised of Dr. Fercho Guadarrama   Patient will have labs done   Patient wanted to keep her appointment for the same day and same time pt states its a little late to cancel now

## 2020-10-07 NOTE — TELEPHONE ENCOUNTER
"  Chief Complaint   Patient presents with   • Diabetes   • Hyperlipidemia   • Hypertension       Subjective   Bam Negron is an 57 y.o. male who presents for follow up of diabetes. Current symptoms include: hyperglycemia. Patient denies foot ulcerations, paresthesia of the feet and visual disturbances. Evaluation to date has included: fasting blood sugar, fasting lipid panel, hemoglobin A1C and microalbuminuria. Home sugars: patient does not check sugars. Current treatments: more intensive attention to diet which has been somewhat effective, low cholesterol diet which has been somewhat effective, increased aerobic exercise which has been not very effective, Continued sulfonylurea which has been somewhat effective, Continued metformin which has been somewhat effective, Continued statin which has been effective, Continued ACE inhibitor/ARB which has been somewhat effective and Continued januvia and Victoza which has been somewhat effective. Discussed importance of yearly eye exams and checking feet for skin integrity.      The following portions of the patient's history were reviewed and updated as appropriate: allergies, current medications, past family history, past medical history, past social history, past surgical history and problem list.        Review of Systems  A comprehensive review of systems was negative except for: Musculoskeletal: positive for stiff joints     Vitals:    07/22/20 1531   BP: 152/82   BP Location: Left arm   Patient Position: Sitting   Cuff Size: Adult   Pulse: 83   SpO2: 99%   Weight: 114 kg (251 lb)   Height: 172.7 cm (67.99\")       Objective    Gen:  Alert, pleasant  Ears: canals clear, TMs normal  Throat: clear , no thrush, teeth ok  Neck: no bruit, no LAD  Lungs: clear  Heart: RR no murmur  Feet:  No rash, no skin breakdown, sensation grossly normal.    Laboratory:  Results for orders placed or performed in visit on 07/14/20   Hemoglobin A1c   Result Value Ref Range    Hemoglobin " From: Author Alejandro  To: Haylee Joanne, APRN - CNP  Sent: 10/7/2020 7:01 AM EDT  Subject: Non-Urgent Medical Question    Tried to send Dr Ayleen Orantes a message but his name is not coming up. Can you see if there are orders for me to go and get blood test for my appointment on Tuesday the 13th and can we add the A1C to this request.    Sorry to bother you but this is the first time this has happened that his name did not come up to send a message.     Bridger Mayorga A1C 7.5 (H) 4.8 - 5.6 %   MicroAlbumin, Urine, Random - Urine, Clean Catch   Result Value Ref Range    Microalbumin, Urine 5.5 Not Estab. ug/mL   Lipid Panel   Result Value Ref Range    Total Cholesterol 148 100 - 199 mg/dL    Triglycerides 127 0 - 149 mg/dL    HDL Cholesterol 42 >39 mg/dL    VLDL Cholesterol 25 5 - 40 mg/dL    LDL Cholesterol  81 0 - 99 mg/dL   Basic Metabolic Panel   Result Value Ref Range    Glucose 177 (H) 65 - 99 mg/dL    BUN 16 6 - 24 mg/dL    Creatinine 0.68 (L) 0.76 - 1.27 mg/dL    eGFR Non African Am 105 >59 mL/min/1.73    eGFR African Am 121 >59 mL/min/1.73    BUN/Creatinine Ratio 24 (H) 9 - 20    Sodium 140 134 - 144 mmol/L    Potassium 4.6 3.5 - 5.2 mmol/L    Chloride 100 96 - 106 mmol/L    Total CO2 28 20 - 29 mmol/L    Calcium 9.3 8.7 - 10.2 mg/dL   ALT   Result Value Ref Range    ALT (SGPT) 24 0 - 44 IU/L   CBC (No Diff)   Result Value Ref Range    WBC 6.8 3.4 - 10.8 x10E3/uL    RBC 4.26 4.14 - 5.80 x10E6/uL    Hemoglobin 13.4 13.0 - 17.7 g/dL    Hematocrit 39.1 37.5 - 51.0 %    MCV 92 79 - 97 fL    MCH 31.5 26.6 - 33.0 pg    MCHC 34.3 31.5 - 35.7 g/dL    RDW 13.0 11.6 - 15.4 %    Platelets 336 150 - 450 x10E3/uL   TSH   Result Value Ref Range    TSH 1.400 0.450 - 4.500 uIU/mL   PSA Screen   Result Value Ref Range    PSA 0.2 0.0 - 4.0 ng/mL        Assessment/Plan        Discussed general issues about diabetes pathophysiology and management.  Continued sulfonylurea; see medication orders.   Continued metformin; see  medication orders.  Continued statin drug see medication orders.  Continued ACE inhibitor; see medication orders.  Follow up in 6 months or as needed.    Bam was seen today for diabetes, hyperlipidemia and hypertension.    Diagnoses and all orders for this visit:    Essential hypertension  -     Basic Metabolic Panel; Future  -     CBC (No Diff); Future  -     Lipid Panel; Future  -     TSH; Future    Mixed hyperlipidemia  -     atorvastatin (LIPITOR) 80 MG tablet; Take 1  tablet by mouth Daily.  -     Lipid Panel; Future  -     TSH; Future    Uncontrolled type 2 diabetes mellitus with hyperglycemia (CMS/HCC)  -     MicroAlbumin, Urine, Random - Urine, Clean Catch; Future  -     Hemoglobin A1c; Future  -     Urinalysis With Microscopic If Indicated (No Culture) - Urine, Clean Catch; Future    Lumbar herniated disc  -     DULoxetine (CYMBALTA) 60 MG capsule; Take 1 capsule by mouth Daily.    Medication monitoring encounter  -     ALT; Future    A1c is some better  Bp is also up some, already on 3 medications    Discussed healthy diabetic eating plan.  May refer to ADA web site, diabetes.org    No follow-ups on file.  There are no Patient Instructions on file for this visit.  Medications Discontinued During This Encounter   Medication Reason   • gabapentin (NEURONTIN) 100 MG capsule *Therapy completed   • atorvastatin (LIPITOR) 80 MG tablet Reorder   • DULoxetine (CYMBALTA) 60 MG capsule Reorder         Dr. Lalito Hansen MD  [place], [place]  [facility].

## 2020-10-07 NOTE — PROGRESS NOTES
Ms. Sonam Timmons returns today in follow-up of her previously treated  bilateral multiple digit stenosing tenosynovitis. She was last seen by me in July, 2019 at which time she was treated with Steroid injection to the Bilateral, Middle Finger, Flexor Tendon Sheath. She experienced moderate relief of her initial symptoms. She  has noticed symptom worsening over the last several months. She returns today with worsened symptoms of bilateral Middle Finger pain, stiffness, catching and locking, requesting further treatment. The patient's , past medical history, medications, allergies,  family history, social history, and review of systems have been reviewed and are recorded in the chart. Physical Exam:  Vitals  Temp: 97.7 °F (36.5 °C)  Temp Source: Infrared  Resp: 18  Height: 5' 2\" (157.5 cm)  Ms. Sonam Timmons appears well, she is in no apparent distress, she demonstrates appropriate mood & affect. Skin: Normal in appearance, Normal Color and Free of Lesions Bilaterally   Digital range of motion is limited by pain bilaterally. Active triggering is noted upon flexion in the bilateral Middle Finger. There is not an associated flexion contracture of the PIP joint. No other digit demonstrates evidence for stenosing tenosynovitis bilaterally. Wrist range of motion is without significant limitation bilaterally  There is no evidence of gross joint instability bilaterally. Sensation is subjectively normal bilaterally  Vascular examination reveals normal and good capillary refill bilaterally  Swelling is mild in the symptomatic digit(s) bilaterally  Muscular strength is clinically appropriate bilaterally. Examination for Stenosing Tenosynovitis demonstrates moderate tenderness, thickening & nodularity at the A-1 pulley(s) of the Bilateral Middle Finger. There is a palpable Nota's Node. There is triggering on active flexion with pain.   No other digits demonstrate evidence of Stenosing Tenosynovitis. Impression:  Ms. Vlad Moreno is showing evidence of worsened Stenosing Tenosynovitis (Trigger Finger) after previous treatment. She requests additional treatment at this time. Plan:  I have had a thorough discussion with Ms. Vlad Moreno regarding the treatment options available for her worsened Bilateral Middle Finger stenosing tenosynovitis, which is causing her functional limitations. I have outlined for Ms. Vlad Moreno the benefits and consequences of the various treatment modalities, including a reasonable expectation for the long term success and the likelihood that further more aggressive treatment may be required for her current presenting condition. Based upon our current discussion and a reasonable understating of the options available to her, Ms. Vlad Moreno has selected to proceed with  an injection to both Middle Finger Flexor Tendon Sheath. I have outlined for her the nature of the injection, and the pre, jose and post injection considerations and the appropriate expectations for this injection. I have clearly explained to her that the above outlined treatment plan should not be expected to 'cure' her stenosing tenosynovitis, but we are rather treating the symptoms with which she presents. She has understood that in order to achieve long lasting relief of her symptoms and to prevent future worsening or further damage, that definitive surgical treatment would be required. Ms. Vlad Moreno  voiced an appropriate understanding of our discussion, the options available to her, and of the expectations of her selected  treatment. She did wish to proceed with Bilateral Middle Finger Flexor Tendon Sheath injection. Procedure:  right Middle Finger Trigger Finger Injection  [third Injection]:   After full discussion of the nature of this process and outlining a treatment plan with Ms. Vlad Moreno, we discussed the complications, limitations, expectations, alternatives, and risks of injection of the flexor tendon sheath. She understood this information well and verbally consented to this treatment. The skin of the symptomatic digit was prepped with Isopropyl Alcohol and under aseptic conditions the flexor tendon sheath was injected with a combination of 1/2 ml of 0.25% Bupivacaine without Epinephrine and 20 mg of Triamcinolone (40 mg/ml). Good filling of the flexor sheath was noted. A dry sterile bandage was applied and the patient tolerated the injection without difficulty. I advised the patient of the expected response, possible reactions and the instructions for care of the hand. Procedure:  left Middle Finger Trigger Finger Injection  [second Injection]: After full discussion of the nature of this process and outlining a treatment plan with Ms. Raymundo Lott, we discussed the complications, limitations, expectations, alternatives, and risks of injection of the flexor tendon sheath. She understood this information well and verbally consented to this treatment. The skin of the symptomatic digit was prepped with Isopropyl Alcohol and under aseptic conditions the flexor tendon sheath was injected with a combination of 1/2 ml of 0.25% Bupivacaine without Epinephrine and 20 mg of Triamcinolone (40 mg/ml). Good filling of the flexor sheath was noted. A dry sterile bandage was applied and the patient tolerated the injection without difficulty. I advised the patient of the expected response, possible reactions and the instructions for care of the hand. I have also discussed with Ms. Raymundo Lott the other treatment options available to her for this condition. We have today selected to proceed with treatment by injection with steroid medication.   She and I have agreed that if our current course of Injection treatment does not prove to be effective over the short term future, that she will schedule a follow-up appointment to discuss and select an alternate course of therapy including possibly further conservative treatment or surgical treatment. Ms. Zaki Delarosa has been given a full verbal list of instructions and precautions related to her present condition. I have asked her to followup with me in the office at the prescribed time. She is also specifically requested to call or return to the office sooner if her symptoms change or worsen prior to the next scheduled appointment.

## 2020-10-08 ASSESSMENT — LIFESTYLE VARIABLES
HOW OFTEN DURING THE LAST YEAR HAVE YOU NEEDED AN ALCOHOLIC DRINK FIRST THING IN THE MORNING TO GET YOURSELF GOING AFTER A NIGHT OF HEAVY DRINKING: NEVER
HOW OFTEN DURING THE LAST YEAR HAVE YOU HAD A FEELING OF GUILT OR REMORSE AFTER DRINKING: 0
HAVE YOU OR SOMEONE ELSE BEEN INJURED AS A RESULT OF YOUR DRINKING: 0
HOW OFTEN DO YOU HAVE A DRINK CONTAINING ALCOHOL: 4
HOW OFTEN DO YOU HAVE A DRINK CONTAINING ALCOHOL: FOUR OR MORE TIMES A WEEK
HOW OFTEN DURING THE LAST YEAR HAVE YOU FOUND THAT YOU WERE NOT ABLE TO STOP DRINKING ONCE YOU HAD STARTED: NEVER
AUDIT TOTAL SCORE: 0
HOW OFTEN DURING THE LAST YEAR HAVE YOU FAILED TO DO WHAT WAS NORMALLY EXPECTED FROM YOU BECAUSE OF DRINKING: NEVER
AUDIT TOTAL SCORE: 4
HAVE YOU OR SOMEONE ELSE BEEN INJURED AS A RESULT OF YOUR DRINKING: NO
HOW OFTEN DO YOU HAVE SIX OR MORE DRINKS ON ONE OCCASION: 0
HOW OFTEN DURING THE LAST YEAR HAVE YOU BEEN UNABLE TO REMEMBER WHAT HAPPENED THE NIGHT BEFORE BECAUSE YOU HAD BEEN DRINKING: 0
HOW OFTEN DURING THE LAST YEAR HAVE YOU FAILED TO DO WHAT WAS NORMALLY EXPECTED FROM YOU BECAUSE OF DRINKING: 0
HOW OFTEN DURING THE LAST YEAR HAVE YOU HAD A FEELING OF GUILT OR REMORSE AFTER DRINKING: NEVER
HOW OFTEN DURING THE LAST YEAR HAVE YOU FOUND THAT YOU WERE NOT ABLE TO STOP DRINKING ONCE YOU HAD STARTED: 0
HOW OFTEN DO YOU HAVE SIX OR MORE DRINKS ON ONE OCCASION: NEVER
HOW OFTEN DURING THE LAST YEAR HAVE YOU BEEN UNABLE TO REMEMBER WHAT HAPPENED THE NIGHT BEFORE BECAUSE YOU HAD BEEN DRINKING: NEVER
HOW MANY STANDARD DRINKS CONTAINING ALCOHOL DO YOU HAVE ON A TYPICAL DAY: ONE OR TWO
AUDIT-C TOTAL SCORE: 0
HOW OFTEN DURING THE LAST YEAR HAVE YOU NEEDED AN ALCOHOLIC DRINK FIRST THING IN THE MORNING TO GET YOURSELF GOING AFTER A NIGHT OF HEAVY DRINKING: 0
HAS A RELATIVE, FRIEND, DOCTOR, OR ANOTHER HEALTH PROFESSIONAL EXPRESSED CONCERN ABOUT YOUR DRINKING OR SUGGESTED YOU CUT DOWN: NO
HOW MANY STANDARD DRINKS CONTAINING ALCOHOL DO YOU HAVE ON A TYPICAL DAY: 0
HAS A RELATIVE, FRIEND, DOCTOR, OR ANOTHER HEALTH PROFESSIONAL EXPRESSED CONCERN ABOUT YOUR DRINKING OR SUGGESTED YOU CUT DOWN: 0
AUDIT-C TOTAL SCORE: 4

## 2020-10-08 ASSESSMENT — PATIENT HEALTH QUESTIONNAIRE - PHQ9
SUM OF ALL RESPONSES TO PHQ9 QUESTIONS 1 & 2: 0
1. LITTLE INTEREST OR PLEASURE IN DOING THINGS: 0
2. FEELING DOWN, DEPRESSED OR HOPELESS: 0
SUM OF ALL RESPONSES TO PHQ QUESTIONS 1-9: 0
SUM OF ALL RESPONSES TO PHQ QUESTIONS 1-9: 0

## 2020-10-09 DIAGNOSIS — E78.2 MIXED HYPERLIPIDEMIA: ICD-10-CM

## 2020-10-09 DIAGNOSIS — E55.9 VITAMIN D DEFICIENCY: ICD-10-CM

## 2020-10-09 DIAGNOSIS — R73.9 ELEVATED BLOOD SUGAR: ICD-10-CM

## 2020-10-09 DIAGNOSIS — I10 ESSENTIAL HYPERTENSION: ICD-10-CM

## 2020-10-09 DIAGNOSIS — E03.9 HYPOTHYROIDISM, UNSPECIFIED TYPE: ICD-10-CM

## 2020-10-09 LAB
A/G RATIO: 2 (ref 1.1–2.2)
ALBUMIN SERPL-MCNC: 4.5 G/DL (ref 3.4–5)
ALP BLD-CCNC: 126 U/L (ref 40–129)
ALT SERPL-CCNC: 26 U/L (ref 10–40)
ANION GAP SERPL CALCULATED.3IONS-SCNC: 12 MMOL/L (ref 3–16)
AST SERPL-CCNC: 16 U/L (ref 15–37)
BASOPHILS ABSOLUTE: 0 K/UL (ref 0–0.2)
BASOPHILS RELATIVE PERCENT: 0.3 %
BILIRUB SERPL-MCNC: 0.3 MG/DL (ref 0–1)
BUN BLDV-MCNC: 27 MG/DL (ref 7–20)
CALCIUM SERPL-MCNC: 9.9 MG/DL (ref 8.3–10.6)
CHLORIDE BLD-SCNC: 104 MMOL/L (ref 99–110)
CHOLESTEROL, FASTING: 164 MG/DL (ref 0–199)
CO2: 26 MMOL/L (ref 21–32)
CREAT SERPL-MCNC: 0.6 MG/DL (ref 0.6–1.2)
EOSINOPHILS ABSOLUTE: 0 K/UL (ref 0–0.6)
EOSINOPHILS RELATIVE PERCENT: 0.1 %
GFR AFRICAN AMERICAN: >60
GFR NON-AFRICAN AMERICAN: >60
GLOBULIN: 2.3 G/DL
GLUCOSE FASTING: 106 MG/DL (ref 70–99)
HCT VFR BLD CALC: 38.3 % (ref 36–48)
HDLC SERPL-MCNC: 48 MG/DL (ref 40–60)
HEMOGLOBIN: 12.7 G/DL (ref 12–16)
LDL CHOLESTEROL CALCULATED: 87 MG/DL
LYMPHOCYTES ABSOLUTE: 2.1 K/UL (ref 1–5.1)
LYMPHOCYTES RELATIVE PERCENT: 22.7 %
MCH RBC QN AUTO: 31 PG (ref 26–34)
MCHC RBC AUTO-ENTMCNC: 33.1 G/DL (ref 31–36)
MCV RBC AUTO: 93.6 FL (ref 80–100)
MONOCYTES ABSOLUTE: 0.6 K/UL (ref 0–1.3)
MONOCYTES RELATIVE PERCENT: 6.6 %
NEUTROPHILS ABSOLUTE: 6.5 K/UL (ref 1.7–7.7)
NEUTROPHILS RELATIVE PERCENT: 70.3 %
PDW BLD-RTO: 14.4 % (ref 12.4–15.4)
PLATELET # BLD: 285 K/UL (ref 135–450)
PMV BLD AUTO: 7.6 FL (ref 5–10.5)
POTASSIUM SERPL-SCNC: 4.1 MMOL/L (ref 3.5–5.1)
RBC # BLD: 4.09 M/UL (ref 4–5.2)
SODIUM BLD-SCNC: 142 MMOL/L (ref 136–145)
TOTAL PROTEIN: 6.8 G/DL (ref 6.4–8.2)
TRIGLYCERIDE, FASTING: 144 MG/DL (ref 0–150)
TSH SERPL DL<=0.05 MIU/L-ACNC: 0.33 UIU/ML (ref 0.27–4.2)
VITAMIN D 25-HYDROXY: 82 NG/ML
VLDLC SERPL CALC-MCNC: 29 MG/DL
WBC # BLD: 9.2 K/UL (ref 4–11)

## 2020-10-10 LAB
ESTIMATED AVERAGE GLUCOSE: 134.1 MG/DL
HBA1C MFR BLD: 6.3 %

## 2020-10-13 ENCOUNTER — OFFICE VISIT (OUTPATIENT)
Dept: INTERNAL MEDICINE CLINIC | Age: 70
End: 2020-10-13
Payer: MEDICARE

## 2020-10-13 VITALS
BODY MASS INDEX: 32.56 KG/M2 | WEIGHT: 178 LBS | SYSTOLIC BLOOD PRESSURE: 124 MMHG | HEART RATE: 93 BPM | OXYGEN SATURATION: 97 % | TEMPERATURE: 97.3 F | DIASTOLIC BLOOD PRESSURE: 76 MMHG

## 2020-10-13 PROCEDURE — G8482 FLU IMMUNIZE ORDER/ADMIN: HCPCS | Performed by: NURSE PRACTITIONER

## 2020-10-13 PROCEDURE — 1123F ACP DISCUSS/DSCN MKR DOCD: CPT | Performed by: NURSE PRACTITIONER

## 2020-10-13 PROCEDURE — 3017F COLORECTAL CA SCREEN DOC REV: CPT | Performed by: NURSE PRACTITIONER

## 2020-10-13 PROCEDURE — G0439 PPPS, SUBSEQ VISIT: HCPCS | Performed by: NURSE PRACTITIONER

## 2020-10-13 PROCEDURE — 4040F PNEUMOC VAC/ADMIN/RCVD: CPT | Performed by: NURSE PRACTITIONER

## 2020-10-13 RX ORDER — ASCORBIC ACID 500 MG
500 TABLET ORAL DAILY
COMMUNITY

## 2020-10-13 RX ORDER — M-VIT,TX,IRON,MINS/CALC/FOLIC 27MG-0.4MG
1 TABLET ORAL DAILY
COMMUNITY

## 2020-10-13 ASSESSMENT — ENCOUNTER SYMPTOMS
ABDOMINAL DISTENTION: 0
SHORTNESS OF BREATH: 1
RHINORRHEA: 0
COLOR CHANGE: 0
COUGH: 0
NAUSEA: 0
SORE THROAT: 0
BACK PAIN: 0
CHOKING: 0
WHEEZING: 0
VOMITING: 0
SINUS PAIN: 0
DIARRHEA: 0
SINUS PRESSURE: 0
PHOTOPHOBIA: 0
BLOOD IN STOOL: 0
CONSTIPATION: 0
ABDOMINAL PAIN: 0

## 2020-10-13 ASSESSMENT — PATIENT HEALTH QUESTIONNAIRE - PHQ9
SUM OF ALL RESPONSES TO PHQ QUESTIONS 1-9: 0
SUM OF ALL RESPONSES TO PHQ9 QUESTIONS 1 & 2: 0
2. FEELING DOWN, DEPRESSED OR HOPELESS: 0
1. LITTLE INTEREST OR PLEASURE IN DOING THINGS: 0
SUM OF ALL RESPONSES TO PHQ QUESTIONS 1-9: 0

## 2020-10-13 NOTE — ASSESSMENT & PLAN NOTE
Reviewed diet and exercise   Reviewed vaccines   Reviewed recent labs   Mammogram ordered    up to date

## 2020-10-13 NOTE — PATIENT INSTRUCTIONS
[place] [id number]              Dr. Laura hernandez Bethanie Strauss - 10/13/2020  Medicare offers a range of preventive health benefits. Some of the tests and screenings are paid in full while other may be subject to a deductible, co-insurance, and/or copay. Some of these benefits include a comprehensive review of your medical history including lifestyle, illnesses that may run in your family, and various assessments and screenings as appropriate. After reviewing your medical record and screening and assessments performed today your provider may have ordered immunizations, labs, imaging, and/or referrals for you. A list of these orders (if applicable) as well as your Preventive Care list are included within your After Visit Summary for your review. Other Preventive Recommendations:    · A preventive eye exam performed by an eye specialist is recommended every 1-2 years to screen for glaucoma; cataracts, macular degeneration, and other eye disorders. · A preventive dental visit is recommended every 6 months. · Try to get at least 150 minutes of exercise per week or 10,000 steps per day on a pedometer . · Order or download the FREE \"Exercise & Physical Activity: Your Everyday Guide\" from [employer]. Call 7-260.922.1012 or search [employer] online. · You need 2173-3308 mg of calcium and 2950-4021 IU of vitamin D per day. It is possible to meet your calcium requirement with diet alone, but a vitamin D supplement is usually necessary to meet this goal.  · When exposed to the sun, use a sunscreen that protects against both UVA and UVB radiation with an SPF of 30 or greater. Reapply every 2 to 3 hours or after sweating, drying off with a towel, or swimming. · Always wear a seat belt when traveling in a car. Always wear a helmet when riding a bicycle or motorcycle.

## 2020-10-13 NOTE — PROGRESS NOTES
Medicare Annual Wellness Visit  Name: Es Poon Date: 10/13/2020   MRN: [id number] Sex: Female   Age: 68 y.o. Ethnicity: Non-/Non    : 1950 Race: Damir Flores is here for Medicare AWV    Screenings for behavioral, psychosocial and functional/safety risks, and cognitive dysfunction are all negative except as indicated below. These results, as well as other patient data from the [address] form, are documented in Flowsheets linked to this Encounter. Allergies   Allergen Reactions    Codeine Nausea And Vomiting    Demerol Nausea And Vomiting    Erythromycin      GI upset    Mobic [Meloxicam] Swelling    Morphine And Related Nausea And Vomiting    Sulfa Antibiotics Nausea And Vomiting         Prior to Visit Medications    Medication Sig Taking?  Authorizing Provider   vitamin C (ASCORBIC ACID) 500 MG tablet Take 500 mg by mouth daily Yes Historical Provider, MD   Multiple Vitamins-Minerals (THERAPEUTIC MULTIVITAMIN-MINERALS) tablet Take 1 tablet by mouth daily Yes Historical Provider, MD   aspirin (ASPIRIN ADULT LOW DOSE) 81 MG EC tablet  Yes Historical Provider, MD   levothyroxine (SYNTHROID) 137 MCG tablet TAKE 1 TABLET BY MOUTH EVERY DAY Yes Michelle Schultz MD   furosemide (LASIX) 40 MG tablet Take 1 tablet by mouth daily Yes Michelle Schultz MD   potassium chloride (MICRO-K) 10 MEQ extended release capsule TAKE 2 CAPSULES BY MOUTH TWICE A DAY AS NEEDED WHEN TAKING LASIX Yes Michelle Schultz MD   benazepril-hydrochlorthiazide (LOTENSIN HCT) 20-25 MG per tablet TAKE 1 TABLET BY MOUTH EVERY DAY Yes Michelle Schultz MD   metoprolol succinate (TOPROL XL) 50 MG extended release tablet Take 1 tablet by mouth daily Yes NICOLÁS Torres - CNP   atorvastatin (LIPITOR) 80 MG tablet TAKE 1 TABLET BY MOUTH EVERY DAY Yes Michelle Schultz MD   Umeclidinium Bromide (INCRUSE ELLIPTA) 62.5 MCG/INH AEPB Inhale 1 puff into the lungs daily Yes Historical Provider, MD   albuterol sulfate  (90 Base) MCG/ACT inhaler Inhale 2 puffs into the lungs every 6 hours as needed for Wheezing Yes Evelyn Shannon MD   vitamin D ([employer] VIT D 5000 HIGH-POTENCY) 5000 units CAPS capsule Take 1 capsule by mouth daily Vitamin D for bones. Yes Evelyn Shannon MD   cyanocobalamin ([facility]S VITAMIN B12) 1000 MCG tablet Everyone should take OTC B12 1000 mcg twice a day for neurological health. High B12 levels help prevent dementia. Yes Evelyn Shannon MD   fluticasone-salmeterol (ADVAIR) 250-50 MCG/DOSE AEPB Inhale 1 puff into the lungs every 12 hours Yes Evelyn Shannon MD   acetaminophen (TYLENOL) 325 MG tablet Take 2 tablets by mouth every 4 hours as needed for Pain or Fever Yes NICOLÁS Olsen - CNP   esomeprazole Magnesium (NEXIUM) 40 MG PACK Take 40 mg by mouth daily Yes Historical Provider, MD         Past Medical History:   Diagnosis Date    Brain aneurysm     on right very small    CAD (coronary artery disease) 07/24/2018    NSTEMI    COPD (chronic obstructive pulmonary disease) ([facility] [address]     Hyperlipidemia     Hypertension     Hypothyroidism     Lumbar spondylosis     per MRI done 5/23/2013 - Dr. Jacoby Hicks [facility])     RLL 3.8cm adenoCa 2004. RML 2.6cm adenoCa 2010.  Renal cyst 10/27/2015    Spinal stenosis of lumbar region     per MRI done 5/23/2013    Thoracic aortic aneurysm (HCC)     4 cm       Past Surgical History:   Procedure Laterality Date    BACK SURGERY  11/2013    5th vert sciatic nerve release    BREAST LUMPECTOMY Bilateral 2000    CARPAL TUNNEL RELEASE Bilateral 12/22/2016    COLONOSCOPY  2010    COLONOSCOPY N/A 9/16/2019    COLONOSCOPY WITH BIOPSY performed by Danica Holguin MD at [id number] [place] [address]y [address]  11/19/2014    Dr. Milton Baum. Ezequiel Sanchez COLPORRHAPHY  11/19/2014    A&P- Dr. Milton Baum. [place], VAGINAL  11/19/2014    Dr. Milton Baum. Ezequiel Sanchez INGUINAL HERNIA REPAIR Bilateral 1980s    LUMBAR SPINE SURGERY  1990    L5    LUNG REMOVAL, PARTIAL Right 04/2010    RML lobectomy in 4/2010, & RLL lobectomy in 5/2004    VT CABG, VEIN, SINGLE N/A 7/26/2018    Dr. Letty Dos Santos. Guillory - urgent x3 (LIMA-LAD, R SVG-D1, SVG-RCA)    SALPINGO-OOPHORECTOMY Bilateral 11/19/2014    Dr. Eloy Lee. [facility] TOTAL HIP ARTHROPLASTY Right 05/2014    Dr. Claudio Arambula  6/2010    URETHRAL STRICTURE DILATATION      VASCULAR SURGERY Right 2003    leg         Family History   Problem Relation Age of Onset    Heart Failure Father     Heart Failure Mother     Dementia Mother     Stroke Mother     Other Sister         brain aneurysm    High Cholesterol Brother     High Blood Pressure Sister     High Cholesterol Sister        CareTeam (Including outside providers/suppliers regularly involved in providing care):   Patient Care Team:  NICOLÁS Mcmullen - CNP as PCP - General (Nurse Practitioner)  NICOLÁS Mcmullen - CNP as PCP - [facility] Empaneled Provider  Alicia Escobar MD as Consulting Physician (Orthopedic Surgery)  Prachi Hernandez MD as Consulting Physician (Endocrinology)  Uzair Gutiérrez MD as Consulting Physician (Pulmonology)  Jey Mitchell MD as Consulting Physician (Hematology and Oncology)  Jacklyn Ch MD as Consulting Physician (General Surgery)  Simeon Estrada MD as Consulting Physician (Internal Medicine)  Abel Pinto MD as Surgeon (Cardiothoracic Surgery)  Magnus Polanco (Urology)  Astrid Wynn MD as Consulting Physician (Orthopedic Surgery)    Wt Readings from Last 3 Encounters:   10/13/20 178 lb (80.7 kg)   07/01/20 178 lb 3.2 oz (80.8 kg)   02/11/20 171 lb (77.6 kg)     Vitals:    10/13/20 0937   BP: 124/76   Site: Left Upper Arm   Position: Sitting   Cuff Size: Medium Adult   Pulse: 93   Temp: 97.3 °F (36.3 °C)   SpO2: 97%   Weight: 178 lb (80.7 kg)     Body mass index is 32.56 kg/m².     Based upon direct observation of the patient, evaluation of cognition reveals recent and remote memory intact. Patient's complete Health Risk Assessment and screening values have been reviewed and are found in Flowsheets. The following problems were reviewed today and where indicated follow up appointments were made and/or referrals ordered. Positive Risk Factor Screenings with Interventions:       General Health and ACP:  General  In general, how would you say your health is?: Very Good  In the past 7 days, have you experienced any of the following?  New or Increased Pain, New or Increased Fatigue, Loneliness, Social Isolation, Stress or Anger?: None of These  Do you get the social and emotional support that you need?: Yes  Do you have a Living Will?: Yes  Advance Directives     [facility] Will ACP-Advance Directive ACP-Power of     Not on File Not on File Filed [facility] [place] Risk Interventions:  · Social isolation: patient declines any further intervention for this issue    Health Habits/Nutrition:  Health Habits/Nutrition  Do you exercise for at least 20 minutes 2-3 times per week?: Yes  Have you lost any weight without trying in the past 3 months?: No  Do you eat fewer than 2 meals per day?: No  Have you seen a dentist within the past year?: Yes     Health Habits/Nutrition Interventions:  ·      Hearing/Vision:  No exam data present  Hearing/Vision  Do you or your family notice any trouble with your hearing?: (!) Yes  Do you have difficulty driving, watching TV, or doing any of your daily activities because of your eyesight?: No  Have you had an eye exam within the past year?: Yes  Hearing/Vision Interventions:  · Hearing concerns:  patient declines any further evaluation/treatment for hearing issues    Personalized Preventive Plan   Current Health Maintenance Status  Immunization History   Administered Date(s) Administered    Influenza Vaccine, unspecified formulation 10/01/2016    Influenza, High Dose (Fluzone appetite change, chills, fatigue and fever. HENT: Negative for congestion, ear pain, nosebleeds, rhinorrhea, sinus pressure, sinus pain, sneezing, sore throat and tinnitus. Eyes: Negative for photophobia and visual disturbance. Respiratory: Positive for shortness of breath (chronic, with exertion). Negative for cough, choking and wheezing. Cardiovascular: Negative for chest pain and palpitations. Gastrointestinal: Negative for abdominal distention, abdominal pain, blood in stool, constipation, diarrhea, nausea and vomiting. Anal bleeding: hemorrhoids. Endocrine: Negative for cold intolerance, heat intolerance, polydipsia, polyphagia and polyuria. Genitourinary: Negative for difficulty urinating, dysuria, flank pain, hematuria, pelvic pain, urgency, vaginal bleeding and vaginal discharge. Musculoskeletal: Negative for back pain, gait problem and myalgias. Skin: Negative for color change, pallor, rash and wound. Neurological: Negative for dizziness, tremors, syncope, weakness, light-headedness and headaches. Hematological: Negative for adenopathy. Does not bruise/bleed easily. Psychiatric/Behavioral: Negative for agitation, behavioral problems, confusion, dysphoric mood, sleep disturbance and suicidal ideas. The patient is not nervous/anxious. Physical Exam  Constitutional:       Appearance: Normal appearance. She is well-developed. HENT:      Head: Normocephalic and atraumatic. Right Ear: Hearing, tympanic membrane and external ear normal.      Left Ear: Hearing, tympanic membrane and external ear normal.      Nose: Nose normal.      Mouth/Throat:      Mouth: Mucous membranes are moist.      Pharynx: Oropharynx is clear. No oropharyngeal exudate. Eyes:      General: Lids are normal.      Extraocular Movements: Extraocular movements intact. Conjunctiva/sclera: Conjunctivae normal.      Pupils: Pupils are equal, round, and reactive to light.    Neck:      Musculoskeletal: Normal range of motion and neck supple. Thyroid: No thyromegaly. Vascular: No JVD. Cardiovascular:      Rate and Rhythm: Normal rate and regular rhythm. Pulses: Normal pulses. Heart sounds: Normal heart sounds, S1 normal and S2 normal. No friction rub. No gallop. No S3 or S4 sounds. Pulmonary:      Effort: Pulmonary effort is normal. No respiratory distress. Breath sounds: Normal breath sounds. Comments: Wearing oxygen  Abdominal:      General: Bowel sounds are normal. There is no distension. Palpations: Abdomen is soft. There is no mass. Tenderness: There is no abdominal tenderness. Musculoskeletal: Normal range of motion. General: No tenderness. Right lower leg: No edema. Left lower leg: No edema. Skin:     General: Skin is warm and dry. Capillary Refill: Capillary refill takes less than 2 seconds. Neurological:      Mental Status: She is alert and oriented to person, place, and time. Cranial Nerves: No cranial nerve deficit. Deep Tendon Reflexes: Reflexes normal.   Psychiatric:         Mood and Affect: Mood normal.         Speech: Speech normal.         Behavior: Behavior normal.         Thought Content:  Thought content normal.            COPD (chronic obstructive pulmonary disease)  Stable, controlled   Yearly PFTs and CT   Following with pulmonology (Dr. Dipti Waller) at [place] [address] with exertion     Ascending aortic aneurysm  Stable, controlled   Recent CT 4.3 cm, unchanged     Routine general medical examination at a health care facility  Reviewed diet and exercise   Reviewed vaccines   Reviewed recent labs   Mammogram ordered    up to date     Hypothyroid  Stable, controlled   Continue synthroid at current dose     DDD (degenerative disc disease), lumbosacral  Stable, controlled   Continue tylenol prn     Lung cancer  Stable  Undergoing yearly CT for surveillance     Hyperlipidemia  Stable, controlled   Continue current

## 2020-10-14 ENCOUNTER — TELEPHONE (OUTPATIENT)
Dept: INTERNAL MEDICINE CLINIC | Age: 70
End: 2020-10-14

## 2020-10-26 ENCOUNTER — TELEPHONE (OUTPATIENT)
Dept: INTERNAL MEDICINE CLINIC | Age: 70
End: 2020-10-26

## 2020-10-26 RX ORDER — BENAZEPRIL/HYDROCHLOROTHIAZIDE 20 MG-25MG
TABLET ORAL
Qty: 90 TABLET | Refills: 1 | Status: SHIPPED | OUTPATIENT
Start: 2020-10-26

## 2020-10-26 NOTE — TELEPHONE ENCOUNTER
Patient requesting a medication refill.   Medication benazepril-hydrochlorthiazide (LOTENSIN HCT)  Dosage 20-25 MG per tablet   FrequencyTAKE 1 TABLET BY MOUTH EVERY DAY   Last filled on 7/15/20  [id number] [place] [address] [place] [facility] -  [phone]

## 2020-10-31 NOTE — PROGRESS NOTES
[facility]    Respiratory Therapy     Home Oxygen Evaluation        Name: [address] [place] [address] Record Number: [id number]  Age: 69 y.o. Gender:  female   : 1950  Today's date: 8/10/2018  Room: [address] [place] [address]-      Assessment        /77   Pulse 78   Temp 97.8 °F (36.6 °C) (Oral)   Resp 16   Ht 5' 2\" (1.575 m)   Wt 167 lb 12.3 oz (76.1 kg)   SpO2 94%   BMI 30.69 kg/m²     Patient Active Problem List   Diagnosis    Hyperlipidemia    HTN (hypertension)    COPD (chronic obstructive pulmonary disease)    Hypothyroid    CAD (coronary artery disease)    Ascending aortic aneurysm    Lung cancer    Lumbar spinal stenosis    Lumbar spondylosis    DDD (degenerative disc disease), lumbosacral    Trigger middle finger of left hand    Painful lumpy right breast    Hemoptysis    Dyspepsia    Chest pain    NSTEMI (non-ST elevated myocardial infarction) (HCC)    S/P CABG x 3    Acute postoperative pain    CAD in native artery       Social History:  Social History   Substance Use Topics    Smoking status: Former Smoker     Packs/day: 1.00     Years: 30.00     Quit date: 2000    Smokeless tobacco: Never Used    Alcohol use 4.2 oz/week     7 Glasses of wine per week      Comment: one glass daily       Patient Room Air saturation at rest 96  %  Patient Room Air saturation upon ambulation 89 %             In your clinical assessment does the Patient Require Portable Oxygen Tanks?     No:                Patient/caregiver was educated on Home Oxygen process:  Yes      Level of patient/caregiver understanding able to:   [x] Verbalize understanding   [] Demonstrate understanding       [] Teach back        [] Needs reinforcement        []  No available caregiver               []  Other:     Response to education:  Excellent     Time Spent with Home O2 Set Up:  5  minutes     Chung Verde RCP on 8/10/2018 at 2:25 PM                 . 31-Oct-2020

## 2020-11-17 ENCOUNTER — PATIENT MESSAGE (OUTPATIENT)
Dept: INTERNAL MEDICINE CLINIC | Age: 70
End: 2020-11-17

## 2020-11-17 NOTE — TELEPHONE ENCOUNTER
From: NICOLÁS Laird CNP  To: Blanca Holguin  Sent: 11/13/2020 3:12 PM EST  Subject: Breast Cancer Screening     Good Afternoon,     Upon reviewing your chart records, it appears that we do not have a current mammogram on file for you. If you believe you have received this message in error please let me know when and where you received your last mammogram so that we can up date our records. If you still need to have your mammogram completed for this year please let me know and I will place an order for you. This is an important part of your overall health. If you have any questions please don't hesitate to reach out.    Be well,     Libby Jordan

## 2020-12-24 RX ORDER — CEPHALEXIN 500 MG/1
500 CAPSULE ORAL 2 TIMES DAILY
Qty: 14 CAPSULE | Refills: 0 | Status: SHIPPED | OUTPATIENT
Start: 2020-12-24 | End: 2020-12-31

## 2020-12-28 ENCOUNTER — TELEPHONE (OUTPATIENT)
Dept: INTERNAL MEDICINE CLINIC | Age: 70
End: 2020-12-28

## 2020-12-28 NOTE — TELEPHONE ENCOUNTER
----- Message from Brie Dexter sent at 12/24/2020  3:43 PM EST -----  Subject: Message to Provider    QUESTIONS  Information for Provider? Pt called 12/24/2020 due to having a bladder   infection. She said she was experiencing burning and it just started. Sent   to [facility] also.   ---------------------------------------------------------------------------  --------------  CALL BACK INFO  What is the best way for the office to contact you? OK to leave message on   voicemail  Preferred Call Back Phone Number? [phone]  ---------------------------------------------------------------------------  --------------  SCRIPT ANSWERS  Relationship to Patient? Self  Appointment reason? Symptomatic  Select script based on patient symptoms? Adult Female Urinary Symptoms   [Urine-related   UTI   Bladder Infection]  Are you having back pain with your urinary symptoms? No  Are you having vomiting or nausea? No  Are you having fevers (100.4)   chills   or sweats? No  Are you having increased thirst? No  Is there blood in your urine? No  Have you been seen by a provider for this symptom before?  Yes

## 2021-01-13 ENCOUNTER — OFFICE VISIT (OUTPATIENT)
Dept: CARDIOLOGY CLINIC | Age: 71
End: 2021-01-13
Payer: MEDICARE

## 2021-01-13 VITALS
TEMPERATURE: 97.6 F | SYSTOLIC BLOOD PRESSURE: 130 MMHG | WEIGHT: 182.6 LBS | BODY MASS INDEX: 33.4 KG/M2 | HEART RATE: 73 BPM | DIASTOLIC BLOOD PRESSURE: 80 MMHG

## 2021-01-13 DIAGNOSIS — E78.2 MIXED HYPERLIPIDEMIA: ICD-10-CM

## 2021-01-13 DIAGNOSIS — I10 ESSENTIAL HYPERTENSION: ICD-10-CM

## 2021-01-13 DIAGNOSIS — I25.10 CORONARY ARTERY DISEASE INVOLVING NATIVE CORONARY ARTERY OF NATIVE HEART WITHOUT ANGINA PECTORIS: Primary | ICD-10-CM

## 2021-01-13 PROCEDURE — 1123F ACP DISCUSS/DSCN MKR DOCD: CPT | Performed by: INTERNAL MEDICINE

## 2021-01-13 PROCEDURE — G8417 CALC BMI ABV UP PARAM F/U: HCPCS | Performed by: INTERNAL MEDICINE

## 2021-01-13 PROCEDURE — 4040F PNEUMOC VAC/ADMIN/RCVD: CPT | Performed by: INTERNAL MEDICINE

## 2021-01-13 PROCEDURE — 3017F COLORECTAL CA SCREEN DOC REV: CPT | Performed by: INTERNAL MEDICINE

## 2021-01-13 PROCEDURE — 1090F PRES/ABSN URINE INCON ASSESS: CPT | Performed by: INTERNAL MEDICINE

## 2021-01-13 PROCEDURE — G8399 PT W/DXA RESULTS DOCUMENT: HCPCS | Performed by: INTERNAL MEDICINE

## 2021-01-13 PROCEDURE — 99214 OFFICE O/P EST MOD 30 MIN: CPT | Performed by: INTERNAL MEDICINE

## 2021-01-13 PROCEDURE — 1036F TOBACCO NON-USER: CPT | Performed by: INTERNAL MEDICINE

## 2021-01-13 PROCEDURE — G8482 FLU IMMUNIZE ORDER/ADMIN: HCPCS | Performed by: INTERNAL MEDICINE

## 2021-01-13 PROCEDURE — G8427 DOCREV CUR MEDS BY ELIG CLIN: HCPCS | Performed by: INTERNAL MEDICINE

## 2021-01-13 ASSESSMENT — ENCOUNTER SYMPTOMS
GASTROINTESTINAL NEGATIVE: 1
WHEEZING: 1
CHOKING: 0
CHEST TIGHTNESS: 0
APNEA: 0
COUGH: 1
EYES NEGATIVE: 1
SHORTNESS OF BREATH: 1
ALLERGIC/IMMUNOLOGIC NEGATIVE: 1

## 2021-01-13 NOTE — PROGRESS NOTES
Subjective:      Patient ID: Yamil Montana is a 65 y.o. female    CC: CAD, HTN and CABG X 3    HPI:  Hermila Cabrera is a 64 yo woman with HX of AAA, CAD, NSTEMI s/p CABG x 3 on 2018,  HTN, HLP, COPD, lung cancer s/p RLL and RML resection in  and  (no chemo or radiation, now cancer-free), brain aneurysm (R ICA clinoid segment 0.2 x 0.2 cm). She is here today for 6 month follow up. She has a left paralyzed diaphragm. Has an oxygen concentrator. She wears CPAP for SARAH. No chest pain but no O2 at night with SARAH. No CP with ambulation. Allergies   Allergen Reactions    Codeine Nausea And Vomiting    Demerol Nausea And Vomiting    Erythromycin      GI upset    Mobic [Meloxicam] Swelling    Morphine And Related Nausea And Vomiting    Sulfa Antibiotics Nausea And Vomiting       Social History     Socioeconomic History    Marital status:      Spouse name: Chana Echevarria Number of children: 5    Years of education: 13    Highest education level: None   Occupational History    Occupation: [employer]   Social Needs    Financial resource strain: None    Food insecurity     Worry: None     Inability: None    Transportation needs     Medical: None     Non-medical: None   Tobacco Use    Smoking status: Former Smoker     Packs/day: 1.00     Years: 30.00     Pack years: 30.00     Quit date: 2000     Years since quittin.0    Smokeless tobacco: Never Used   Substance and Sexual Activity    Alcohol use:  Yes     Alcohol/week: 7.0 standard drinks     Types: 7 Glasses of wine per week     Comment: one glass daily    Drug use: No    Sexual activity: Yes     Partners: Male   Lifestyle    Physical activity     Days per week: None     Minutes per session: None    Stress: None   Relationships    Social connections     Talks on phone: None     Gets together: None     Attends Oriental orthodox service: None     Active member of club or organization: None     Attends meetings of clubs or organizations: None     Relationship status: None    Intimate partner violence     Fear of current or ex partner: None     Emotionally abused: None     Physically abused: None     Forced sexual activity: None   Other Topics Concern    None   Social History Narrative    Works [employer] , [address] x 25 yrs before [place]. . 4 grown daughters 39, 36, 39, 29 in 11/14. HS in [address]. Family History   Problem Relation Age of Onset    Heart Failure Father     Heart Failure Mother     Dementia Mother     Stroke Mother     Other Sister         brain aneurysm    High Cholesterol Brother     High Blood Pressure Sister     High Cholesterol Sister         has a past medical history of Brain aneurysm, CAD (coronary artery disease), COPD (chronic obstructive pulmonary disease) ([address] Hyperlipidemia, Hypertension, Hypothyroidism, Lumbar spondylosis, Lung cancer ([address] Renal cyst, Spinal stenosis of lumbar region, and Thoracic aortic aneurysm ([address] Review of Systems   Constitutional: Negative. HENT: Negative. Eyes: Negative. Respiratory: Positive for cough, shortness of breath and wheezing. Negative for apnea, choking and chest tightness. Cardiovascular: Negative for chest pain, palpitations and leg swelling. Gastrointestinal: Negative. Endocrine: Negative. Genitourinary: Negative. Musculoskeletal: Negative. Skin: Negative. Allergic/Immunologic: Negative. Neurological: Negative. Hematological: Negative. Psychiatric/Behavioral: Negative. Vitals:    01/13/21 1412   BP: 130/80   Pulse: 73   Temp: 97.6 °F (36.4 °C)       Exam unchanged from 7/11/20  Objective:   Physical Exam  Constitutional:       Appearance: She is well-developed. HENT:      Head: Normocephalic and atraumatic. Eyes:      Conjunctiva/sclera: Conjunctivae normal.      Pupils: Pupils are equal, round, and reactive to light.    Neck:      Musculoskeletal: Normal range of motion and neck supple. Cardiovascular:      Rate and Rhythm: Normal rate and regular rhythm. Heart sounds: Normal heart sounds. Pulmonary:      Breath sounds: Wheezing present. Abdominal:      General: Bowel sounds are normal.      Palpations: Abdomen is soft. Musculoskeletal: Normal range of motion. Skin:     General: Skin is warm and dry. Neurological:      Mental Status: She is alert and oriented to person, place, and time. Psychiatric:         Behavior: Behavior normal.         Thought Content: Thought content normal.         Judgment: Judgment normal.         Current Outpatient Medications   Medication Sig Dispense Refill    benazepril-hydrochlorthiazide (LOTENSIN HCT) 20-25 MG per tablet TAKE 1 TABLET BY MOUTH EVERY DAY 90 tablet 1    vitamin C (ASCORBIC ACID) 500 MG tablet Take 500 mg by mouth daily      Multiple Vitamins-Minerals (THERAPEUTIC MULTIVITAMIN-MINERALS) tablet Take 1 tablet by mouth daily      aspirin (ASPIRIN ADULT LOW DOSE) 81 MG EC tablet       levothyroxine (SYNTHROID) 137 MCG tablet TAKE 1 TABLET BY MOUTH EVERY DAY 90 tablet 1    furosemide (LASIX) 40 MG tablet Take 1 tablet by mouth daily 90 tablet 1    potassium chloride (MICRO-K) 10 MEQ extended release capsule TAKE 2 CAPSULES BY MOUTH TWICE A DAY AS NEEDED WHEN TAKING LASIX 120 capsule 1    metoprolol succinate (TOPROL XL) 50 MG extended release tablet Take 1 tablet by mouth daily 90 tablet 3    atorvastatin (LIPITOR) 80 MG tablet TAKE 1 TABLET BY MOUTH EVERY DAY 90 tablet 1    Umeclidinium Bromide (INCRUSE ELLIPTA) 62.5 MCG/INH AEPB Inhale 1 puff into the lungs daily      albuterol sulfate  (90 Base) MCG/ACT inhaler Inhale 2 puffs into the lungs every 6 hours as needed for Wheezing 3 Inhaler 0    vitamin D (CVS VIT D 5000 HIGH-POTENCY) 5000 units CAPS capsule Take 1 capsule by mouth daily Vitamin D for bones.  90 capsule 12    cyanocobalamin (CVS VITAMIN B12) 1000 MCG tablet Everyone should take OTC B12 1000 mcg twice a day for neurological health. High B12 levels help prevent dementia. 180 tablet 3    fluticasone-salmeterol (ADVAIR) 250-50 MCG/DOSE AEPB Inhale 1 puff into the lungs every 12 hours 1 Inhaler 0    acetaminophen (TYLENOL) 325 MG tablet Take 2 tablets by mouth every 4 hours as needed for Pain or Fever 120 tablet 3    esomeprazole Magnesium (NEXIUM) 40 MG PACK Take 40 mg by mouth daily       No current facility-administered medications for this visit. Vitals:    21 1412   BP: 130/80   Pulse: 73   Temp: 97.6 °F (36.4 °C)          [facility] 18  Cath Lab Report [id number] 2018  6:53 PM Miguelito Cano MD   Signed by Miguelito Cano on 18 at 1654      []Manual[]Template  []Copied                      [address]                               CARDIAC CATHETERIZATION     PATIENT NAME: Madhu Lozoya                  :        1950  MED REC NO:   [id number]                          ROOM:       [facility]  ACCOUNT NO:   [de-identified]                           ADMIT DATE: 2018  PROVIDER:     Jay Pelletier. Bill Quinones MD     DATE OF PROCEDURE:  2018     INDICATION FOR PROCEDURE:  The patient presents with chest burning for  several days. She was in her doctor's office. She called the squad, sent  her to the emergency room. EKG shows inferior and anterolateral T-wave  inversions. Chest pain and chest burning were abated in the emergency room  with rest and medication. Troponin was measured at 0.16 and 0.17  consistent with a non-ST-elevation MI. It was felt prudent to invasively  risk stratify this patient with coronary angiography.     PROCEDURE:  Prepped and draped in sterile manner, locally anesthetized with  2% lidocaine, right femoral artery area, 6-French sheath was placed in  retrograde manner, right femoral artery over guidewire.   Multiple catheters were used for diagnostic interventional procedure.     FINDINGS:  Left main coronary artery appears to be normal.  Left anterior  descending coronary artery proximally is normal.  At mid vessel, there is a  tapering 80% stenosis that leads into an aneurysmal segment. The  aneurysmal segment gives rise to two septal  arteries and then,  it leads out of the aneurysm where there is another 80% stenosis. This is  on a mild bend. It is calcified and hazy. The remainder of the LAD is  unremarkable. It is mildly diffusely diseased. The diagonal branch was  unremarkable beyond this stenosis.     The circumflex coronary artery at mid vessel has a bend in it and a 75%  hazy stenosis. The remainder of the circumflex is unremarkable. The  obtuse marginal branches beyond that are unremarkable.     JR-4 catheter would not engage the right coronary artery and 3-DRC catheter  engaged the right coronary artery. Injection showed mild narrowing  proximally. In the vertical limb of the right coronary artery, there is a  99% hazy calcific stenosis. There is NÉSTOR grade 2 flow in the right  coronary artery. This appears to be the culprit vessel. The right  posterior descending is small, but normal.  Right posterolateral branch is  small, but normal.     Pigtail catheter in the left ventricle showed an LVEDP post coronary  angiography of 15 mmHg. Power injection HARMAN projection shows LV ejection  fraction 35% with mild left ventricular enlargement qualitatively and  inferior wall hypokinesia. There was no mitral regurgitation. There was  no gradient upon pullback from the left ventricle ascending aorta.     I elected to proceed with PCI upon the right coronary artery as I felt that  it had NÉSTOR-2 flow and was the culprit lesion upfront. I was keenly aware  of the fact that this was going to be a very difficult angioplasty.   I was  also thinking that the patient will need bypass surgery with her LV  systolic dysfunction and multivessel coronary disease, but given that this  was the culprit lesion with NÉSTOR-2 flow, I felt it was most expeditious to  try and improve this with balloon angioplasty and stenting.     INTERVENTIONAL PROCEDURE:  Heparin and Integrilin were given per protocol  after consultation with  _____ over the phone. We decided that  intervention was reasonable at this juncture. The 6-Turkmen sheath was  already in place. A 6-Turkmen 3-DRC guide was used to engage the right  coronary artery. Heparin was given per protocol. A 190 BMW guidewire was  advanced with great difficulty into the distal right coronary artery. A  2.5 mm balloon would not pass. A 1.5 mm balloon would not pass. I elected  to change out and use a ChoICE PT extra support guidewire. The balloon  would not pass. I abandoned the 3-DRC 6-Turkmen guide and went with an AL-1  guide and then, I used a ChoICE PT extra support guidewire. I was able to  pass the wire. A 2.5 mm balloon would not pass. A 1.5 mm balloon did  pass. I made several inflations to 14 and then 17 atmospheres x30 and 45  seconds. The flow had improved to NÉSTOR 3 flow. I elected to try and use a  stent. I picked a 2.75 x 15 mm Jeramie drug-eluting stent. This stent would  not cross the lesion. I elected to use a 2.5 mm x 15 mm balloon. With  great difficulty, I was able to get that to cross the lesion and did  several inflations to 14 to 15 atmospheres. There was a focal dissection,  but there was NÉSTOR 3 flow. I then tried to place the stent with deep  seating of the guide and I used a BMW or[place] wire with the ChoICE PT extra  support as a rail and I still could not get the stent to traverse even with  changing the configuration of the guide while the stent was near lesion. I  could not get the stent across. I elected to conclude the procedure at  this juncture as we were there for a while and I did improve the flow.   I  still believe that the patient will benefit most from bypass surgery and I  am going to place the patient on Integrilin, heparin drip, and a  nitroglycerin drip in the intensive care unit. I have spoken to the  cardiothoracic surgeon and further evaluation will be done in the morning  as she is pain-free at this juncture with NÉSTOR grade 3 flow. There is a  residual 80% to 90% stenosis at this calcified mid right coronary lesion.     Injection of femoral sheath shows there is coiling in the right common  femoral artery. For the patient's safety and comfort, a 6-Citizen of the [place]  Angio-Seal device was deployed and hemostasis was achieved. The ACT at the  conclusion of the procedure was 298 seconds.     PLAN:  ICU management. Heparin drip 1000 units an hour and nitroglycerin  drip 40 mcg per minute. Beta blockers will be initiated and Integrilin  will continue for 15 hours pending the evaluation from CT surgery. She is  critically ill and will be maintained in the ICU with hydration to  continue.           Mabel Eason MD     D: 07/24/2018 18:53:52       T: 07/24/2018 18:58:22     [email or web address]  Job#: [id number]     Doc#: [id number]     CC:  Feli Raymond. Kellee Alamo MD         DISCHARGE SUMMARY     Patient ID: Apollo Jacob  MRN:  [id number]  YOB: 1950        Admission Date:  7/24/2018 10:20 AM  Discharge Date:  08/01/18        Principle Diagnosis:  NSTEMI (non-ST elevated myocardial infarction) [facility])     Secondary Diagnosis:  Principal Problem:    NSTEMI (non-ST elevated myocardial infarction) ([facility]  Active Problems:    Hyperlipidemia    HTN (hypertension)    Hypothyroid    Ascending aortic aneurysm    Chest pain    S/P CABG x 3    Acute postoperative pain  Resolved Problems:    * No resolved hospital problems.  *  Known Thoracic Aortic Aneurysm  Spinal stenosis  H/o lung cancer with subsequent s/p RML, RLL  Chronic hypertension  Chronic hyperlipidemia  Hypothyroidism  COPD  H/o cerebral aneurysm  PAD  GERD  Acute postoperative pain  Acute on IV nitroglycerin and Nicardipine and insulin infusions for ongoing care. She failed several spontaneous breathing trials due to increased work of breathing and tachypnea. A pulmonary consult was obtained to assist with her acute respiratory failure. She was aggressively diuresed and started on Precedex. She was able to be extubated thirty hours postoperatively. She transitioned to progressive care on the second postoperative day. She went into atrial fib with a rapid ventricular rate on the third postop day. She started on an Amiodarone protocol and converted back to sinus rhythm two hours later. PT/OT were consulted to assist with discharge. She was started on PRN Xanax and Ativan as she would get anxiety during the night and become tachypneic. She stated she has had this in the past and trying to do her exercise she learned in pulmonary rehab. She also has been having insomnia the past couple of nights and may need an medication for insomnia. She was discharged on the sixth postop day to Acute Rehab.         Assessment:       Diagnosis Orders   1. Coronary artery disease involving native coronary artery of native heart without angina pectoris     2. Mixed hyperlipidemia     3. Essential hypertension              Plan:   HLP-Liptior , lipid panel 2020 LDL 86  HTN-Controlled benazepril-hydrochlorthiazide, metoprolol   CAD- Lipitor,ASA and controlled. No chest pain  CABG- metoprolol and stable. AAA- CT 10/30/18 The ascending thoracic aorta is dilated to 4.2 cm in diameter. Stable  Yearly   COPD:  Supplemental O2 and inhalers. May have p HTN and wears O2 with mask and activity. ldl 39. Continue current medications. Stable cardiovascular status.

## 2021-05-18 ENCOUNTER — OFFICE VISIT (OUTPATIENT)
Dept: PRIMARY CARE CLINIC | Age: 71
End: 2021-05-18
Payer: MEDICARE

## 2021-05-18 DIAGNOSIS — Z01.818 PRE-OP EXAMINATION: Primary | ICD-10-CM

## 2021-05-18 LAB — SARS-COV-2: NOT DETECTED

## 2021-05-18 PROCEDURE — 99211 OFF/OP EST MAY X REQ PHY/QHP: CPT | Performed by: NURSE PRACTITIONER

## 2021-05-24 ENCOUNTER — HOSPITAL ENCOUNTER (OUTPATIENT)
Age: 71
Setting detail: OUTPATIENT SURGERY
Discharge: HOME | End: 2021-05-24
Attending: INTERNAL MEDICINE | Admitting: INTERNAL MEDICINE
Payer: MEDICARE

## 2021-05-24 VITALS
TEMPERATURE: 98.8 F | DIASTOLIC BLOOD PRESSURE: 65 MMHG | OXYGEN SATURATION: 95 % | SYSTOLIC BLOOD PRESSURE: 125 MMHG | WEIGHT: 170 LBS | BODY MASS INDEX: 31.28 KG/M2 | HEART RATE: 52 BPM | HEIGHT: 62 IN | RESPIRATION RATE: 16 BRPM

## 2021-05-24 DIAGNOSIS — Z86.010 HX OF COLONIC POLYPS: ICD-10-CM

## 2021-05-24 PROCEDURE — 7100000011 HC PHASE II RECOVERY - ADDTL 15 MIN: Performed by: INTERNAL MEDICINE

## 2021-05-24 PROCEDURE — 2709999900 HC NON-CHARGEABLE SUPPLY: Performed by: INTERNAL MEDICINE

## 2021-05-24 PROCEDURE — 3609010600 HC COLONOSCOPY POLYPECTOMY SNARE/COLD BIOPSY: Performed by: INTERNAL MEDICINE

## 2021-05-24 PROCEDURE — 2580000003 HC RX 258: Performed by: INTERNAL MEDICINE

## 2021-05-24 PROCEDURE — 6360000002 HC RX W HCPCS: Performed by: INTERNAL MEDICINE

## 2021-05-24 PROCEDURE — 99153 MOD SED SAME PHYS/QHP EA: CPT | Performed by: INTERNAL MEDICINE

## 2021-05-24 PROCEDURE — 99152 MOD SED SAME PHYS/QHP 5/>YRS: CPT | Performed by: INTERNAL MEDICINE

## 2021-05-24 PROCEDURE — 7100000010 HC PHASE II RECOVERY - FIRST 15 MIN: Performed by: INTERNAL MEDICINE

## 2021-05-24 PROCEDURE — 88305 TISSUE EXAM BY PATHOLOGIST: CPT

## 2021-05-24 RX ORDER — FENTANYL CITRATE 50 UG/ML
INJECTION, SOLUTION INTRAMUSCULAR; INTRAVENOUS PRN
Status: DISCONTINUED | OUTPATIENT
Start: 2021-05-24 | End: 2021-05-24 | Stop reason: ALTCHOICE

## 2021-05-24 RX ORDER — IBUPROFEN 200 MG
200 TABLET ORAL EVERY 6 HOURS PRN
COMMUNITY

## 2021-05-24 RX ORDER — SODIUM CHLORIDE 9 MG/ML
INJECTION, SOLUTION INTRAVENOUS CONTINUOUS
Status: DISCONTINUED | OUTPATIENT
Start: 2021-05-24 | End: 2021-05-24 | Stop reason: HOSPADM

## 2021-05-24 RX ORDER — MIDAZOLAM HYDROCHLORIDE 1 MG/ML
INJECTION INTRAMUSCULAR; INTRAVENOUS PRN
Status: DISCONTINUED | OUTPATIENT
Start: 2021-05-24 | End: 2021-05-24 | Stop reason: ALTCHOICE

## 2021-05-24 RX ADMIN — SODIUM CHLORIDE: 9 INJECTION, SOLUTION INTRAVENOUS at 10:04

## 2021-05-24 ASSESSMENT — PULMONARY FUNCTION TESTS: PIF_VALUE: 1

## 2021-05-24 ASSESSMENT — PAIN - FUNCTIONAL ASSESSMENT: PAIN_FUNCTIONAL_ASSESSMENT: 0-10

## 2021-05-24 NOTE — BRIEF OP NOTE
Brief Postoperative Note      Patient: Sonia Landin  YOB: 1950  MRN: [id number]    Date of Procedure: 5/24/2021    Pre-Op Diagnosis: Hx of colonic polyps [Z86.010]    Post-Op Diagnosis: Same       Procedure(s):  COLONOSCOPY POLYPECTOMY SNARE/COLD BIOPSY    Surgeon(s):  Suze Alamo MD    Assistant:  * No surgical staff found *    Anesthesia: IV Sedation    Estimated Blood Loss (mL): Minimal    Complications: None    Specimens:   ID Type Source Tests Collected by Time Destination   A : BX FORCEP  REMOVAL SIGMOID COLON POLYP Tissue Colon SURGICAL PATHOLOGY Suze Alamo MD 5/24/2021 1130        Implants:  * No implants in log *      Drains: * No LDAs found *    Findings: polyp    Electronically signed by Ligia Shaikh MD on 5/24/2021 at 11:35 AM

## 2021-05-24 NOTE — H&P
History and Physical / Pre-Sedation Assessment    Michelle Anderson is a 65 y.o. female who presents today for colonoscopy procedure. PMHx:    Past Medical History:   Diagnosis Date    Brain aneurysm     on right very small    CAD (coronary artery disease) 07/24/2018    NSTEMI    COPD (chronic obstructive pulmonary disease) (HCC)     Hyperlipidemia     Hypertension     Hypothyroidism     Lumbar spondylosis     per MRI done 5/23/2013 - Dr. Iesha Franco [facility]) 2004    and then RLL 3.8cm adenoCa 2004. RML 2.6cm adenoCa -2010.  Paralysis of diaphragm     partial on left from open heart surgery    PONV (postoperative nausea and vomiting)     Renal cyst 10/27/2015    Spinal stenosis of lumbar region     per MRI done 5/23/2013    Thoracic aortic aneurysm (HCC)     4 cm       Medications:    Prior to Admission medications    Medication Sig Start Date End Date Taking?  Authorizing Provider   UNKNOWN TO PATIENT Inhale 2 puffs into the lungs 2 times daily New Inhaler starts with a b   Yes Historical Provider, MD   ibuprofen (ADVIL;MOTRIN) 200 MG tablet Take 200 mg by mouth every 6 hours as needed for Pain 4 tabs   Yes Historical Provider, MD   benazepril-hydrochlorthiazide (LOTENSIN HCT) 20-25 MG per tablet TAKE 1 [place]  Patient taking differently: TAKE 1 TABLET BY MOUTH EVERY DAY- takes 1/2 in a.m. and 1/2 in p.m. 10/26/20  Yes NICOLÁS Hills - CNP   vitamin C (ASCORBIC ACID) 500 MG tablet Take 500 mg by mouth daily   Yes Historical Provider, MD   Multiple Vitamins-Minerals (THERAPEUTIC MULTIVITAMIN-MINERALS) tablet Take 1 tablet by mouth daily   Yes Historical Provider, MD   levothyroxine (SYNTHROID) 137 MCG tablet TAKE 1 TABLET BY MOUTH EVERY DAY 9/21/20  Yes Flavio Carbajal MD   potassium chloride (MICRO-K) 10 MEQ extended release capsule TAKE 2 CAPSULES BY MOUTH TWICE A DAY AS NEEDED WHEN TAKING LASIX 7/17/20  Yes Flavio Carbajal MD   metoprolol succinate (TOPROL XL) 50 MG extended release tablet Take 1 tablet by mouth daily  Patient taking differently: Take 50 mg by mouth daily Takes 1/2 in a.m. and 1/2 in p.m. 7/6/20  Yes NICOLÁS Perry CNP   atorvastatin (LIPITOR) 80 MG tablet TAKE 1 TABLET BY MOUTH EVERY DAY 6/19/20  Yes Oleksandr Dang MD   vitamin D (CVS VIT D 5000 HIGH-POTENCY) 5000 units CAPS capsule Take 1 capsule by mouth daily Vitamin D for bones. 8/10/18  Yes Maria Ines Delong MD   cyanocobalamin (CVS VITAMIN B12) 1000 MCG tablet Everyone should take OTC B12 1000 mcg twice a day for neurological health. High B12 levels help prevent dementia. 8/10/18  Yes Maria Ines Delong MD   acetaminophen (TYLENOL) 325 MG tablet Take 2 tablets by mouth every 4 hours as needed for Pain or Fever 8/1/18  Yes NICOLÁS Garcia - CNP   esomeprazole Magnesium (NEXIUM) 40 MG PACK Take 40 mg by mouth daily   Yes Historical Provider, MD   aspirin (ASPIRIN ADULT LOW DOSE) 81 MG EC tablet     Historical Provider, MD   furosemide (LASIX) 40 MG tablet Take 1 tablet by mouth daily  Patient taking differently: Take 40 mg by mouth daily as needed  9/1/20   Oleksandr Dang MD   albuterol sulfate  (90 Base) MCG/ACT inhaler Inhale 2 puffs into the lungs every 6 hours as needed for Wheezing 8/10/18   Maria Ines Delong MD       Allergies: Allergies   Allergen Reactions    Codeine Nausea And Vomiting    Demerol Nausea And Vomiting    Erythromycin      GI upset    Mobic [Meloxicam] Swelling    Morphine And Related Nausea And Vomiting    Sulfa Antibiotics Nausea And Vomiting       PSHx:    Past Surgical History:   Procedure Laterality Date    BACK SURGERY  11/2013    5th vert sciatic nerve release    BREAST LUMPECTOMY Bilateral 2000    CARPAL TUNNEL RELEASE Bilateral 12/22/2016    COLONOSCOPY  2010    COLONOSCOPY N/A 9/16/2019    COLONOSCOPY WITH BIOPSY performed by Valentine Whitt MD at [address] [place] [address] 151  11/19/2014    Dr. Leola Devi. Michelle Arrieta COLPORRHAPHY  2014    A&P- Dr. Em Sloan. [place], VAGINAL  2014    Dr. Em Sloan. [place] INGUINAL HERNIA REPAIR Bilateral     LUMBAR SPINE SURGERY      L5    LUNG REMOVAL, PARTIAL Right 2010    RML lobectomy in 2010, & RLL lobectomy in 2004    AK CABG, VEIN, SINGLE N/A 2018    Dr. Avery Bonner. Guillory - urgent x3 (LIMA-LAD, R SVG-D1, SVG-RCA)    SALPINGO-OOPHORECTOMY Bilateral 2014    Dr. Em Sloan. [place] TOTAL HIP ARTHROPLASTY Right 2014    Dr. 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   Tobacco Use    Smoking status: Former Smoker     Packs/day: 1.00     Years: 30.00     Pack years: 30.00     Types: Cigarettes     Start date: 1961     Quit date: 2000     Years since quittin.4    Smokeless tobacco: Never Used   Vaping Use    Vaping Use: Never used   Substance and Sexual Activity    Alcohol use: Yes     Alcohol/week: 7.0 standard drinks     Types: 7 Glasses of wine per week     Comment: one glass daily    Drug use: No    Sexual activity: Yes     Partners: Male   Other Topics Concern    Not on file   Social History Narrative    Works [employer] , [address] x 25 yrs before [place]. . 4 grown daughters 39, 36, 39, 29 in . [facility] [address] Social Determinants of Health     Financial Resource Strain:     Difficulty of Paying Living Expenses:    Food Insecurity:     Worried About Running Out of Food in the Last Year:     [address] in the Last Year:    Transportation Needs:     Lack of Transportation (Medical):      Lack of Transportation (Non-Medical):    Physical Activity:     Days of Exercise per Week:     Minutes of Exercise per Session:    Stress:     Feeling of Stress :    Social Connections:     Frequency of Communication with Friends and Family:     Frequency of Social Gatherings with Friends and Family:     Attends Mormon Services:     Active Member of Clubs or Organizations:     Attends Club or Organization Meetings:     Marital Status:    Intimate Partner Violence:     Fear of Current or Ex-Partner:     Emotionally Abused:     Physically Abused:     Sexually Abused:        Family Hx:   Family History   Problem Relation Age of Onset    Heart Failure Father     Heart Failure Mother     Dementia Mother     Stroke Mother     Other Sister         brain aneurysm    High Cholesterol Brother     High Blood Pressure Sister     High Cholesterol Sister        Physical Exam:  Vital Signs: /68   Pulse 67   Temp 98.8 °F (37.1 °C) (Temporal)   Resp 16   Ht 5' 2\" (1.575 m)   Wt 170 lb (77.1 kg)   SpO2 97%   BMI 31.09 kg/m²    Pulmonary: Normal  Cardiac: Normal  Abdomen: Normal    Pre-Procedure Assessment / Plan:  ASA Classification: Class 2 - A normal healthy patient with mild systemic disease  Level of Sedation Plan: Moderate sedation   Mallampati Score:  I (soft palate, uvula, fauces, tonsillar pillars visible)  Post Procedure plan: Return to same level of care    Colonoscopy Interval History:  3 or more years since last colonoscopy, Less than 3 years since the patient's last colonoscopy due to medical reasons and Less than 3 years since the patient's last colonoscopy due to system reasons    Medical Reason for Colonoscopy before 3 years last colonoscopy incomplete, last colonoscopy had inadequate prep, piecemeal removal of adenomas and last colonoscopy found greater than 10 adenomas  System Reasons for Colonoscopy before 3 years:   previous colonoscopy report unavailable or unable to locate    I assessed the patient and find that the patient is in satisfactory condition to proceed with the planned procedure and sedation plan. Risks/benefits/alternatives of procedure discussed with patient and any present family members. Risks including, but not limited to: bleeding, perforation, post polypectomy syndrome, splenic injury, need for additional procedures or surgery, risks of anesthesia. Patient understands it is their responsibility to call office for pathology results if they do not hear from my office within 1-2 weeks. All questions answered.     Hervey Dubin, MD  5/24/2021

## 2021-05-24 NOTE — PROGRESS NOTES
Discharge instructions reviewed with patient/responsible adult and understanding verbalized. Discharge instructions signed and copies given. Patient to be discharged home with belongings.  at bedside.

## 2021-06-02 ENCOUNTER — OFFICE VISIT (OUTPATIENT)
Dept: CARDIOLOGY CLINIC | Age: 71
End: 2021-06-02
Payer: MEDICARE

## 2021-06-02 VITALS
BODY MASS INDEX: 32.3 KG/M2 | SYSTOLIC BLOOD PRESSURE: 122 MMHG | DIASTOLIC BLOOD PRESSURE: 68 MMHG | WEIGHT: 176.6 LBS | HEART RATE: 72 BPM

## 2021-06-02 DIAGNOSIS — I25.10 CORONARY ARTERY DISEASE INVOLVING NATIVE CORONARY ARTERY OF NATIVE HEART WITHOUT ANGINA PECTORIS: Primary | ICD-10-CM

## 2021-06-02 DIAGNOSIS — J41.0 SIMPLE CHRONIC BRONCHITIS (HCC): ICD-10-CM

## 2021-06-02 DIAGNOSIS — I10 HTN (HYPERTENSION), BENIGN: ICD-10-CM

## 2021-06-02 DIAGNOSIS — E78.5 HYPERLIPIDEMIA, UNSPECIFIED HYPERLIPIDEMIA TYPE: ICD-10-CM

## 2021-06-02 PROCEDURE — 1036F TOBACCO NON-USER: CPT | Performed by: INTERNAL MEDICINE

## 2021-06-02 PROCEDURE — 4040F PNEUMOC VAC/ADMIN/RCVD: CPT | Performed by: INTERNAL MEDICINE

## 2021-06-02 PROCEDURE — 1123F ACP DISCUSS/DSCN MKR DOCD: CPT | Performed by: INTERNAL MEDICINE

## 2021-06-02 PROCEDURE — 3023F SPIROM DOC REV: CPT | Performed by: INTERNAL MEDICINE

## 2021-06-02 PROCEDURE — G8399 PT W/DXA RESULTS DOCUMENT: HCPCS | Performed by: INTERNAL MEDICINE

## 2021-06-02 PROCEDURE — G8417 CALC BMI ABV UP PARAM F/U: HCPCS | Performed by: INTERNAL MEDICINE

## 2021-06-02 PROCEDURE — G8926 SPIRO NO PERF OR DOC: HCPCS | Performed by: INTERNAL MEDICINE

## 2021-06-02 PROCEDURE — G8427 DOCREV CUR MEDS BY ELIG CLIN: HCPCS | Performed by: INTERNAL MEDICINE

## 2021-06-02 PROCEDURE — 1090F PRES/ABSN URINE INCON ASSESS: CPT | Performed by: INTERNAL MEDICINE

## 2021-06-02 PROCEDURE — 99214 OFFICE O/P EST MOD 30 MIN: CPT | Performed by: INTERNAL MEDICINE

## 2021-06-02 PROCEDURE — 3017F COLORECTAL CA SCREEN DOC REV: CPT | Performed by: INTERNAL MEDICINE

## 2021-06-02 ASSESSMENT — ENCOUNTER SYMPTOMS
CHEST TIGHTNESS: 0
APNEA: 0
ALLERGIC/IMMUNOLOGIC NEGATIVE: 1
WHEEZING: 1
EYES NEGATIVE: 1
CHOKING: 0
SHORTNESS OF BREATH: 1
COUGH: 1
GASTROINTESTINAL NEGATIVE: 1

## 2021-06-02 NOTE — PROGRESS NOTES
Subjective:      Patient ID: Yahaira Zhao is a 65 y.o. female    CC: CAD, HTN and CABG X 3    HPI:  Brian Fung is a 64 yo woman with HX of AAA, CAD, NSTEMI s/p CABG x 3 on 2018,  HTN, HLP, COPD, lung cancer s/p RLL and RML resection in  and  (no chemo or radiation, now cancer-free), brain aneurysm (R ICA clinoid segment 0.2 x 0.2 cm). She is here today for 6 month follow up. She has a left paralyzed diaphragm. Has an oxygen concentrator. She wears CPAP for SARAH. No chest pain but no O2 at night with SARAH. No CP with ambulation. Allergies   Allergen Reactions    Codeine Nausea And Vomiting    Demerol Nausea And Vomiting    Erythromycin      GI upset    Mobic [Meloxicam] Swelling    Morphine And Related Nausea And Vomiting    Sulfa Antibiotics Nausea And Vomiting       Social History     Socioeconomic History    Marital status:      Spouse name: Priti Harmon Number of children: 11    Years of education: 15    Highest education level: None   Occupational History    Occupation:  [employer]   Tobacco Use    Smoking status: Former Smoker     Packs/day: 1.00     Years: 30.00     Pack years: 30.00     Types: Cigarettes     Start date: 1961     Quit date: 2000     Years since quittin.4    Smokeless tobacco: Never Used   Vaping Use    Vaping Use: Never used   Substance and Sexual Activity    Alcohol use: Yes     Alcohol/week: 7.0 standard drinks     Types: 7 Glasses of wine per week     Comment: one glass daily    Drug use: No    Sexual activity: Yes     Partners: Male   Other Topics Concern    None   Social History Narrative    Works [employer] manager, [address]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 . 4 grown daughters 39, 36, 39, 29 in . [facility] [address]      Social Determinants of Health     Financial Resource Strain:     Difficulty of Paying Living Expenses:    Food Insecurity:     Worried About Running Out of Food in the Last Year:     Ran Out of Food in the Last Year:    Transportation Needs:     Lack of Transportation (Medical):  Lack of Transportation (Non-Medical):    Physical Activity:     Days of Exercise per Week:     Minutes of Exercise per Session:    Stress:     Feeling of Stress :    Social Connections:     Frequency of Communication with Friends and Family:     Frequency of Social Gatherings with Friends and Family:     Attends Baptism Services:     Active Member of Clubs or Organizations:     Attends Club or Organization Meetings:     Marital Status:    Intimate Partner Violence:     Fear of Current or Ex-Partner:     Emotionally Abused:     Physically Abused:     Sexually Abused:        Family History   Problem Relation Age of Onset    Heart Failure Father     Heart Failure Mother     Dementia Mother     Stroke Mother     Other Sister         brain aneurysm    High Cholesterol Brother     High Blood Pressure Sister     High Cholesterol Sister         has a past medical history of Brain aneurysm, CAD (coronary artery disease), COPD (chronic obstructive pulmonary disease) ([facility] [address] Hyperlipidemia, Hypertension, Hypothyroidism, Lumbar spondylosis, Lung cancer ([facility] [address] Paralysis of diaphragm, PONV (postoperative nausea and vomiting), Renal cyst, Spinal stenosis of lumbar region, and Thoracic aortic aneurysm ([facility] [address] Review of Systems   Constitutional: Negative. HENT: Negative. Eyes: Negative. Respiratory: Positive for cough, shortness of breath and wheezing. Negative for apnea, choking and chest tightness. Cardiovascular: Negative for chest pain, palpitations and leg swelling. Gastrointestinal: Negative. Endocrine: Negative. Genitourinary: Negative. Musculoskeletal: Negative. Skin: Negative. Allergic/Immunologic: Negative. Neurological: Negative. Hematological: Negative. Psychiatric/Behavioral: Negative.       Vitals:    06/02/21 1313   BP: 122/68   Pulse: 72 Exam unchanged from 7/11/20  Objective:   Physical Exam  Constitutional:       Appearance: She is well-developed. HENT:      Head: Normocephalic and atraumatic. Eyes:      Conjunctiva/sclera: Conjunctivae normal.      Pupils: Pupils are equal, round, and reactive to light. Cardiovascular:      Rate and Rhythm: Normal rate and regular rhythm. Heart sounds: Normal heart sounds. Pulmonary:      Breath sounds: Wheezing present. Abdominal:      General: Bowel sounds are normal.      Palpations: Abdomen is soft. Musculoskeletal:         General: Normal range of motion. Cervical back: Normal range of motion and neck supple. Skin:     General: Skin is warm and dry. Neurological:      Mental Status: She is alert and oriented to person, place, and time. Psychiatric:         Behavior: Behavior normal.         Thought Content:  Thought content normal.         Judgment: Judgment normal.         Current Outpatient Medications   Medication Sig Dispense Refill    UNKNOWN TO PATIENT Inhale 2 puffs into the lungs 2 times daily New Inhaler starts with a b      ibuprofen (ADVIL;MOTRIN) 200 MG tablet Take 200 mg by mouth every 6 hours as needed for Pain 4 tabs      benazepril-hydrochlorthiazide (LOTENSIN HCT) 20-25 MG per tablet TAKE 1 TABLET BY MOUTH EVERY DAY (Patient taking differently: TAKE 1 TABLET BY MOUTH EVERY DAY- takes 1/2 in a.m. and 1/2 in p.m.) 90 tablet 1    vitamin C (ASCORBIC ACID) 500 MG tablet Take 500 mg by mouth daily      Multiple Vitamins-Minerals (THERAPEUTIC MULTIVITAMIN-MINERALS) tablet Take 1 tablet by mouth daily      aspirin (ASPIRIN ADULT LOW DOSE) 81 MG EC tablet       levothyroxine (SYNTHROID) 137 MCG tablet TAKE 1 TABLET BY MOUTH EVERY DAY 90 tablet 1    furosemide (LASIX) 40 MG tablet Take 1 tablet by mouth daily (Patient taking differently: Take 40 mg by mouth daily as needed ) 90 tablet 1    potassium chloride (MICRO-K) 10 MEQ extended release capsule TAKE 2 CAPSULES BY MOUTH TWICE A DAY AS NEEDED WHEN TAKING LASIX 120 capsule 1    metoprolol succinate (TOPROL XL) 50 MG extended release tablet Take 1 tablet by mouth daily (Patient taking differently: Take 50 mg by mouth daily Takes 1/2 in a.m. and 1/2 in p.m.) 90 tablet 3    atorvastatin (LIPITOR) 80 MG tablet TAKE 1 TABLET BY MOUTH EVERY DAY 90 tablet 1    albuterol sulfate  (90 Base) MCG/ACT inhaler Inhale 2 puffs into the lungs every 6 hours as needed for Wheezing 3 Inhaler 0    vitamin D (CVS VIT D 5000 HIGH-POTENCY) 5000 units CAPS capsule Take 1 capsule by mouth daily Vitamin D for bones. 90 capsule 12    cyanocobalamin (CVS VITAMIN B12) 1000 MCG tablet Everyone should take OTC B12 1000 mcg twice a day for neurological health. High B12 levels help prevent dementia. 180 tablet 3    acetaminophen (TYLENOL) 325 MG tablet Take 2 tablets by mouth every 4 hours as needed for Pain or Fever 120 tablet 3    esomeprazole Magnesium (NEXIUM) 40 MG PACK Take 40 mg by mouth daily       No current facility-administered medications for this visit. Vitals:    21 1313   BP: 122/68   Pulse: 72          [facility] 18  Cath Lab Report [id number] 2018  6:53 PM Rossi Catalan MD   Signed by Rossi Catalan on 18 at 1654      []Manual[]Template  []Copied                      [facility]                 [address] [place] [address]                               CARDIAC CATHETERIZATION     PATIENT NAME: Lorena Chopra                  :        1950  MED REC NO:   [id number]                          ROOM:       [facility]  ACCOUNT NO:   [de-identified]                           ADMIT DATE: 2018  PROVIDER:     Tricia Hanson. Rad Zhang MD     DATE OF PROCEDURE:  2018     INDICATION FOR PROCEDURE:  The patient presents with chest burning for  several days. She was in her doctor's office. She called the squad, sent  her to the emergency room.   EKG shows inferior and anterolateral T-wave  inversions. Chest pain and chest burning were abated in the emergency room  with rest and medication. Troponin was measured at 0.16 and 0.17  consistent with a non-ST-elevation MI. It was felt prudent to invasively  risk stratify this patient with coronary angiography.     PROCEDURE:  Prepped and draped in sterile manner, locally anesthetized with  2% lidocaine, right femoral artery area, 6-Stateless sheath was placed in  retrograde manner, right femoral artery over guidewire. Multiple catheters  were used for diagnostic interventional procedure.     FINDINGS:  Left main coronary artery appears to be normal.  Left anterior  descending coronary artery proximally is normal.  At mid vessel, there is a  tapering 80% stenosis that leads into an aneurysmal segment. The  aneurysmal segment gives rise to two septal  arteries and then,  it leads out of the aneurysm where there is another 80% stenosis. This is  on a mild bend. It is calcified and hazy. The remainder of the LAD is  unremarkable. It is mildly diffusely diseased. The diagonal branch was  unremarkable beyond this stenosis.     The circumflex coronary artery at mid vessel has a bend in it and a 75%  hazy stenosis. The remainder of the circumflex is unremarkable. The  obtuse marginal branches beyond that are unremarkable.     JR-4 catheter would not engage the right coronary artery and 3-DRC catheter  engaged the right coronary artery. Injection showed mild narrowing  proximally. In the vertical limb of the right coronary artery, there is a  99% hazy calcific stenosis. There is NÉSTOR grade 2 flow in the right  coronary artery. This appears to be the culprit vessel. The right  posterior descending is small, but normal.  Right posterolateral branch is  small, but normal.     Pigtail catheter in the left ventricle showed an LVEDP post coronary  angiography of 15 mmHg.   Power injection HARMAN projection shows LV to 15 atmospheres. There was a focal dissection,  but there was NÉSTOR 3 flow. I then tried to place the stent with deep  seating of the guide and I used a BMW orlando wire with the [employer] PT extra  support as a rail and I still could not get the stent to traverse even with  changing the configuration of the guide while the stent was near lesion. I  could not get the stent across. I elected to conclude the procedure at  this juncture as we were there for a while and I did improve the flow. I  still believe that the patient will benefit most from bypass surgery and I  am going to place the patient on Integrilin, heparin drip, and a  nitroglycerin drip in the intensive care unit. I have spoken to the  cardiothoracic surgeon and further evaluation will be done in the morning  as she is pain-free at this juncture with NÉSTOR grade 3 flow. There is a  residual 80% to 90% stenosis at this calcified mid right coronary lesion.     Injection of femoral sheath shows there is coiling in the right common  femoral artery. For the patient's safety and comfort, a 6-Micronesian  Angio-Seal device was deployed and hemostasis was achieved. The ACT at the  conclusion of the procedure was 298 seconds.     PLAN:  ICU management. Heparin drip 1000 units an hour and nitroglycerin  drip 40 mcg per minute. Beta blockers will be initiated and Integrilin  will continue for 15 hours pending the evaluation from CT surgery. She is  critically ill and will be maintained in the ICU with hydration to  continue.           Jose Bennett MD     D: 07/24/2018 18:53:52       T: 07/24/2018 18:58:22     [email or web address]  Job#: [id number]     Doc#: [id number]     CC:  Dia Stanley MD         DISCHARGE SUMMARY     Patient ID: Rita Jonas  MRN:  [id number]  YOB: 1950        Admission Date:  7/24/2018 10:20 AM  Discharge Date:  08/01/18        Principle Diagnosis:  NSTEMI (non-ST elevated myocardial infarction) [facility])     Secondary diameter and was probed patent distally. On the anterior wall, the LAD was very small distally. It was therefore dissected out of the epicardial fat in the midportion.  It was grafted just beyond a visible plaque. The vessel was about 2.5 mm in diameter.  On the lateral wall, there was a single obtuse marginal branch about 2 mm in diameter. This was grafted again beyond some visible mural disease. The completion ELVIRA confirmed continued good ventricular function. Her operative course was uncomplicated and she transferred to ICU on IV nitroglycerin and Nicardipine and insulin infusions for ongoing care. She failed several spontaneous breathing trials due to increased work of breathing and tachypnea. A pulmonary consult was obtained to assist with her acute respiratory failure. She was aggressively diuresed and started on Precedex. She was able to be extubated thirty hours postoperatively. She transitioned to progressive care on the second postoperative day. She went into atrial fib with a rapid ventricular rate on the third postop day. She started on an Amiodarone protocol and converted back to sinus rhythm two hours later. PT/OT were consulted to assist with discharge. She was started on PRN Xanax and Ativan as she would get anxiety during the night and become tachypneic. She stated she has had this in the past and trying to do her exercise she learned in pulmonary rehab. She also has been having insomnia the past couple of nights and may need an medication for insomnia. She was discharged on the sixth postop day to Acute Rehab.         Assessment:       Diagnosis Orders   1. Coronary artery disease involving native coronary artery of native heart without angina pectoris     2. HTN (hypertension), benign     3. Hyperlipidemia, unspecified hyperlipidemia type     4.  Simple chronic bronchitis (HCC)              Plan:   HLP-Liptior , lipid panel 2020 LDL 86  HTN-Controlled benazepril-hydrochlorthiazide, metoprolol CAD- Lipitor,ASA and controlled. No chest pain  CABG- metoprolol and stable. AAA- CT 10/30/18 The ascending thoracic aorta is dilated to 4.5 cm in diameter. Stable  Yearly   COPD:  Supplemental O2 and inhalers. May have p HTN and wears O2 with mask and activity. ldl 39. Continue current medications. Stable cardiovascular status.

## 2021-09-09 ENCOUNTER — TELEPHONE (OUTPATIENT)
Dept: CARDIOLOGY CLINIC | Age: 71
End: 2021-09-09

## 2021-09-09 NOTE — TELEPHONE ENCOUNTER
The patient called requesting that Dr. Mary Puckett take a look at her CT scan she had done at [facility], located in [facility]. Please call the patient back at [phone] to advise.

## 2023-08-29 NOTE — PROGRESS NOTES
PM Olumiant Counseling: I discussed with the patient the risks of Olumiant therapy including but not limited to upper respiratory tract infections, shingles, cold sores, and nausea. Live vaccines should be avoided.  This medication has been linked to serious infections; higher rate of mortality; malignancy and lymphoproliferative disorders; major adverse cardiovascular events; thrombosis; gastrointestinal perforations; neutropenia; lymphopenia; anemia; liver enzyme elevations; and lipid elevations.
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
## 2025-02-17 ENCOUNTER — TELEPHONE (OUTPATIENT)
Dept: SURGERY | Age: 75
End: 2025-02-17

## 2025-02-17 NOTE — TELEPHONE ENCOUNTER
Spoke to patient to schedule office visit with Dr. Lu-Referral from Dr. Haro for hemorrhoids. Patient is not currently interested in office visit as she prefers the [facility]. She was given our office number and instructed to call back if she changes her mind.

## (undated) DEVICE — YANKAUER SUCTION INSTRUMENT WITHOUT CONTROL VENT, OPEN TIP, CLEAR: Brand: YANKAUER

## (undated) DEVICE — SUTURE NONABSORBABLE MONOFILAMENT 4-0 RB-1 36 IN BLU PROLENE 8557H

## (undated) DEVICE — SOLUTION IV SODIUM CHL 0.9% 500 ML INJ FLX CONTAINER

## (undated) DEVICE — SHEET,DRAPE,40X58,STERILE: Brand: MEDLINE

## (undated) DEVICE — PROCEDURE KIT COMP 5Y10R8

## (undated) DEVICE — SOLUTION IV CARDPLG PLEGISOL

## (undated) DEVICE — SUTURE S STL SZ 6 L18IN NONABSORBABLE SIL L48MM V-40 1/2 M649G

## (undated) DEVICE — SUTURE NONABSORBABLE MONOFILAMENT 5-0 C-1 1X24 IN PROLENE 8725H

## (undated) DEVICE — FOGARTY - HYDRAGRIP SURGICAL - CLAMP INSERTS: Brand: FOGARTY SOFTJAW

## (undated) DEVICE — NO USE 18 MONTHS GOWN XL IMPERV REINF ST ECLIPSE DISP

## (undated) DEVICE — SUTURE VCRL SZ 0 L27IN ABSRB UD L36MM CT-1 1/2 CIR J260H

## (undated) DEVICE — CANNULA ART 21FR L14IN VENT 3/8IN CONN SFT FLO ANG TIP W/

## (undated) DEVICE — Device: Brand: MEDEX

## (undated) DEVICE — CANNULA PERF 7FR L5.5IN AORT ROOT RADPQ STD TIP W/ VENT LN

## (undated) DEVICE — SUTURE ABSORBABLE BRAIDED 2-0 CT-1 27 IN UD VICRYL J259H

## (undated) DEVICE — TURNOVER KIT RM INF CTRL TECH

## (undated) DEVICE — FOGARTY SPRING CLIPS 6MM: Brand: FOGARTY SOFTJAW

## (undated) DEVICE — LAPAROSCOPIC VESSEL HARVESTING GAS CONDITIONING DEVICE: Brand: VESSEL GUARDIAN

## (undated) DEVICE — CONNECTOR PERF W3/8XH0.5IN BASE Y SHP REDUC W/O LUERLOCK

## (undated) DEVICE — SWAN-GANZ CCOMBO THERMODILUTION CATHETER: Brand: SWAN-GANZ CCOMBO

## (undated) DEVICE — Z INACTIVE USE 2582933 BAG BLD TRNSF 1 CPLR IV ST 600ML TERUFLEX

## (undated) DEVICE — BLOOD TRANSFUSION FILTER: Brand: HAEMONETICS

## (undated) DEVICE — BAG INFUSION PRESSURE 1000 CC THMB WHL AIR CTRL DISP

## (undated) DEVICE — COVER LT HNDL BLU PLAS

## (undated) DEVICE — PUNCH AORT DIA4MM LNG HNDL

## (undated) DEVICE — NO USE 18 MONTHS PACKS BASIC 120194C

## (undated) DEVICE — COVER US PRB W15XL120CM W/ GEL RUBBERBAND TAPE STRP FLD GEN

## (undated) DEVICE — KIT PROC 2 SPRY APPL 11:1

## (undated) DEVICE — SUTURE VCRL SZ 3-0 L36IN ABSRB UD L36MM CT-1 1/2 CIR J944H

## (undated) DEVICE — BLADE OPHTH 180DEG CUT SURF BLU STR SHRP DBL BVL GRINDLESS

## (undated) DEVICE — SET PROC STD VOL BOWL SUCT ASMBLY GS FLTR BLD COLLCTN RESVR

## (undated) DEVICE — CONNECTOR PERF 3/8X3/8X3/8IN EQL WYE

## (undated) DEVICE — CONNECTOR PERF L5 IN OD1 2 IN SAT HCT ST FEATURING B CARE 5

## (undated) DEVICE — Z INACTIVE OBSOLETE PER MEDTRONIC ADAPTER Y TYP RECIRCULATING FEM LUER CLMP W  CLR CODE ARROWS

## (undated) DEVICE — PRESSURE TUBING: Brand: TRUWAVE

## (undated) DEVICE — CANNULA SAMP CO2 AD GRN 7FT CO2 AND 7FT O2 TBNG UNIV CONN

## (undated) DEVICE — Device

## (undated) DEVICE — PROC PK CPC OPN HRT CUST

## (undated) DEVICE — Device: Brand: TEMPORARY MYOCARDIAL HEARTWIRE

## (undated) DEVICE — TIP APPL TOP 2 SPRY

## (undated) DEVICE — SUTURE NONABSORBABLE MONOFILAMENT 7-0 BV-1 1X24 IN PROLENE 8702H

## (undated) DEVICE — BLADE ES L2.75IN ELASTOMERIC COAT DURABLE BEND UPTO 90DEG

## (undated) DEVICE — COVER LT HNDL CAM BLU DISP W/ SURG CTRL

## (undated) DEVICE — EZ GLIDE AORTIC CANNULA: Brand: EDWARDS LIFESCIENCES EZ GLIDE AORTIC CANNULA

## (undated) DEVICE — CANNULA PERF L15IN DIA29FR VEN 3 STG THN WALL DSGN W  VENT

## (undated) DEVICE — SUTURE VCRL SZ 0 L18IN ABSRB UD L36MM CT-1 1/2 CIR J840D

## (undated) DEVICE — Z DISCONTINUED APPLICATOR SURG PREP 0.35OZ 2% CHG 70% ISO ALC W/ HI LT

## (undated) DEVICE — KIT APPL 11:1 PROC W/ FIBRIJET MED CUP APPL TIP TY

## (undated) DEVICE — TOWEL,OR,DSP,ST,WHITE,DLX,4/PK,20PK/CS: Brand: MEDLINE

## (undated) DEVICE — STERNUM BLADE, OFFSET (31.7 X 0.64 X 6.3MM)

## (undated) DEVICE — CLIP SM RED INTERN HMOCLP TITAN LIGATING

## (undated) DEVICE — FORCEPS BX L240CM JAW DIA2.4MM ORNG L CAP W/ NDL DISP RAD

## (undated) DEVICE — SURGICAL PROCEDURE PACK PROC SMARTPREP 2

## (undated) DEVICE — CATHETER ETER URIN 18FR ROB NELATON RED RUB ALL PURP

## (undated) DEVICE — MEDI-VAC NON-CONDUCTIVE SUCTION TUBING: Brand: CARDINAL HEALTH

## (undated) DEVICE — DRAIN SURG SGL COLL PT TB FOR ATS BG OASIS

## (undated) DEVICE — DUAL LUMEN STOMACH TUBE: Brand: SALEM SUMP

## (undated) DEVICE — SUTURE GOR TX SZ 2-0 L36IN NONABSORBABLE L18MM TH-18 1/2 3N04B

## (undated) DEVICE — FORCEPS BX L240CM DIA2.4MM L NDL RAD JAW 4 133340

## (undated) DEVICE — CANNULA PERF L125IN OD15FR POLYVI CHL VEN RG AUTO INFL SMOOT

## (undated) DEVICE — DRAPE SLUSH W44XL66IN FOR RECT BSIN ORS HUSH SYS PLATE-DRP

## (undated) DEVICE — DRESSING GERM DIA1IN CNTR H DIA7MM BLU CHG ANTIMIC PROTCT

## (undated) DEVICE — VASOVIEW 2 HEMOPRO MIN ORD 5 EA

## (undated) DEVICE — PENCIL ES L3M ROCK SWCH S STL HEX LOK BLDE ELECTRD HOLSTER

## (undated) DEVICE — SUTURE PROL SZ 6-0 L18IN NONABSORBABLE BLU L13MM C-1 3/8 8718H

## (undated) DEVICE — SUTURE PROL SZ 3-0 L36IN NONABSORBABLE BLU L17MM RB-1 1/2 8558H

## (undated) DEVICE — SUTURE MCRYL SZ 3-0 L27IN ABSRB UD L19MM PS-2 3/8 CIR PRIM Y427H

## (undated) DEVICE — SUTURE NONABSORBABLE MFIL TAPR PNT 3/8 CIR BLU 8.0M BV175-6 8734H

## (undated) DEVICE — STOCKINETTE,DOUBLE PLY,6X48,STERILE: Brand: MEDLINE

## (undated) DEVICE — LARGE BORE STOPCOCK WITH ROTATING MALE LUER LOCK

## (undated) DEVICE — [HIGH FLOW INSUFFLATOR,  DO NOT USE IF PACKAGE IS DAMAGED,  KEEP DRY,  KEEP AWAY FROM SUNLIGHT,  PROTECT FROM HEAT AND RADIOACTIVE SOURCES.]: Brand: PNEUMOSURE

## (undated) DEVICE — 3M™ STERI-DRAPE™ INSTRUMENT POUCH 1018: Brand: STERI-DRAPE™

## (undated) DEVICE — BAG PRSS INFUS 500ML NYL NETTED BK VISIBLE COLOR-CODED G L

## (undated) DEVICE — SPONGE,LAP,18"X18",DLX,XR,ST,5/PK,40/PK: Brand: MEDLINE

## (undated) DEVICE — SURE SET-DOUBLE BASIN-LF: Brand: MEDLINE INDUSTRIES, INC.

## (undated) DEVICE — TUBING SUCT L12FT DIA025IN UNIV W SCALLOPED FEM CONN